# Patient Record
Sex: FEMALE | Race: BLACK OR AFRICAN AMERICAN | Employment: UNEMPLOYED | URBAN - METROPOLITAN AREA
[De-identification: names, ages, dates, MRNs, and addresses within clinical notes are randomized per-mention and may not be internally consistent; named-entity substitution may affect disease eponyms.]

---

## 2017-02-21 ENCOUNTER — HOSPITAL ENCOUNTER (INPATIENT)
Age: 62
LOS: 3 days | Discharge: HOME | DRG: 872 | End: 2017-02-24
Attending: EMERGENCY MEDICINE | Admitting: FAMILY MEDICINE
Payer: COMMERCIAL

## 2017-02-21 DIAGNOSIS — R65.10 SIRS (SYSTEMIC INFLAMMATORY RESPONSE SYNDROME) (HCC): ICD-10-CM

## 2017-02-21 DIAGNOSIS — N12 PYELONEPHRITIS: Primary | ICD-10-CM

## 2017-02-21 LAB
ALBUMIN SERPL BCP-MCNC: 3.8 G/DL (ref 3.4–5)
ALBUMIN/GLOB SERPL: 0.8 {RATIO} (ref 0.8–1.7)
ALP SERPL-CCNC: 47 U/L (ref 45–117)
ALT SERPL-CCNC: 23 U/L (ref 13–56)
ANION GAP BLD CALC-SCNC: 11 MMOL/L (ref 3–18)
APPEARANCE UR: ABNORMAL
AST SERPL W P-5'-P-CCNC: 15 U/L (ref 15–37)
BACTERIA URNS QL MICRO: ABNORMAL /HPF
BASOPHILS # BLD AUTO: 0 K/UL (ref 0–0.1)
BASOPHILS # BLD: 0 % (ref 0–2)
BILIRUB SERPL-MCNC: 0.5 MG/DL (ref 0.2–1)
BILIRUB UR QL: NEGATIVE
BUN SERPL-MCNC: 17 MG/DL (ref 7–18)
BUN/CREAT SERPL: 18 (ref 12–20)
CALCIUM SERPL-MCNC: 9.7 MG/DL (ref 8.5–10.1)
CHLORIDE SERPL-SCNC: 100 MMOL/L (ref 100–108)
CO2 SERPL-SCNC: 24 MMOL/L (ref 21–32)
COLOR UR: YELLOW
CREAT SERPL-MCNC: 0.93 MG/DL (ref 0.6–1.3)
DIFFERENTIAL METHOD BLD: ABNORMAL
EOSINOPHIL # BLD: 0 K/UL (ref 0–0.4)
EOSINOPHIL NFR BLD: 0 % (ref 0–5)
EPITH CASTS URNS QL MICRO: ABNORMAL /LPF (ref 0–5)
ERYTHROCYTE [DISTWIDTH] IN BLOOD BY AUTOMATED COUNT: 13.2 % (ref 11.6–14.5)
GLOBULIN SER CALC-MCNC: 4.5 G/DL (ref 2–4)
GLUCOSE SERPL-MCNC: 99 MG/DL (ref 74–99)
GLUCOSE UR STRIP.AUTO-MCNC: NEGATIVE MG/DL
HCT VFR BLD AUTO: 37.5 % (ref 35–45)
HGB BLD-MCNC: 13.2 G/DL (ref 12–16)
HGB UR QL STRIP: ABNORMAL
KETONES UR QL STRIP.AUTO: ABNORMAL MG/DL
LACTATE BLD-SCNC: 1 MMOL/L (ref 0.4–2)
LEUKOCYTE ESTERASE UR QL STRIP.AUTO: ABNORMAL
LYMPHOCYTES # BLD AUTO: 18 % (ref 21–52)
LYMPHOCYTES # BLD: 3.6 K/UL (ref 0.9–3.6)
MCH RBC QN AUTO: 30.5 PG (ref 24–34)
MCHC RBC AUTO-ENTMCNC: 35.2 G/DL (ref 31–37)
MCV RBC AUTO: 86.6 FL (ref 74–97)
MONOCYTES # BLD: 1.8 K/UL (ref 0.05–1.2)
MONOCYTES NFR BLD AUTO: 9 % (ref 3–10)
NEUTS SEG # BLD: 14.4 K/UL (ref 1.8–8)
NEUTS SEG NFR BLD AUTO: 73 % (ref 40–73)
NITRITE UR QL STRIP.AUTO: POSITIVE
PH UR STRIP: 5.5 [PH] (ref 5–8)
PLATELET # BLD AUTO: 330 K/UL (ref 135–420)
PMV BLD AUTO: 10.9 FL (ref 9.2–11.8)
POTASSIUM SERPL-SCNC: 3.1 MMOL/L (ref 3.5–5.5)
PROT SERPL-MCNC: 8.3 G/DL (ref 6.4–8.2)
PROT UR STRIP-MCNC: 100 MG/DL
RBC # BLD AUTO: 4.33 M/UL (ref 4.2–5.3)
RBC #/AREA URNS HPF: ABNORMAL /HPF (ref 0–5)
SODIUM SERPL-SCNC: 135 MMOL/L (ref 136–145)
SP GR UR REFRACTOMETRY: 1.01 (ref 1–1.03)
UROBILINOGEN UR QL STRIP.AUTO: 1 EU/DL (ref 0.2–1)
WBC # BLD AUTO: 19.9 K/UL (ref 4.6–13.2)
WBC URNS QL MICRO: ABNORMAL /HPF (ref 0–4)

## 2017-02-21 PROCEDURE — 80053 COMPREHEN METABOLIC PANEL: CPT | Performed by: EMERGENCY MEDICINE

## 2017-02-21 PROCEDURE — 96375 TX/PRO/DX INJ NEW DRUG ADDON: CPT

## 2017-02-21 PROCEDURE — 65270000029 HC RM PRIVATE

## 2017-02-21 PROCEDURE — 74011000258 HC RX REV CODE- 258: Performed by: NURSE PRACTITIONER

## 2017-02-21 PROCEDURE — 74011250637 HC RX REV CODE- 250/637: Performed by: NURSE PRACTITIONER

## 2017-02-21 PROCEDURE — 83605 ASSAY OF LACTIC ACID: CPT

## 2017-02-21 PROCEDURE — 99283 EMERGENCY DEPT VISIT LOW MDM: CPT

## 2017-02-21 PROCEDURE — 81001 URINALYSIS AUTO W/SCOPE: CPT | Performed by: EMERGENCY MEDICINE

## 2017-02-21 PROCEDURE — 85025 COMPLETE CBC W/AUTO DIFF WBC: CPT | Performed by: EMERGENCY MEDICINE

## 2017-02-21 PROCEDURE — 96361 HYDRATE IV INFUSION ADD-ON: CPT

## 2017-02-21 PROCEDURE — 74011250636 HC RX REV CODE- 250/636: Performed by: EMERGENCY MEDICINE

## 2017-02-21 PROCEDURE — 96365 THER/PROPH/DIAG IV INF INIT: CPT

## 2017-02-21 PROCEDURE — 74011250636 HC RX REV CODE- 250/636: Performed by: NURSE PRACTITIONER

## 2017-02-21 PROCEDURE — 87040 BLOOD CULTURE FOR BACTERIA: CPT | Performed by: NURSE PRACTITIONER

## 2017-02-21 RX ORDER — LEVOFLOXACIN 5 MG/ML
500 INJECTION, SOLUTION INTRAVENOUS
Status: COMPLETED | OUTPATIENT
Start: 2017-02-21 | End: 2017-02-21

## 2017-02-21 RX ORDER — METOCLOPRAMIDE HYDROCHLORIDE 5 MG/ML
10 INJECTION INTRAMUSCULAR; INTRAVENOUS EVERY 6 HOURS
Status: DISCONTINUED | OUTPATIENT
Start: 2017-02-21 | End: 2017-02-22

## 2017-02-21 RX ORDER — ACETAMINOPHEN 500 MG
500 TABLET ORAL
Status: COMPLETED | OUTPATIENT
Start: 2017-02-21 | End: 2017-02-21

## 2017-02-21 RX ORDER — ACETAMINOPHEN 325 MG/1
650 TABLET ORAL
Status: DISCONTINUED | OUTPATIENT
Start: 2017-02-21 | End: 2017-02-24 | Stop reason: HOSPADM

## 2017-02-21 RX ADMIN — LEVOFLOXACIN 500 MG: 5 INJECTION, SOLUTION INTRAVENOUS at 20:44

## 2017-02-21 RX ADMIN — ACETAMINOPHEN 500 MG: 500 TABLET, FILM COATED ORAL at 19:35

## 2017-02-21 RX ADMIN — SODIUM CHLORIDE 1800 ML: 900 INJECTION, SOLUTION INTRAVENOUS at 19:23

## 2017-02-21 RX ADMIN — METOCLOPRAMIDE 10 MG: 5 INJECTION, SOLUTION INTRAMUSCULAR; INTRAVENOUS at 18:07

## 2017-02-21 RX ADMIN — PIPERACILLIN AND TAZOBACTAM 3.38 G: 3; .375 INJECTION, POWDER, LYOPHILIZED, FOR SOLUTION INTRAVENOUS; PARENTERAL at 19:35

## 2017-02-22 ENCOUNTER — APPOINTMENT (OUTPATIENT)
Dept: ULTRASOUND IMAGING | Age: 62
DRG: 872 | End: 2017-02-22
Attending: FAMILY MEDICINE
Payer: COMMERCIAL

## 2017-02-22 PROBLEM — A41.9 SEPSIS (HCC): Status: ACTIVE | Noted: 2017-02-22

## 2017-02-22 PROCEDURE — 74011250637 HC RX REV CODE- 250/637: Performed by: FAMILY MEDICINE

## 2017-02-22 PROCEDURE — 87086 URINE CULTURE/COLONY COUNT: CPT | Performed by: FAMILY MEDICINE

## 2017-02-22 PROCEDURE — 74011250636 HC RX REV CODE- 250/636: Performed by: EMERGENCY MEDICINE

## 2017-02-22 PROCEDURE — 76770 US EXAM ABDO BACK WALL COMP: CPT

## 2017-02-22 PROCEDURE — 74011250636 HC RX REV CODE- 250/636: Performed by: FAMILY MEDICINE

## 2017-02-22 PROCEDURE — 65270000029 HC RM PRIVATE

## 2017-02-22 RX ORDER — HYDROCODONE BITARTRATE AND ACETAMINOPHEN 5; 325 MG/1; MG/1
1 TABLET ORAL
Status: DISCONTINUED | OUTPATIENT
Start: 2017-02-22 | End: 2017-02-24 | Stop reason: HOSPADM

## 2017-02-22 RX ORDER — SODIUM CHLORIDE 0.9 % (FLUSH) 0.9 %
5-10 SYRINGE (ML) INJECTION AS NEEDED
Status: DISCONTINUED | OUTPATIENT
Start: 2017-02-22 | End: 2017-02-24 | Stop reason: HOSPADM

## 2017-02-22 RX ORDER — LEVOFLOXACIN 5 MG/ML
500 INJECTION, SOLUTION INTRAVENOUS
Status: COMPLETED | OUTPATIENT
Start: 2017-02-22 | End: 2017-02-22

## 2017-02-22 RX ORDER — DULOXETIN HYDROCHLORIDE 20 MG/1
60 CAPSULE, DELAYED RELEASE ORAL DAILY
Status: DISCONTINUED | OUTPATIENT
Start: 2017-02-22 | End: 2017-02-24 | Stop reason: HOSPADM

## 2017-02-22 RX ORDER — ENOXAPARIN SODIUM 100 MG/ML
40 INJECTION SUBCUTANEOUS EVERY 24 HOURS
Status: DISCONTINUED | OUTPATIENT
Start: 2017-02-22 | End: 2017-02-24 | Stop reason: HOSPADM

## 2017-02-22 RX ORDER — OLANZAPINE 5 MG/1
10 TABLET, ORALLY DISINTEGRATING ORAL
Status: DISCONTINUED | OUTPATIENT
Start: 2017-02-22 | End: 2017-02-24 | Stop reason: HOSPADM

## 2017-02-22 RX ORDER — SODIUM CHLORIDE 0.9 % (FLUSH) 0.9 %
5-10 SYRINGE (ML) INJECTION EVERY 8 HOURS
Status: DISCONTINUED | OUTPATIENT
Start: 2017-02-22 | End: 2017-02-24 | Stop reason: HOSPADM

## 2017-02-22 RX ORDER — DOCUSATE SODIUM 100 MG/1
100 CAPSULE, LIQUID FILLED ORAL
Status: DISCONTINUED | OUTPATIENT
Start: 2017-02-22 | End: 2017-02-24 | Stop reason: HOSPADM

## 2017-02-22 RX ORDER — THERA TABS 400 MCG
1 TAB ORAL DAILY
Status: DISCONTINUED | OUTPATIENT
Start: 2017-02-22 | End: 2017-02-24 | Stop reason: HOSPADM

## 2017-02-22 RX ORDER — ACETAMINOPHEN 325 MG/1
650 TABLET ORAL
Status: DISCONTINUED | OUTPATIENT
Start: 2017-02-22 | End: 2017-02-22

## 2017-02-22 RX ADMIN — Medication 10 ML: at 13:20

## 2017-02-22 RX ADMIN — LEVOFLOXACIN 500 MG: 5 INJECTION, SOLUTION INTRAVENOUS at 09:03

## 2017-02-22 RX ADMIN — HYDROCODONE BITARTRATE AND ACETAMINOPHEN 1 TABLET: 5; 325 TABLET ORAL at 11:13

## 2017-02-22 RX ADMIN — HYDROCODONE BITARTRATE AND ACETAMINOPHEN 1 TABLET: 5; 325 TABLET ORAL at 22:59

## 2017-02-22 RX ADMIN — ACETAMINOPHEN 650 MG: 325 TABLET, FILM COATED ORAL at 00:10

## 2017-02-22 RX ADMIN — Medication 10 ML: at 23:01

## 2017-02-22 RX ADMIN — Medication 10 ML: at 09:04

## 2017-02-22 RX ADMIN — METOCLOPRAMIDE 10 MG: 5 INJECTION, SOLUTION INTRAMUSCULAR; INTRAVENOUS at 06:03

## 2017-02-22 RX ADMIN — THERA TABS 1 TABLET: TAB at 09:05

## 2017-02-22 RX ADMIN — DULOXETINE HYDROCHLORIDE 60 MG: 20 CAPSULE, DELAYED RELEASE ORAL at 09:05

## 2017-02-22 RX ADMIN — ACETAMINOPHEN 650 MG: 325 TABLET, FILM COATED ORAL at 06:03

## 2017-02-22 RX ADMIN — METOCLOPRAMIDE 10 MG: 5 INJECTION, SOLUTION INTRAMUSCULAR; INTRAVENOUS at 00:10

## 2017-02-22 RX ADMIN — ENOXAPARIN SODIUM 40 MG: 40 INJECTION SUBCUTANEOUS at 09:04

## 2017-02-22 NOTE — ROUTINE PROCESS
Bedside and Verbal shift change report given to Mattie Hernandez RN (oncoming nurse) by Keegan Granados RN (offgoing nurse). Report included the following information SBAR, Kardex, MAR and Recent Results.     SITUATION:    Code Status: Full Code  Reason for Admission: Pyelonephritis  SIRS (systemic inflammatory response syndrome) ([facility] [facility]   Sepsis ([facility] [facility]    [facility] day: 1   Problem List:       Hospital Problems  Never Reviewed          Codes Class Noted POA    Sepsis ([facility] [facility] ICD-10-CM: A41.9  ICD-9-CM: 038.9, 995.91  2/22/2017 Unknown        Pyelonephritis ICD-10-CM: N12  ICD-9-CM: 590.80  2/21/2017 Unknown        SIRS (systemic inflammatory response syndrome) ([facility] [facility] ICD-10-CM: R65.10  ICD-9-CM: 995.90  2/21/2017 Unknown              BACKGROUND:    Past Medical History:   Past Medical History:   Diagnosis Date    Depression     Gastrointestinal disorder Pancreatitis    Pancreatic disease     Pancreatitis     Psychiatric disorder          Patient taking anticoagulants no     ASSESSMENT:    Changes in Assessment Throughout Shift: none     Patient has Central Line: no Reasons if yes: n/a   Patient has Cisneros Cath: no Reasons if yes: n/a      Last Vitals:     Vitals:    02/22/17 0602 02/22/17 0815 02/22/17 1238 02/22/17 1600   BP: 147/77 133/78 116/77 138/86   Pulse:  76 81 82   Resp: 18 18 18 18   Temp: 99.1 °F (37.3 °C) 98.4 °F (36.9 °C) 97 °F (36.1 °C) 98.4 °F (36.9 °C)   SpO2: 99% 98% 100% 96%   Weight:       Height:            IV and DRAINS (will only show if present)   Peripheral IV 02/21/17 Left Antecubital-Site Assessment: Clean, dry, & intact  Peripheral IV 02/21/17 Right Antecubital-Site Assessment: Clean, dry, & intact     WOUND (if present)   Wound Type:  none   Dressing present Dressing Present : No   Wound Concerns/Notes:  none     PAIN    Pain Assessment    Pain Intensity 1: 0 (02/22/17 1600)    Pain Location 1: Generalized    Pain Intervention(s) 1: Medication (see MAR)    Patient Stated Pain Goal: 0  o Interventions for Pain:  Pain meds  o Intervention effective: yes  o Time of last intervention: 0600   o Reassessment Completed: yes      Last 3 Weights:  Last 3 Recorded Weights in this Encounter    02/21/17 1501   Weight: 51.3 kg (113 lb)     Weight change:      INTAKE/OUPUT    Current Shift:      Last three shifts: 02/20 1901 - 02/22 0700  In: 250 [P.O.:250]  Out: 300 [Urine:300]     LAB RESULTS     Recent Labs      02/21/17   1442   WBC  19.9*   HGB  13.2   HCT  37.5   PLT  330        Recent Labs      02/21/17   1442   NA  135*   K  3.1*   GLU  99   BUN  17   CREA  0.93   CA  9.7       RECOMMENDATIONS AND DISCHARGE PLANNING     1. Pending tests/procedures/ Plan of Care or Other Needs: follow up labs     2. Discharge plan for patient and Needs/Barriers: TBD    3. Estimated Discharge Date: 2/25/17 Posted on Whiteboard in [facility]: yes      4. The patient's care plan was reviewed with the oncoming nurse. \"HEALS\" SAFETY CHECK      Fall Risk    Total Score: 1    Safety Measures: Safety Measures: Bed/Chair-Wheels locked, Bed in low position, Call light within reach    A safety check occurred in the patient's room between off going nurse and oncoming nurse listed above. The safety check included the below items  Area Items   H  High Alert Medications - Verify all high alert medication drips (heparin, PCA, etc.)   E  Equipment - Suction is set up for ALL patients (with yanker)  - Red plugs utilized for all equipment (IV pumps, etc.)  - WOWs wiped down at end of shift.  - Room stocked with oxygen, suction, and other unit-specific supplies   A  Alarms - Bed alarm is set for fall risk patients  - Ensure chair alarm is in place and activated if patient is up in a chair   L  Lines - Check IV for any infiltration  - Cisneros bag is empty if patient has a Cisneros   - Tubing and IV bags are labeled   S  Safety   - Room is clean, patient is clean, and equipment is clean.   - Hallways are clear from equipment besides carts. - Fall bracelet on for fall risk patients  - Ensure room is clear and free of clutter  - Suction is set up for ALL patients (with steve)  - Hallways are clear from equipment besides carts.    - Isolation precautions followed, supplies available outside room, sign posted     Ahmet Saucedo RN

## 2017-02-22 NOTE — PROGRESS NOTES
NUTRITION    BPA/MST Referral       RECOMMENDATIONS / PLAN:     - Add nutrition supplement: Ensure Enlive TID  - Add food preferences  - Continue RD inpatient monitoring and evaluation. NUTRITION INTERVENTIONS & DIAGNOSIS:     [x] Meals/Snacks: regular diet  [x] Medical food supplementation: add  [x] Vitamin and mineral supplementation: multivitamin    Nutrition Diagnosis: Inadequate energy intake related to poor appetite as evidenced by poor meal intake PTA and since admission    ASSESSMENT:     Subjective/Objective:  Patient reported having poor appetite and po intake PTA. Had poor po intake today. Discussed food preferences. Discussed adding nutrition supplement; pt agreed with plan  Current Appetite:   [] Good     [] Fair     [x] Poor     [] Other:  Appetite/meal intake prior to admission:   [] Good     [] Fair     [x] Poor (for a while)    [] Other:    Diet: DIET REGULAR      Food Allergies:  None known   Feeding Limitations:  [] Swallowing difficulty    [] Chewing difficulty    [x] Other: wears dentures; pt stated tolerates regular consistency diet  Current Meal Intake: No data found. Average po intake adequate to meet patients estimated nutritional needs:   [] Yes     [x] No   [] Unable to determine at this time    BM:  Unknown   Skin Integrity:  No pressure ulcer or wound  Edema:  None   Pertinent Medications: Reviewed    Recent Labs      02/21/17   1442   NA  135*   K  3.1*   CL  100   CO2  24   GLU  99   BUN  17   CREA  0.93   CA  9.7   ALB  3.8   SGOT  15   ALT  23       Intake/Output Summary (Last 24 hours) at 02/22/17 1508  Last data filed at 02/22/17 0358   Gross per 24 hour   Intake              250 ml   Output              300 ml   Net              -50 ml       Anthropometrics:  Ht Readings from Last 1 Encounters:   02/21/17 5' 2\" (1.575 m)     Last 3 Recorded Weights in this Encounter    02/21/17 1501   Weight: 51.3 kg (113 lb)     Body mass index is 20.67 kg/(m^2).           Weight History: Patient reported having weighed 72 lb and gaining weight, as planned, and was recently 115 lbs. Reported noticing some weight loss; pt reported that her \"jeans fit a little big\". Weight Metrics 2/21/2017 7/15/2016 4/8/2016 11/24/2015 11/20/2015 1/17/2014 1/16/2014   Weight 113 lb 97 lb 101 lb 3 oz 93 lb 93 lb 3.2 oz - 115 lb   BMI 20.67 kg/m2 17.74 kg/m2 17.93 kg/m2 16.48 kg/m2 16.51 kg/m2 19.73 kg/m2 -   Some encounter information is confidential and restricted. Go to Review Flowsheets activity to see all data. Admitting Diagnosis: Pyelonephritis  SIRS (systemic inflammatory response syndrome) (HCC)  Sepsis ([facility] [address]  Past Medical History:   Diagnosis Date    Depression     Gastrointestinal disorder Pancreatitis    Pancreatic disease     Pancreatitis     Psychiatric disorder        Education Needs:        [x] None identified  [] Identified - Not appropriate at this time  []  Identified and addressed - refer to education log  Learning Limitations:   [x] None identified  [] Identified    Cultural, Adventist & ethnic food preferences:  [x] None identified    [] Identified and addressed     ESTIMATED NUTRITION NEEDS:     Calories: 4369-3925 kcal (MSJx1.2-1.3) based on  [x] Actual BW: 51 kg     [] IBW:   Protein: 41-51 gm (0.8-1 gm/kg) based on  [x] Actual BW:  51 kg    [] IBW:   Fluid: 1 mL/kcal     MONITORING & EVALUATION:     Nutrition Goal(s):   1. Po intake of meals will meet >75% of patient estimated nutritional needs within the next 5-7 days.   Outcome:  [] Met/Ongoing    []  Not Met    [x] New/Initial Goal     Monitoring:   [x] Diet tolerance   [x] Meal intake   [x] Supplement intake   [] GI symptoms/ability to tolerate po diet   [] Respiratory status   [] Plan of care      Previous Recommendations (for follow-up assessments only):     []   Implemented       []   Not Implemented (RD to address)     [] No Recommendation Made     Discharge Planning:   Regular diet   [x] Participated in care planning, discharge planning, & interdisciplinary rounds as appropriate      Shawna Gamboa, [address] [place] [address]   Pager: [phone]

## 2017-02-22 NOTE — ROUTINE PROCESS
Bedside and Verbal shift change report given to [address] (oncoming nurse) by Ольга Atkins RN (offgoing nurse). Report included the following information SBAR, Kardex, MAR and Recent Results.     SITUATION:    Code Status: No Order   Reason for Admission: Pyelonephritis   SIRS (systemic inflammatory response syndrome) ([facility] [address]     Hospital day: 1   Problem List:       Hospital Problems  Never Reviewed          Codes Class Noted POA    Pyelonephritis ICD-10-CM: N12  ICD-9-CM: 590.80  2/21/2017 Unknown        SIRS (systemic inflammatory response syndrome) ([facility]) ICD-10-CM: R65.10  ICD-9-CM: 995.90  2/21/2017 Unknown              BACKGROUND:    Past Medical History:   Past Medical History:   Diagnosis Date    Depression     Gastrointestinal disorder Pancreatitis    Pancreatic disease     Pancreatitis     Psychiatric disorder          Patient taking anticoagulants no     ASSESSMENT:    Changes in Assessment Throughout Shift: none     Patient has Central Line: no Reasons if yes: n/a   Patient has Cisneros Cath: no Reasons if yes: n/a      Last Vitals:     Vitals:    02/21/17 1501 02/21/17 1922 02/21/17 2128 02/22/17 0009   BP: (!) 158/91 (!) 142/93 169/82 150/82   Pulse: (!) 113 (!) 103 84    Resp: 16 18 18 18   Temp: 98.6 °F (37 °C) 100.3 °F (37.9 °C) 99 °F (37.2 °C) 99.4 °F (37.4 °C)   SpO2: 97% 100% 100% 99%   Weight: 51.3 kg (113 lb)      Height: 5' 2\" (1.575 m)           IV and DRAINS (will only show if present)   Peripheral IV 02/21/17 Left Antecubital-Site Assessment: Clean, dry, & intact  Peripheral IV 02/21/17 Right Antecubital-Site Assessment: Clean, dry, & intact     WOUND (if present)   Wound Type:  none   Dressing present Dressing Present : No   Wound Concerns/Notes:  none     PAIN    Pain Assessment    Pain Intensity 1: 0 (02/22/17 0358)    Pain Location 1: Head    Pain Intervention(s) 1: Medication (see MAR)    Patient Stated Pain Goal: 1  o Interventions for Pain:  Pain meds  o Intervention effective: yes  o Time of last intervention: 0600   o Reassessment Completed: yes      Last 3 Weights:  Last 3 Recorded Weights in this Encounter    02/21/17 1501   Weight: 51.3 kg (113 lb)     Weight change:      INTAKE/OUPUT    Current Shift: 02/21 1901 - 02/22 0700  In: 250 [P.O.:250]  Out: 300 [Urine:300]    Last three shifts:       LAB RESULTS     Recent Labs      02/21/17   1442   WBC  19.9*   HGB  13.2   HCT  37.5   PLT  330        Recent Labs      02/21/17   1442   NA  135*   K  3.1*   GLU  99   BUN  17   CREA  0.93   CA  9.7       RECOMMENDATIONS AND DISCHARGE PLANNING     1. Pending tests/procedures/ Plan of Care or Other Needs: follow up labs     2. Discharge plan for patient and Needs/Barriers: TBD    3. Estimated Discharge Date: 2/25/17 Posted on Whiteboard in [facility]: yes      4. The patient's care plan was reviewed with the oncoming nurse. \"HEALS\" SAFETY CHECK      Fall Risk    Total Score: 1    Safety Measures: Safety Measures: Bed/Chair-Wheels locked, Bed in low position, Call light within reach    A safety check occurred in the patient's room between off going nurse and oncoming nurse listed above. The safety check included the below items  Area Items   H  High Alert Medications - Verify all high alert medication drips (heparin, PCA, etc.)   E  Equipment - Suction is set up for ALL patients (with yanker)  - Red plugs utilized for all equipment (IV pumps, etc.)  - WOWs wiped down at end of shift.  - Room stocked with oxygen, suction, and other unit-specific supplies   A  Alarms - Bed alarm is set for fall risk patients  - Ensure chair alarm is in place and activated if patient is up in a chair   L  Lines - Check IV for any infiltration  - Cisneros bag is empty if patient has a Cisneros   - Tubing and IV bags are labeled   S  Safety   - Room is clean, patient is clean, and equipment is clean. - Hallways are clear from equipment besides carts.    - Fall bracelet on for fall risk patients  - Ensure room is clear and free of clutter  - Suction is set up for ALL patients (with huyker)  - Hallways are clear from equipment besides carts.    - Isolation precautions followed, supplies available outside room, sign posted     Nelson Tobias RN

## 2017-02-22 NOTE — ED PROVIDER NOTES
HPI Comments: Pt with a PMH of depression presents with the chief complaint of headache since yesterday and incidental blurred vision for several months that is unchanged. Pt was recently seen by her PCP 4 days ago with the complaint of dysuria and foot rash and placed on unknown antibiotic for \"blood infection\". Pt states that no urine was checked at that time. She states that she has been feeling progressively worse since then. She denies any upper respiratory symptoms, cough, chest or abdominal pain, nausea, vomiting or diarrhea. Pt also denies any weakness, numbness or tingling. Pt was unaware of her fever and denies chills. No other complaints at this time. Patient is a 57 y.o. female presenting with headaches, blurred vision, and vomiting. Headache   Associated symptoms include headaches. Pertinent negatives include no chest pain, no abdominal pain and no shortness of breath. Blurred Vision    Associated symptoms include blurred vision, nausea, vomiting and a fever. Pertinent negatives include no numbness, no photophobia, no eye redness, no weakness, no pain and no dizziness. Vomiting    Associated symptoms include a fever, headaches and headaches. Pertinent negatives include no chills, no abdominal pain, no diarrhea and no cough. Past Medical History:   Diagnosis Date    Depression     Gastrointestinal disorder Pancreatitis    Pancreatic disease     Pancreatitis     Psychiatric disorder        Past Surgical History:   Procedure Laterality Date    Hx gyn      Hx hysterectomy           Family History:   Problem Relation Age of Onset    Diabetes Maternal Aunt     Cancer Maternal Aunt     Alzheimer Maternal Aunt     Cancer Paternal Aunt     Diabetes Paternal Aunt        Social History     Social History    Marital status:      Spouse name: N/A    Number of children: N/A    Years of education: N/A     Occupational History    Not on file.      Social History Main Topics    Smoking status: Current Every Day Smoker     Packs/day: 1.00    Smokeless tobacco: Not on file    Alcohol use No      Comment: Per pt she quit in 2007    Drug use: No    Sexual activity: Not Currently     Other Topics Concern    Not on file     Social History Narrative         ALLERGIES: Review of patient's allergies indicates no known allergies. Review of Systems   Constitutional: Positive for activity change, appetite change and fever. Negative for chills and diaphoresis. HENT: Negative for congestion, ear pain, rhinorrhea, sinus pressure and sore throat. Eyes: Positive for blurred vision and visual disturbance. Negative for photophobia, pain and redness. Bilateral blurriness for several months, unchanged per pt   Respiratory: Negative. Negative for cough, chest tightness, shortness of breath, wheezing and stridor. Cardiovascular: Negative. Negative for chest pain and palpitations. Vomiting x 1 yesterday, no appetite today   Gastrointestinal: Positive for nausea and vomiting. Negative for abdominal pain and diarrhea. Genitourinary: Positive for dysuria and flank pain. Negative for difficulty urinating, hematuria, pelvic pain and vaginal discharge. Musculoskeletal: Positive for back pain. Negative for neck pain and neck stiffness. Bilateral flank pain   Skin: Negative. Neurological: Positive for headaches. Negative for dizziness, tremors, seizures, syncope, facial asymmetry, speech difficulty, weakness, light-headedness and numbness. Hematological: Does not bruise/bleed easily. All other systems reviewed and are negative. Vitals:    02/21/17 1501 02/21/17 1922   BP: (!) 158/91 (!) 142/93   Pulse: (!) 113 (!) 103   Resp: 16 18   Temp: 98.6 °F (37 °C) 100.3 °F (37.9 °C)   SpO2: 97% 100%   Weight: 51.3 kg (113 lb)    Height: 5' 2\" (1.575 m)             Physical Exam   Constitutional: She is oriented to person, place, and time.  She appears well-developed and well-nourished. No distress. HENT:   Head: Normocephalic and atraumatic. Mouth/Throat: Oropharynx is clear and moist.   Eyes: Conjunctivae and EOM are normal. Pupils are equal, round, and reactive to light. Right eye exhibits no discharge. Left eye exhibits no discharge. Neck: Normal range of motion. Neck supple. Cardiovascular: Regular rhythm and normal heart sounds. Exam reveals no gallop and no friction rub. No murmur heard. Tachycardia with fever   Pulmonary/Chest: Effort normal and breath sounds normal. No stridor. No respiratory distress. She has no wheezes. She has no rales. She exhibits no tenderness. Abdominal: Soft. Bowel sounds are normal. She exhibits no distension and no mass. There is no tenderness. There is no rebound and no guarding. Genitourinary:   Genitourinary Comments: +bilateral flank pain   Musculoskeletal: Normal range of motion. She exhibits no edema or tenderness. Neurological: She is alert and oriented to person, place, and time. Coordination normal.   Skin: Skin is warm and dry. She is not diaphoretic. Psychiatric: She has a normal mood and affect. Her behavior is normal. Thought content normal.   Nursing note and vitals reviewed. MDM  Number of Diagnoses or Management Options  Pyelonephritis:   SIRS (systemic inflammatory response syndrome) [facility]):   Diagnosis management comments: Discussed with Dr. Quan Nicholson immediately after seeing pt-LA is normal but will treat as SIRS due to labs and vital signs. Pt in NAD   Pt given IV Levaquin and Zosyn, 1800 cc NS bolus, discussed and admitted to Dr. David Mccoy who concurred with plan.         Amount and/or Complexity of Data Reviewed  Clinical lab tests: ordered and reviewed    Risk of Complications, Morbidity, and/or Mortality  Presenting problems: moderate  Diagnostic procedures: moderate  Management options: moderate    Patient Progress  Patient progress: stable    ED Course       Procedures                       Diagnosis: 1. Pyelonephritis    2. SIRS (systemic inflammatory response syndrome) ([facility])          Disposition: admit      Follow-up Information     None          Patient's Medications   Start Taking    No medications on file   Continue Taking    DOCUSATE SODIUM (COLACE) 100 MG CAPSULE    Take 100 mg by mouth two (2) times daily as needed for Constipation. DULOXETINE (CYMBALTA) 60 MG CAPSULE    Take 1 Cap by mouth daily. Indications: MAJOR DEPRESSIVE DISORDER    OLANZAPINE (ZYPREXA ZYDIS) 10 MG DISINTEGRATING TABLET    Take 1 Tab by mouth nightly. Indications: Psychosis    THERAPEUTIC MULTIVITAMIN (THERAGRAN) TABLET    Take 1 Tab by mouth daily.  Indications: VITAMIN DEFICIENCY PREVENTION   These Medications have changed    No medications on file   Stop Taking    No medications on file

## 2017-02-22 NOTE — INTERDISCIPLINARY ROUNDS
IDR/SLIDR Summary          Patient: Leopoldo Hernandez MRN: [id number]    Age: 57 y.o. YOB: 1955 Room/Bed: [facility]   Admit Diagnosis: Pyelonephritis  SIRS (systemic inflammatory response syndrome) (HCC)  Sepsis (HCC)  Principal Diagnosis: <principal problem not specified>   Goals: infection control  Readmission: NO  Quality Measure: SEPSIS  VTE Prophylaxis: Not needed  Influenza Vaccine screening completed? YES  Pneumococcal Vaccine screening completed? YES  Mobility needs: No   Nutrition plan:Yes  Consults:    Financial concerns:No  Escalated to CM? NO  RRAT Score: 12   Interventions:  Testing due for pt today?  NO  LOS: 1 days Expected length of stay 3 days  Discharge plan: home   PCP: José Miguel Carreon MD  Transportation needs: No    Days before discharge:two or more days before discharge   Discharge disposition: Home    Signed:     Janki Quintero RN  2/22/2017  10:21 PM

## 2017-02-22 NOTE — PROGRESS NOTES
Care Management Interventions  PCP Verified by CM: Yes (DR. Barbra Song)  Palliative Care Consult (Criteria: CHF and RRAT>21): No  Reason for No Palliative Care Consult: Other (see comment) (NO ORDER)  Mode of Transport at Discharge:  Other (see comment) (FRIENDS OR PT'S FAMILY WILL TRANSPORT )  Transition of Care Consult (CM Consult): Discharge Planning  Current Support Network: Own Home, Family Lives Nearby  Confirm Follow Up Transport: Self  Plan discussed with Pt/Family/Caregiver: Yes (PT [facility])  Discharge Location  Discharge Placement: Home with family assistance

## 2017-02-22 NOTE — PROGRESS NOTES
H&P dictated  1. UTI sepsis  2. Dehydratio  3. Mod protein selwyn malnutrition  4.  Depression    Plan  See orders

## 2017-02-22 NOTE — INTERDISCIPLINARY ROUNDS
IDR/SLIDR Summary          Patient: Eyal Vinson MRN: [id number]    Age: 57 y.o. YOB: 1955 Room/Bed: [facility]   Admit Diagnosis: Pyelonephritis  SIRS (systemic inflammatory response syndrome) (HCC)  Principal Diagnosis: <principal problem not specified>   Goals: infection control  Readmission: NO  Quality Measure: SEPSIS  VTE Prophylaxis: Not needed  Influenza Vaccine screening completed? YES  Pneumococcal Vaccine screening completed? YES  Mobility needs: No   Nutrition plan:Yes  Consults:    Financial concerns:No  Escalated to ? NO  RRAT Score: 12   Interventions:  Testing due for pt today?  NO  LOS: 0 days Expected length of stay 3 days  Discharge plan: home   PCP: Heather Rosa MD  Transportation needs: No    Days before discharge:two or more days before discharge   Discharge disposition: Home    Signed:     Mike Rain RN  2/21/2017  10:21 PM

## 2017-02-22 NOTE — H&P
[facility]  ROUTINE H AND PS    Name:  Rubén Dumont  MR#:  [id number]  :  1955  Account #:  [de-identified]  Date of Adm:  2017      CHIEF COMPLAINT: Not feeling well, dysuria and headaches. HISTORY OF PRESENT ILLNESS: This is a 62-year-old black female  with history of depression and COPD, who was seen by Dr. Dorothea Singleton 4  days prior to admission with dysuria and was given medication for  urinary tract infection. The patient is not feeling any better. She has  lost her appetite, not eating well. She presented to the ER with  tachycardia, low-grade fever and evidence of urinary tract infection for  which the patient was admitted for further treatment. PAST MEDICAL HISTORY: History of depression, COPD,  hysterectomy for fibroids, depression. FAMILY HISTORY: Father  of heart failure. Mother is alive and  well. SOCIAL HISTORY: She is a . 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 She cannot hold a job. She has  been hospitalized for depression. She had one daughter  who committed suicide. Her   of cancer. She does not  drink alcohol. REVIEW OF SYSTEMS  HEENT: Headaches. Vision is poor. No hearing problem. CARDIOVASCULAR/RESPIRATORY: No chest pain. No shortness of  breath. GASTROINTESTINAL: She had vomiting one time yesterday. GENITOURINARY: She has dysuria and flank pain. MUSCULOSKELETAL: No complaints. PHYSICAL EXAMINATION  GENERAL: The patient is awake and alert. VITAL SIGNS: On admission, her blood pressure is 158/91, pulse is  113, temperature is 100.3. She looks malnourished. HEENT: Head is normocephalic. Eyes, ocular movements intact. Ears,  no discharge. Nose, no septal deviation. Mouth, edentulous. NECK: Thyroid not enlarged. CHEST: Symmetrical. No breast masses. HEART: Regular. No murmur. LUNGS: Clear. ABDOMEN: Revealed midline scar below the umbilicus. EXTREMITIES: Revealed no edema.     LABORATORY DATA: Review of laboratory data revealed WBC too  numerous to count, large blood, positive nitrites, hemoglobin is 13 g,  white count 19,000. Chemistry: BUN 17, creatinine 0.93. IMPRESSION  1. Sepsis, urinary source, which includes tachycardia, leukocytosis,  and fever. 2. Chronic obstructive pulmonary disease. 3. Hypokalemia. 4. Moderate protein-calorie malnutrition. DISCUSSION: The patient will need IV fluids, IV antibiotics, and  potassium replacement.         MD REID Gottlieb / RENAN  D:  02/22/2017   11:10  T:  02/22/2017   11:36  Job #:  [id number]

## 2017-02-23 PROBLEM — S06.0XAA CONCUSSION: Chronic | Status: ACTIVE | Noted: 2017-02-23

## 2017-02-23 PROBLEM — S06.0XAA CONCUSSION: Status: RESOLVED | Noted: 2017-02-23 | Resolved: 2017-02-23

## 2017-02-23 PROBLEM — N39.0 UTI (URINARY TRACT INFECTION): Status: ACTIVE | Noted: 2017-02-23

## 2017-02-23 PROBLEM — S06.0XAA CONCUSSION: Status: ACTIVE | Noted: 2017-02-23

## 2017-02-23 LAB
BASOPHILS # BLD AUTO: 0 K/UL (ref 0–0.06)
BASOPHILS # BLD: 0 % (ref 0–2)
DIFFERENTIAL METHOD BLD: ABNORMAL
EOSINOPHIL # BLD: 0 K/UL (ref 0–0.4)
EOSINOPHIL NFR BLD: 0 % (ref 0–5)
ERYTHROCYTE [DISTWIDTH] IN BLOOD BY AUTOMATED COUNT: 13 % (ref 11.6–14.5)
HCT VFR BLD AUTO: 33.9 % (ref 35–45)
HGB BLD-MCNC: 11.8 G/DL (ref 12–16)
LYMPHOCYTES # BLD AUTO: 33 % (ref 21–52)
LYMPHOCYTES # BLD: 3.6 K/UL (ref 0.9–3.6)
MCH RBC QN AUTO: 30.2 PG (ref 24–34)
MCHC RBC AUTO-ENTMCNC: 34.8 G/DL (ref 31–37)
MCV RBC AUTO: 86.7 FL (ref 74–97)
MONOCYTES # BLD: 0.9 K/UL (ref 0.05–1.2)
MONOCYTES NFR BLD AUTO: 9 % (ref 3–10)
NEUTS SEG # BLD: 6.4 K/UL (ref 1.8–8)
NEUTS SEG NFR BLD AUTO: 58 % (ref 40–73)
PLATELET # BLD AUTO: 286 K/UL (ref 135–420)
PMV BLD AUTO: 10.3 FL (ref 9.2–11.8)
RBC # BLD AUTO: 3.91 M/UL (ref 4.2–5.3)
WBC # BLD AUTO: 11 K/UL (ref 4.6–13.2)

## 2017-02-23 PROCEDURE — 74011250637 HC RX REV CODE- 250/637: Performed by: FAMILY MEDICINE

## 2017-02-23 PROCEDURE — 65270000029 HC RM PRIVATE

## 2017-02-23 PROCEDURE — 74011250636 HC RX REV CODE- 250/636: Performed by: FAMILY MEDICINE

## 2017-02-23 PROCEDURE — 85025 COMPLETE CBC W/AUTO DIFF WBC: CPT | Performed by: FAMILY MEDICINE

## 2017-02-23 PROCEDURE — 36415 COLL VENOUS BLD VENIPUNCTURE: CPT | Performed by: FAMILY MEDICINE

## 2017-02-23 RX ADMIN — ENOXAPARIN SODIUM 40 MG: 40 INJECTION SUBCUTANEOUS at 08:00

## 2017-02-23 RX ADMIN — OLANZAPINE 10 MG: 5 TABLET, ORALLY DISINTEGRATING ORAL at 00:42

## 2017-02-23 RX ADMIN — THERA TABS 1 TABLET: TAB at 09:00

## 2017-02-23 RX ADMIN — Medication 10 ML: at 23:18

## 2017-02-23 RX ADMIN — OLANZAPINE 10 MG: 5 TABLET, ORALLY DISINTEGRATING ORAL at 23:18

## 2017-02-23 RX ADMIN — Medication 10 ML: at 14:00

## 2017-02-23 RX ADMIN — DULOXETINE HYDROCHLORIDE 60 MG: 20 CAPSULE, DELAYED RELEASE ORAL at 09:00

## 2017-02-23 RX ADMIN — Medication 10 ML: at 06:00

## 2017-02-23 NOTE — PROGRESS NOTES
visited with patient Samuel Alegre who is 57 y.o. of age and Cheondoism is Mandaen   When I entered the room the patient was crawled on the chair by the window and under her blanket. Patient appeared to be alert and aware of her surroundings. Patient shared limited information about both her medical history and spiritual beliefs; and her beliefs will not hinder her medical treatment. Patient talked about family. She has no children and her sadness started after her daughter committed suicide. She felt that she became sad and angry about life situations.  confirmed patient's Cheondoism affiliation, [employer].   I sensed patient to be sad about her prognosis because she mentioned that her medication is giving her headaches and her previous depressive medication was giving her hallucinations.  shared about the Behavioral Medicine floor and patient would like to know more about it. She has not seen her psychiatric to change her previous medicine for depression.  shared patient's concerns to the nurse, , and charge nurse. Patient appreciated my visit   Chaplains will continue to follow and will provide pastoral care as needed.     [address] [facility] Kulwinder Carranza  [phone]

## 2017-02-23 NOTE — PROGRESS NOTES
attended the interdisciplinary rounds for John Armenta, who is a 57 y.o.,female. Patients Primary Language is: [place]. According to the patients EMR Baptism Affiliation is: [facility].   Patient was sitting by the window when IDR team arrived.  shared that she contacted her nutritionist and left a note for her doctor. Patient appreciated 's care and asked to come back to check on her next time. Plan:  Chaplains will continue to follow and will provide pastoral care.     [address] [facility] Kulwinder Carranza  [phone]

## 2017-02-23 NOTE — ROUTINE PROCESS
Bedside and Verbal shift change report given to Suri Oviedo RN (oncoming nurse) by Maryan Kidd RN (offgoing nurse). Report included the following information SBAR, Kardex, MAR and Recent Results.     SITUATION:    Code Status: Full Code  Reason for Admission: Pyelonephritis  SIRS (systemic inflammatory response syndrome) ([facility]   Sepsis ([facility]    [facility] day: 2   Problem List:       Hospital Problems  Never Reviewed          Codes Class Noted POA    Sepsis ([facility] [facility] ICD-10-CM: A41.9  ICD-9-CM: 038.9, 995.91  2/22/2017 Unknown        Pyelonephritis ICD-10-CM: N12  ICD-9-CM: 590.80  2/21/2017 Unknown        SIRS (systemic inflammatory response syndrome) (HCC) ICD-10-CM: R65.10  ICD-9-CM: 995.90  2/21/2017 Unknown              BACKGROUND:    Past Medical History:   Past Medical History:   Diagnosis Date    Depression     Gastrointestinal disorder Pancreatitis    Pancreatic disease     Pancreatitis     Psychiatric disorder          Patient taking anticoagulants no     ASSESSMENT:    Changes in Assessment Throughout Shift: none     Patient has Central Line: no Reasons if yes: n/a   Patient has Cisneros Cath: no Reasons if yes: n/a      Last Vitals:     Vitals:    02/22/17 1951 02/23/17 0009 02/23/17 0418 02/23/17 0757   BP: 123/65 121/69 118/78 119/72   Pulse: 70 70 73 75   Resp: 18 18 18 18   Temp: 98.9 °F (37.2 °C) 98.3 °F (36.8 °C) 98.6 °F (37 °C) 98.6 °F (37 °C)   SpO2: 100% 98% 99% 99%   Weight:       Height:            IV and DRAINS (will only show if present)   Peripheral IV 02/21/17 Left Antecubital-Site Assessment: Clean, dry, & intact  Peripheral IV 02/21/17 Right Antecubital-Site Assessment: Clean, dry, & intact     WOUND (if present)   Wound Type:  none   Dressing present Dressing Present : No   Wound Concerns/Notes:  none     PAIN    Pain Assessment    Pain Intensity 1: 0 (02/23/17 0923)    Pain Location 1: Head    Pain Intervention(s) 1: Medication (see MAR)    Patient Stated Pain Goal: 0  o Interventions for Pain:  Pain meds  o Intervention effective: yes  o Time of last intervention: 2259  o Reassessment Completed: yes      Last 3 Weights:  Last 3 Recorded Weights in this Encounter    02/21/17 1501   Weight: 51.3 kg (113 lb)     Weight change:      INTAKE/OUPUT    Current Shift:      Last three shifts: 02/21 1901 - 02/23 0700  In: 250 [P.O.:250]  Out: 300 [Urine:300]     LAB RESULTS     Recent Labs      02/21/17   1442   WBC  19.9*   HGB  13.2   HCT  37.5   PLT  330        Recent Labs      02/21/17   1442   NA  135*   K  3.1*   GLU  99   BUN  17   CREA  0.93   CA  9.7       RECOMMENDATIONS AND DISCHARGE PLANNING     1. Pending tests/procedures/ Plan of Care or Other Needs: follow up labs     2. Discharge plan for patient and Needs/Barriers: TBD    3. Estimated Discharge Date: 2/25/17 Posted on Whiteboard in [facility]: yes      4. The patient's care plan was reviewed with the oncoming nurse. \"HEALS\" SAFETY CHECK      Fall Risk    Total Score: 1    Safety Measures: Safety Measures: Bed/Chair-Wheels locked, Bed in low position, Call light within reach    A safety check occurred in the patient's room between off going nurse and oncoming nurse listed above. The safety check included the below items  Area Items   H  High Alert Medications - Verify all high alert medication drips (heparin, PCA, etc.)   E  Equipment - Suction is set up for ALL patients (with yanker)  - Red plugs utilized for all equipment (IV pumps, etc.)  - WOWs wiped down at end of shift.  - Room stocked with oxygen, suction, and other unit-specific supplies   A  Alarms - Bed alarm is set for fall risk patients  - Ensure chair alarm is in place and activated if patient is up in a chair   L  Lines - Check IV for any infiltration  - Cisneros bag is empty if patient has a Cisneros   - Tubing and IV bags are labeled   S  Safety   - Room is clean, patient is clean, and equipment is clean.   - Hallways are clear from equipment besides carts. - Fall bracelet on for fall risk patients  - Ensure room is clear and free of clutter  - Suction is set up for ALL patients (with steve)  - Hallways are clear from equipment besides carts.    - Isolation precautions followed, supplies available outside room, sign posted     Dev Ruiz RN

## 2017-02-23 NOTE — PROGRESS NOTES
Justo Ojeda M.D. PROGRESS NOTE    Name: Jo Chandra MRN: [id number]   : 1955 Hospital: [facility]   Date: 2017  Admission Date: 2017     Subjective/Objective/Plans  1. UTI sepsis  2. Dehydratio  3. Mod protein selwyn malnutrition  4. Depression  5. Hypokalemia    No report of UC  HA, she feel depressed does not think her pills for depression is working  Still with dysuria    Plan  Psych consult  Levaquin for UTI  Cymbalta and Zyprexa  [facility]  Vital Signs:  Visit Vitals    /72 (BP 1 Location: Left arm, BP Patient Position: At rest)    Pulse 75    Temp 98.6 °F (37 °C)    Resp 18    Ht 5' 2\" (1.575 m)    Wt 51.3 kg (113 lb)    SpO2 99%    BMI 20.67 kg/m2       O2 Device: Room air       Temp (24hrs), Av.3 °F (36.8 °C), Min:97 °F (36.1 °C), Max:98.9 °F (37.2 °C)     9:08 AM  Intake/Output:   Last shift:         Last 3 shifts:  1901 -  0700  In: 250 [P.O.:250]  Out: 300 [Urine:300]  No intake or output data in the 24 hours ending 17 0908      Physical Exam:    General: in no apparent distress    HEENT: pupils equal, no ear discharge   Neck: No abnormally enlarged lymph nodes. , no JDV   Mouth: MMM no lesions    Chest: normal, no breast masses   Lungs: normal air entry   Heart: Regular rate and rhythm   Abdomen: abdomen is soft without significant tenderness, masses, organomegaly or guarding   Extremity: negative   Neuro: alert    Skin: Skin color, texture, turgor normal. No rashes or lesions    Data Review:    Labs: Results:       Chemistry Recent Labs      17   1442   GLU  99   NA  135*   K  3.1*   CL  100   CO2  24   BUN  17   CREA  0.93   CA  9.7   AGAP  11   BUCR  18   TBILI  0.5   AP  47   TP  8.3*   ALB  3.8   GLOB  4.5*   AGRAT  0.8      CBC w/Diff Recent Labs      17   1442   WBC  19.9*   RBC  4.33   HGB  13.2   HCT  37.5   PLT  330   GRANS  73   LYMPH  18*   EOS  0      Cardiac Enzymes No results for input(s): CPK, CKND1, MIKE in the last 72 hours.    No lab exists for component: CKRMB, TROIP   Coagulation No results for input(s): PTP, INR, APTT in the last 72 hours. No lab exists for component: INREXT    Lipid Panel Lab Results   Component Value Date/Time    Cholesterol, total 208 08/30/2016 06:50 AM    HDL Cholesterol 62 08/30/2016 06:50 AM    LDL, calculated 134.6 08/30/2016 06:50 AM    VLDL, calculated 11.4 08/30/2016 06:50 AM    Triglyceride 57 08/30/2016 06:50 AM    CHOL/HDL Ratio 3.4 08/30/2016 06:50 AM      BNP No results for input(s): BNPP in the last 72 hours. Liver Enzymes Recent Labs      02/21/17   1442   TP  8.3*   ALB  3.8   TBILI  0.5   AP  47   SGOT  15   ALT  23      Thyroid Studies Lab Results   Component Value Date/Time    TSH 0.47 04/08/2016 11:57 AM          Procedures/imaging: see electronic medical records for all procedures, Xrays and details which were not copied into this note but were reviewed. Allergies:  No Known Allergies    Home Medications:  Prior to Admission Medications   Prescriptions Last Dose Informant Patient Reported? Taking? DULoxetine (CYMBALTA) 60 mg capsule   No No   Sig: Take 1 Cap by mouth daily. Indications: MAJOR DEPRESSIVE DISORDER   OLANZapine (ZYPREXA ZYDIS) 10 mg disintegrating tablet   No No   Sig: Take 1 Tab by mouth nightly. Indications: Psychosis   docusate sodium (COLACE) 100 mg capsule   Yes No   Sig: Take 100 mg by mouth two (2) times daily as needed for Constipation. therapeutic multivitamin (THERAGRAN) tablet   No No   Sig: Take 1 Tab by mouth daily.  Indications: VITAMIN DEFICIENCY PREVENTION      Facility-Administered Medications: None       Current Medications:  Current Facility-Administered Medications   Medication Dose Route Frequency    docusate sodium (COLACE) capsule 100 mg  100 mg Oral BID PRN    DULoxetine (CYMBALTA) capsule 60 mg  60 mg Oral DAILY    OLANZapine (ZyPREXA zydis) disintegrating tablet 10 mg  10 mg Oral QHS    therapeutic multivitamin (THERAGRAN) tablet 1 Tab  1 Tab Oral DAILY    sodium chloride (NS) flush 5-10 mL  5-10 mL IntraVENous Q8H    sodium chloride (NS) flush 5-10 mL  5-10 mL IntraVENous PRN    HYDROcodone-acetaminophen (NORCO) 5-325 mg per tablet 1 Tab  1 Tab Oral Q4H PRN    enoxaparin (LOVENOX) injection 40 mg  40 mg SubCUTAneous Q24H    acetaminophen (TYLENOL) tablet 650 mg  650 mg Oral Q4H PRN       Chart and notes reviewed. Data reviewed. I have evaluated and examined the patient.         IMPRESSION:   Patient Active Problem List   Diagnosis Code    Depression F32.9    Itch of skin L29.9    Anxiety state F41.1    Weight loss, unintentional R63.4    Major depressive disorder, recurrent episode, severe, with psychosis ([facility] [facility] F33.3    Pyelonephritis N12    SIRS (systemic inflammatory response syndrome) (HCC) R65.10    Sepsis ([facility] [address] A41.9 ·         PLAN:/DISCUSSION:   · Psych consult  · [facility]  · DC in am        Alex Conley MD  2/23/2017, 9:08 AM

## 2017-02-24 VITALS
WEIGHT: 113 LBS | SYSTOLIC BLOOD PRESSURE: 130 MMHG | HEART RATE: 115 BPM | OXYGEN SATURATION: 100 % | RESPIRATION RATE: 18 BRPM | BODY MASS INDEX: 20.8 KG/M2 | HEIGHT: 62 IN | DIASTOLIC BLOOD PRESSURE: 86 MMHG | TEMPERATURE: 98.3 F

## 2017-02-24 LAB
BACTERIA SPEC CULT: NORMAL
SERVICE CMNT-IMP: NORMAL

## 2017-02-24 PROCEDURE — 74011250637 HC RX REV CODE- 250/637: Performed by: FAMILY MEDICINE

## 2017-02-24 PROCEDURE — 74011250636 HC RX REV CODE- 250/636: Performed by: FAMILY MEDICINE

## 2017-02-24 RX ORDER — LEVOFLOXACIN 500 MG/1
500 TABLET, FILM COATED ORAL DAILY
Qty: 5 TAB | Refills: 0 | Status: SHIPPED | OUTPATIENT
Start: 2017-02-24 | End: 2017-03-01

## 2017-02-24 RX ORDER — POTASSIUM CHLORIDE 20 MEQ/1
20 TABLET, EXTENDED RELEASE ORAL 2 TIMES DAILY
Qty: 10 TAB | Refills: 0 | Status: ON HOLD | OUTPATIENT
Start: 2017-02-24 | End: 2021-11-30 | Stop reason: SDUPTHER

## 2017-02-24 RX ORDER — POTASSIUM CHLORIDE 20 MEQ/1
20 TABLET, EXTENDED RELEASE ORAL 2 TIMES DAILY
Status: DISCONTINUED | OUTPATIENT
Start: 2017-02-24 | End: 2017-02-24 | Stop reason: HOSPADM

## 2017-02-24 RX ADMIN — Medication 10 ML: at 13:27

## 2017-02-24 RX ADMIN — ENOXAPARIN SODIUM 40 MG: 40 INJECTION SUBCUTANEOUS at 08:37

## 2017-02-24 RX ADMIN — THERA TABS 1 TABLET: TAB at 08:37

## 2017-02-24 RX ADMIN — DULOXETINE HYDROCHLORIDE 60 MG: 20 CAPSULE, DELAYED RELEASE ORAL at 08:37

## 2017-02-24 RX ADMIN — Medication 10 ML: at 06:16

## 2017-02-24 NOTE — PROGRESS NOTES
Pt's family at beside, prepared to transport this pt home when ready for discharge. Pt refused psychiatric consult, agreeing to be safe and is being escorted home with her niece Payal Mitchell who [facility], family provided number and intake hours for walk-in appointments.   Pt and Dr. Saurabh Howe M.D.

## 2017-02-24 NOTE — PROGRESS NOTES
Didi Garcia M.D. PROGRESS NOTE    Name: Chantal Massey MRN: [id number]   : 1955 Hospital: [address] [place] [address]    Date: 2017  Admission Date: 2017     Subjective/Objective/Plans  1. UTI sepsis  2. Dehydratio  3. Mod protein selwyn malnutrition  4. Depression  5. Hypokalemia    Does not want to wait for Psychiatrist, denies thoughts of hurying herself  She will see [facility] next week and Dr Candy Chacon next week    Vital Signs:  Visit Vitals    /75    Pulse 88    Temp 97.5 °F (36.4 °C)    Resp 18    Ht 5' 2\" (1.575 m)    Wt 51.3 kg (113 lb)    SpO2 100%    BMI 20.67 kg/m2       O2 Device: Room air       Temp (24hrs), Av.7 °F (37.1 °C), Min:97.5 °F (36.4 °C), Max:99.9 °F (37.7 °C)     12:47 PM  Intake/Output:   Last shift:         Last 3 shifts:    No intake or output data in the 24 hours ending 17 1247      Physical Exam:    General: in no apparent distress    HEENT: pupils equal, no ear discharge   Neck: No abnormally enlarged lymph nodes. , no JDV   Mouth: MMM no lesions    Chest: normal, no breast masses   Lungs: normal air entry   Heart: Regular rate and rhythm   Abdomen: abdomen is soft without significant tenderness, masses, organomegaly or guarding   Extremity: negative   Neuro: alert    Skin: Skin color, texture, turgor normal. No rashes or lesions    Data Review:    Labs: Results:       Chemistry Recent Labs      17   1442   GLU  99   NA  135*   K  3.1*   CL  100   CO2  24   BUN  17   CREA  0.93   CA  9.7   AGAP  11   BUCR  18   TBILI  0.5   AP  47   TP  8.3*   ALB  3.8   GLOB  4.5*   AGRAT  0.8      CBC w/Diff Recent Labs      17   1100  17   1442   WBC  11.0  19.9*   RBC  3.91*  4.33   HGB  11.8*  13.2   HCT  33.9*  37.5   PLT  286  330   GRANS  58  73   LYMPH  33  18*   EOS  0  0      Cardiac Enzymes No results for input(s): CPK, CKND1, MIKE in the last 72 hours.     No lab exists for component: CKRMB, TROIP   Coagulation No results for input(s): PTP, INR, APTT in the last 72 hours. No lab exists for component: INREXT    Lipid Panel Lab Results   Component Value Date/Time    Cholesterol, total 208 08/30/2016 06:50 AM    HDL Cholesterol 62 08/30/2016 06:50 AM    LDL, calculated 134.6 08/30/2016 06:50 AM    VLDL, calculated 11.4 08/30/2016 06:50 AM    Triglyceride 57 08/30/2016 06:50 AM    CHOL/HDL Ratio 3.4 08/30/2016 06:50 AM      BNP No results for input(s): BNPP in the last 72 hours. Liver Enzymes Recent Labs      02/21/17   1442   TP  8.3*   ALB  3.8   TBILI  0.5   AP  47   SGOT  15   ALT  23      Thyroid Studies Lab Results   Component Value Date/Time    TSH 0.47 04/08/2016 11:57 AM          Procedures/imaging: see electronic medical records for all procedures, Xrays and details which were not copied into this note but were reviewed. Allergies:  No Known Allergies    Home Medications:  Prior to Admission Medications   Prescriptions Last Dose Informant Patient Reported? Taking? DULoxetine (CYMBALTA) 60 mg capsule   No No   Sig: Take 1 Cap by mouth daily. Indications: MAJOR DEPRESSIVE DISORDER   OLANZapine (ZYPREXA ZYDIS) 10 mg disintegrating tablet   No No   Sig: Take 1 Tab by mouth nightly. Indications: Psychosis   docusate sodium (COLACE) 100 mg capsule   Yes No   Sig: Take 100 mg by mouth two (2) times daily as needed for Constipation. therapeutic multivitamin (THERAGRAN) tablet   No No   Sig: Take 1 Tab by mouth daily.  Indications: VITAMIN DEFICIENCY PREVENTION      Facility-Administered Medications: None       Current Medications:  Current Facility-Administered Medications   Medication Dose Route Frequency    docusate sodium (COLACE) capsule 100 mg  100 mg Oral BID PRN    DULoxetine (CYMBALTA) capsule 60 mg  60 mg Oral DAILY    OLANZapine (ZyPREXA zydis) disintegrating tablet 10 mg  10 mg Oral QHS    therapeutic multivitamin (THERAGRAN) tablet 1 Tab  1 Tab Oral DAILY    sodium chloride (NS) flush 5-10 mL  5-10 mL IntraVENous Q8H    sodium chloride (NS) flush 5-10 mL  5-10 mL IntraVENous PRN    HYDROcodone-acetaminophen (NORCO) 5-325 mg per tablet 1 Tab  1 Tab Oral Q4H PRN    enoxaparin (LOVENOX) injection 40 mg  40 mg SubCUTAneous Q24H    acetaminophen (TYLENOL) tablet 650 mg  650 mg Oral Q4H PRN       Chart and notes reviewed. Data reviewed. I have evaluated and examined the patient.         IMPRESSION:   Patient Active Problem List   Diagnosis Code    Depression F32.9    Itch of skin L29.9    Anxiety state F41.1    Weight loss, unintentional R63.4    Major depressive disorder, recurrent episode, severe, with psychosis ([facility] [address] F33.3    Pyelonephritis N12    SIRS (systemic inflammatory response syndrome) (HCC) R65.10    Sepsis ([facility] [address] A41.9    UTI (urinary tract infection) N39.0 ·         PLAN:/DISCUSSION:   · DC to home, friend will take her home        Francy Vee MD  2/24/2017, 12:47 PM

## 2017-02-24 NOTE — DISCHARGE INSTRUCTIONS
Patient armband removed and given to patient to take home. Patient was informed of the privacy risks if armband lost or stolen    MyChart Activation    Thank you for requesting access to [employer]. Please follow the instructions below to securely access and download your online medical record. [employer] allows you to send messages to your doctor, view your test results, renew your prescriptions, schedule appointments, and more. How Do I Sign Up? 1. In your internet browser, go to [email or web address]  2. Click on the First Time User? Click Here link in the Sign In box. You will be redirect to the New Member Sign Up page. 3. Enter your [employer] Access Code exactly as it appears below. You will not need to use this code after youve completed the sign-up process. If you do not sign up before the expiration date, you must request a new code. [employer] Access Code: [email or web address]  Expires: 2017  5:46 PM (This is the date your [employer] access code will )    4. Enter the last four digits of your Social Security Number (xxxx) and Date of Birth (mm/dd/yyyy) as indicated and click Submit. You will be taken to the next sign-up page. 5. Create a [employer] ID. This will be your [employer] login ID and cannot be changed, so think of one that is secure and easy to remember. 6. Create a [employer] password. You can change your password at any time. 7. Enter your Password Reset Question and Answer. This can be used at a later time if you forget your password. 8. Enter your e-mail address. You will receive e-mail notification when new information is available in [address]. 9. Click Sign Up. You can now view and download portions of your medical record. 10. Click the Download Summary menu link to download a portable copy of your medical information. Additional Information    If you have questions, please visit the Frequently Asked Questions section of the [employer] website at [email or web address] Remember, MyChart is NOT to be used for urgent needs. For medical emergencies, dial 911. DISCHARGE SUMMARY from Nurse    The following personal items are in your possession at time of discharge:    Dental Appliances: At home  Visual Aid: None     Home Medications: None  Jewelry: None  Clothing: Pants, Shirt, Jacket/Coat  Other Valuables: None             PATIENT INSTRUCTIONS:    After general anesthesia or intravenous sedation, for 24 hours or while taking prescription Narcotics:  · Limit your activities  · Do not drive and operate hazardous machinery  · Do not make important personal or business decisions  · Do  not drink alcoholic beverages  · If you have not urinated within 8 hours after discharge, please contact your surgeon on call. Report the following to your surgeon:  · Excessive pain, swelling, redness or odor of or around the surgical area  · Temperature over 100.5  · Nausea and vomiting lasting longer than 4 hours or if unable to take medications  · Any signs of decreased circulation or nerve impairment to extremity: change in color, persistent  numbness, tingling, coldness or increase pain  · Any questions        What to do at Home:  Recommended activity: Activity as tolerated    If you experience any of the following symptoms Nausea,vomiting, diarrhea, chest pain, shortness of breath  , please follow up with PCP. *  Please give a list of your current medications to your Primary Care Provider. *  Please update this list whenever your medications are discontinued, doses are      changed, or new medications (including over-the-counter products) are added. *  Please carry medication information at all times in case of emergency situations. These are general instructions for a healthy lifestyle:    No smoking/ No tobacco products/ Avoid exposure to second hand smoke    Surgeon General's Warning:  Quitting smoking now greatly reduces serious risk to your health.     Obesity, smoking, and sedentary lifestyle greatly increases your risk for illness    A healthy diet, regular physical exercise & weight monitoring are important for maintaining a healthy lifestyle    You may be retaining fluid if you have a history of heart failure or if you experience any of the following symptoms:  Weight gain of 3 pounds or more overnight or 5 pounds in a week, increased swelling in our hands or feet or shortness of breath while lying flat in bed. Please call your doctor as soon as you notice any of these symptoms; do not wait until your next office visit. Recognize signs and symptoms of STROKE:    F-face looks uneven    A-arms unable to move or move unevenly    S-speech slurred or non-existent    T-time-call 911 as soon as signs and symptoms begin-DO NOT go       Back to bed or wait to see if you get better-TIME IS BRAIN. Warning Signs of HEART ATTACK     Call 911 if you have these symptoms:   Chest discomfort. Most heart attacks involve discomfort in the center of the chest that lasts more than a few minutes, or that goes away and comes back. It can feel like uncomfortable pressure, squeezing, fullness, or pain.  Discomfort in other areas of the upper body. Symptoms can include pain or discomfort in one or both arms, the back, neck, jaw, or stomach.  Shortness of breath with or without chest discomfort.  Other signs may include breaking out in a cold sweat, nausea, or lightheadedness. Don't wait more than five minutes to call 911 - MINUTES MATTER! Fast action can save your life. Calling 911 is almost always the fastest way to get lifesaving treatment. Emergency Medical Services staff can begin treatment when they arrive -- up to an hour sooner than if someone gets to the hospital by car. The discharge information has been reviewed with the patient. The patient verbalized understanding.     Discharge medications reviewed with the patient and appropriate educational materials and side effects teaching were provided.

## 2017-02-24 NOTE — ROUTINE PROCESS
Bedside and Verbal shift change report given to JIN Mcclure (oncoming nurse) by Hermelinda Walls (offgoing nurse). Report included the following information SBAR, Kardex, MAR and Recent Results.     SITUATION:    Code Status: Full Code  Reason for Admission: Pyelonephritis  SIRS (systemic inflammatory response syndrome) ([facility]   Sepsis ([facility]    [facility] day: 3   Problem List:       Hospital Problems  Date Reviewed: 2/23/2017          Codes Class Noted POA    * (Principal)UTI (urinary tract infection) ICD-10-CM: N39.0  ICD-9-CM: 599.0  2/23/2017 Yes        Sepsis ([facility] ICD-10-CM: A41.9  ICD-9-CM: 038.9, 995.91  2/22/2017 Unknown        Pyelonephritis ICD-10-CM: N12  ICD-9-CM: 590.80  2/21/2017 Unknown        SIRS (systemic inflammatory response syndrome) ([facility] ICD-10-CM: R65.10  ICD-9-CM: 995.90  2/21/2017 Unknown        Anxiety state ICD-10-CM: F41.1  ICD-9-CM: 300.00  7/15/2016 Yes        Depression ICD-10-CM: F32.9  ICD-9-CM: 521  1/6/2016 Yes              BACKGROUND:    Past Medical History:   Past Medical History:   Diagnosis Date    Depression     Gastrointestinal disorder Pancreatitis    Pancreatic disease     Pancreatitis     Psychiatric disorder          Patient taking anticoagulants no     ASSESSMENT:    Changes in Assessment Throughout Shift: none     Patient has Central Line: no Reasons if yes: n/a   Patient has Cisneros Cath: no Reasons if yes: n/a      Last Vitals:     Vitals:    02/23/17 1733 02/23/17 1938 02/23/17 2341 02/24/17 0339   BP: 109/79 113/67 99/62 108/70   Pulse: 96 88 73 81   Resp: 18 17 16 16   Temp: 97.9 °F (36.6 °C) 99.9 °F (37.7 °C) 98.9 °F (37.2 °C) 99.5 °F (37.5 °C)   SpO2: 97% 97% 96% 95%   Weight:       Height:            IV and DRAINS (will only show if present)   Peripheral IV 02/21/17 Left Antecubital-Site Assessment: Clean, dry, & intact  Peripheral IV 02/21/17 Right Antecubital-Site Assessment: Clean, dry, & intact     WOUND (if present)   Wound Type: none   Dressing present Dressing Present : No   Wound Concerns/Notes:  none     PAIN    Pain Assessment    Pain Intensity 1: 0 (02/24/17 0400)    Pain Location 1: Head    Pain Intervention(s) 1: Medication (see MAR)    Patient Stated Pain Goal: 0  o Interventions for Pain:  Pain meds  o Intervention effective: yes  o Time of last intervention: 2259  o Reassessment Completed: yes      Last 3 Weights:  Last 3 Recorded Weights in this Encounter    02/21/17 1501   Weight: 51.3 kg (113 lb)     Weight change:      INTAKE/OUPUT    Current Shift:      Last three shifts:       LAB RESULTS     Recent Labs      02/23/17   1100  02/21/17   1442   WBC  11.0  19.9*   HGB  11.8*  13.2   HCT  33.9*  37.5   PLT  286  330        Recent Labs      02/21/17   1442   NA  135*   K  3.1*   GLU  99   BUN  17   CREA  0.93   CA  9.7       RECOMMENDATIONS AND DISCHARGE PLANNING     1. Pending tests/procedures/ Plan of Care or Other Needs: follow up labs     2. Discharge plan for patient and Needs/Barriers: home    3. Estimated Discharge Date: 2/25/17 Posted on i[employer] in [facility]: yes      4. The patient's care plan was reviewed with the oncoming nurse. \"HEALS\" SAFETY CHECK      Fall Risk    Total Score: 1    Safety Measures: Safety Measures: Bed/Chair alarm on, Bed/Chair-Wheels locked, Bed in low position, Call light within reach, Fall prevention (comment)    A safety check occurred in the patient's room between off going nurse and oncoming nurse listed above.     The safety check included the below items  Area Items   H  High Alert Medications - Verify all high alert medication drips (heparin, PCA, etc.)   E  Equipment - Suction is set up for ALL patients (with yanker)  - Red plugs utilized for all equipment (IV pumps, etc.)  - WOWs wiped down at end of shift.  - Room stocked with oxygen, suction, and other unit-specific supplies   A  Alarms - Bed alarm is set for fall risk patients  - Ensure chair alarm is in place and activated if patient is up in a chair   L  Lines - Check IV for any infiltration  - Cisneros bag is empty if patient has a Cisneros   - Tubing and IV bags are labeled   S  Safety   - Room is clean, patient is clean, and equipment is clean. - Hallways are clear from equipment besides carts. - Fall bracelet on for fall risk patients  - Ensure room is clear and free of clutter  - Suction is set up for ALL patients (with yanker)  - Hallways are clear from equipment besides carts.    - Isolation precautions followed, supplies available outside room, sign posted     Tana Alaniz

## 2017-02-24 NOTE — DISCHARGE SUMMARY
[facility]  TRANSFER SUMMARY    Name:  Rubén Dumont  MR#:  [id number]  :  1955  Account #:  [de-identified]  Date of Adm:  2017  Date of Transfer:  2017      FINAL DIAGNOSES  1. Sepsis, urinary source. 2. Dehydration. 3. Moderate protein-calorie malnutrition. 4. Depression. 5. Hypokalemia. DIET:  Regular. ACTIVITY:  As tolerated. MEDICATIONS ON DISCHARGE  1. Levaquin 500 mg daily x5Days. 2. Cymbalta 60 mg daily. 3. Zyprexa 10 mg at bedtime. 4. Theragran vitamin 1 tablet daily. 5. Tylenol 650 every 4 hours p.r.n. pain or fever. 6. Colace 100 mg b.i.d.  7. Norco 5/325 one every 4 hours p.r.n. pain. SUMMARY:  This is a 76-year-old black female with a history of  depression and COPD. She was seen by Jelly Valerio. Dorothea Singleton M.D.,  PCP, four days prior to admission. She was treated for a UTI. The  patient has dysuria. The patient has not been eating very well. She  has lost her appetite. On admission, she was dehydrated. She was  afebrile. She had a low-grade fever. PHYSICAL EXAMINATION  GENERAL:  On exam, she looks malnourished. VITAL SIGNS:  Blood pressure is 158/91. Pulse is 115. Temperature is  100.3. HEART AND LUNGS:  Unremarkable. ABDOMEN:  Soft. EXTREMITIES:  Revealed no edema. LABORATORY DATA:  Review of the laboratory data revealed the  urine showing WBCs too numerous to count and positive nitrite. White  count was 19,000. She was also hypokalemic. CLINICAL COURSE AND MANAGEMENT:  The patient was treated  with IV Levaquin. Potassium was replaced. A subsequent urine  culture showed no growth. Blood cultures were negative. The patient  is starting to feel well at this time, but tells me she feels depressed. She does not think the medication is working. We therefore asked  Psychiatry to see her. We will consider discharge once cleared from  Psychiatry.         Jacqui Hicks MD    JL / [address]  D:  2017   13:03  T:  2017 13:23  Job #:  [id number]

## 2017-02-24 NOTE — ROUTINE PROCESS
Pt left without getting her discharge instruction. MD provided pts prescriptions. Pt refused to wait 5 minutes to print d/c papers. Pt refused to take potassium pill.

## 2017-02-24 NOTE — INTERDISCIPLINARY ROUNDS
IDR/SLIDR Summary          Patient: Latrice Graves MRN: [id number]    Age: 57 y.o. YOB: 1955 Room/Bed: [facility]   Admit Diagnosis: Pyelonephritis  SIRS (systemic inflammatory response syndrome) ([facility])  [facility]  Principal Diagnosis: UTI (urinary tract infection)   Goals: infection control  Readmission: NO  Quality Measure: SEPSIS  VTE Prophylaxis: Not needed  Influenza Vaccine screening completed? YES  Pneumococcal Vaccine screening completed? YES  Mobility needs: No   Nutrition plan:Yes  Consults:    Financial concerns:No  Escalated to CM? NO  RRAT Score: 12   Interventions:  Testing due for pt today?  NO  LOS: 3 days Expected length of stay 3 days  Discharge plan: home   PCP: Coleen Braun MD  Transportation needs: No    Days before discharge:two or more days before discharge   Discharge disposition: Home    Signed:     Kamini Taveras RN  2/24/2017

## 2017-02-27 LAB
BACTERIA SPEC CULT: NORMAL
BACTERIA SPEC CULT: NORMAL
SERVICE CMNT-IMP: NORMAL
SERVICE CMNT-IMP: NORMAL

## 2017-05-02 ENCOUNTER — HOSPITAL ENCOUNTER (OUTPATIENT)
Dept: GENERAL RADIOLOGY | Age: 62
Discharge: HOME | End: 2017-05-02
Payer: COMMERCIAL

## 2017-05-02 ENCOUNTER — HOSPITAL ENCOUNTER (OUTPATIENT)
Dept: LAB | Age: 62
Discharge: HOME | End: 2017-05-02
Payer: COMMERCIAL

## 2017-05-02 DIAGNOSIS — R63.4 ABNORMAL WEIGHT LOSS: ICD-10-CM

## 2017-05-02 LAB
ALBUMIN SERPL BCP-MCNC: 3.4 G/DL (ref 3.4–5)
ALBUMIN/GLOB SERPL: 1.2 {RATIO} (ref 0.8–1.7)
ALP SERPL-CCNC: 43 U/L (ref 45–117)
ALT SERPL-CCNC: 14 U/L (ref 13–56)
ANION GAP BLD CALC-SCNC: 7 MMOL/L (ref 3–18)
AST SERPL W P-5'-P-CCNC: 10 U/L (ref 15–37)
BASOPHILS # BLD AUTO: 0 K/UL (ref 0–0.06)
BASOPHILS # BLD: 0 % (ref 0–2)
BILIRUB SERPL-MCNC: 0.3 MG/DL (ref 0.2–1)
BUN SERPL-MCNC: 7 MG/DL (ref 7–18)
BUN/CREAT SERPL: 9 (ref 12–20)
CALCIUM SERPL-MCNC: 9.2 MG/DL (ref 8.5–10.1)
CHLORIDE SERPL-SCNC: 106 MMOL/L (ref 100–108)
CHOLEST SERPL-MCNC: 157 MG/DL
CO2 SERPL-SCNC: 28 MMOL/L (ref 21–32)
CREAT SERPL-MCNC: 0.78 MG/DL (ref 0.6–1.3)
DIFFERENTIAL METHOD BLD: ABNORMAL
EOSINOPHIL # BLD: 0 K/UL (ref 0–0.4)
EOSINOPHIL NFR BLD: 0 % (ref 0–5)
ERYTHROCYTE [DISTWIDTH] IN BLOOD BY AUTOMATED COUNT: 13.3 % (ref 11.6–14.5)
GLOBULIN SER CALC-MCNC: 2.9 G/DL (ref 2–4)
GLUCOSE SERPL-MCNC: 63 MG/DL (ref 74–99)
HCT VFR BLD AUTO: 30.2 % (ref 35–45)
HDLC SERPL-MCNC: 58 MG/DL (ref 40–60)
HDLC SERPL: 2.7 {RATIO} (ref 0–5)
HGB BLD-MCNC: 10.3 G/DL (ref 12–16)
LDLC SERPL CALC-MCNC: 79.4 MG/DL (ref 0–100)
LIPID PROFILE,FLP: NORMAL
LYMPHOCYTES # BLD AUTO: 25 % (ref 21–52)
LYMPHOCYTES # BLD: 3.1 K/UL (ref 0.9–3.6)
MCH RBC QN AUTO: 30.4 PG (ref 24–34)
MCHC RBC AUTO-ENTMCNC: 34.1 G/DL (ref 31–37)
MCV RBC AUTO: 89.1 FL (ref 74–97)
MONOCYTES # BLD: 0.7 K/UL (ref 0.05–1.2)
MONOCYTES NFR BLD AUTO: 6 % (ref 3–10)
NEUTS SEG # BLD: 8.3 K/UL (ref 1.8–8)
NEUTS SEG NFR BLD AUTO: 69 % (ref 40–73)
PLATELET # BLD AUTO: 308 K/UL (ref 135–420)
PMV BLD AUTO: 10.7 FL (ref 9.2–11.8)
POTASSIUM SERPL-SCNC: 3.5 MMOL/L (ref 3.5–5.5)
PROT SERPL-MCNC: 6.3 G/DL (ref 6.4–8.2)
RBC # BLD AUTO: 3.39 M/UL (ref 4.2–5.3)
SODIUM SERPL-SCNC: 141 MMOL/L (ref 136–145)
T4 SERPL-MCNC: 6.5 UG/DL (ref 4.5–10.9)
TRIGL SERPL-MCNC: 98 MG/DL (ref ?–150)
TSH SERPL DL<=0.05 MIU/L-ACNC: 0.6 UIU/ML (ref 0.36–3.74)
VLDLC SERPL CALC-MCNC: 19.6 MG/DL
WBC # BLD AUTO: 12.2 K/UL (ref 4.6–13.2)

## 2017-05-02 PROCEDURE — 85025 COMPLETE CBC W/AUTO DIFF WBC: CPT | Performed by: INTERNAL MEDICINE

## 2017-05-02 PROCEDURE — 80061 LIPID PANEL: CPT | Performed by: INTERNAL MEDICINE

## 2017-05-02 PROCEDURE — 80053 COMPREHEN METABOLIC PANEL: CPT | Performed by: INTERNAL MEDICINE

## 2017-05-02 PROCEDURE — 71020 XR CHEST PA LAT: CPT

## 2017-05-02 PROCEDURE — 36415 COLL VENOUS BLD VENIPUNCTURE: CPT | Performed by: INTERNAL MEDICINE

## 2017-05-02 PROCEDURE — 84436 ASSAY OF TOTAL THYROXINE: CPT | Performed by: INTERNAL MEDICINE

## 2017-10-02 ENCOUNTER — HOSPITAL ENCOUNTER (EMERGENCY)
Age: 62
Discharge: HOME | End: 2017-10-02
Attending: EMERGENCY MEDICINE
Payer: COMMERCIAL

## 2017-10-02 VITALS
DIASTOLIC BLOOD PRESSURE: 91 MMHG | WEIGHT: 92 LBS | OXYGEN SATURATION: 98 % | HEART RATE: 99 BPM | TEMPERATURE: 98.1 F | SYSTOLIC BLOOD PRESSURE: 135 MMHG | BODY MASS INDEX: 16.83 KG/M2 | RESPIRATION RATE: 16 BRPM

## 2017-10-02 DIAGNOSIS — N30.01 ACUTE CYSTITIS WITH HEMATURIA: Primary | ICD-10-CM

## 2017-10-02 LAB
ANION GAP BLD CALC-SCNC: 17 MMOL/L (ref 10–20)
APPEARANCE UR: ABNORMAL
BACTERIA URNS QL MICRO: ABNORMAL /HPF
BILIRUB UR QL: NEGATIVE
BUN BLD-MCNC: 17 MG/DL (ref 7–18)
CA-I BLD-MCNC: 1.17 MMOL/L (ref 1.12–1.32)
CHLORIDE BLD-SCNC: 99 MMOL/L (ref 100–108)
CO2 BLD-SCNC: 24 MMOL/L (ref 19–24)
COLOR UR: YELLOW
CREAT UR-MCNC: 0.8 MG/DL (ref 0.6–1.3)
EPITH CASTS URNS QL MICRO: ABNORMAL /LPF (ref 0–5)
GLUCOSE BLD STRIP.AUTO-MCNC: 104 MG/DL (ref 74–106)
GLUCOSE UR STRIP.AUTO-MCNC: NEGATIVE MG/DL
HCT VFR BLD CALC: 39 % (ref 36–49)
HGB BLD-MCNC: 13.3 G/DL (ref 12–16)
HGB UR QL STRIP: ABNORMAL
KETONES UR QL STRIP.AUTO: 15 MG/DL
LEUKOCYTE ESTERASE UR QL STRIP.AUTO: ABNORMAL
NITRITE UR QL STRIP.AUTO: POSITIVE
PH UR STRIP: 5 [PH] (ref 5–8)
POTASSIUM BLD-SCNC: 3.8 MMOL/L (ref 3.5–5.5)
PROT UR STRIP-MCNC: 300 MG/DL
RBC #/AREA URNS HPF: ABNORMAL /HPF (ref 0–5)
SODIUM BLD-SCNC: 135 MMOL/L (ref 136–145)
SP GR UR REFRACTOMETRY: 1.02 (ref 1–1.03)
UROBILINOGEN UR QL STRIP.AUTO: 1 EU/DL (ref 0.2–1)
WBC URNS QL MICRO: ABNORMAL /HPF (ref 0–4)

## 2017-10-02 PROCEDURE — 87186 SC STD MICRODIL/AGAR DIL: CPT | Performed by: EMERGENCY MEDICINE

## 2017-10-02 PROCEDURE — 81001 URINALYSIS AUTO W/SCOPE: CPT | Performed by: EMERGENCY MEDICINE

## 2017-10-02 PROCEDURE — 87086 URINE CULTURE/COLONY COUNT: CPT | Performed by: EMERGENCY MEDICINE

## 2017-10-02 PROCEDURE — 87077 CULTURE AEROBIC IDENTIFY: CPT | Performed by: EMERGENCY MEDICINE

## 2017-10-02 PROCEDURE — 99283 EMERGENCY DEPT VISIT LOW MDM: CPT

## 2017-10-02 PROCEDURE — 80047 BASIC METABLC PNL IONIZED CA: CPT

## 2017-10-02 RX ORDER — CEPHALEXIN 500 MG/1
500 CAPSULE ORAL 3 TIMES DAILY
Qty: 21 CAP | Refills: 0 | Status: SHIPPED | OUTPATIENT
Start: 2017-10-02 | End: 2017-10-09

## 2017-10-02 RX ORDER — PHENAZOPYRIDINE HYDROCHLORIDE 200 MG/1
200 TABLET, FILM COATED ORAL 3 TIMES DAILY
Qty: 6 TAB | Refills: 0 | Status: SHIPPED | OUTPATIENT
Start: 2017-10-02 | End: 2017-10-04

## 2017-10-02 NOTE — ED NOTES
I have reviewed discharge instructions with the patient. The patient verbalized understanding. Discharge medications reviewed with patient and appropriate educational materials and side effects teaching were provided. Patient armband removed and given to patient to take home.   Patient armband removed and shredded

## 2017-10-02 NOTE — ED TRIAGE NOTES
urinary odor since Friday, also having hair fall our again. Caregiver states pt pulls it out. Pt admits to pulling hair out and using lots of things to soften skin.

## 2017-10-02 NOTE — DISCHARGE INSTRUCTIONS
Urinary Tract Infection in Women: Care Instructions  Your Care Instructions    A urinary tract infection, or UTI, is a general term for an infection anywhere between the kidneys and the urethra (where urine comes out). Most UTIs are bladder infections. They often cause pain or burning when you urinate. UTIs are caused by bacteria and can be cured with antibiotics. Be sure to complete your treatment so that the infection goes away. Follow-up care is a key part of your treatment and safety. Be sure to make and go to all appointments, and call your doctor if you are having problems. It's also a good idea to know your test results and keep a list of the medicines you take. How can you care for yourself at home? · Take your antibiotics as directed. Do not stop taking them just because you feel better. You need to take the full course of antibiotics. · Drink extra water and other fluids for the next day or two. This may help wash out the bacteria that are causing the infection. (If you have kidney, heart, or liver disease and have to limit fluids, talk with your doctor before you increase your fluid intake.)  · Avoid drinks that are carbonated or have caffeine. They can irritate the bladder. · Urinate often. Try to empty your bladder each time. · To relieve pain, take a hot bath or lay a heating pad set on low over your lower belly or genital area. Never go to sleep with a heating pad in place. To prevent UTIs  · Drink plenty of water each day. This helps you urinate often, which clears bacteria from your system. (If you have kidney, heart, or liver disease and have to limit fluids, talk with your doctor before you increase your fluid intake.)  · Urinate when you need to. · Urinate right after you have sex. · Change sanitary pads often. · Avoid douches, bubble baths, feminine hygiene sprays, and other feminine hygiene products that have deodorants.   · After going to the bathroom, wipe from front to back.  When should you call for help? Call your doctor now or seek immediate medical care if:  · Symptoms such as fever, chills, nausea, or vomiting get worse or appear for the first time. · You have new pain in your back just below your rib cage. This is called flank pain. · There is new blood or pus in your urine. · You have any problems with your antibiotic medicine. Watch closely for changes in your health, and be sure to contact your doctor if:  · You are not getting better after taking an antibiotic for 2 days. · Your symptoms go away but then come back. Where can you learn more? Go to http://shara-alcon.info/. Enter J446 in the search box to learn more about \"Urinary Tract Infection in Women: Care Instructions. \"  Current as of: November 28, 2016  Content Version: 11.3  © 7405-3105 [employer], [employer]. Care instructions adapted under license by [employer] (which disclaims liability or warranty for this information). If you have questions about a medical condition or this instruction, always ask your healthcare professional. [address] any warranty or liability for your use of this information.

## 2017-10-02 NOTE — ED PROVIDER NOTES
HPI Comments: 3:45 PM Yariel Perez is a 60 y.o. female with a h/o pancreatitis who presents to the ED c/o urinary odor that began 3 days ago. She states that odor is consistent with previous UTI. She notes twitching that began about 1 month ago. Her sister also states that she had been pulling her hair out and notes recent weight loss and lessened appetite. Patient had recently restarted tobacco use. PCP:  MD Joanna      The history is provided by the patient and a relative. No  was used. Past Medical History:   Diagnosis Date    Depression     Gastrointestinal disorder Pancreatitis    Pancreatic disease     Pancreatitis     Psychiatric disorder        Past Surgical History:   Procedure Laterality Date    HX GYN      HX HYSTERECTOMY           Family History:   Problem Relation Age of Onset    Diabetes Maternal Aunt     Cancer Maternal Aunt     Alzheimer Maternal Aunt     Cancer Paternal Aunt     Diabetes Paternal Aunt        Social History     Social History    Marital status:      Spouse name: N/A    Number of children: N/A    Years of education: N/A     Occupational History    Not on file. Social History Main Topics    Smoking status: Current Every Day Smoker     Packs/day: 1.00    Smokeless tobacco: Not on file    Alcohol use No      Comment: Per pt she quit in 2007    Drug use: No    Sexual activity: Not Currently     Other Topics Concern    Not on file     Social History Narrative         ALLERGIES: Review of patient's allergies indicates no known allergies. Review of Systems   Constitutional: Positive for appetite change and unexpected weight change. Negative for activity change, fatigue and fever. HENT: Negative for congestion and rhinorrhea. Eyes: Negative for visual disturbance. Respiratory: Negative for shortness of breath. Cardiovascular: Negative for chest pain and palpitations.    Gastrointestinal: Negative for abdominal pain, diarrhea, nausea and vomiting. Genitourinary: Positive for dysuria, frequency and urgency. Negative for hematuria. (+) urinary odor   Musculoskeletal: Negative for back pain. Skin: Negative for rash. Neurological: Negative for dizziness, weakness and light-headedness. (+) twitching       Vitals:    10/02/17 1452   BP: (!) 135/91   Pulse: 99   Resp: 16   Temp: 98.1 °F (36.7 °C)   SpO2: 98%   Weight: 41.7 kg (92 lb)            Physical Exam   Constitutional: She is oriented to person, place, and time. She appears well-developed and well-nourished. No distress. Supportive sister, a nurse, at bedside  Thin   episodic twitching that appears purposeful  Hair loss that appears to alopecia   HENT:   Head: Normocephalic and atraumatic. Right Ear: External ear normal.   Left Ear: External ear normal.   Nose: Nose normal.   Mouth/Throat: Oropharynx is clear and moist.   Moist mucus membranes   Eyes: Conjunctivae and EOM are normal. Pupils are equal, round, and reactive to light. No scleral icterus. Neck: Normal range of motion. Neck supple. No JVD present. No tracheal deviation present. No thyromegaly present. Cardiovascular: Normal rate, regular rhythm, normal heart sounds and intact distal pulses. Exam reveals no gallop and no friction rub. No murmur heard. Pulmonary/Chest: Effort normal and breath sounds normal. She exhibits no tenderness. Abdominal: Soft. Bowel sounds are normal. She exhibits no distension. There is no tenderness. There is no rebound and no guarding. Musculoskeletal: Normal range of motion. She exhibits no edema or tenderness. Lymphadenopathy:     She has no cervical adenopathy. Neurological: She is alert and oriented to person, place, and time. No cranial nerve deficit. Coordination normal.   No sensory loss, Gait normal, Motor 5/5   Skin: Skin is warm and dry. Psychiatric: She has a normal mood and affect.  Her behavior is normal. Judgment and thought content normal.   Nursing note and vitals reviewed. MDM  Number of Diagnoses or Management Options  Diagnosis management comments: 60 y.o. female with hx pancreatitis, anxiety and depression presents with dysuria, urinary urgency and frequency. Urinalysis appears to show UTI. Patient is chronically thin and episodically twitching. Will check creatinine, hemocrit and BUN and proceed with antibiotics if reassuring.           Amount and/or Complexity of Data Reviewed  Clinical lab tests: ordered and reviewed  Tests in the medicine section of CPT®: ordered and reviewed  Decide to obtain previous medical records or to obtain history from someone other than the patient: yes  Review and summarize past medical records: yes      ED Course       Procedures    Vitals:  Patient Vitals for the past 12 hrs:   Temp Pulse Resp BP SpO2   10/02/17 1452 98.1 °F (36.7 °C) 99 16 (!) 135/91 98 %       Medications ordered:   Medications - No data to display      Lab findings:  Recent Results (from the past 12 hour(s))   URINALYSIS W/ RFLX MICROSCOPIC    Collection Time: 10/02/17  2:55 PM   Result Value Ref Range    Color YELLOW      Appearance CLOUDY      Specific gravity 1.024 1.005 - 1.030      pH (UA) 5.0 5.0 - 8.0      Protein 300 (A) NEG mg/dL    Glucose NEGATIVE  NEG mg/dL    Ketone 15 (A) NEG mg/dL    Bilirubin NEGATIVE  NEG      Blood LARGE (A) NEG      Urobilinogen 1.0 0.2 - 1.0 EU/dL    Nitrites POSITIVE (A) NEG      Leukocyte Esterase MODERATE (A) NEG     URINE MICROSCOPIC ONLY    Collection Time: 10/02/17  2:55 PM   Result Value Ref Range    WBC 50 to 60 0 - 4 /hpf    RBC 10 to 14 0 - 5 /hpf    Epithelial cells 1+ 0 - 5 /lpf    Bacteria 4+ (A) NEG /hpf   POC CHEM8    Collection Time: 10/02/17  4:15 PM   Result Value Ref Range    CO2, POC 24 19 - 24 MMOL/L    Glucose,  74 - 106 MG/DL    BUN, POC 17 7 - 18 MG/DL    Creatinine, POC 0.8 0.6 - 1.3 MG/DL    GFRAA, POC >60 >60 ml/min/1.73m2    GFRNA, POC >60 >60 ml/min/1.73m2    Sodium,  (L) 136 - 145 MMOL/L    Potassium, POC 3.8 3.5 - 5.5 MMOL/L    Calcium, ionized (POC) 1.17 1.12 - 1.32 MMOL/L    Chloride, POC 99 (L) 100 - 108 MMOL/L    Anion gap, POC 17 10 - 20      Hematocrit, POC 39 36 - 49 %    Hemoglobin, POC 13.3 12 - 16 G/DL       Reevaluation of patient:   I have reassessed the patient. Blood tests are reassuring. Will treat for UTI and have her follow up with PCP. Disposition:  Diagnosis:   1. Acute cystitis with hematuria        Disposition: Discharge    Follow-up Information     Follow up With Details Comments 99 Rosemary Paredes MD In 2 days ED visit follow-up [address] [facility] [address]      [facility] EMERGENCY DEPT Go to As needed, If symptoms worsen [address] [facility] [address]  [phone]           Patient's Medications   Start Taking    CEPHALEXIN (KEFLEX) 500 MG CAPSULE    Take 1 Cap by mouth three (3) times daily for 7 days. PHENAZOPYRIDINE (PYRIDIUM) 200 MG TABLET    Take 1 Tab by mouth three (3) times daily for 2 days. Continue Taking    DOCUSATE SODIUM (COLACE) 100 MG CAPSULE    Take 100 mg by mouth two (2) times daily as needed for Constipation. DULOXETINE (CYMBALTA) 60 MG CAPSULE    Take 1 Cap by mouth daily. Indications: MAJOR DEPRESSIVE DISORDER    OLANZAPINE (ZYPREXA ZYDIS) 10 MG DISINTEGRATING TABLET    Take 1 Tab by mouth nightly. Indications: Psychosis    POTASSIUM CHLORIDE (K-DUR, KLOR-CON) 20 MEQ TABLET    Take 1 Tab by mouth two (2) times a day. THERAPEUTIC MULTIVITAMIN (THERAGRAN) TABLET    Take 1 Tab by mouth daily.  Indications: VITAMIN DEFICIENCY PREVENTION   These Medications have changed    No medications on file   Stop Taking    No medications on file         Scribe Attestation      John Erwin acting as a scribe for and in the presence of Roman Bagley DO      October 02, 2017 at 3:45 PM       Provider Attestation:      I personally performed the services described in the documentation, reviewed the documentation, as recorded by the scribe in my presence, and it accurately and completely records my words and actions.  October 02, 2017 at 3:45 PM - Johnnie Conley DO

## 2017-10-05 ENCOUNTER — APPOINTMENT (OUTPATIENT)
Dept: GENERAL RADIOLOGY | Age: 62
End: 2017-10-05
Attending: EMERGENCY MEDICINE
Payer: COMMERCIAL

## 2017-10-05 ENCOUNTER — HOSPITAL ENCOUNTER (EMERGENCY)
Age: 62
Discharge: HOME | End: 2017-10-05
Attending: EMERGENCY MEDICINE
Payer: COMMERCIAL

## 2017-10-05 VITALS
HEART RATE: 76 BPM | SYSTOLIC BLOOD PRESSURE: 112 MMHG | DIASTOLIC BLOOD PRESSURE: 64 MMHG | BODY MASS INDEX: 16.3 KG/M2 | TEMPERATURE: 98.1 F | RESPIRATION RATE: 17 BRPM | OXYGEN SATURATION: 98 % | HEIGHT: 63 IN | WEIGHT: 92 LBS

## 2017-10-05 DIAGNOSIS — R55 SYNCOPE AND COLLAPSE: Primary | ICD-10-CM

## 2017-10-05 DIAGNOSIS — N12 PYELONEPHRITIS: ICD-10-CM

## 2017-10-05 LAB
ALBUMIN SERPL-MCNC: 3.7 G/DL (ref 3.4–5)
ALBUMIN/GLOB SERPL: 0.9 {RATIO} (ref 0.8–1.7)
ALP SERPL-CCNC: 58 U/L (ref 45–117)
ALT SERPL-CCNC: 12 U/L (ref 13–56)
ANION GAP SERPL CALC-SCNC: 16 MMOL/L (ref 3–18)
APPEARANCE UR: ABNORMAL
AST SERPL-CCNC: 15 U/L (ref 15–37)
ATRIAL RATE: 76 BPM
BACTERIA SPEC CULT: ABNORMAL
BACTERIA SPEC CULT: ABNORMAL
BACTERIA URNS QL MICRO: ABNORMAL /HPF
BASOPHILS # BLD: 0 K/UL (ref 0–0.06)
BASOPHILS NFR BLD: 0 % (ref 0–3)
BILIRUB SERPL-MCNC: 0.8 MG/DL (ref 0.2–1)
BILIRUB UR QL: ABNORMAL
BUN SERPL-MCNC: 27 MG/DL (ref 7–18)
BUN/CREAT SERPL: 24 (ref 12–20)
CALCIUM SERPL-MCNC: 9.9 MG/DL (ref 8.5–10.1)
CALCULATED P AXIS, ECG09: 72 DEGREES
CALCULATED R AXIS, ECG10: 52 DEGREES
CALCULATED T AXIS, ECG11: 50 DEGREES
CHLORIDE SERPL-SCNC: 97 MMOL/L (ref 100–108)
CO2 SERPL-SCNC: 20 MMOL/L (ref 21–32)
COLOR UR: ABNORMAL
CREAT SERPL-MCNC: 1.12 MG/DL (ref 0.6–1.3)
DIAGNOSIS, 93000: NORMAL
DIFFERENTIAL METHOD BLD: ABNORMAL
EOSINOPHIL # BLD: 0 K/UL (ref 0–0.4)
EOSINOPHIL NFR BLD: 0 % (ref 0–5)
EPITH CASTS URNS QL MICRO: ABNORMAL /LPF (ref 0–5)
ERYTHROCYTE [DISTWIDTH] IN BLOOD BY AUTOMATED COUNT: 13.7 % (ref 11.6–14.5)
GLOBULIN SER CALC-MCNC: 4.2 G/DL (ref 2–4)
GLUCOSE SERPL-MCNC: 87 MG/DL (ref 74–99)
GLUCOSE UR STRIP.AUTO-MCNC: NEGATIVE MG/DL
HCT VFR BLD AUTO: 34.7 % (ref 35–45)
HGB BLD-MCNC: 11.7 G/DL (ref 12–16)
HGB UR QL STRIP: NEGATIVE
KETONES UR QL STRIP.AUTO: 40 MG/DL
LEUKOCYTE ESTERASE UR QL STRIP.AUTO: ABNORMAL
LYMPHOCYTES # BLD: 5.1 K/UL (ref 0.8–3.5)
LYMPHOCYTES NFR BLD: 34 % (ref 20–51)
MCH RBC QN AUTO: 29.3 PG (ref 24–34)
MCHC RBC AUTO-ENTMCNC: 33.7 G/DL (ref 31–37)
MCV RBC AUTO: 87 FL (ref 74–97)
MONOCYTES # BLD: 0.6 K/UL (ref 0–1)
MONOCYTES NFR BLD: 4 % (ref 2–9)
NEUTS SEG # BLD: 9.2 K/UL (ref 1.8–8)
NEUTS SEG NFR BLD: 62 % (ref 42–75)
NITRITE UR QL STRIP.AUTO: POSITIVE
P-R INTERVAL, ECG05: 128 MS
PH UR STRIP: 5 [PH] (ref 5–8)
PLATELET # BLD AUTO: 488 K/UL (ref 135–420)
PLATELET COMMENTS,PCOM: ABNORMAL
PMV BLD AUTO: 9.9 FL (ref 9.2–11.8)
POTASSIUM SERPL-SCNC: 3.6 MMOL/L (ref 3.5–5.5)
PROT SERPL-MCNC: 7.9 G/DL (ref 6.4–8.2)
PROT UR STRIP-MCNC: 100 MG/DL
Q-T INTERVAL, ECG07: 522 MS
QRS DURATION, ECG06: 132 MS
QTC CALCULATION (BEZET), ECG08: 587 MS
RBC # BLD AUTO: 3.99 M/UL (ref 4.2–5.3)
RBC MORPH BLD: ABNORMAL
SERVICE CMNT-IMP: ABNORMAL
SODIUM SERPL-SCNC: 133 MMOL/L (ref 136–145)
SP GR UR REFRACTOMETRY: 1.03 (ref 1–1.03)
UROBILINOGEN UR QL STRIP.AUTO: 1 EU/DL (ref 0.2–1)
VENTRICULAR RATE, ECG03: 76 BPM
WBC # BLD AUTO: 14.9 K/UL (ref 4.6–13.2)
WBC URNS QL MICRO: ABNORMAL /HPF (ref 0–4)

## 2017-10-05 PROCEDURE — 80053 COMPREHEN METABOLIC PANEL: CPT | Performed by: EMERGENCY MEDICINE

## 2017-10-05 PROCEDURE — 96361 HYDRATE IV INFUSION ADD-ON: CPT

## 2017-10-05 PROCEDURE — 74011250636 HC RX REV CODE- 250/636: Performed by: EMERGENCY MEDICINE

## 2017-10-05 PROCEDURE — 96360 HYDRATION IV INFUSION INIT: CPT

## 2017-10-05 PROCEDURE — 93005 ELECTROCARDIOGRAM TRACING: CPT

## 2017-10-05 PROCEDURE — 71010 XR CHEST SNGL V: CPT

## 2017-10-05 PROCEDURE — 99284 EMERGENCY DEPT VISIT MOD MDM: CPT

## 2017-10-05 PROCEDURE — 81001 URINALYSIS AUTO W/SCOPE: CPT | Performed by: EMERGENCY MEDICINE

## 2017-10-05 PROCEDURE — 85025 COMPLETE CBC W/AUTO DIFF WBC: CPT | Performed by: EMERGENCY MEDICINE

## 2017-10-05 PROCEDURE — 87086 URINE CULTURE/COLONY COUNT: CPT | Performed by: EMERGENCY MEDICINE

## 2017-10-05 RX ORDER — CEFPODOXIME PROXETIL 200 MG/1
200 TABLET, FILM COATED ORAL 2 TIMES DAILY
Qty: 20 TAB | Refills: 0 | Status: SHIPPED | OUTPATIENT
Start: 2017-10-05 | End: 2017-10-15

## 2017-10-05 RX ORDER — ONDANSETRON 4 MG/1
4 TABLET, ORALLY DISINTEGRATING ORAL
Qty: 12 TAB | Refills: 0 | Status: SHIPPED | OUTPATIENT
Start: 2017-10-05 | End: 2017-10-09

## 2017-10-05 RX ADMIN — SODIUM CHLORIDE 1000 ML: 900 INJECTION, SOLUTION INTRAVENOUS at 12:18

## 2017-10-05 RX ADMIN — SODIUM CHLORIDE 1000 ML: 900 INJECTION, SOLUTION INTRAVENOUS at 09:40

## 2017-10-05 NOTE — PROGRESS NOTES
Pt was placed on keflex at the time of the ED visit. Culture shows sensitivity to first generation cephalosporins.  Cecille Kemp PA-C 11:46 AM

## 2017-10-05 NOTE — ED TRIAGE NOTES
Pt arrived in room # 6 from triage. Pt alert & oriented times 3. Pt sister at bedside. Pt had a syncopal episode at home.

## 2017-10-05 NOTE — ED PROVIDER NOTES
HPI Comments: 9:28 AM  Keesha Strauss is a 60 y.o. female with a h/o UTI presents to ED c/o syncope onset this morning. Pt's sister, who brought pt here, is at bedside. Per sister, pt had a syncope this morning while walking on the way to the car. She did not have a head injury. Pt does not remember events after syncope, and says that she was light-headed prior to syncopal episode. Currently, pt notes of pressure on head and abd soreness. Pt's sister explains that pt had a UTI this week and has been taking abx. Pt's sister notes pt has been unable to urinate recently due to dehydration. Pt reports that she has had diarrhea. Pt has also been intermittently losing and gaining weight after taking medications for depression. PMHx include pancreatitis, depression and UTI. Pt denies SOB, CP, urinary sxs, and any other sxs or complaints. The history is provided by the patient and a relative. No  was used. Past Medical History:   Diagnosis Date    Depression     Gastrointestinal disorder Pancreatitis    Pancreatic disease     Pancreatitis     Psychiatric disorder        Past Surgical History:   Procedure Laterality Date    HX GYN      HX HYSTERECTOMY           Family History:   Problem Relation Age of Onset    Diabetes Maternal Aunt     Cancer Maternal Aunt     Alzheimer Maternal Aunt     Cancer Paternal Aunt     Diabetes Paternal Aunt        Social History     Social History    Marital status:      Spouse name: N/A    Number of children: N/A    Years of education: N/A     Occupational History    Not on file.      Social History Main Topics    Smoking status: Current Every Day Smoker     Packs/day: 1.00    Smokeless tobacco: Not on file    Alcohol use No      Comment: Per pt she quit in 2007    Drug use: No    Sexual activity: Not Currently     Other Topics Concern    Not on file     Social History Narrative         ALLERGIES: Review of patient's allergies indicates no known allergies. Review of Systems   Constitutional: Positive for unexpected weight change. Respiratory: Negative for shortness of breath. Cardiovascular: Negative for chest pain. Gastrointestinal: Positive for diarrhea. Negative for abdominal pain. Neurological: Positive for syncope and light-headedness. All other systems reviewed and are negative. Vitals:    10/05/17 0945 10/05/17 1000 10/05/17 1015 10/05/17 1030   BP: 112/67 112/67 103/60 108/58   Pulse: 80 81 72 65   Resp: 16 12 17 15   Temp:       SpO2: 99% 99% 99% 100%   Weight:       Height:                Physical Exam   Constitutional: She is oriented to person, place, and time. She appears well-developed. Overall dehydrated. HENT:   Head: Normocephalic and atraumatic. Mouth/Throat: Mucous membranes are dry. Eyes: Conjunctivae and EOM are normal. Scleral icterus is present. Neck: Normal range of motion. Cardiovascular: Normal heart sounds. Exam reveals no gallop and no friction rub. No murmur heard. Pulmonary/Chest: Effort normal and breath sounds normal. No stridor. Abdominal: Soft. There is no tenderness. Musculoskeletal: Normal range of motion. She exhibits no tenderness. Neurological: She is alert and oriented to person, place, and time. Skin: Skin is warm and dry. She is not diaphoretic. Skin tinting. Psychiatric: She has a normal mood and affect. Her behavior is normal.   Nursing note and vitals reviewed. MDM  Number of Diagnoses or Management Options  Syncope and collapse:   Diagnosis management comments: 60 y.o. female presents to ED with syncopal episode this morning. No head trauma involved. Was light-headed prior to LOC. Likely due to dehydration. Will check EKG for any arrhythmias. CBC for anemia. Electrolytes for kidney injury. Urine for any further UTI. Pt appears to be acute on chronic malnourished and dehydrated-- worsened by diarrhea after abx.  Will give fluids back and reassess. ED Course       Procedures    Vitals:  Patient Vitals for the past 12 hrs:   Temp Pulse Resp BP SpO2   10/05/17 1030 - 65 15 108/58 100 %   10/05/17 1015 - 72 17 103/60 99 %   10/05/17 1000 - 81 12 112/67 99 %   10/05/17 0945 - 80 16 112/67 99 %   10/05/17 0933 97.9 °F (36.6 °C) 82 17 138/81 99 %   10/05/17 0930 - 80 17 99/62 99 %       Medications Ordered:  Medications   sodium chloride 0.9 % bolus infusion 1,000 mL (1,000 mL IntraVENous New Bag 10/5/17 0940)       Lab Findings:  Recent Results (from the past 12 hour(s))   CBC WITH AUTOMATED DIFF    Collection Time: 10/05/17  9:31 AM   Result Value Ref Range    WBC 14.9 (H) 4.6 - 13.2 K/uL    RBC 3.99 (L) 4.20 - 5.30 M/uL    HGB 11.7 (L) 12.0 - 16.0 g/dL    HCT 34.7 (L) 35.0 - 45.0 %    MCV 87.0 74.0 - 97.0 FL    MCH 29.3 24.0 - 34.0 PG    MCHC 33.7 31.0 - 37.0 g/dL    RDW 13.7 11.6 - 14.5 %    PLATELET 367 (H) 659 - 420 K/uL    MPV 9.9 9.2 - 11.8 FL    NEUTROPHILS 62 42 - 75 %    LYMPHOCYTES 34 20 - 51 %    MONOCYTES 4 2 - 9 %    EOSINOPHILS 0 0 - 5 %    BASOPHILS 0 0 - 3 %    ABS. NEUTROPHILS 9.2 (H) 1.8 - 8.0 K/UL    ABS. LYMPHOCYTES 5.1 (H) 0.8 - 3.5 K/UL    ABS. MONOCYTES 0.6 0 - 1.0 K/UL    ABS. EOSINOPHILS 0.0 0.0 - 0.4 K/UL    ABS.  BASOPHILS 0.0 0.0 - 0.06 K/UL    DF MANUAL      PLATELET COMMENTS INCREASED PLATELETS      RBC COMMENTS NORMOCYTIC, NORMOCHROMIC     METABOLIC PANEL, COMPREHENSIVE    Collection Time: 10/05/17  9:31 AM   Result Value Ref Range    Sodium 133 (L) 136 - 145 mmol/L    Potassium 3.6 3.5 - 5.5 mmol/L    Chloride 97 (L) 100 - 108 mmol/L    CO2 20 (L) 21 - 32 mmol/L    Anion gap 16 3.0 - 18 mmol/L    Glucose 87 74 - 99 mg/dL    BUN 27 (H) 7.0 - 18 MG/DL    Creatinine 1.12 0.6 - 1.3 MG/DL    BUN/Creatinine ratio 24 (H) 12 - 20      GFR est AA 60 (L) >60 ml/min/1.73m2    GFR est non-AA 49 (L) >60 ml/min/1.73m2    Calcium 9.9 8.5 - 10.1 MG/DL    Bilirubin, total 0.8 0.2 - 1.0 MG/DL    ALT (SGPT) 12 (L) 13 - 56 U/L    AST (SGOT) 15 15 - 37 U/L    Alk. phosphatase 58 45 - 117 U/L    Protein, total 7.9 6.4 - 8.2 g/dL    Albumin 3.7 3.4 - 5.0 g/dL    Globulin 4.2 (H) 2.0 - 4.0 g/dL    A-G Ratio 0.9 0.8 - 1.7         EKG Interpretation by ED physician:  11:04 AM  Rate 76 bpm, right bundle compared to prior V3 has flipped t-waves now. X-ray, CT or radiology findings or impressions:  XR CHEST SNGL V   Final Result      11:17 AM  RADIOLOGY FINDINGS  CHEST SNGL V X-ray shows:  Impression:  1. No acute infiltrate or effusion. 2.  Hyperinflated lungs suggestive of COPD. Interpreted by Radiologist    Progress notes, consult notes, or additional procedure notes:      Reevaluation of the patient:  -Re-evaluted the patient - feeling much better, able to urine well after hydration.   -Results including UA, lytes, cbc, ekg were discussed and reviewed with pt who understood the implications. Otherwise reassuring results     -We discussed the diagnosis, treatment, and plan. Next steps in close outpt care include: switching to vantin for broader abx coverage and cont fluid/hydration as outpt with zofran as needed. Close PMD follow up. -They verbally convey understanding and agreement of the signs, symptoms, diagnosis, treatment and prognosis and additionally agree to follow up as discussed. All questions were answered, and we reviewed pertinent return precautions as seen in the discharge paperwork. Pt understood follow up instructions, and would return to the ED if any worsening or concerns. Graciela Castillo MD  2:00 PM    Diagnosis:   1. Syncope and collapse        Disposition: DC    Follow-up Information     None           Patient's Medications   Start Taking    No medications on file   Continue Taking    CEPHALEXIN (KEFLEX) 500 MG CAPSULE    Take 1 Cap by mouth three (3) times daily for 7 days. DOCUSATE SODIUM (COLACE) 100 MG CAPSULE    Take 100 mg by mouth two (2) times daily as needed for Constipation.     DULOXETINE (CYMBALTA) 60 MG CAPSULE Take 1 Cap by mouth daily. Indications: MAJOR DEPRESSIVE DISORDER    OLANZAPINE (ZYPREXA ZYDIS) 10 MG DISINTEGRATING TABLET    Take 1 Tab by mouth nightly. Indications: Psychosis    POTASSIUM CHLORIDE (K-DUR, KLOR-CON) 20 MEQ TABLET    Take 1 Tab by mouth two (2) times a day. THERAPEUTIC MULTIVITAMIN (THERAGRAN) TABLET    Take 1 Tab by mouth daily. Indications: VITAMIN DEFICIENCY PREVENTION   These Medications have changed    No medications on file   Stop Taking    No medications on file       Scribe Attestation      Mari Maloney acting as a scribe for and in the presence of Verona Sexton MD  October 05, 2017 at 9:30 AM       Provider Attestation:      I personally performed the services described in the documentation, reviewed the documentation, as recorded by the scribe in my presence, and it accurately and completely records my words and actions.  October 05, 2017 at 9:30 AM - Verona Sexton MD

## 2017-10-05 NOTE — ED TRIAGE NOTES
Pt arrived via family member, family states pt had syncopal episode upon getting in the car this morning, pt was seen here 4 days ago for a UTI, has finished full course of treatment, pt admits to not drinking water enough over the last week, pt has a hx of mental health problems for which she takes prozac, sister at bedside.

## 2017-10-05 NOTE — DISCHARGE INSTRUCTIONS
Kidney Infection: Care Instructions  Your Care Instructions  A kidney infection (pyelonephritis) is a type of urinary tract infection, or UTI. Most UTIs are bladder infections. Kidney infections tend to make people much sicker than bladder infections do. A kidney infection is also more serious because it can cause lasting damage if it is not treated quickly. Follow-up care is a key part of your treatment and safety. Be sure to make and go to all appointments, and call your doctor if you are having problems. It's also a good idea to know your test results and keep a list of the medicines you take. How can you care for yourself at home? · Take your antibiotics as directed. Do not stop taking them just because you feel better. You need to take the full course of antibiotics. · Drink plenty of water, enough so that your urine is light yellow or clear like water. This may help wash out bacteria that are causing the infection. If you have kidney, heart, or liver disease and have to limit fluids, talk with your doctor before you increase the amount of fluids you drink. · Urinate often. Try to empty your bladder each time. · To relieve pain, take a hot shower or lay a heating pad (set on low) over your lower belly. Never go to sleep with a heating pad in place. Put a thin cloth between the heating pad and your skin. To help prevent kidney infections  · Drink plenty of water each day. This helps you urinate often, which clears bacteria from your system. If you have kidney, heart, or liver disease and have to limit fluids, talk with your doctor before you increase the amount of fluids you drink. · Urinate when you have the urge. Do not hold your urine for a long time. Urinate before you go to sleep. · If you have symptoms of a bladder infection, such as burning when you urinate or having to urinate often, call your doctor so you can treat the problem before it gets worse.  If you do not treat a bladder infection quickly, it can spread to the kidney. · Men should keep the tip of the penis clean. If you are a woman, keep these ideas in mind:  · Urinate right after you have sex. · Change sanitary pads often. Avoid douches, feminine hygiene sprays, and other feminine hygiene products that have deodorants. · After going to the bathroom, wipe from front to back. When should you call for help? Call your doctor now or seek immediate medical care if:  · You have increasing pain in your back just below the rib cage. This is called flank pain. · You have a new or higher fever and chills. · You are vomiting or nauseated. Watch closely for changes in your health, and be sure to contact your doctor if:  · Symptoms, such as burning when you urinate, get worse or get better but then come back. · You are not getting better after 2 days. Where can you learn more? Go to http://shara-alcon.info/. Enter B616 in the search box to learn more about \"Kidney Infection: Care Instructions. \"  Current as of: November 28, 2016  Content Version: 11.3  © 6914-1703 [employer]. Care instructions adapted under license by [employer] (which disclaims liability or warranty for this information). If you have questions about a medical condition or this instruction, always ask your healthcare professional. [address] any warranty or liability for your use of this information.

## 2017-10-06 LAB
BACTERIA SPEC CULT: NORMAL
SERVICE CMNT-IMP: NORMAL

## 2021-11-20 ENCOUNTER — HOSPITAL ENCOUNTER (INPATIENT)
Age: 66
LOS: 2 days | Discharge: ANOTHER HOSPITAL | DRG: 885 | End: 2021-11-24
Attending: STUDENT IN AN ORGANIZED HEALTH CARE EDUCATION/TRAINING PROGRAM | Admitting: PSYCHIATRY & NEUROLOGY
Payer: MEDICARE

## 2021-11-20 ENCOUNTER — APPOINTMENT (OUTPATIENT)
Dept: GENERAL RADIOLOGY | Age: 66
DRG: 885 | End: 2021-11-20
Attending: PHYSICIAN ASSISTANT
Payer: MEDICARE

## 2021-11-20 ENCOUNTER — APPOINTMENT (OUTPATIENT)
Dept: CT IMAGING | Age: 66
DRG: 885 | End: 2021-11-20
Attending: PHYSICIAN ASSISTANT
Payer: MEDICARE

## 2021-11-20 DIAGNOSIS — R45.851 SUICIDAL IDEATION: Primary | ICD-10-CM

## 2021-11-20 DIAGNOSIS — D64.9 ANEMIA, UNSPECIFIED TYPE: ICD-10-CM

## 2021-11-20 DIAGNOSIS — E87.6 HYPOKALEMIA: ICD-10-CM

## 2021-11-20 LAB
ALBUMIN SERPL-MCNC: 3.1 G/DL (ref 3.4–5)
ALBUMIN/GLOB SERPL: 0.7 {RATIO} (ref 0.8–1.7)
ALP SERPL-CCNC: 73 U/L (ref 45–117)
ALT SERPL-CCNC: 17 U/L (ref 13–56)
AMPHET UR QL SCN: NEGATIVE
ANION GAP SERPL CALC-SCNC: 3 MMOL/L (ref 3–18)
ANION GAP SERPL CALC-SCNC: 6 MMOL/L (ref 3–18)
APPEARANCE UR: CLEAR
AST SERPL-CCNC: 15 U/L (ref 10–38)
BACTERIA URNS QL MICRO: ABNORMAL /HPF
BARBITURATES UR QL SCN: NEGATIVE
BASOPHILS # BLD: 0.2 K/UL (ref 0–0.1)
BASOPHILS NFR BLD: 1 % (ref 0–2)
BENZODIAZ UR QL: NEGATIVE
BILIRUB SERPL-MCNC: 0.3 MG/DL (ref 0.2–1)
BILIRUB UR QL: NEGATIVE
BUN SERPL-MCNC: 3 MG/DL (ref 7–18)
BUN SERPL-MCNC: 3 MG/DL (ref 7–18)
BUN/CREAT SERPL: 6 (ref 12–20)
BUN/CREAT SERPL: 6 (ref 12–20)
CALCIUM SERPL-MCNC: 8.6 MG/DL (ref 8.5–10.1)
CALCIUM SERPL-MCNC: 9.5 MG/DL (ref 8.5–10.1)
CANNABINOIDS UR QL SCN: POSITIVE
CHLORIDE SERPL-SCNC: 104 MMOL/L (ref 100–111)
CHLORIDE SERPL-SCNC: 99 MMOL/L (ref 100–111)
CO2 SERPL-SCNC: 30 MMOL/L (ref 21–32)
CO2 SERPL-SCNC: 34 MMOL/L (ref 21–32)
COCAINE UR QL SCN: NEGATIVE
COLOR UR: YELLOW
COVID-19 RAPID TEST, COVR: NOT DETECTED
CREAT SERPL-MCNC: 0.48 MG/DL (ref 0.6–1.3)
CREAT SERPL-MCNC: 0.5 MG/DL (ref 0.6–1.3)
DIFFERENTIAL METHOD BLD: ABNORMAL
EOSINOPHIL # BLD: 0 K/UL (ref 0–0.4)
EOSINOPHIL NFR BLD: 0 % (ref 0–5)
EPITH CASTS URNS QL MICRO: ABNORMAL /LPF (ref 0–5)
ERYTHROCYTE [DISTWIDTH] IN BLOOD BY AUTOMATED COUNT: 12.6 % (ref 11.6–14.5)
ETHANOL SERPL-MCNC: <3 MG/DL (ref 0–3)
GLOBULIN SER CALC-MCNC: 4.4 G/DL (ref 2–4)
GLUCOSE BLD STRIP.AUTO-MCNC: 92 MG/DL (ref 70–110)
GLUCOSE SERPL-MCNC: 102 MG/DL (ref 74–99)
GLUCOSE SERPL-MCNC: 86 MG/DL (ref 74–99)
GLUCOSE UR STRIP.AUTO-MCNC: NEGATIVE MG/DL
HCT VFR BLD AUTO: 28 % (ref 35–45)
HCT VFR BLD AUTO: 28.1 % (ref 35–45)
HDSCOM,HDSCOM: ABNORMAL
HEMOCCULT STL QL: NEGATIVE
HGB BLD-MCNC: 9.1 G/DL (ref 12–16)
HGB BLD-MCNC: 9.4 G/DL (ref 12–16)
HGB UR QL STRIP: ABNORMAL
IMM GRANULOCYTES # BLD AUTO: 0 K/UL
IMM GRANULOCYTES NFR BLD AUTO: 0 %
KETONES UR QL STRIP.AUTO: NEGATIVE MG/DL
LACTATE BLD-SCNC: 1.06 MMOL/L (ref 0.4–2)
LEUKOCYTE ESTERASE UR QL STRIP.AUTO: NEGATIVE
LYMPHOCYTES # BLD: 4.3 K/UL (ref 0.9–3.6)
LYMPHOCYTES NFR BLD: 21 % (ref 21–52)
MAGNESIUM SERPL-MCNC: 2.1 MG/DL (ref 1.6–2.6)
MCH RBC QN AUTO: 30.1 PG (ref 24–34)
MCHC RBC AUTO-ENTMCNC: 33.5 G/DL (ref 31–37)
MCV RBC AUTO: 90.1 FL (ref 78–100)
METHADONE UR QL: NEGATIVE
MONOCYTES # BLD: 1.8 K/UL (ref 0.05–1.2)
MONOCYTES NFR BLD: 9 % (ref 3–10)
NEUTS SEG # BLD: 14.2 K/UL (ref 1.8–8)
NEUTS SEG NFR BLD: 69 % (ref 40–73)
NITRITE UR QL STRIP.AUTO: NEGATIVE
NRBC # BLD: 0 K/UL (ref 0–0.01)
NRBC BLD-RTO: 0 PER 100 WBC
OPIATES UR QL: NEGATIVE
PCP UR QL: NEGATIVE
PH UR STRIP: 7.5 [PH] (ref 5–8)
PLATELET # BLD AUTO: 385 K/UL (ref 135–420)
PLATELET COMMENTS,PCOM: ABNORMAL
PMV BLD AUTO: 10.1 FL (ref 9.2–11.8)
POTASSIUM SERPL-SCNC: 2.6 MMOL/L (ref 3.5–5.5)
POTASSIUM SERPL-SCNC: 3.6 MMOL/L (ref 3.5–5.5)
PROT SERPL-MCNC: 7.5 G/DL (ref 6.4–8.2)
PROT UR STRIP-MCNC: NEGATIVE MG/DL
RBC # BLD AUTO: 3.12 M/UL (ref 4.2–5.3)
RBC #/AREA URNS HPF: ABNORMAL /HPF (ref 0–5)
RBC MORPH BLD: ABNORMAL
RBC MORPH BLD: ABNORMAL
SODIUM SERPL-SCNC: 136 MMOL/L (ref 136–145)
SODIUM SERPL-SCNC: 140 MMOL/L (ref 136–145)
SOURCE, COVRS: NORMAL
SP GR UR REFRACTOMETRY: 1.01 (ref 1–1.03)
UROBILINOGEN UR QL STRIP.AUTO: 1 EU/DL (ref 0.2–1)
WBC # BLD AUTO: 20.5 K/UL (ref 4.6–13.2)
WBC URNS QL MICRO: ABNORMAL /HPF (ref 0–5)

## 2021-11-20 PROCEDURE — 82272 OCCULT BLD FECES 1-3 TESTS: CPT

## 2021-11-20 PROCEDURE — 82962 GLUCOSE BLOOD TEST: CPT

## 2021-11-20 PROCEDURE — 93005 ELECTROCARDIOGRAM TRACING: CPT

## 2021-11-20 PROCEDURE — 96365 THER/PROPH/DIAG IV INF INIT: CPT

## 2021-11-20 PROCEDURE — 80307 DRUG TEST PRSMV CHEM ANLYZR: CPT

## 2021-11-20 PROCEDURE — 87040 BLOOD CULTURE FOR BACTERIA: CPT

## 2021-11-20 PROCEDURE — 74011250637 HC RX REV CODE- 250/637: Performed by: PHYSICIAN ASSISTANT

## 2021-11-20 PROCEDURE — 74011250636 HC RX REV CODE- 250/636: Performed by: PHYSICIAN ASSISTANT

## 2021-11-20 PROCEDURE — 85025 COMPLETE CBC W/AUTO DIFF WBC: CPT

## 2021-11-20 PROCEDURE — 96361 HYDRATE IV INFUSION ADD-ON: CPT

## 2021-11-20 PROCEDURE — 83605 ASSAY OF LACTIC ACID: CPT

## 2021-11-20 PROCEDURE — 99285 EMERGENCY DEPT VISIT HI MDM: CPT

## 2021-11-20 PROCEDURE — 74011000250 HC RX REV CODE- 250: Performed by: PHYSICIAN ASSISTANT

## 2021-11-20 PROCEDURE — 96367 TX/PROPH/DG ADDL SEQ IV INF: CPT

## 2021-11-20 PROCEDURE — 96376 TX/PRO/DX INJ SAME DRUG ADON: CPT

## 2021-11-20 PROCEDURE — 85014 HEMATOCRIT: CPT

## 2021-11-20 PROCEDURE — C9113 INJ PANTOPRAZOLE SODIUM, VIA: HCPCS | Performed by: PHYSICIAN ASSISTANT

## 2021-11-20 PROCEDURE — 80053 COMPREHEN METABOLIC PANEL: CPT

## 2021-11-20 PROCEDURE — 74011000636 HC RX REV CODE- 636: Performed by: STUDENT IN AN ORGANIZED HEALTH CARE EDUCATION/TRAINING PROGRAM

## 2021-11-20 PROCEDURE — 81001 URINALYSIS AUTO W/SCOPE: CPT

## 2021-11-20 PROCEDURE — 71046 X-RAY EXAM CHEST 2 VIEWS: CPT

## 2021-11-20 PROCEDURE — 96375 TX/PRO/DX INJ NEW DRUG ADDON: CPT

## 2021-11-20 PROCEDURE — 87635 SARS-COV-2 COVID-19 AMP PRB: CPT

## 2021-11-20 PROCEDURE — 83735 ASSAY OF MAGNESIUM: CPT

## 2021-11-20 PROCEDURE — 82077 ASSAY SPEC XCP UR&BREATH IA: CPT

## 2021-11-20 PROCEDURE — 74177 CT ABD & PELVIS W/CONTRAST: CPT

## 2021-11-20 RX ORDER — DOXYCYCLINE 100 MG/1
100 CAPSULE ORAL
Status: DISCONTINUED | OUTPATIENT
Start: 2021-11-20 | End: 2021-11-20

## 2021-11-20 RX ORDER — POTASSIUM CHLORIDE 7.45 MG/ML
10 INJECTION INTRAVENOUS
Status: DISPENSED | OUTPATIENT
Start: 2021-11-20 | End: 2021-11-20

## 2021-11-20 RX ORDER — DOXYCYCLINE 100 MG/1
100 CAPSULE ORAL ONCE
Status: DISCONTINUED | OUTPATIENT
Start: 2021-11-21 | End: 2021-11-21

## 2021-11-20 RX ORDER — POTASSIUM CHLORIDE 7.45 MG/ML
10 INJECTION INTRAVENOUS
Status: DISCONTINUED | OUTPATIENT
Start: 2021-11-20 | End: 2021-11-20

## 2021-11-20 RX ORDER — HYDROCODONE BITARTRATE AND ACETAMINOPHEN 5; 325 MG/1; MG/1
1 TABLET ORAL
Status: COMPLETED | OUTPATIENT
Start: 2021-11-20 | End: 2021-11-20

## 2021-11-20 RX ORDER — SODIUM CHLORIDE 0.9 % (FLUSH) 0.9 %
5-10 SYRINGE (ML) INJECTION AS NEEDED
Status: DISCONTINUED | OUTPATIENT
Start: 2021-11-20 | End: 2021-11-23

## 2021-11-20 RX ADMIN — HYDROCODONE BITARTRATE AND ACETAMINOPHEN 1 TABLET: 5; 325 TABLET ORAL at 22:39

## 2021-11-20 RX ADMIN — AZITHROMYCIN 500 MG: 500 INJECTION, POWDER, LYOPHILIZED, FOR SOLUTION INTRAVENOUS at 22:07

## 2021-11-20 RX ADMIN — SODIUM CHLORIDE 1000 ML: 900 INJECTION, SOLUTION INTRAVENOUS at 21:12

## 2021-11-20 RX ADMIN — POTASSIUM CHLORIDE 10 MEQ: 7.46 INJECTION, SOLUTION INTRAVENOUS at 16:32

## 2021-11-20 RX ADMIN — IOPAMIDOL 100 ML: 755 INJECTION, SOLUTION INTRAVENOUS at 18:15

## 2021-11-20 RX ADMIN — SODIUM CHLORIDE 347 ML: 900 INJECTION, SOLUTION INTRAVENOUS at 23:49

## 2021-11-20 RX ADMIN — POTASSIUM CHLORIDE 10 MEQ: 7.46 INJECTION, SOLUTION INTRAVENOUS at 19:49

## 2021-11-20 RX ADMIN — SODIUM CHLORIDE 40 MG: 9 INJECTION, SOLUTION INTRAMUSCULAR; INTRAVENOUS; SUBCUTANEOUS at 21:11

## 2021-11-20 RX ADMIN — CEFTRIAXONE SODIUM 2 G: 2 INJECTION, POWDER, FOR SOLUTION INTRAMUSCULAR; INTRAVENOUS at 21:57

## 2021-11-20 RX ADMIN — POTASSIUM BICARBONATE 40 MEQ: 391 TABLET, EFFERVESCENT ORAL at 16:29

## 2021-11-20 RX ADMIN — POTASSIUM CHLORIDE 10 MEQ: 7.46 INJECTION, SOLUTION INTRAVENOUS at 17:47

## 2021-11-20 NOTE — ED NOTES
Pt. Sister at bedside, Carla Brown [phone]    Sister would like a call if condition or placement changes.

## 2021-11-20 NOTE — ED TRIAGE NOTES
Pt comes in w/ sister stating she is depressed and wants to hurt herself. Pt has been depressed for the last month, per her sister she has gotten more depressed over the past week and a half. Pt has plan, states she would take a lot of pills and drink etoh.

## 2021-11-20 NOTE — ED NOTES
6:00 PM Assumed care of the pt at this time. Discussed with PARESH Brown concerning patient Joy Levy, standard discussion of reason for visit, HPI, ROS, PE, and current results available. Recommendation for obtaining pending CT imaging and repeat labs (after being medicated) to medically clear the pt followed by crisis evaluation/recommendation. Cecille Kemp PA-C     9:20 PM CT resulted,concerning for RLL pneumonia. Sepsis bundle ordered and IV abx ordered as well. Consulted with the hospitalist on call Dr. Rae Richard who recommends treating with PO meds as long as the pt isn't hypoxic and her vital shave remained stable in the ED. Pt was ambulated in the ED, O2 remains b/w % on room air. EKG shows no evidence of prolonged QT, IV meds ordered, will transition to PO meds after that. Pt's repeat BMP shows a normalized potassium. Will continue to monitor while in the ED awaiting placement. Cecille Kemp PA-C     2:00 AM Pt is turned over to Dr. Bebeto Meek, night ED attending who agrees to assume care of this pt at this time. Discussed this case with the doctor who agrees with the plan to continue to monitor the pt while in the ED Awaiting placement. Cecille Kemp PA-C     Disposition: TBD    Dictation disclaimer:  Please note that this dictation was completed with [employer], the computer voice recognition software. Quite often unanticipated grammatical, syntax, homophones, and other interpretive errors are inadvertently transcribed by the computer software. Please disregard these errors. Please excuse any errors that have escaped final proofreading.

## 2021-11-20 NOTE — ED NOTES
Patient changed into scrubs. Cords and monitor wires taken out of patient room. Belonging placed in [address]. Sister and sitter bedside.
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
## 2021-11-21 LAB
ATRIAL RATE: 65 BPM
CALCULATED P AXIS, ECG09: 25 DEGREES
CALCULATED R AXIS, ECG10: 18 DEGREES
CALCULATED T AXIS, ECG11: -32 DEGREES
DIAGNOSIS, 93000: NORMAL
P-R INTERVAL, ECG05: 114 MS
Q-T INTERVAL, ECG07: 462 MS
QRS DURATION, ECG06: 116 MS
QTC CALCULATION (BEZET), ECG08: 480 MS
VENTRICULAR RATE, ECG03: 65 BPM

## 2021-11-21 PROCEDURE — 74011250636 HC RX REV CODE- 250/636: Performed by: PHYSICIAN ASSISTANT

## 2021-11-21 PROCEDURE — 74011000250 HC RX REV CODE- 250: Performed by: PHYSICIAN ASSISTANT

## 2021-11-21 RX ORDER — AZITHROMYCIN 250 MG/1
250 TABLET, FILM COATED ORAL DAILY
Status: DISCONTINUED | OUTPATIENT
Start: 2021-11-22 | End: 2021-11-21

## 2021-11-21 RX ORDER — DOXYCYCLINE 100 MG/1
100 CAPSULE ORAL EVERY 12 HOURS
Status: DISCONTINUED | OUTPATIENT
Start: 2021-11-22 | End: 2021-11-23 | Stop reason: SDUPTHER

## 2021-11-21 RX ADMIN — AZITHROMYCIN 500 MG: 500 INJECTION, POWDER, LYOPHILIZED, FOR SOLUTION INTRAVENOUS at 21:58

## 2021-11-21 RX ADMIN — CEFTRIAXONE SODIUM 2 G: 2 INJECTION, POWDER, FOR SOLUTION INTRAMUSCULAR; INTRAVENOUS at 22:02

## 2021-11-21 NOTE — ED NOTES
Pt. Awake tearful. Pt. States she wants to \"be here on this earth a while longer\". Pt. Is talkative to staff and has pleasant demeanor.

## 2021-11-21 NOTE — ED NOTES
Patient is pending crisis reevaluation in the morning. She is suicidal. Has pneumonia but can be treated as an outpatient with p.o. antibiotics. Vital signs remained stable and no oxygen demand.

## 2021-11-21 NOTE — ED NOTES
Patient resting with eyes closed. Has bilateral hep locks, both are patent. Tolerated IV and IV push antibiotics well, no s/s adverse reactions.

## 2021-11-22 LAB — GLUCOSE BLD STRIP.AUTO-MCNC: 98 MG/DL (ref 70–110)

## 2021-11-22 PROCEDURE — 65220000003 HC RM SEMIPRIVATE PSYCH

## 2021-11-22 PROCEDURE — 74011250637 HC RX REV CODE- 250/637: Performed by: PHYSICIAN ASSISTANT

## 2021-11-22 PROCEDURE — 74011250637 HC RX REV CODE- 250/637: Performed by: PSYCHIATRY & NEUROLOGY

## 2021-11-22 PROCEDURE — 82962 GLUCOSE BLOOD TEST: CPT

## 2021-11-22 RX ORDER — TRAZODONE HYDROCHLORIDE 50 MG/1
50 TABLET ORAL
Status: DISCONTINUED | OUTPATIENT
Start: 2021-11-22 | End: 2021-11-24 | Stop reason: HOSPADM

## 2021-11-22 RX ORDER — ACETAMINOPHEN 325 MG/1
650 TABLET ORAL
Status: DISCONTINUED | OUTPATIENT
Start: 2021-11-22 | End: 2021-11-24 | Stop reason: HOSPADM

## 2021-11-22 RX ORDER — HYDROXYZINE PAMOATE 50 MG/1
50 CAPSULE ORAL
Status: DISCONTINUED | OUTPATIENT
Start: 2021-11-22 | End: 2021-11-24 | Stop reason: HOSPADM

## 2021-11-22 RX ORDER — DOXYCYCLINE 100 MG/1
100 CAPSULE ORAL EVERY 12 HOURS
Status: DISCONTINUED | OUTPATIENT
Start: 2021-11-22 | End: 2021-11-24 | Stop reason: HOSPADM

## 2021-11-22 RX ADMIN — DOXYCYCLINE HYCLATE 100 MG: 100 CAPSULE ORAL at 08:48

## 2021-11-22 RX ADMIN — DOXYCYCLINE HYCLATE 100 MG: 100 CAPSULE ORAL at 17:36

## 2021-11-22 RX ADMIN — DOXYCYCLINE HYCLATE 100 MG: 100 CAPSULE ORAL at 21:16

## 2021-11-22 RX ADMIN — DOXYCYCLINE HYCLATE 100 MG: 100 CAPSULE ORAL at 21:17

## 2021-11-22 NOTE — ED NOTES
Pt resting in bed, eating, and reports no pain at this time. Will continue to monitor. Sitter at bedside.   Suicide and Psychiatric Awareness/Precautions Observation Flowsheet utilized at pt bedside

## 2021-11-22 NOTE — H&P
Psychiatry History and Physical    Subjective:     Date of Evaluation:  11/22/2021    Reason for Referral:  Олег Schwarz was referred to the examiners from ED for SI. History of Presenting Problem: 77 yo AA female in NAD, underweight and well developed who presented to the ED for SI. She is currently being tx for Pneumonia and on doxycycline. She had IM Rocephin and IV Azithromycin in the ED. She has no SOB, CP, dizziness, fevers, nausea or vomiting and is overall feeling well. She denies SI, HI, anxiety, AH, or VH. She endorses depression. Patient Active Problem List    Diagnosis Date Noted    UTI (urinary tract infection) 02/23/2017    Sepsis ([facility] [address] 02/22/2017    Pyelonephritis 02/21/2017    SIRS (systemic inflammatory response syndrome) ([facility] [address] 02/21/2017    Major depressive disorder, recurrent episode, severe, with psychosis ([facility] [address] 08/28/2016    Itch of skin 07/15/2016    Anxiety state 07/15/2016    Weight loss, unintentional 07/15/2016    Depression 01/06/2016     Past Medical History:   Diagnosis Date    Depression     Gastrointestinal disorder Pancreatitis    Pancreatic disease     Pancreatitis     Psychiatric disorder      Past Surgical History:   Procedure Laterality Date    HX GYN      HX HYSTERECTOMY         Family History   Problem Relation Age of Onset    Diabetes Maternal Aunt     Cancer Maternal Aunt     Alzheimer's Disease Maternal Aunt     Cancer Paternal Aunt     Diabetes Paternal Aunt       Social History     Tobacco Use    Smoking status: Current Every Day Smoker     Packs/day: 1.00    Smokeless tobacco: Not on file   Substance Use Topics    Alcohol use: No     Comment: Per pt she quit in 2007     Prior to Admission medications    Medication Sig Start Date End Date Taking? Authorizing Provider   potassium chloride (K-DUR, KLOR-CON) 20 mEq tablet Take 1 Tab by mouth two (2) times a day.  2/24/17   Debra Rob MD   DULoxetine (CYMBALTA) 60 mg capsule Take 1 Cap by mouth daily. Indications: MAJOR DEPRESSIVE DISORDER 9/6/16   Kendal Ash MD   OLANZapine (ZYPREXA ZYDIS) 10 mg disintegrating tablet Take 1 Tab by mouth nightly. Indications: Psychosis 9/6/16   Kendal Ash MD   therapeutic multivitamin [facility]) tablet Take 1 Tab by mouth daily. Indications: VITAMIN DEFICIENCY PREVENTION 9/6/16   Kendal Ash MD   docusate sodium (COLACE) 100 mg capsule Take 100 mg by mouth two (2) times daily as needed for Constipation.     Provider, Historical     No Known Allergies     Review of Systems - History obtained from chart review and the patient  General ROS: negative  Psychological ROS: positive for - depression  Ophthalmic ROS: negative  ENT ROS: negative  Allergy and Immunology ROS: negative  Hematological and Lymphatic ROS: negative  Endocrine ROS: negative  Respiratory ROS: no cough, shortness of breath, or wheezing  Cardiovascular ROS: no chest pain or dyspnea on exertion  Gastrointestinal ROS: no abdominal pain, change in bowel habits, or black or bloody stools  Genito-Urinary ROS: no dysuria, trouble voiding, or hematuria  Musculoskeletal ROS: negative  Neurological ROS: no TIA or stroke symptoms  Dermatological ROS: negative      Objective:     Patient Vitals for the past 8 hrs:   BP Temp Pulse Resp SpO2   11/22/21 1414 126/77 98 °F (36.7 °C) 71 16 98 %   11/22/21 1352 124/67 99 °F (37.2 °C) 65 18 98 %   11/22/21 1341     97 %       Mental Status exam: WNL except for    Sensorium  Alert and Oriented x 2   Orientation person and place   Relations cooperative   Eye Contact poor   Appearance:  disheveled   Motor Behavior:  within normal limits   Speech:  normal pitch, normal volume and non-pressured   Vocabulary average   Thought Process: within normal limits   Thought Content free of delusions and free of hallucinations   Suicidal ideations no plan  and no intention   Homicidal ideations no plan  and no intention   Mood:  depressed   Affect: mood-congruent   Memory recent  adequate   Memory remote:  adequate   Concentration:  adequate   Abstraction:  concrete   Insight:  good   Reliability good   Judgment:  fair         Physical Exam:   Visit Vitals  /77 (BP 1 Location: Right upper arm, BP Patient Position: At rest)   Pulse 71   Temp 98 °F (36.7 °C)   Resp 16   Ht 5' 2\" (1.575 m)   Wt 44.9 kg (99 lb)   SpO2 98%   BMI 18.11 kg/m²     General:  Alert, cooperative, no distress, appears stated age. Head:  Normocephalic, without obvious abnormality, atraumatic. Eyes:  Conjunctivae/corneas clear. PERRL, EOMs intact. Fundi benign. Ears:  Normal TMs and external ear canals both ears. Nose: Nares normal. Septum midline. Mucosa normal. No drainage or sinus tenderness. Throat: Lips, mucosa, and tongue normal. Poor dentition   Neck: Supple, symmetrical, trachea midline, no adenopathy, thyroid: no enlargement/tenderness/nodules, no carotid bruit and no JVD. Back:   Symmetric, no curvature. ROM normal. No CVA tenderness. Lungs:   Clear to auscultation bilaterally. Chest wall:  No tenderness or deformity. Heart:  Regular rate and rhythm, S1, S2 normal, no murmur, click, rub or gallop. Abdomen:   Soft, non-tender. Bowel sounds normal. No masses,  No organomegaly. Extremities: Extremities normal, atraumatic, no cyanosis or edema. Pulses: 2+ and symmetric all extremities. Skin: Skin color, texture, turgor normal. No rashes or lesions. Lymph nodes: Cervical, supraclavicular, and axillary nodes normal.   Neurologic: CNII-XII intact. Normal strength, sensation and reflexes throughout. Impression:      Active Problems:    Depression (1/6/2016)          Plan:     Recommendations for Treatment/Conditions:  Psychiatric treatment recommended while in hospital  Admit to behavioral health for Depression. Continue Doxycycline for Pneumonia. Referral To:    Inpatient psychiatric care      [place], [place]   11/22/2021 5:43 PM

## 2021-11-22 NOTE — PROGRESS NOTES
Problem: Suicide  Goal: *STG: Remains safe in hospital  Description: Patient will be assess daily for her safety. Outcome: Progressing Towards Goal  Goal: *STG/LTG: Complies with medication therapy  Description: Patient will take prescribed medications daily.   Outcome: Progressing Towards Goal

## 2021-11-22 NOTE — BSMART NOTE
Crisis Evaluation:  Patient reassessed for possible admission. Patient continues to endorse SI with a plan. Patient reports feelings of helplessness and hopelessness. Patient reports the need for inpatient hospitalization. Patient will be presented for admission to [facility].   Dr. Vaughn Means informed.  Damion Ricketts, JUAN-E

## 2021-11-22 NOTE — PROGRESS NOTES
Patient is a 78year old female who presented to the emergency room with c/o of feeling  suicidal.  Patient verbalized that she \"has a plan to take pills. \"  Patient has been depressed for some time. She denies substance abuse but did state she use to be an alcoholic, states she has not drank for 14 years. Patient lives in her own home and will return will discharged. Patient admitted to unit , oriented to unit and guide lines. RN's will initate ,develop,implent, review or revise treatment plan.
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
## 2021-11-22 NOTE — PROGRESS NOTES
completed the initial Spiritual Assessment of the patient in bed FT3 of the emergency room, and offered Pastoral Care support to the patient who has now been here 2 days. . Patient does not have an advance directive on file here yet. Patient does not have any Mandaen/cultural needs that will affect patients preferences in health care. Chaplains will continue to follow and will provide pastoral care on an as needed/requested basis.     [facility] Care Department  [phone]

## 2021-11-22 NOTE — ED NOTES
Patient turned over to me Dr. Janis Chery she is a 14-year-old female evaluated by crisis for going to admit in the morning for suicidal ideation.   Dr. Majo De Anda pending observation

## 2021-11-23 PROBLEM — E43 SEVERE PROTEIN-CALORIE MALNUTRITION (HCC): Status: ACTIVE | Noted: 2021-11-23

## 2021-11-23 PROCEDURE — 99222 1ST HOSP IP/OBS MODERATE 55: CPT | Performed by: PSYCHIATRY & NEUROLOGY

## 2021-11-23 PROCEDURE — 74011000250 HC RX REV CODE- 250: Performed by: PSYCHIATRY & NEUROLOGY

## 2021-11-23 PROCEDURE — 74011250636 HC RX REV CODE- 250/636: Performed by: PSYCHIATRY & NEUROLOGY

## 2021-11-23 PROCEDURE — 74011250637 HC RX REV CODE- 250/637: Performed by: PSYCHIATRY & NEUROLOGY

## 2021-11-23 PROCEDURE — 65220000003 HC RM SEMIPRIVATE PSYCH

## 2021-11-23 RX ORDER — THERA TABS 400 MCG
1 TAB ORAL DAILY
Status: DISCONTINUED | OUTPATIENT
Start: 2021-11-23 | End: 2021-11-24 | Stop reason: HOSPADM

## 2021-11-23 RX ORDER — IPRATROPIUM BROMIDE AND ALBUTEROL SULFATE 2.5; .5 MG/3ML; MG/3ML
3 SOLUTION RESPIRATORY (INHALATION)
Status: DISCONTINUED | OUTPATIENT
Start: 2021-11-23 | End: 2021-11-24 | Stop reason: HOSPADM

## 2021-11-23 RX ORDER — ONDANSETRON 2 MG/ML
4 INJECTION INTRAMUSCULAR; INTRAVENOUS
Status: DISCONTINUED | OUTPATIENT
Start: 2021-11-23 | End: 2021-11-24 | Stop reason: HOSPADM

## 2021-11-23 RX ADMIN — IPRATROPIUM BROMIDE AND ALBUTEROL SULFATE 3 ML: .5; 3 SOLUTION RESPIRATORY (INHALATION) at 00:46

## 2021-11-23 RX ADMIN — DOXYCYCLINE HYCLATE 100 MG: 100 CAPSULE ORAL at 21:05

## 2021-11-23 RX ADMIN — ACETAMINOPHEN 650 MG: 325 TABLET ORAL at 17:18

## 2021-11-23 RX ADMIN — ONDANSETRON 4 MG: 2 INJECTION INTRAMUSCULAR; INTRAVENOUS at 00:43
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
## 2021-11-23 NOTE — BH NOTES
Pt was given Zofran 4mg. IM in the right gluteal region for nausea and vomiting. She was given an albuterol treatment for respiratory congestion. ( albuterol duo-neb 2.5 -0.5 MG /3 ML.

## 2021-11-23 NOTE — BH NOTES
During initial rounds patient was observed sitting in the day area. Shortly she complained of nausea and began to vomit in the trash can. She complained of respiratory tightness. She does have some expiratory  wheezing in the left upper chest . BP [address] pulse-87 R-18.

## 2021-11-23 NOTE — PROGRESS NOTES
Problem: Suicide  Goal: *STG: Remains safe in hospital  Description: Patient will be assess daily for her safety. Outcome: Progressing Towards Goal  Goal: *STG/LTG: Complies with medication therapy  Description: Patient will take prescribed medications daily. Outcome: Progressing Towards Goal     Problem: Falls - Risk of  Goal: *Absence of Falls  Description: Document Nolan Wade Fall Risk and appropriate interventions in the flowsheet daily.   Outcome: Progressing Towards Goal  Note: Fall Risk Interventions:                      History of Falls Interventions: Door open when patient unattended         Problem: Patient Education: Go to Patient Education Activity  Goal: Patient/Family Education  Outcome: Progressing Towards Goal
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
## 2021-11-23 NOTE — GROUP NOTE
[facility] GROUP DOCUMENTATION INDIVIDUAL                                                                          Group Therapy Note    Date: 11/22/2021    Group Start Time: 2000  Group End Time: 2100  Group Topic: Medication    [address] ADULT CHEM DEP    Thao Still RN    IP [facility] GROUP DOCUMENTATION GROUP    Group Therapy Note    Attendees: 13           Attendance: Attended    Patient's Goal:  Understand medication being provided and available. Additional Notes:  Pt isolated to room. Multiple attempts made to encourage pt to come to group. All attempts unsuccessful. Will continue to monitor and encourage group participation as needed.       Cuate Arciniega RN

## 2021-11-23 NOTE — BSMART NOTE
SOCIAL WORK GROUP THERAPY PROGRESS NOTE    Group Time:  10:15am    Group Topic:  Coping Skills    Group Participation:     Pt reportedly did not attend group due to medical issues & was deemed inappropriate for session

## 2021-11-23 NOTE — BH NOTES
MICHELLE Note: Rounds were done at the beginning of the shift by the shift that was getting ready to hand the shift over.

## 2021-11-23 NOTE — CONSULTS
Comprehensive Nutrition Assessment    Type and Reason for Visit: Initial, Positive nutrition screen, Consult    Nutrition Recommendations/Plan:  - Continue oral diet and add Ensure Enlive, TID to optimize nutritional intake. - Add daily multivitamin.  - Monitor and encourage po intake as tolerated. Nutrition Assessment:  Admitted with depression & reported decreased appetite with episode of nausea and occasional emesis. Noted pt received Zofran overnight. States she ate a few bites of breakfast meal this morning, agreeable to supplements & NFPE completed. Malnutrition Assessment:  Malnutrition Status:  Severe malnutrition    Context:  Social/environmental circumstances     Findings of the 6 clinical characteristics of malnutrition:   Energy Intake:  Mild decrease in energy intake (specify)  Weight Loss:  Unable to assess     Body Fat Loss:  7 - Severe body fat loss, Orbital, Fat overlying ribs   Muscle Mass Loss:  7 - Severe muscle mass loss, Clavicles (pectoralis &deltoids), Temples (temporalis)  Fluid Accumulation:  Unable to assess,     Strength:  Not performed     Nutrition History and Allergies: PMHx- depression & pancreatitis. Presented to ED with increased feelings of hopelessness and agitation over the past few weeks. Reports decreased appetite over the past few months with usual intake of 1 small meal at dinner time and Ensure supplements TID. Reports wt loss over many years with highest wt of 110#. Wt hx per chart 102# (9/4/16), 92# (10/5/17) & 99# (11/20/21). NKFA    Estimated Daily Nutrient Needs:  Energy (kcal): 6353-6297; Weight Used for Energy Requirements: Current (50 kg)  Protein (g): 40-60; Weight Used for Protein Requirements: Current (0.8-1.2)  Fluid (ml/day): 8410-1464; Method Used for Fluid Requirements: 1 ml/kcal     Nutrition Related Findings:  Edentulous but denies need for altered diet consistency.       Wounds:    None       Current Nutrition Therapies:  ADULT DIET Regular    Anthropometric Measures:  · Height:  5' 2\" (157.5 cm)  · Current Body Wt:  44.9 kg (98 lb 15.8 oz)   · Admission Body Wt:  98 lb 15.8 oz    · Usual Body Wt:  41.7 kg (92 lb) (10/5/17 per chart)     · Ideal Body Wt:  110 lbs:  90 %   · BMI Category:  Underweight (BMI less than 22) age over 72       Nutrition Diagnosis:   · Severe malnutrition, In context of social or environmental circumstances related to psychological cause or life stress (predicted inadequate energy intake) as evidenced by severe loss of subcutaneous fat, severe muscle loss    Nutrition Interventions:   Food and/or Nutrient Delivery: Continue current diet, Start oral nutrition supplement, Vitamin supplement  Nutrition Education and Counseling: No recommendations at this time, Education not indicated  Coordination of Nutrition Care: Continue to monitor while inpatient (verbal orders from MD for nutrition consult)    Goals:  PO nutrition intake will meet >75% of patient estimated nutritional needs within the next 7 days.        Nutrition Monitoring and Evaluation:   Behavioral-Environmental Outcomes: None identified  Food/Nutrient Intake Outcomes: Diet advancement/tolerance, Food and nutrient intake, Supplement intake, Vitamin/mineral intake  Physical Signs/Symptoms Outcomes: Biochemical data, Chewing or swallowing, Meal time behavior, Nutrition focused physical findings, Nausea/vomiting, Weight    Discharge Planning:    Continue current diet, Continue oral nutrition supplement     Electronically signed by Adama Smith RD on 11/23/2021 at 10:55 AM    Contact: [phone]

## 2021-11-23 NOTE — BH NOTES
Pt appeared to have slept for 3.50 hours thus far. See previous RN notes. Pt wakes up intermittently and vomits mucous/dry heaves. Vital signs were assessed. RN administered meds. She is quite weak and has required staff to assist her to the restroom to prevent her from falling. This writer has observed loose (light brown)  stool in her toilet; no blood. Pt has slept in the suzanne chair, in the day area , for comfort and close observation. Will continue to monitor for safety.

## 2021-11-24 ENCOUNTER — APPOINTMENT (OUTPATIENT)
Dept: GENERAL RADIOLOGY | Age: 66
DRG: 286 | End: 2021-11-24
Attending: EMERGENCY MEDICINE
Payer: MEDICARE

## 2021-11-24 ENCOUNTER — APPOINTMENT (OUTPATIENT)
Dept: CT IMAGING | Age: 66
DRG: 286 | End: 2021-11-24
Attending: EMERGENCY MEDICINE
Payer: MEDICARE

## 2021-11-24 ENCOUNTER — HOSPITAL ENCOUNTER (INPATIENT)
Age: 66
LOS: 5 days | Discharge: HOME | DRG: 286 | End: 2021-11-30
Attending: EMERGENCY MEDICINE | Admitting: STUDENT IN AN ORGANIZED HEALTH CARE EDUCATION/TRAINING PROGRAM
Payer: MEDICARE

## 2021-11-24 ENCOUNTER — APPOINTMENT (OUTPATIENT)
Dept: ULTRASOUND IMAGING | Age: 66
DRG: 885 | End: 2021-11-24
Attending: PSYCHIATRY & NEUROLOGY
Payer: MEDICARE

## 2021-11-24 VITALS
WEIGHT: 99 LBS | HEIGHT: 62 IN | HEART RATE: 97 BPM | DIASTOLIC BLOOD PRESSURE: 64 MMHG | OXYGEN SATURATION: 97 % | TEMPERATURE: 98 F | BODY MASS INDEX: 18.22 KG/M2 | RESPIRATION RATE: 16 BRPM | SYSTOLIC BLOOD PRESSURE: 97 MMHG

## 2021-11-24 DIAGNOSIS — F33.3 MAJOR DEPRESSIVE DISORDER, RECURRENT EPISODE, SEVERE, WITH PSYCHOSIS (HCC): ICD-10-CM

## 2021-11-24 DIAGNOSIS — R65.20 SEVERE SEPSIS (HCC): ICD-10-CM

## 2021-11-24 DIAGNOSIS — J18.9 COMMUNITY ACQUIRED PNEUMONIA OF RIGHT LOWER LOBE OF LUNG: ICD-10-CM

## 2021-11-24 DIAGNOSIS — I21.4 NSTEMI (NON-ST ELEVATED MYOCARDIAL INFARCTION) (HCC): Primary | ICD-10-CM

## 2021-11-24 DIAGNOSIS — I25.10 CORONARY ARTERY DISEASE, UNSPECIFIED VESSEL OR LESION TYPE, UNSPECIFIED WHETHER ANGINA PRESENT, UNSPECIFIED WHETHER NATIVE OR TRANSPLANTED HEART: ICD-10-CM

## 2021-11-24 DIAGNOSIS — A41.9 SEVERE SEPSIS (HCC): ICD-10-CM

## 2021-11-24 DIAGNOSIS — R65.10 SIRS (SYSTEMIC INFLAMMATORY RESPONSE SYNDROME) (HCC): ICD-10-CM

## 2021-11-24 DIAGNOSIS — R94.31 QT PROLONGATION: ICD-10-CM

## 2021-11-24 LAB
ALBUMIN SERPL-MCNC: 3 G/DL (ref 3.4–5)
ALBUMIN/GLOB SERPL: 0.8 {RATIO} (ref 0.8–1.7)
ALP SERPL-CCNC: 60 U/L (ref 45–117)
ALT SERPL-CCNC: 12 U/L (ref 13–56)
ANION GAP SERPL CALC-SCNC: 8 MMOL/L (ref 3–18)
APTT PPP: 32.1 SEC (ref 23–36.4)
AST SERPL-CCNC: 50 U/L (ref 10–38)
BASOPHILS # BLD: 0.1 K/UL (ref 0–0.1)
BASOPHILS NFR BLD: 0 % (ref 0–2)
BILIRUB SERPL-MCNC: 0.6 MG/DL (ref 0.2–1)
BNP SERPL-MCNC: ABNORMAL PG/ML (ref 0–900)
BUN SERPL-MCNC: 30 MG/DL (ref 7–18)
BUN/CREAT SERPL: 37 (ref 12–20)
CA-I BLD-SCNC: 1.2 MMOL/L (ref 1.12–1.32)
CALCIUM SERPL-MCNC: 10.7 MG/DL (ref 8.5–10.1)
CHLORIDE SERPL-SCNC: 98 MMOL/L (ref 100–111)
CO2 SERPL-SCNC: 29 MMOL/L (ref 21–32)
CREAT SERPL-MCNC: 0.82 MG/DL (ref 0.6–1.3)
DIFFERENTIAL METHOD BLD: ABNORMAL
EOSINOPHIL # BLD: 0 K/UL (ref 0–0.4)
EOSINOPHIL NFR BLD: 0 % (ref 0–5)
ERYTHROCYTE [DISTWIDTH] IN BLOOD BY AUTOMATED COUNT: 13.2 % (ref 11.6–14.5)
GLOBULIN SER CALC-MCNC: 4 G/DL (ref 2–4)
GLUCOSE BLD STRIP.AUTO-MCNC: 160 MG/DL (ref 70–110)
GLUCOSE SERPL-MCNC: 148 MG/DL (ref 74–99)
HCT VFR BLD AUTO: 34.5 % (ref 35–45)
HGB BLD-MCNC: 11 G/DL (ref 12–16)
IMM GRANULOCYTES # BLD AUTO: 0.3 K/UL (ref 0–0.04)
IMM GRANULOCYTES NFR BLD AUTO: 1 % (ref 0–0.5)
LIPASE SERPL-CCNC: 58 U/L (ref 73–393)
LYMPHOCYTES # BLD: 4.5 K/UL (ref 0.9–3.6)
LYMPHOCYTES NFR BLD: 17 % (ref 21–52)
MAGNESIUM SERPL-MCNC: 2 MG/DL (ref 1.6–2.6)
MCH RBC QN AUTO: 29.6 PG (ref 24–34)
MCHC RBC AUTO-ENTMCNC: 31.9 G/DL (ref 31–37)
MCV RBC AUTO: 93 FL (ref 78–100)
MONOCYTES # BLD: 1.7 K/UL (ref 0.05–1.2)
MONOCYTES NFR BLD: 6 % (ref 3–10)
NEUTS SEG # BLD: 19.8 K/UL (ref 1.8–8)
NEUTS SEG NFR BLD: 75 % (ref 40–73)
NRBC # BLD: 0.03 K/UL (ref 0–0.01)
NRBC BLD-RTO: 0.1 PER 100 WBC
PLATELET # BLD AUTO: 581 K/UL (ref 135–420)
PMV BLD AUTO: 9.8 FL (ref 9.2–11.8)
POTASSIUM SERPL-SCNC: 4.8 MMOL/L (ref 3.5–5.5)
PROT SERPL-MCNC: 7 G/DL (ref 6.4–8.2)
RBC # BLD AUTO: 3.71 M/UL (ref 4.2–5.3)
SERVICE CMNT-IMP: NORMAL
SODIUM SERPL-SCNC: 135 MMOL/L (ref 136–145)
TROPONIN I SERPL-MCNC: 1.13 NG/ML (ref 0–0.04)
WBC # BLD AUTO: 26.3 K/UL (ref 4.6–13.2)

## 2021-11-24 PROCEDURE — 83690 ASSAY OF LIPASE: CPT

## 2021-11-24 PROCEDURE — 71045 X-RAY EXAM CHEST 1 VIEW: CPT

## 2021-11-24 PROCEDURE — 85730 THROMBOPLASTIN TIME PARTIAL: CPT

## 2021-11-24 PROCEDURE — 96365 THER/PROPH/DIAG IV INF INIT: CPT

## 2021-11-24 PROCEDURE — 74011250636 HC RX REV CODE- 250/636: Performed by: EMERGENCY MEDICINE

## 2021-11-24 PROCEDURE — 87040 BLOOD CULTURE FOR BACTERIA: CPT

## 2021-11-24 PROCEDURE — 80053 COMPREHEN METABOLIC PANEL: CPT

## 2021-11-24 PROCEDURE — 71275 CT ANGIOGRAPHY CHEST: CPT

## 2021-11-24 PROCEDURE — 83735 ASSAY OF MAGNESIUM: CPT

## 2021-11-24 PROCEDURE — 93005 ELECTROCARDIOGRAM TRACING: CPT

## 2021-11-24 PROCEDURE — 85025 COMPLETE CBC W/AUTO DIFF WBC: CPT

## 2021-11-24 PROCEDURE — 82330 ASSAY OF CALCIUM: CPT

## 2021-11-24 PROCEDURE — 96375 TX/PRO/DX INJ NEW DRUG ADDON: CPT

## 2021-11-24 PROCEDURE — 74011250637 HC RX REV CODE- 250/637: Performed by: PSYCHIATRY & NEUROLOGY

## 2021-11-24 PROCEDURE — 74011000258 HC RX REV CODE- 258: Performed by: EMERGENCY MEDICINE

## 2021-11-24 PROCEDURE — 82962 GLUCOSE BLOOD TEST: CPT

## 2021-11-24 PROCEDURE — 99238 HOSP IP/OBS DSCHRG MGMT 30/<: CPT | Performed by: PSYCHIATRY & NEUROLOGY

## 2021-11-24 PROCEDURE — 84484 ASSAY OF TROPONIN QUANT: CPT

## 2021-11-24 PROCEDURE — 76536 US EXAM OF HEAD AND NECK: CPT

## 2021-11-24 PROCEDURE — 74011000636 HC RX REV CODE- 636: Performed by: EMERGENCY MEDICINE

## 2021-11-24 PROCEDURE — 83880 ASSAY OF NATRIURETIC PEPTIDE: CPT

## 2021-11-24 PROCEDURE — 99285 EMERGENCY DEPT VISIT HI MDM: CPT

## 2021-11-24 PROCEDURE — 70450 CT HEAD/BRAIN W/O DYE: CPT

## 2021-11-24 RX ORDER — HEPARIN SODIUM 1000 [USP'U]/ML
60 INJECTION, SOLUTION INTRAVENOUS; SUBCUTANEOUS ONCE
Status: COMPLETED | OUTPATIENT
Start: 2021-11-24 | End: 2021-11-25

## 2021-11-24 RX ORDER — ONDANSETRON 2 MG/ML
4 INJECTION INTRAMUSCULAR; INTRAVENOUS ONCE
Status: COMPLETED | OUTPATIENT
Start: 2021-11-24 | End: 2021-11-24

## 2021-11-24 RX ORDER — ARIPIPRAZOLE 5 MG/1
5 TABLET ORAL DAILY
Status: DISCONTINUED | OUTPATIENT
Start: 2021-11-24 | End: 2021-11-24 | Stop reason: HOSPADM

## 2021-11-24 RX ORDER — MIRTAZAPINE 15 MG/1
7.5 TABLET, FILM COATED ORAL
Status: DISCONTINUED | OUTPATIENT
Start: 2021-11-24 | End: 2021-11-24 | Stop reason: HOSPADM

## 2021-11-24 RX ORDER — HEPARIN SODIUM 10000 [USP'U]/100ML
12-25 INJECTION, SOLUTION INTRAVENOUS
Status: DISCONTINUED | OUTPATIENT
Start: 2021-11-24 | End: 2021-11-26

## 2021-11-24 RX ORDER — SODIUM CHLORIDE 0.9 % (FLUSH) 0.9 %
5-10 SYRINGE (ML) INJECTION AS NEEDED
Status: DISCONTINUED | OUTPATIENT
Start: 2021-11-24 | End: 2021-11-30 | Stop reason: HOSPADM

## 2021-11-24 RX ADMIN — MIRTAZAPINE 7.5 MG: 15 TABLET, FILM COATED ORAL at 20:09

## 2021-11-24 RX ADMIN — TRAZODONE HYDROCHLORIDE 50 MG: 50 TABLET ORAL at 20:10

## 2021-11-24 RX ADMIN — PIPERACILLIN AND TAZOBACTAM 4.5 G: 4; .5 INJECTION, POWDER, FOR SOLUTION INTRAVENOUS at 22:57

## 2021-11-24 RX ADMIN — ARIPIPRAZOLE 5 MG: 5 TABLET ORAL at 11:57

## 2021-11-24 RX ADMIN — IOPAMIDOL 100 ML: 755 INJECTION, SOLUTION INTRAVENOUS at 23:47

## 2021-11-24 RX ADMIN — SODIUM CHLORIDE, SODIUM LACTATE, POTASSIUM CHLORIDE, AND CALCIUM CHLORIDE 1000 ML: 600; 310; 30; 20 INJECTION, SOLUTION INTRAVENOUS at 22:18

## 2021-11-24 RX ADMIN — DOXYCYCLINE HYCLATE 100 MG: 100 CAPSULE ORAL at 09:00

## 2021-11-24 RX ADMIN — ONDANSETRON 4 MG: 2 INJECTION INTRAMUSCULAR; INTRAVENOUS at 22:57

## 2021-11-24 RX ADMIN — SODIUM CHLORIDE, SODIUM LACTATE, POTASSIUM CHLORIDE, AND CALCIUM CHLORIDE 347 ML: 600; 310; 30; 20 INJECTION, SOLUTION INTRAVENOUS at 23:06

## 2021-11-24 RX ADMIN — THERA TABS 1 TABLET: TAB at 09:00

## 2021-11-24 RX ADMIN — DOXYCYCLINE HYCLATE 100 MG: 100 CAPSULE ORAL at 20:09

## 2021-11-24 NOTE — Clinical Note
Bilateral groin and right radial prepped with ChloraPrep and draped. Wet prep elapsed drying time: 3 mins.  Physician wants to proceed with femoral approach due to small radial artery, low SPO2 pleth

## 2021-11-24 NOTE — Clinical Note
Status[de-identified] INPATIENT [101]   Type of Bed: Telemetry [19]   Cardiac Monitoring Required?: Yes   Inpatient Hospitalization Certified Necessary for the Following Reasons: 3.  Patient receiving treatment that can only be provided in an inpatient setting (further clarification in H&P documentation)   Admitting Diagnosis: NSTEMI (non-ST elevated myocardial infarction) [facility]) [[id number]]   Admitting Diagnosis: Severe sepsis [facility] [[id number]]   Admitting Physician: Joseluis Ospina   Attending Physician: Joseluis Ospina   Estimated Length of Stay: 2 Midnights   Discharge Plan[de-identified] Home with Office Follow-up

## 2021-11-24 NOTE — BH NOTES
Pt continues to refuse meals stating, \"I don't feel much like eating. \" Pt did consume ensure supplement, and was encouraged by staff to push fluids. Pt was given several cups of water as well as a gatorade to drink. Will continue to monitor.

## 2021-11-24 NOTE — BSMART NOTE
AXIS I:  Major depressive disorder with psychotic symptoms. Cannabis use disorder, moderate. Nicotine use disorder, moderate. AXIS II:  Noncontributory. AXIS III:  History of nausea and vomiting without specific findings on the CT scans performed. Pneumonia, right lower lobe by CT of the chest finding. Pancreatitis by history. History of weight loss. Hypokalemia. Status post hysterectomy. Pt.s' case was dicussed . SW Contact:  Covering SW attempted to meet with pt . Pt was off the unit for an ultrasound. Covering SW will attempt to meet with pt at a later time.          Pranav Martinez MA, LMHP-R

## 2021-11-24 NOTE — PROGRESS NOTES
Problem: Suicide  Goal: *STG: Remains safe in hospital  Description: Patient will be assess daily for her safety. Outcome: Progressing Towards Goal  Goal: *STG: Attends activities and groups  Description: Patient will be encouraged to attend groups daily. Outcome: Not Progressing Towards Goal  Goal: *STG/LTG: Complies with medication therapy  Description: Patient will take prescribed medications daily. Outcome: Progressing Towards Goal     Problem: Falls - Risk of  Goal: *Absence of Falls  Description: Document Danney Mess Fall Risk and appropriate interventions in the flowsheet daily. Outcome: Progressing Towards Goal  Note: Fall Risk Interventions:  History of Falls Interventions: Door open when patient unattended    Problem: Depressed Mood (Adult/Pediatric)  Goal: *STG: Demonstrates reduction in symptoms and increase in insight into coping skills/future focused  Outcome: Not Progressing Towards Goal       Pt appeared to have a flat affect. Pt reported feeling okay today. Pt reported that she is \"currently tired of being tired. \" pt was medication compliant. Pt denies SI currently. Pt does not attend groups as she stated, \"I rather lay in the bed. \" pt will continue to be monitored.

## 2021-11-24 NOTE — PROGRESS NOTES
[address] [address]  Inpatient Progress Note     Date of Service: 21  Hospital Day: 2     Subjective/Interval History   21    Treatment Team Notes:  Notes reviewed and/or discussed and report that Mamdaou Hoang is a patient with a history of depression with psychotic symptoms, recently admitted to the facility. Attention is invited to the dictated admission note which is self-explanatory. Patient interview: Mamadou Hoang was interviewed by this writer today. During session today, the patient describes the continues \"whispering from her  daughter,\" which appears to be the only auditory hallucinations that she is currently suffering with. As a stated since admission note, this auditory hallucinations are present only when the patient describes her depression as more intense. During session today she disclosed the fact that she has not been taking any medications for at least 6 to 8 weeks, and so it is not clear as to when did she see Dr. Radha Young with the [facility] in [place] last.  So we are going to start recommendation of a low-dose of mirtazapine at nighttime, hoping to help with care insomnia and also eating difficulties, but in addition a low-dose of an atypical.  We are hopeful that prescribing an atypical antipsychotic is going to help not only with the patient's psychotic symptoms, but also with providing antidepressant augmentation. Please see orders. Objective     Visit Vitals  BP 97/64 (BP 1 Location: Left upper arm, BP Patient Position: Sitting)   Pulse 97   Temp 98 °F (36.7 °C)   Resp 16   Ht 5' 2\" (1.575 m)   Wt 44.9 kg (99 lb)   SpO2 97%   BMI 18.11 kg/m²     Vital signs are low however she denies any hypotension related symptoms    No results found for this or any previous visit (from the past 24 hour(s)). Mental Status Examination     Appearance/Hygiene 78 y.o.  BLACK/ female  Hygiene: Limited   Behavior/Social Relatedness  withdrawn Musculoskeletal Gait/Station: appropriate  Tone (flaccid, cogwheeling, spastic): not assessed  Psychomotor (hyperkinetic, hypokinetic): calm   Involuntary movements (tics, dyskinesias, akathisa, stereotypies): none   Speech   Rate, rhythm, volume, fluency and articulation are appropriate   Mood   depressed   Affect    appropriate to situation   Thought Process Linear and goal directed   Thought Content and Perceptual Disturbances  the patient described the presence of auditory hallucinations as above stated. Self harming thoughts are less intense, and she has been able to maintain self-control of her behaviors. She is able to CFS while in the hospital, however she remains helpless and hopeless. Sensorium and Cognition  Grossly intact   Insight  Limited but improving   Judgment  improving        Assessment/Plan      Psychiatric Diagnoses:   Patient Active Problem List   Diagnosis Code    Depression F32. A    Itch of skin L29.9    Anxiety state F41.1    Weight loss, unintentional R63.4    Major depressive disorder, recurrent episode, severe, with psychosis ([address] F33.3    Pyelonephritis N12    SIRS (systemic inflammatory response syndrome) (HCC) R65.10    Sepsis ([address] A41.9    UTI (urinary tract infection) N39.0    Severe protein-calorie malnutrition ([address] E43       Medical Diagnoses: Current diagnoses include major depressive disorder recurrent with psychotic symptoms, right lower lobe pneumonia, weight loss, severe malnutrition, history of GI symptoms possibly associated to antibiotic treatment. Hypodensities in the thyroid by CT of the chest findings    Psychosocial and contextual factors: Same    Level of impairment/disability: Severe    1. We will begin treatment with aripiprazole 5 mg every morning, and Remeron 7.5 mg every night. Doses will be adjusted depending upon progress and tolerance. Several lab studies were requested including iron profile, A1c, lipid panel, and if not done a TSH.   2. Reviewed instructions, risks, benefits and side effects of medications  3. Disposition/Discharge Date: TBD.     Jasmin Phillips MD, [address] [place] [address]

## 2021-11-24 NOTE — Clinical Note
Contrast Dose Calculator:   Patient's age: 78.   Patient's sex: Female. Patient weight (kg) = 48. Creatinine level (mg/dL) = 0.61. Creatinine clearance (mL/min): 68.   Max Contrast dose per Creatinine Cl calculator = 153 mL.

## 2021-11-24 NOTE — GROUP NOTE
[facility] GROUP DOCUMENTATION INDIVIDUAL                                                                          Group Therapy Note    Date: 11/23/2021    Group Start Time: 2000  Group End Time: 2030  Group Topic: Medication    [address] ADULT CHEM DEP    Thao Still RN    IP [address] [facility] GROUP DOCUMENTATION GROUP    Group Therapy Note    Attendees: 8           Attendance: Attended    Patient's Goal:  Understanding medication being provided as ordered and as needed. Interventions/techniques: Reinforced    Follows Directions: Followed directions    Interactions: Interacted appropriately    Mental Status: Flat    Behavior/appearance: Cooperative    Goals Achieved: Able to receive feedback      Additional Notes:  Pt has been isolated to bed with general malaise. Pt appears weak on feet, but denied needing assistance back to bed. Remains compliant with medication. Will continue to monitor and support as needed.      Cuate Arciniega RN

## 2021-11-24 NOTE — Clinical Note
TRANSFER - OUT REPORT:     Verbal report given to: Renee Hutton RN. Report consisted of patient's Situation, Background, Assessment and   Recommendations(SBAR). Opportunity for questions and clarification was provided. Patient transported with a Registered Nurse. Patient transported to: holding area.

## 2021-11-24 NOTE — BH NOTES
[facility] Pt reports vomiting three times thus far this morning, as well as having one episode of \"black dribble like diarrhea. \" Pt reports chest tightness in the middle of her chest. Pt states, \"It hurts like a 9/10 like someone is taking a nail to my chest. It has been going on for several days now since I have pneumonia. That, on top of, I was having black stools before I came in here. \" Lung sounds clear and equal in all fields, except the Right lower lobe. Lung sounds diminished in right lower lobe. Pt refused breakfast stating, \"The smell of food makes me sick. \" Vital signs are as follows: /90 HR 85 RR 18 O2 98 Temp 97.6. MD notified. Will continue to monitor.

## 2021-11-24 NOTE — Clinical Note
Sheath #1: Sheath: left in place. Site secured by Tegaderm. Upon evaluation of the common femoral artery stick using fluoroscopy, the access site puncture was within the safe zone.

## 2021-11-24 NOTE — CONSULTS
Nutrition Assessment     Type and Reason for Visit: Reassess, Positive nutrition screen, Consult    Nutrition Recommendations/Plan:   - Continue oral diet as tolerated with Ensure Enlive, TID.  - Continue daily multivitamin and anti emetic medication as needed. Nutrition Assessment:  Did not eat any meals yesterday but did consume her Ensure at dinner last night & this morning at breakfast. States she ate ½ a fruit cup today for breakfast. C/o nausea with episodes of emesis (mucuos like appearance per pt) and early satiety. Malnutrition Assessment:  Malnutrition Status: Severe malnutrition     Estimated Daily Nutrient Needs:  Energy (kcal):  7987-5718  Protein (g):  40-60       Fluid (ml/day):  7827-1828    Nutrition Related Findings:  Edentulous but denies need for altered diet consistency. Remeron to start tonight at bedtime. Current Nutrition Therapies:  ADULT ORAL NUTRITION SUPPLEMENT Breakfast, Lunch, Dinner; Standard High Calorie/High Protein  ADULT DIET Regular    Anthropometric Measures:  · Height:  5' 2\" (157.5 cm)  · Current Body Wt:  44.9 kg (98 lb 15.8 oz)  · BMI: 18.1    Nutrition Diagnosis:   · Severe malnutrition, In context of social or environmental circumstances related to psychological cause or life stress (predicted inadequate energy intake) as evidenced by severe loss of subcutaneous fat, severe muscle loss    Nutrition Intervention:  Food and/or Nutrient Delivery: Continue current diet, Continue oral nutrition supplement, Vitamin supplement  Nutrition Education and Counseling: No recommendations at this time, Education not indicated  Coordination of Nutrition Care: Continue to monitor while inpatient    Goals:  PO nutrition intake will meet >75% of patient estimated nutritional needs within the next 7 days.        Nutrition Monitoring and Evaluation:   Behavioral-Environmental Outcomes: None identified  Food/Nutrient Intake Outcomes: Diet advancement/tolerance, Food and nutrient intake, Supplement intake, Vitamin/mineral intake  Physical Signs/Symptoms Outcomes: Biochemical data, Chewing or swallowing, Meal time behavior, Nutrition focused physical findings, Nausea/vomiting, Weight    Discharge Planning:    Continue current diet, Continue oral nutrition supplement     Electronically signed by Taylor Shelton RD on 11/24/2021 at 11:01 AM    Contact Number: [phone]

## 2021-11-25 ENCOUNTER — APPOINTMENT (OUTPATIENT)
Dept: NON INVASIVE DIAGNOSTICS | Age: 66
DRG: 286 | End: 2021-11-25
Attending: STUDENT IN AN ORGANIZED HEALTH CARE EDUCATION/TRAINING PROGRAM
Payer: MEDICARE

## 2021-11-25 ENCOUNTER — APPOINTMENT (OUTPATIENT)
Dept: CT IMAGING | Age: 66
DRG: 286 | End: 2021-11-25
Attending: STUDENT IN AN ORGANIZED HEALTH CARE EDUCATION/TRAINING PROGRAM
Payer: MEDICARE

## 2021-11-25 PROBLEM — I21.4 NSTEMI (NON-ST ELEVATED MYOCARDIAL INFARCTION) (HCC): Status: ACTIVE | Noted: 2021-11-25

## 2021-11-25 PROBLEM — R65.20 SEVERE SEPSIS (HCC): Status: ACTIVE | Noted: 2021-11-25

## 2021-11-25 PROBLEM — A41.9 SEVERE SEPSIS (HCC): Status: ACTIVE | Noted: 2021-11-25

## 2021-11-25 PROBLEM — R94.31 QT PROLONGATION: Status: ACTIVE | Noted: 2021-11-25

## 2021-11-25 LAB
ALBUMIN SERPL-MCNC: 2.9 G/DL (ref 3.4–5)
ALBUMIN/GLOB SERPL: 0.7 {RATIO} (ref 0.8–1.7)
ALP SERPL-CCNC: 54 U/L (ref 45–117)
ALT SERPL-CCNC: 14 U/L (ref 13–56)
ANION GAP SERPL CALC-SCNC: 5 MMOL/L (ref 3–18)
ANION GAP SERPL CALC-SCNC: 6 MMOL/L (ref 3–18)
APTT PPP: 126.8 SEC (ref 23–36.4)
APTT PPP: 39.3 SEC (ref 23–36.4)
APTT PPP: 44.7 SEC (ref 23–36.4)
AST SERPL-CCNC: 40 U/L (ref 10–38)
ATRIAL RATE: 81 BPM
AV R PG: 81.51 MMHG
BASOPHILS # BLD: 0 K/UL (ref 0–0.1)
BASOPHILS NFR BLD: 0 % (ref 0–2)
BILIRUB DIRECT SERPL-MCNC: <0.1 MG/DL (ref 0–0.2)
BILIRUB SERPL-MCNC: 0.6 MG/DL (ref 0.2–1)
BUN SERPL-MCNC: 21 MG/DL (ref 7–18)
BUN SERPL-MCNC: 24 MG/DL (ref 7–18)
BUN/CREAT SERPL: 30 (ref 12–20)
BUN/CREAT SERPL: 38 (ref 12–20)
CALCIUM SERPL-MCNC: 10 MG/DL (ref 8.5–10.1)
CALCIUM SERPL-MCNC: 10 MG/DL (ref 8.5–10.1)
CALCULATED P AXIS, ECG09: 77 DEGREES
CALCULATED R AXIS, ECG10: -81 DEGREES
CALCULATED T AXIS, ECG11: -97 DEGREES
CHLORIDE SERPL-SCNC: 96 MMOL/L (ref 100–111)
CHLORIDE SERPL-SCNC: 97 MMOL/L (ref 100–111)
CHOLEST SERPL-MCNC: 195 MG/DL
CO2 SERPL-SCNC: 32 MMOL/L (ref 21–32)
CO2 SERPL-SCNC: 33 MMOL/L (ref 21–32)
CREAT SERPL-MCNC: 0.64 MG/DL (ref 0.6–1.3)
CREAT SERPL-MCNC: 0.69 MG/DL (ref 0.6–1.3)
DIAGNOSIS, 93000: NORMAL
DIFFERENTIAL METHOD BLD: ABNORMAL
ECHO AO ROOT DIAM: 2.97 CM
ECHO AR MAX VEL PISA: 451.42 CM/S
ECHO AV REGURGITANT PHT: 439.87 MS
ECHO LA AREA 4C: 17.86 CM2
ECHO LA VOL 2C: 60.25 ML (ref 22–52)
ECHO LA VOL 4C: 49.47 ML (ref 22–52)
ECHO LA VOL BP: 59.62 ML (ref 22–52)
ECHO LA VOL/BSA BIPLANE: 39.75 ML/M2 (ref 16–28)
ECHO LA VOLUME INDEX A2C: 40.17 ML/M2 (ref 16–28)
ECHO LA VOLUME INDEX A4C: 32.98 ML/M2 (ref 16–28)
ECHO LV E' LATERAL VELOCITY: 7.81 CM/S
ECHO LV E' SEPTAL VELOCITY: 5.2 CM/S
ECHO LV INTERNAL DIMENSION DIASTOLIC: 4.05 CM (ref 3.9–5.3)
ECHO LV INTERNAL DIMENSION SYSTOLIC: 3.5 CM
ECHO LV IVSD: 1.47 CM (ref 0.6–0.9)
ECHO LV MASS 2D: 252 G (ref 67–162)
ECHO LV MASS INDEX 2D: 168 G/M2 (ref 43–95)
ECHO LV POSTERIOR WALL DIASTOLIC: 1.65 CM (ref 0.6–0.9)
ECHO LVOT DIAM: 2.02 CM
ECHO MV A VELOCITY: 93.97 CM/S
ECHO MV E DECELERATION TIME (DT): 142.47 MS
ECHO MV E VELOCITY: 58.01 CM/S
ECHO MV E/A RATIO: 0.62
ECHO MV E/E' LATERAL: 7.43
ECHO MV E/E' RATIO (AVERAGED): 9.29
ECHO MV E/E' SEPTAL: 11.16
ECHO PVEIN A DURATION: 157.33 MS
ECHO PVEIN A VELOCITY: 46.03 CM/S
ECHO RV TAPSE: 2.38 CM (ref 1.5–2)
ECHO TV REGURGITANT MAX VELOCITY: 249.25 CM/S
ECHO TV REGURGITANT PEAK GRADIENT: 24.85 MMHG
EOSINOPHIL # BLD: 0 K/UL (ref 0–0.4)
EOSINOPHIL NFR BLD: 0 % (ref 0–5)
ERYTHROCYTE [DISTWIDTH] IN BLOOD BY AUTOMATED COUNT: 13.2 % (ref 11.6–14.5)
GLOBULIN SER CALC-MCNC: 4.4 G/DL (ref 2–4)
GLUCOSE SERPL-MCNC: 110 MG/DL (ref 74–99)
GLUCOSE SERPL-MCNC: 122 MG/DL (ref 74–99)
HBA1C MFR BLD: 5.2 % (ref 4.2–5.6)
HCT VFR BLD AUTO: 34.4 % (ref 35–45)
HDLC SERPL-MCNC: 48 MG/DL (ref 40–60)
HDLC SERPL: 4.1 {RATIO} (ref 0–5)
HGB BLD-MCNC: 11.2 G/DL (ref 12–16)
IMM GRANULOCYTES # BLD AUTO: 0 K/UL
IMM GRANULOCYTES NFR BLD AUTO: 0 %
IRON SATN MFR SERPL: 29 % (ref 20–50)
IRON SERPL-MCNC: 89 UG/DL (ref 50–175)
LACTATE BLD-SCNC: 2.53 MMOL/L (ref 0.4–2)
LACTATE SERPL-SCNC: 1.8 MMOL/L (ref 0.4–2)
LDLC SERPL CALC-MCNC: 126.4 MG/DL (ref 0–100)
LIPID PROFILE,FLP: ABNORMAL
LYMPHOCYTES # BLD: 5.6 K/UL (ref 0.9–3.6)
LYMPHOCYTES NFR BLD: 20 % (ref 21–52)
MAGNESIUM SERPL-MCNC: 2.4 MG/DL (ref 1.6–2.6)
MAGNESIUM SERPL-MCNC: 7.7 MG/DL (ref 1.6–2.6)
MCH RBC QN AUTO: 29.9 PG (ref 24–34)
MCHC RBC AUTO-ENTMCNC: 32.6 G/DL (ref 31–37)
MCV RBC AUTO: 91.7 FL (ref 78–100)
MONOCYTES # BLD: 0.8 K/UL (ref 0.05–1.2)
MONOCYTES NFR BLD: 3 % (ref 3–10)
NEUTS SEG # BLD: 21.4 K/UL (ref 1.8–8)
NEUTS SEG NFR BLD: 77 % (ref 40–73)
NRBC # BLD: 0 K/UL (ref 0–0.01)
NRBC BLD-RTO: 0 PER 100 WBC
P-R INTERVAL, ECG05: 126 MS
PLATELET # BLD AUTO: 631 K/UL (ref 135–420)
PLATELET COMMENTS,PCOM: ABNORMAL
PMV BLD AUTO: 9.4 FL (ref 9.2–11.8)
POTASSIUM SERPL-SCNC: 3.3 MMOL/L (ref 3.5–5.5)
POTASSIUM SERPL-SCNC: 4.1 MMOL/L (ref 3.5–5.5)
PROT SERPL-MCNC: 7.3 G/DL (ref 6.4–8.2)
Q-T INTERVAL, ECG07: 526 MS
QRS DURATION, ECG06: 126 MS
QTC CALCULATION (BEZET), ECG08: 611 MS
RBC # BLD AUTO: 3.75 M/UL (ref 4.2–5.3)
RBC MORPH BLD: ABNORMAL
SODIUM SERPL-SCNC: 134 MMOL/L (ref 136–145)
SODIUM SERPL-SCNC: 135 MMOL/L (ref 136–145)
TIBC SERPL-MCNC: 308 UG/DL (ref 250–450)
TRIGL SERPL-MCNC: 103 MG/DL (ref ?–150)
TROPONIN I SERPL-MCNC: 0.96 NG/ML (ref 0–0.04)
TSH SERPL DL<=0.05 MIU/L-ACNC: 1.56 UIU/ML (ref 0.36–3.74)
VENTRICULAR RATE, ECG03: 81 BPM
VLDLC SERPL CALC-MCNC: 20.6 MG/DL
WBC # BLD AUTO: 27.8 K/UL (ref 4.6–13.2)

## 2021-11-25 PROCEDURE — 74011250636 HC RX REV CODE- 250/636: Performed by: STUDENT IN AN ORGANIZED HEALTH CARE EDUCATION/TRAINING PROGRAM

## 2021-11-25 PROCEDURE — 93306 TTE W/DOPPLER COMPLETE: CPT

## 2021-11-25 PROCEDURE — 85025 COMPLETE CBC W/AUTO DIFF WBC: CPT

## 2021-11-25 PROCEDURE — 99232 SBSQ HOSP IP/OBS MODERATE 35: CPT | Performed by: INTERNAL MEDICINE

## 2021-11-25 PROCEDURE — 74011000258 HC RX REV CODE- 258: Performed by: INTERNAL MEDICINE

## 2021-11-25 PROCEDURE — 85730 THROMBOPLASTIN TIME PARTIAL: CPT

## 2021-11-25 PROCEDURE — 84443 ASSAY THYROID STIM HORMONE: CPT

## 2021-11-25 PROCEDURE — 96365 THER/PROPH/DIAG IV INF INIT: CPT

## 2021-11-25 PROCEDURE — 80076 HEPATIC FUNCTION PANEL: CPT

## 2021-11-25 PROCEDURE — 99223 1ST HOSP IP/OBS HIGH 75: CPT | Performed by: STUDENT IN AN ORGANIZED HEALTH CARE EDUCATION/TRAINING PROGRAM

## 2021-11-25 PROCEDURE — 36415 COLL VENOUS BLD VENIPUNCTURE: CPT

## 2021-11-25 PROCEDURE — 80061 LIPID PANEL: CPT

## 2021-11-25 PROCEDURE — 74011250637 HC RX REV CODE- 250/637: Performed by: INTERNAL MEDICINE

## 2021-11-25 PROCEDURE — 74176 CT ABD & PELVIS W/O CONTRAST: CPT

## 2021-11-25 PROCEDURE — 65660000000 HC RM CCU STEPDOWN

## 2021-11-25 PROCEDURE — 83605 ASSAY OF LACTIC ACID: CPT

## 2021-11-25 PROCEDURE — 80048 BASIC METABOLIC PNL TOTAL CA: CPT

## 2021-11-25 PROCEDURE — 74011250637 HC RX REV CODE- 250/637: Performed by: STUDENT IN AN ORGANIZED HEALTH CARE EDUCATION/TRAINING PROGRAM

## 2021-11-25 PROCEDURE — 83735 ASSAY OF MAGNESIUM: CPT

## 2021-11-25 PROCEDURE — 74011000258 HC RX REV CODE- 258: Performed by: EMERGENCY MEDICINE

## 2021-11-25 PROCEDURE — 2709999900 HC NON-CHARGEABLE SUPPLY

## 2021-11-25 PROCEDURE — 83036 HEMOGLOBIN GLYCOSYLATED A1C: CPT

## 2021-11-25 PROCEDURE — 94640 AIRWAY INHALATION TREATMENT: CPT

## 2021-11-25 PROCEDURE — 74011250636 HC RX REV CODE- 250/636: Performed by: EMERGENCY MEDICINE

## 2021-11-25 PROCEDURE — 74011250636 HC RX REV CODE- 250/636: Performed by: INTERNAL MEDICINE

## 2021-11-25 PROCEDURE — 74011000636 HC RX REV CODE- 636: Performed by: STUDENT IN AN ORGANIZED HEALTH CARE EDUCATION/TRAINING PROGRAM

## 2021-11-25 PROCEDURE — 83540 ASSAY OF IRON: CPT

## 2021-11-25 PROCEDURE — 74011000250 HC RX REV CODE- 250: Performed by: INTERNAL MEDICINE

## 2021-11-25 PROCEDURE — 84484 ASSAY OF TROPONIN QUANT: CPT

## 2021-11-25 PROCEDURE — 96375 TX/PRO/DX INJ NEW DRUG ADDON: CPT

## 2021-11-25 RX ORDER — MIRTAZAPINE 15 MG/1
15 TABLET, ORALLY DISINTEGRATING ORAL
Status: DISCONTINUED | OUTPATIENT
Start: 2021-11-25 | End: 2021-11-30 | Stop reason: HOSPADM

## 2021-11-25 RX ORDER — DEXTROSE, SODIUM CHLORIDE, AND POTASSIUM CHLORIDE 5; .9; .15 G/100ML; G/100ML; G/100ML
50 INJECTION INTRAVENOUS CONTINUOUS
Status: DISCONTINUED | OUTPATIENT
Start: 2021-11-25 | End: 2021-11-30 | Stop reason: HOSPADM

## 2021-11-25 RX ORDER — SODIUM CHLORIDE 0.9 % (FLUSH) 0.9 %
5-40 SYRINGE (ML) INJECTION AS NEEDED
Status: DISCONTINUED | OUTPATIENT
Start: 2021-11-25 | End: 2021-11-30 | Stop reason: HOSPADM

## 2021-11-25 RX ORDER — ONDANSETRON 4 MG/1
4 TABLET, ORALLY DISINTEGRATING ORAL
Status: DISCONTINUED | OUTPATIENT
Start: 2021-11-25 | End: 2021-11-30 | Stop reason: HOSPADM

## 2021-11-25 RX ORDER — IPRATROPIUM BROMIDE AND ALBUTEROL SULFATE 2.5; .5 MG/3ML; MG/3ML
3 SOLUTION RESPIRATORY (INHALATION)
Status: DISCONTINUED | OUTPATIENT
Start: 2021-11-25 | End: 2021-11-30 | Stop reason: HOSPADM

## 2021-11-25 RX ORDER — HEPARIN SODIUM 1000 [USP'U]/ML
3000 INJECTION, SOLUTION INTRAVENOUS; SUBCUTANEOUS ONCE
Status: COMPLETED | OUTPATIENT
Start: 2021-11-25 | End: 2021-11-25

## 2021-11-25 RX ORDER — POLYETHYLENE GLYCOL 3350 17 G/17G
17 POWDER, FOR SOLUTION ORAL DAILY PRN
Status: DISCONTINUED | OUTPATIENT
Start: 2021-11-25 | End: 2021-11-30 | Stop reason: HOSPADM

## 2021-11-25 RX ORDER — ONDANSETRON 2 MG/ML
4 INJECTION INTRAMUSCULAR; INTRAVENOUS
Status: DISCONTINUED | OUTPATIENT
Start: 2021-11-25 | End: 2021-11-30 | Stop reason: HOSPADM

## 2021-11-25 RX ORDER — AMOXICILLIN 250 MG
1 CAPSULE ORAL DAILY
Status: DISCONTINUED | OUTPATIENT
Start: 2021-11-26 | End: 2021-11-30 | Stop reason: HOSPADM

## 2021-11-25 RX ORDER — ATORVASTATIN CALCIUM 20 MG/1
20 TABLET, FILM COATED ORAL
Status: DISCONTINUED | OUTPATIENT
Start: 2021-11-25 | End: 2021-11-30 | Stop reason: HOSPADM

## 2021-11-25 RX ORDER — MAGNESIUM SULFATE HEPTAHYDRATE 40 MG/ML
2 INJECTION, SOLUTION INTRAVENOUS ONCE
Status: COMPLETED | OUTPATIENT
Start: 2021-11-25 | End: 2021-11-25

## 2021-11-25 RX ORDER — ASPIRIN 81 MG/1
81 TABLET ORAL DAILY
Status: DISCONTINUED | OUTPATIENT
Start: 2021-11-26 | End: 2021-11-30 | Stop reason: HOSPADM

## 2021-11-25 RX ORDER — SODIUM CHLORIDE 0.9 % (FLUSH) 0.9 %
5-40 SYRINGE (ML) INJECTION EVERY 8 HOURS
Status: DISCONTINUED | OUTPATIENT
Start: 2021-11-25 | End: 2021-11-30 | Stop reason: HOSPADM

## 2021-11-25 RX ORDER — FAMOTIDINE 20 MG/1
20 TABLET, FILM COATED ORAL 2 TIMES DAILY
Status: DISCONTINUED | OUTPATIENT
Start: 2021-11-25 | End: 2021-11-30 | Stop reason: HOSPADM

## 2021-11-25 RX ORDER — ACETAMINOPHEN 325 MG/1
650 TABLET ORAL
Status: DISCONTINUED | OUTPATIENT
Start: 2021-11-25 | End: 2021-11-30 | Stop reason: HOSPADM

## 2021-11-25 RX ORDER — ACETAMINOPHEN 650 MG/1
650 SUPPOSITORY RECTAL
Status: DISCONTINUED | OUTPATIENT
Start: 2021-11-25 | End: 2021-11-30 | Stop reason: HOSPADM

## 2021-11-25 RX ORDER — POTASSIUM CHLORIDE 20 MEQ/1
40 TABLET, EXTENDED RELEASE ORAL
Status: COMPLETED | OUTPATIENT
Start: 2021-11-25 | End: 2021-11-25

## 2021-11-25 RX ADMIN — POLYETHYLENE GLYCOL 3350 17 G: 17 POWDER, FOR SOLUTION ORAL at 17:48

## 2021-11-25 RX ADMIN — VANCOMYCIN HYDROCHLORIDE 500 MG: 500 INJECTION, POWDER, LYOPHILIZED, FOR SOLUTION INTRAVENOUS at 23:33

## 2021-11-25 RX ADMIN — PIPERACILLIN AND TAZOBACTAM 3.38 G: 3; .375 INJECTION, POWDER, LYOPHILIZED, FOR SOLUTION INTRAVENOUS at 04:36

## 2021-11-25 RX ADMIN — ATORVASTATIN CALCIUM 20 MG: 20 TABLET, FILM COATED ORAL at 23:36

## 2021-11-25 RX ADMIN — IPRATROPIUM BROMIDE AND ALBUTEROL SULFATE 3 ML: .5; 2.5 SOLUTION RESPIRATORY (INHALATION) at 13:27

## 2021-11-25 RX ADMIN — HEPARIN SODIUM AND DEXTROSE 12 UNITS/KG/HR: 10000; 5 INJECTION INTRAVENOUS at 00:22

## 2021-11-25 RX ADMIN — MAGNESIUM SULFATE HEPTAHYDRATE 2 G: 2 INJECTION, SOLUTION INTRAVENOUS at 02:55

## 2021-11-25 RX ADMIN — Medication 10 ML: at 06:00

## 2021-11-25 RX ADMIN — Medication 10 ML: at 23:34

## 2021-11-25 RX ADMIN — IPRATROPIUM BROMIDE AND ALBUTEROL SULFATE 3 ML: .5; 2.5 SOLUTION RESPIRATORY (INHALATION) at 20:59

## 2021-11-25 RX ADMIN — PIPERACILLIN AND TAZOBACTAM 3.38 G: 3; .375 INJECTION, POWDER, LYOPHILIZED, FOR SOLUTION INTRAVENOUS at 17:02

## 2021-11-25 RX ADMIN — VANCOMYCIN HYDROCHLORIDE 500 MG: 500 INJECTION, POWDER, LYOPHILIZED, FOR SOLUTION INTRAVENOUS at 11:32

## 2021-11-25 RX ADMIN — PIPERACILLIN AND TAZOBACTAM 3.38 G: 3; .375 INJECTION, POWDER, LYOPHILIZED, FOR SOLUTION INTRAVENOUS at 11:31

## 2021-11-25 RX ADMIN — HEPARIN SODIUM 3000 UNITS: 1000 INJECTION, SOLUTION INTRAVENOUS; SUBCUTANEOUS at 06:40

## 2021-11-25 RX ADMIN — POTASSIUM CHLORIDE, DEXTROSE MONOHYDRATE AND SODIUM CHLORIDE 50 ML/HR: 150; 5; 900 INJECTION, SOLUTION INTRAVENOUS at 17:00

## 2021-11-25 RX ADMIN — MIRTAZAPINE 15 MG: 15 TABLET, ORALLY DISINTEGRATING ORAL at 23:36

## 2021-11-25 RX ADMIN — HEPARIN SODIUM 2370 UNITS: 1000 INJECTION INTRAVENOUS; SUBCUTANEOUS at 00:17

## 2021-11-25 RX ADMIN — VANCOMYCIN HYDROCHLORIDE 750 MG: 750 INJECTION, POWDER, LYOPHILIZED, FOR SOLUTION INTRAVENOUS at 00:15

## 2021-11-25 RX ADMIN — POTASSIUM CHLORIDE 40 MEQ: 1500 TABLET, EXTENDED RELEASE ORAL at 17:48

## 2021-11-25 RX ADMIN — Medication 10 ML: at 02:55

## 2021-11-25 RX ADMIN — DIATRIZOATE MEGLUMINE AND DIATRIZOATE SODIUM 30 ML: 600; 100 SOLUTION ORAL; RECTAL at 02:54

## 2021-11-25 RX ADMIN — FAMOTIDINE 20 MG: 20 TABLET ORAL at 17:02

## 2021-11-25 RX ADMIN — HEPARIN SODIUM AND DEXTROSE 14 UNITS/KG/HR: 10000; 5 INJECTION INTRAVENOUS at 06:16

## 2021-11-25 RX ADMIN — Medication 10 ML: at 17:03

## 2021-11-25 NOTE — ROUTINE PROCESS
0615 Pt transferred from ED to [address] [place] [address]. Asked pt if she had any thoughts of suicidal ideation. Pt states \" I feel at peace right now.  I feel calm\"

## 2021-11-25 NOTE — ED TRIAGE NOTES
Patient presents to the ED as a rapid response from [address]. Patient was in bed when she threw up. She got up to go to the bathroom when she had a syncopal episode. Patient states she hit her head on the wall as she fell but no LOC. Patient is not sure if she is on blood thinners or not. Patient sleeping in mothers arms at this time   Mom with call bah within reach     Bill Chamorro RN  08/06/18 1316

## 2021-11-25 NOTE — PROGRESS NOTES
Problem: Patient Education: Go to Patient Education Activity  Goal: Patient/Family Education  Outcome: Progressing Towards Goal     Problem: Unstable angina/NSTEMI: Day of Admission/Day 1  Goal: Medications  Outcome: Progressing Towards Goal     Problem: Unstable angina/NSTEMI: Day 2  Goal: Medications  Outcome: Progressing Towards Goal

## 2021-11-25 NOTE — ED NOTES
Patient's lactic acid results are not transferring into EMR from ePOC machine. I printed the results from the machine to be scanned into patient's chart.      Lactic Acid: 2.74 at 2151 (11/24/21)

## 2021-11-25 NOTE — ED NOTES
TRANSFER - OUT REPORT:    Verbal report given to receiving nurse on Armen Christianson  being transferred to [address] [place] [address] for routine progression of care       Report consisted of patients Situation, Background, Assessment and   Recommendations(SBAR). Information from the following report(s) SBAR and ED Summary was reviewed with the receiving nurse. Lines:   Peripheral IV 11/24/21 Left Antecubital (Active)   Site Assessment Clean, dry, & intact 11/24/21 2132   Phlebitis Assessment 0 11/24/21 2132   Infiltration Assessment 0 11/24/21 2132   Dressing Status Clean, dry, & intact 11/24/21 2132   Dressing Type Transparent 11/24/21 2132   Hub Color/Line Status Pink; Flushed; Patent 11/24/21 2132       Peripheral IV 11/24/21 Right Forearm (Active)   Site Assessment Clean, dry, & intact 11/24/21 2207   Phlebitis Assessment 0 11/24/21 2207   Infiltration Assessment 0 11/24/21 2207   Dressing Status Clean, dry, & intact 11/24/21 2207   Dressing Type Transparent 11/24/21 2207   Hub Color/Line Status Blue; Flushed; Patent 11/24/21 2207   Action Taken Blood drawn 11/24/21 2207        Opportunity for questions and clarification was provided.       Patient transported with:   [employer]

## 2021-11-25 NOTE — PROGRESS NOTES
Rapid Response Note  [facility]    Patient: Marlene Pond 78 y.o. female  [id number]  1955      Admit Date: 11/24/2021   Admission Diagnosis: None on file, psychosis. RAPID RESPONSE     Rapid response called for fall. On arrival, patient was found to by lying on her right side. She was alert and oriented. Patient described having gone to the restroom, after which she stood up and hit her head against the wall having felt faint quickly. She fell down and doesn't specifically recall losing consciousness or hitting her head. Patient denies any acute musculoskeletal complaints though she does endorse shortness of breath and chronic back pain. Of note, patient was recently diagnosed with PNA and has been taking doxycycline. Before tonight's event, she had emesis of watery, light brown fluid. Given PNA diagnosis with previous leukocytosis and hypotension, patient was transferred to the ED for a sepsis work up. Medications Reviewed    OBJECTIVE     Vital signs on arrival:   BP 75/51, RR 94, SpO2 97%  BP 83/55, HR 89, SpO2 96%    PHYSICAL:  General:  Alert and responsive. Lying on floor. CV:  RRR, no murmurs, rubs, or gallops. RESP:  Unlabored breathing. Mild right lung field rhonchi. ABD:  Soft, nontender. Normoactive bowel sounds. Neuro:  Alert and oriented x3. ASSESSMENT, PLAN & DISPOSITION   Marlene Pond is a 78y.o. year old female admitted for depression with psychosis. Rapid response called for fall. Patient condition currently: stable. Medications Administered: None. Labs ordered/Pending: . Recommend sepsis work up in the ED, consider head CT. Disposition: ED    Attending, Dr. Girish Carrera, notified of rapid response. In agreement with plan. Primary team resuming care.      Senior resident, Dr. Jagruti De La Cruz, present during RRT and evaluation    Main Vang, [address] [place] [address] PGY-1  9:21 PM 11/24/21

## 2021-11-25 NOTE — BH NOTES
MHT alerted by this patient's roommate that this patient had fallen & needed assistance. MHT reports patient had been in bed and appeared to be sleeping during 2045 rounds. At approximately 2050 patient found lying in prone position on floor of patient room. When this nurse arrived to room MHT and another RN were in room talking to patient who seemed minimally responsive. Patient lying on right side when this nurse arrived into room. Upon visual assessment with vomit noted in basin next to patient's bed, this nurse immediately notified supervisor of patient decline in condition. Rapid response team called and arrived promptly to assess patient. Patient transferred to Emergency Dept. Please see rapid response flow chart for timing.

## 2021-11-25 NOTE — ED PROVIDER NOTES
EMERGENCY DEPARTMENT HISTORY AND PHYSICAL EXAM    9:17 PM  Date: 11/24/2021  Patient Name: Dejuan Mccray    History of Presenting Illness     Chief Complaint   Patient presents with    Dizziness        History Provided By: Patient and Rapid response team    HPI: Dejuan Mccray is a 78 y.o. female with history of depression and recently diagnosed pneumonia about 4 days ago. Patient was admitted to the behavioral health unit. She had been taking doxycycline as prescribed for her pneumonia. When she got up to go to the bathroom she felt lightheaded, had a syncopal event and fell down and hit her head. That was followed by an episode of vomiting. Patient is reporting generalized fatigue and weakness. States that she has not had a bowel movement in a few days. Denies abdominal pain but complaining of nausea. Passing flatus normally. Is also complaining of chest pain radiating to her back which was similar to the pain she had upon presentation. Denies shortness of breath. Location:  Severity:  Timing/course:    Onset/Duration:     PCP: Oskar Norton MD    Past History     Past Medical History:  Past Medical History:   Diagnosis Date    Depression     Gastrointestinal disorder Pancreatitis    Pancreatic disease     Pancreatitis     Psychiatric disorder        Past Surgical History:  Past Surgical History:   Procedure Laterality Date    HX GYN      HX HYSTERECTOMY         Family History:  Family History   Problem Relation Age of Onset    Diabetes Maternal Aunt     Cancer Maternal Aunt     Alzheimer's Disease Maternal Aunt     Cancer Paternal Aunt     Diabetes Paternal Aunt        Social History:  Social History     Tobacco Use    Smoking status: Current Every Day Smoker     Packs/day: 1.00    Smokeless tobacco: Not on file   Substance Use Topics    Alcohol use: No     Comment: Per pt she quit in 2007    Drug use: No       Allergies:  No Known Allergies    Review of Systems   Review of Systems Constitutional: Positive for appetite change and fatigue. Cardiovascular: Positive for chest pain. Gastrointestinal: Positive for constipation, nausea and vomiting. Musculoskeletal: Positive for back pain and myalgias. All other systems reviewed and are negative. Physical Exam     Patient Vitals for the past 12 hrs:   Temp Pulse Resp BP SpO2   11/25/21 0007 -- 85 20 122/74 98 %   11/24/21 2352 -- 86 18 118/70 100 %   11/24/21 2337 -- -- -- 119/68 --   11/24/21 2307 -- 89 17 120/75 97 %   11/24/21 2300 -- 83 18 118/75 99 %   11/24/21 2243 -- 91 19 125/76 99 %   11/24/21 2228 -- 83 17 125/72 98 %   11/24/21 2213 -- 91 23 125/77 99 %   11/24/21 2158 -- -- -- 126/74 --   11/24/21 2143 -- 84 17 127/75 --   11/24/21 2134 -- 80 16 (!) 116/56 100 %   11/24/21 2133 -- 85 13 117/74 --   11/24/21 2122 97.4 °F (36.3 °C) 92 -- (!) 83/55 96 %   11/24/21 2118 -- 80 19 (!) 116/56 --       Physical Exam  Vitals and nursing note reviewed. Constitutional:       Appearance: She is underweight. She is ill-appearing. HENT:      Head: Normocephalic and atraumatic. Mouth/Throat:      Mouth: Mucous membranes are dry. Cardiovascular:      Rate and Rhythm: Normal rate. Pulses: Normal pulses. Pulmonary:      Effort: Pulmonary effort is normal. No respiratory distress. Abdominal:      General: There is no distension. Palpations: Abdomen is soft. Tenderness: There is no abdominal tenderness. Musculoskeletal:         General: No deformity. Cervical back: Neck supple. Skin:     General: Skin is warm and dry. Neurological:      General: No focal deficit present. Mental Status: She is alert and oriented to person, place, and time.    Psychiatric:         Mood and Affect: Mood normal.         Behavior: Behavior normal.         Diagnostic Study Results     Labs -  Recent Results (from the past 12 hour(s))   GLUCOSE, POC    Collection Time: 11/24/21  9:00 PM   Result Value Ref Range Glucose (POC) 160 (H) 70 - 110 mg/dL   EKG, 12 LEAD, INITIAL    Collection Time: 11/24/21  9:19 PM   Result Value Ref Range    Ventricular Rate 90 BPM    Atrial Rate 90 BPM    P-R Interval 128 ms    QRS Duration 114 ms    Q-T Interval 446 ms    QTC Calculation (Bezet) 545 ms    Calculated P Axis 71 degrees    Calculated R Axis -86 degrees    Calculated T Axis -90 degrees    Diagnosis       Normal sinus rhythm  Possible Left atrial enlargement  Left axis deviation  Incomplete right bundle branch block  Inferior infarct , age undetermined  Anterior infarct , age undetermined  T wave abnormality, consider lateral ischemia  Prolonged QT  Abnormal ECG  When compared with ECG of 20-NOV-2021 21:34,  Significant changes have occurred     IONIZED CALCIUM    Collection Time: 11/24/21  9:51 PM   Result Value Ref Range    Calcium, ionized 1.20 1. 12 - 1.32 mmol/L    Critical value read back DR. Reagan Carmona    METABOLIC PANEL, COMPREHENSIVE    Collection Time: 11/24/21  9:52 PM   Result Value Ref Range    Sodium 135 (L) 136 - 145 mmol/L    Potassium 4.8 3.5 - 5.5 mmol/L    Chloride 98 (L) 100 - 111 mmol/L    CO2 29 21 - 32 mmol/L    Anion gap 8 3.0 - 18 mmol/L    Glucose 148 (H) 74 - 99 mg/dL    BUN 30 (H) 7.0 - 18 MG/DL    Creatinine 0.82 0.6 - 1.3 MG/DL    BUN/Creatinine ratio 37 (H) 12 - 20      GFR est AA >60 >60 ml/min/1.73m2    GFR est non-AA >60 >60 ml/min/1.73m2    Calcium 10.7 (H) 8.5 - 10.1 MG/DL    Bilirubin, total 0.6 0.2 - 1.0 MG/DL    ALT (SGPT) 12 (L) 13 - 56 U/L    AST (SGOT) 50 (H) 10 - 38 U/L    Alk.  phosphatase 60 45 - 117 U/L    Protein, total 7.0 6.4 - 8.2 g/dL    Albumin 3.0 (L) 3.4 - 5.0 g/dL    Globulin 4.0 2.0 - 4.0 g/dL    A-G Ratio 0.8 0.8 - 1.7     CBC WITH AUTOMATED DIFF    Collection Time: 11/24/21  9:52 PM   Result Value Ref Range    WBC 26.3 (H) 4.6 - 13.2 K/uL    RBC 3.71 (L) 4.20 - 5.30 M/uL    HGB 11.0 (L) 12.0 - 16.0 g/dL    HCT 34.5 (L) 35.0 - 45.0 %    MCV 93.0 78.0 - 100.0 FL    MCH 29.6 24.0 - 34.0 PG    MCHC 31.9 31.0 - 37.0 g/dL    RDW 13.2 11.6 - 14.5 %    PLATELET 573 (H) 905 - 420 K/uL    MPV 9.8 9.2 - 11.8 FL    NRBC 0.1 (H) 0  WBC    ABSOLUTE NRBC 0.03 (H) 0.00 - 0.01 K/uL    NEUTROPHILS 75 (H) 40 - 73 %    LYMPHOCYTES 17 (L) 21 - 52 %    MONOCYTES 6 3 - 10 %    EOSINOPHILS 0 0 - 5 %    BASOPHILS 0 0 - 2 %    IMMATURE GRANULOCYTES 1 (H) 0.0 - 0.5 %    ABS. NEUTROPHILS 19.8 (H) 1.8 - 8.0 K/UL    ABS. LYMPHOCYTES 4.5 (H) 0.9 - 3.6 K/UL    ABS. MONOCYTES 1.7 (H) 0.05 - 1.2 K/UL    ABS. EOSINOPHILS 0.0 0.0 - 0.4 K/UL    ABS. BASOPHILS 0.1 0.0 - 0.1 K/UL    ABS. IMM. GRANS. 0.3 (H) 0.00 - 0.04 K/UL    DF AUTOMATED     NT-PRO BNP    Collection Time: 11/24/21  9:52 PM   Result Value Ref Range    NT pro-BNP 13,769 (H) 0 - 900 PG/ML   TROPONIN I    Collection Time: 11/24/21  9:52 PM   Result Value Ref Range    Troponin-I, QT 1.13 (H) 0.0 - 0.045 NG/ML   LIPASE    Collection Time: 11/24/21  9:52 PM   Result Value Ref Range    Lipase 58 (L) 73 - 393 U/L   MAGNESIUM    Collection Time: 11/24/21  9:52 PM   Result Value Ref Range    Magnesium 2.0 1.6 - 2.6 mg/dL   PTT    Collection Time: 11/24/21 11:02 PM   Result Value Ref Range    aPTT 32.1 23.0 - 36.4 SEC   POC LACTIC ACID    Collection Time: 11/25/21 12:50 AM   Result Value Ref Range    Lactic Acid (POC) 2.53 (HH) 0.40 - 2.00 mmol/L       Radiologic Studies -   CT HEAD WO CONT    Result Date: 11/24/2021  No acute infarct, hemorrhage or mass effect identified. CTA CHEST W OR W WO CONT    Result Date: 11/25/2021  No evidence of pulmonary embolus or right heart strain. Residual right lung base streaky opacities notably improved compared to 11/20/2021. US THYROID/PARATHYROID/SOFT TISS    Result Date: 11/24/2021  Multinodular thyroid gland. Largest nodules in the right thyroid lobe measure up to 3.6 cm and in the left thyroid lobe measures 3.2 cm. Tissue sampling recommended.     XR CHEST PORT    Result Date: 11/24/2021  Right basilar streaky infiltrate appears slightly improved compared to 11/20/2021. Medical Decision Making     ED Course: Progress Notes, Reevaluation, and Consults:    9:17 PM Initial assessment performed. The patients presenting problems have been discussed, and they/their family are in agreement with the care plan formulated and outlined with them. I have encouraged them to ask questions as they arise throughout their visit. Provider Notes (Medical Decision Making): 50-year-old female history of depression and recently diagnosed with pneumonia. Patient was brought to the ER after syncopal event in the behavioral health unit. She is ill-appearing on exam and appears dehydrated. Hypotensive but not tachycardic or febrile. She is 700% on room air and not in any respiratory distress. Sepsis bundle was initiated with IV fluid and broad-spectrum antibiotic and suspicion for septic shock from pneumonia. Could also be related to vomiting and decreased p.o. intake, dehydration. Abdomen is soft and nontender. She had a CT scan 4 days ago of her chest and abdomen and there were both unremarkable for acute abnormalities. I do not think she needs repeat imaging however will order head CT given the syncope followed by vomiting. Given the elevation of proBNP and troponin without EKG changes PE was possible diagnosed especially with the syncope. CTA was negative for PE. Patient abdominal exam is pretty benign so I do not think she needs repeat abdominal CT. Her lactic acid only went from 2.7-2.3, unclear why she is not clearing her lactic. I think it might be more related to poor cardiac function rather than septic shock. We will hold off on additional fluids and recheck her lactic acid. She is currently on a heparin drip. Chest pain-free. Case was discussed with the hospitalist Dr. Wild Townsend and the patient had been accepted.     Procedures:     Critical Care Time: Upon my evaluation, this patient had a high probability of imminent or life-threatening deterioration due to severe sepsis, NSTEMI, which required my direct attention, intervention, and personal management. I have personally provided 45 minutes of critical care time exclusive of time spent on separately billable procedures. Time includes review of laboratory data, radiology results, discussion with consultants, and monitoring for potential decompensation. Interventions were performed as documented above. Ghislaine Jones MD    1:08 AM        Vital Signs-Reviewed the patient's vital signs. Reviewed pt's pulse ox reading. EKG: Interpreted by the EP. Time Interpreted:    Rate:    Rhythm:    Interpretation:   Comparison:     Records Reviewed: Nursing Notes, Old Medical Records, Previous electrocardiograms, Previous Radiology Studies and Previous Laboratory Studies (Time of Review: 9:17 PM)  -I am the first provider for this patient.  -I reviewed the vital signs, available nursing notes, past medical history, past surgical history, family history and social history.     Current Facility-Administered Medications   Medication Dose Route Frequency Provider Last Rate Last Admin    sodium chloride (NS) flush 5-40 mL  5-40 mL IntraVENous Q8H Rachael Thurston MD        sodium chloride (NS) flush 5-40 mL  5-40 mL IntraVENous PRN Rachael Thurston MD        acetaminophen (TYLENOL) tablet 650 mg  650 mg Oral Q6H PRN Rachael Thurston MD        Or    acetaminophen (TYLENOL) suppository 650 mg  650 mg Rectal Q6H PRN Rachael Thurston MD        polyethylene glycol (MIRALAX) packet 17 g  17 g Oral DAILY PRN Rachael Thurston MD        ondansetron (ZOFRAN ODT) tablet 4 mg  4 mg Oral Q8H PRN Rachael Thurston MD        Or    ondansetron (ZOFRAN) injection 4 mg  4 mg IntraVENous Q6H PRN Rachael Thurston MD        sodium chloride (NS) flush 5-10 mL  5-10 mL IntraVENous PRN Ghislaine Jones MD        piperacillin-tazobactam (ZOSYN) 3.375 g in 0.9% sodium chloride (MBP/ADV) 100 mL MBP  3.375 g IntraVENous Q6H Ghislaine Jones MD        vancomycin (VANCOCIN) 750 mg in 0.9% sodium chloride 250 mL (VIAL-MATE)  750 mg IntraVENous Q12H Ghislaine Jones  mL/hr at 11/25/21 0015 750 mg at 11/25/21 0015    heparin 25,000 units in D5W 250 ml infusion  12-25 Units/kg/hr IntraVENous TITRATE Ghislaine Jones MD 4.7 mL/hr at 11/25/21 0022 12 Units/kg/hr at 11/25/21 0022     Current Outpatient Medications   Medication Sig Dispense Refill    potassium chloride (K-DUR, KLOR-CON) 20 mEq tablet Take 1 Tab by mouth two (2) times a day. 10 Tab 0    DULoxetine (CYMBALTA) 60 mg capsule Take 1 Cap by mouth daily. Indications: MAJOR DEPRESSIVE DISORDER 30 Cap 0    OLANZapine (ZYPREXA ZYDIS) 10 mg disintegrating tablet Take 1 Tab by mouth nightly. Indications: Psychosis 30 Tab 0    therapeutic multivitamin (THERAGRAN) tablet Take 1 Tab by mouth daily. Indications: VITAMIN DEFICIENCY PREVENTION 30 Tab 0    docusate sodium (COLACE) 100 mg capsule Take 100 mg by mouth two (2) times daily as needed for Constipation. Clinical Impression     Clinical Impression: No diagnosis found. Disposition: Admit        This note was dictated utilizing voice recognition software which may lead to typographical errors. I apologize in advance if the situation occurs. If questions arise please do not hesitate to contact me or call our department.     Froilan Franco MD  9:17 PM

## 2021-11-25 NOTE — PROGRESS NOTES
[address] [facility] [address]  Inpatient Progress Note     Date of Service: 11/25/21  Hospital Day: 0     Subjective/Interval History   11/25/21      Notes reviewed and/or discussed and report that Wilmer Christian is a patient who has been admitted to [facility] under the care of the undersigned, and who suffered with a syncopal episode last evening prompting her transfer to the emergency room, and from there to be admitted to the telemetry services here at  [facility]. In addition to her having a QT and QTc elongation on her electrocardiogram which by the way is new when compared to previous one done several days prior, the patient had elevated cardiac enzymes with the possibility of a non-STEMI being raised. Patient interview: Wilmer Christian was interviewed by this writer today. The patient herself described feelings stable, currently denying suicidal thoughts, and described herself as being very comfortable in the [address] The case was discussed with Dr. Tk Davalos who was informed about the reasons for which the patient had been admitted psychiatrically. We also discussed with him the medical problems that she has been diagnosed with when initially  being medically cleared at [facility] days earlier. While in the psychiatric unit, the patient only received 1 dose of aripiprazole 5 mg a day since we were able to determined that previously prescribed duloxetine 60 mg daily, and also olanzapine 10 mg at bedtime, has not been taken by the patient for several months. Due to history of depression with psychotic symptoms, a combination of aripiprazole and mirtazapine has been ordered however the patient had only received 1 dose of aripiprazole as a stated.   It is doubtful that 1 dose of aripiprazole increase the QT and QTc segments as noted in the patient's electrocardiogram.  However for obvious reasons treatment with antidepressants and antipsychotics have been held. Objective     Visit Vitals  /68 (BP 1 Location: Left upper arm, BP Patient Position: At rest)   Pulse 80   Temp 98.1 °F (36.7 °C)   Resp 18   Ht 5' 3\" (1.6 m)   Wt 49.9 kg (110 lb)   SpO2 98%   Breastfeeding No   BMI 19.49 kg/m²     Vitals appear to be stable    Recent Results (from the past 24 hour(s))   GLUCOSE, POC    Collection Time: 11/24/21  9:00 PM   Result Value Ref Range    Glucose (POC) 160 (H) 70 - 110 mg/dL   EKG, 12 LEAD, INITIAL    Collection Time: 11/24/21  9:19 PM   Result Value Ref Range    Ventricular Rate 81 BPM    Atrial Rate 81 BPM    P-R Interval 126 ms    QRS Duration 126 ms    Q-T Interval 526 ms    QTC Calculation (Bezet) 611 ms    Calculated P Axis 77 degrees    Calculated R Axis -81 degrees    Calculated T Axis -97 degrees    Diagnosis       Normal sinus rhythm  Possible Left atrial enlargement  Right bundle branch block  Left anterior fascicular block  Bifascicular block  Inferior infarct (cited on or before 24-NOV-2021)  T wave abnormality, consider lateral ischemia  Abnormal ECG  When compared with ECG of 24-NOV-2021 21:19,  QT has lengthened     IONIZED CALCIUM    Collection Time: 11/24/21  9:51 PM   Result Value Ref Range    Calcium, ionized 1.20 1. 12 - 1.32 mmol/L    Critical value read back DR. Kathleen Mehta    CULTURE, BLOOD    Collection Time: 11/24/21  9:52 PM    Specimen: Blood   Result Value Ref Range    Special Requests: NO SPECIAL REQUESTS      Culture result: NO GROWTH AFTER 8 HOURS     METABOLIC PANEL, COMPREHENSIVE    Collection Time: 11/24/21  9:52 PM   Result Value Ref Range    Sodium 135 (L) 136 - 145 mmol/L    Potassium 4.8 3.5 - 5.5 mmol/L    Chloride 98 (L) 100 - 111 mmol/L    CO2 29 21 - 32 mmol/L    Anion gap 8 3.0 - 18 mmol/L    Glucose 148 (H) 74 - 99 mg/dL    BUN 30 (H) 7.0 - 18 MG/DL    Creatinine 0.82 0.6 - 1.3 MG/DL    BUN/Creatinine ratio 37 (H) 12 - 20      GFR est AA >60 >60 ml/min/1.73m2    GFR est non-AA >60 >60 ml/min/1.73m2    Calcium 10.7 (H) 8.5 - 10.1 MG/DL    Bilirubin, total 0.6 0.2 - 1.0 MG/DL    ALT (SGPT) 12 (L) 13 - 56 U/L    AST (SGOT) 50 (H) 10 - 38 U/L    Alk. phosphatase 60 45 - 117 U/L    Protein, total 7.0 6.4 - 8.2 g/dL    Albumin 3.0 (L) 3.4 - 5.0 g/dL    Globulin 4.0 2.0 - 4.0 g/dL    A-G Ratio 0.8 0.8 - 1.7     CBC WITH AUTOMATED DIFF    Collection Time: 11/24/21  9:52 PM   Result Value Ref Range    WBC 26.3 (H) 4.6 - 13.2 K/uL    RBC 3.71 (L) 4.20 - 5.30 M/uL    HGB 11.0 (L) 12.0 - 16.0 g/dL    HCT 34.5 (L) 35.0 - 45.0 %    MCV 93.0 78.0 - 100.0 FL    MCH 29.6 24.0 - 34.0 PG    MCHC 31.9 31.0 - 37.0 g/dL    RDW 13.2 11.6 - 14.5 %    PLATELET 200 (H) 557 - 420 K/uL    MPV 9.8 9.2 - 11.8 FL    NRBC 0.1 (H) 0  WBC    ABSOLUTE NRBC 0.03 (H) 0.00 - 0.01 K/uL    NEUTROPHILS 75 (H) 40 - 73 %    LYMPHOCYTES 17 (L) 21 - 52 %    MONOCYTES 6 3 - 10 %    EOSINOPHILS 0 0 - 5 %    BASOPHILS 0 0 - 2 %    IMMATURE GRANULOCYTES 1 (H) 0.0 - 0.5 %    ABS. NEUTROPHILS 19.8 (H) 1.8 - 8.0 K/UL    ABS. LYMPHOCYTES 4.5 (H) 0.9 - 3.6 K/UL    ABS. MONOCYTES 1.7 (H) 0.05 - 1.2 K/UL    ABS. EOSINOPHILS 0.0 0.0 - 0.4 K/UL    ABS. BASOPHILS 0.1 0.0 - 0.1 K/UL    ABS. IMM.  GRANS. 0.3 (H) 0.00 - 0.04 K/UL    DF AUTOMATED     NT-PRO BNP    Collection Time: 11/24/21  9:52 PM   Result Value Ref Range    NT pro-BNP 13,769 (H) 0 - 900 PG/ML   TROPONIN I    Collection Time: 11/24/21  9:52 PM   Result Value Ref Range    Troponin-I, QT 1.13 (H) 0.0 - 0.045 NG/ML   LIPASE    Collection Time: 11/24/21  9:52 PM   Result Value Ref Range    Lipase 58 (L) 73 - 393 U/L   MAGNESIUM    Collection Time: 11/24/21  9:52 PM   Result Value Ref Range    Magnesium 2.0 1.6 - 2.6 mg/dL   CULTURE, BLOOD    Collection Time: 11/24/21 11:02 PM    Specimen: Blood   Result Value Ref Range    Special Requests: NO SPECIAL REQUESTS      Culture result: NO GROWTH AFTER 6 HOURS     PTT    Collection Time: 11/24/21 11:02 PM   Result Value Ref Range    aPTT 32.1 23.0 - 36.4 SEC   POC LACTIC ACID    Collection Time: 11/25/21 12:50 AM   Result Value Ref Range    Lactic Acid (POC) 2.53 (HH) 0.40 - 2.00 mmol/L   HEMOGLOBIN A1C W/O EAG    Collection Time: 11/25/21  4:42 AM   Result Value Ref Range    Hemoglobin A1c 5.2 4.2 - 5.6 %   METABOLIC PANEL, BASIC    Collection Time: 11/25/21  4:42 AM   Result Value Ref Range    Sodium 135 (L) 136 - 145 mmol/L    Potassium 4.1 3.5 - 5.5 mmol/L    Chloride 97 (L) 100 - 111 mmol/L    CO2 33 (H) 21 - 32 mmol/L    Anion gap 5 3.0 - 18 mmol/L    Glucose 122 (H) 74 - 99 mg/dL    BUN 24 (H) 7.0 - 18 MG/DL    Creatinine 0.64 0.6 - 1.3 MG/DL    BUN/Creatinine ratio 38 (H) 12 - 20      GFR est AA >60 >60 ml/min/1.73m2    GFR est non-AA >60 >60 ml/min/1.73m2    Calcium 10.0 8.5 - 10.1 MG/DL   PTT    Collection Time: 11/25/21  4:42 AM   Result Value Ref Range    aPTT 44.7 (H) 23.0 - 36.4 SEC   CBC WITH AUTOMATED DIFF    Collection Time: 11/25/21  4:42 AM   Result Value Ref Range    WBC 27.8 (H) 4.6 - 13.2 K/uL    RBC 3.75 (L) 4.20 - 5.30 M/uL    HGB 11.2 (L) 12.0 - 16.0 g/dL    HCT 34.4 (L) 35.0 - 45.0 %    MCV 91.7 78.0 - 100.0 FL    MCH 29.9 24.0 - 34.0 PG    MCHC 32.6 31.0 - 37.0 g/dL    RDW 13.2 11.6 - 14.5 %    PLATELET 705 (H) 318 - 420 K/uL    MPV 9.4 9.2 - 11.8 FL    NRBC 0.0 0  WBC    ABSOLUTE NRBC 0.00 0.00 - 0.01 K/uL    NEUTROPHILS 77 (H) 40 - 73 %    LYMPHOCYTES 20 (L) 21 - 52 %    MONOCYTES 3 3 - 10 %    EOSINOPHILS 0 0 - 5 %    BASOPHILS 0 0 - 2 %    IMMATURE GRANULOCYTES 0 %    ABS. NEUTROPHILS 21.4 (H) 1.8 - 8.0 K/UL    ABS. LYMPHOCYTES 5.6 (H) 0.9 - 3.6 K/UL    ABS. MONOCYTES 0.8 0.05 - 1.2 K/UL    ABS. EOSINOPHILS 0.0 0.0 - 0.4 K/UL    ABS. BASOPHILS 0.0 0.0 - 0.1 K/UL    ABS. IMM.  GRANS. 0.0 K/UL    DF MANUAL      PLATELET COMMENTS Increased Platelets      RBC COMMENTS ANISOCYTOSIS  1+       HEPATIC FUNCTION PANEL    Collection Time: 11/25/21  4:42 AM   Result Value Ref Range    Protein, total 7.3 6.4 - 8.2 g/dL    Albumin 2.9 (L) 3.4 - 5.0 g/dL    Globulin 4.4 (H) 2.0 - 4.0 g/dL    A-G Ratio 0.7 (L) 0.8 - 1.7      Bilirubin, total 0.6 0.2 - 1.0 MG/DL    Bilirubin, direct <0.1 0.0 - 0.2 MG/DL    Alk.  phosphatase 54 45 - 117 U/L    AST (SGOT) 40 (H) 10 - 38 U/L    ALT (SGPT) 14 13 - 56 U/L   MAGNESIUM    Collection Time: 11/25/21  4:42 AM   Result Value Ref Range    Magnesium 7.7 (HH) 1.6 - 2.6 mg/dL   IRON PROFILE    Collection Time: 11/25/21  4:42 AM   Result Value Ref Range    Iron 89 50 - 175 ug/dL    TIBC 308 250 - 450 ug/dL    Iron % saturation 29 20 - 50 %   LIPID PANEL    Collection Time: 11/25/21  4:42 AM   Result Value Ref Range    LIPID PROFILE          Cholesterol, total 195 <200 MG/DL    Triglyceride 103 <150 MG/DL    HDL Cholesterol 48 40 - 60 MG/DL    LDL, calculated 126.4 (H) 0 - 100 MG/DL    VLDL, calculated 20.6 MG/DL    CHOL/HDL Ratio 4.1 0 - 5.0     TSH 3RD GENERATION    Collection Time: 11/25/21  4:42 AM   Result Value Ref Range    TSH 1.56 0.36 - 3.74 uIU/mL   LACTIC ACID    Collection Time: 11/25/21  6:25 AM   Result Value Ref Range    Lactic acid 1.8 0.4 - 2.0 MMOL/L   TROPONIN I    Collection Time: 11/25/21  6:25 AM   Result Value Ref Range    Troponin-I, QT 0.96 (H) 0.0 - 0.045 NG/ML   ECHO ADULT COMPLETE    Collection Time: 11/25/21 10:00 AM   Result Value Ref Range    IVSd 1.47 (A) 0.6 - 0.9 cm    LVIDd 4.05 3.9 - 5.3 cm    LVIDs 3.50 cm    LVOT d 2.02 cm    LVPWd 1.65 (A) 0.6 - 0.9 cm    LA Volume 59.62 22 - 52 mL    LA Area 4C 17.86 cm2    LA Vol 2C 60.25 (A) 22 - 52 mL    LA Vol 4C 49.47 22 - 52 mL    AV R PG 81.51 mmHg    Aortic Regurgitant Pressure Half-time 439.87 ms    AR Max Bryant 451.42 cm/s    MV A Bryant 93.97 cm/s    Mitral Valve E Wave Deceleration Time 142.47 ms    MV E Bryant 58.01 cm/s    E/E' lateral 7.43     E/E' septal 11.16     LV E' Lateral Velocity 7.81 cm/s    LV E' Septal Velocity 5.20 cm/s    Tapse 2.38 (A) 1.5 - 2.0 cm    Triscuspid Valve Regurgitation Peak Gradient 24.85 mmHg    TR Max Velocity 249.25 cm/s    Ao Root D 2.97 cm    Pulmonary Vein \"A\" Wave Velocity 46.03 cm/s    P Vein A Dur 157.33 ms    MV E/A 0.62     LV Mass .0 67 - 162 g    LV Mass AL Index 168.0 43 - 95 g/m2    E/E' ratio (averaged) 9.29     LA Vol Index 39.75 16 - 28 ml/m2    LA Vol Index 40.17 16 - 28 ml/m2    LA Vol Index 32.98 16 - 28 ml/m2   METABOLIC PANEL, BASIC    Collection Time: 11/25/21 10:39 AM   Result Value Ref Range    Sodium 134 (L) 136 - 145 mmol/L    Potassium 3.3 (L) 3.5 - 5.5 mmol/L    Chloride 96 (L) 100 - 111 mmol/L    CO2 32 21 - 32 mmol/L    Anion gap 6 3.0 - 18 mmol/L    Glucose 110 (H) 74 - 99 mg/dL    BUN 21 (H) 7.0 - 18 MG/DL    Creatinine 0.69 0.6 - 1.3 MG/DL    BUN/Creatinine ratio 30 (H) 12 - 20      GFR est AA >60 >60 ml/min/1.73m2    GFR est non-AA >60 >60 ml/min/1.73m2    Calcium 10.0 8.5 - 10.1 MG/DL   MAGNESIUM    Collection Time: 11/25/21 10:39 AM   Result Value Ref Range    Magnesium 2.4 1.6 - 2.6 mg/dL     Above results noted, treatment per primary care team.    Mental Status Examination     Appearance/Hygiene 78 y.o.  BLACK/ female  Hygiene: Fair   Behavior/Social Relatedness Appropriate   Musculoskeletal Gait/Station: appropriate  Tone (flaccid, cogwheeling, spastic): not assessed  Psychomotor (hyperkinetic, hypokinetic): calm   Involuntary movements (tics, dyskinesias, akathisa, stereotypies): none   Speech   Rate, rhythm, volume, fluency and articulation are appropriate   Mood   described her depression improving   Affect    appropriate to situation   Thought Process Linear and goal directed   Thought Content and Perceptual Disturbances Denies self-injurious behavior (SIB), suicidal ideation (SI), aggressive behavior or homicidal ideation (HI)  Denies auditory and visual hallucinations   Sensorium and Cognition  Grossly intact   Insight  improving   Judgment  improving        Assessment/Plan      Psychiatric Diagnoses:   Patient Active Problem List   Diagnosis Code  Itch of skin L29.9    Anxiety state F41.1    Weight loss, unintentional R63.4    Major depressive disorder, recurrent episode, severe, with psychosis ([facility] F33.3    Pyelonephritis N12    SIRS (systemic inflammatory response syndrome) ([facility]) R65.10    Sepsis ([facility]) A41.9    UTI (urinary tract infection) N39.0    Severe protein-calorie malnutrition ([facility] E43    NSTEMI (non-ST elevated myocardial infarction) ([facility]) I21.4    Severe sepsis ([facility]) A41.9, R65.20    QT prolongation R94.31       Medical Diagnoses: Per attending physician    Psychosocial and contextual factors: Same    Level of impairment/disability: Severe by history    1. The help provided by the rapid response team, and the hospitalist group is greatly appreciated. We will continue to follow patient psychiatrically as necessary. For now treatment with antipsychotics and antidepressants have been discontinued due to the presence of QT and QTc elongation. We will have to eventually determine if she requires to be represcribed with them or not. 2.  Reviewed instructions, risks, benefits and side effects of medications  3.   Disposition/Discharge Date: self-care/home, TBD    Yuri Brown MD, [facility]  Psychiatry

## 2021-11-25 NOTE — H&P
History & Physical    Patient: Олег Schwarz MRN: [id number]  CSN: [id number]    YOB: 1955  Age: 78 y.o. Sex: female      DOA: 11/24/2021    Chief Complaint:   Chief Complaint   Patient presents with    Dizziness          HPI:     Олег Schwarz is a 78 y.o. female w/ PMH depression/anxiety due to childhood trauma, GERD who presented to the ED initially 4 days ago with complaints of increased dyspnea on exertion and cough. She had a CT scan done that had a mild right lower lobe consolidation and white blood cell elevation to 20,000. She was started on doxycycline and she was sent to behavioral health unit as she was also expressing suicidal ideation. She was kept on doxycycline. Today, patient had a syncopal episode which she describes as her whole body going limp and her losing consciousness. She denies any kind of palpitations or chest pain. She denies any fevers or chills. No dysuria. On further questioning, she states that she has had a poor appetite for the past week or so. Has not been drinking much water has not been eating much either. She is also had significant constipation and reports not having a bowel movement except for once where she passed a small amount of stool for the past week. Additionally, she also stated that she has not passed gas for the past 24 to 36 hours. Otherwise, she continues to have cough with whitish sputum production.     Past Medical History:   Diagnosis Date    Depression     Gastrointestinal disorder Pancreatitis    Pancreatic disease     Pancreatitis     Psychiatric disorder        Past Surgical History:   Procedure Laterality Date    HX GYN      HX HYSTERECTOMY         Family History   Problem Relation Age of Onset    Diabetes Maternal Aunt     Cancer Maternal Aunt     Alzheimer's Disease Maternal Aunt     Cancer Paternal Aunt     Diabetes Paternal Aunt        Social History     Socioeconomic History    Marital status:  Tobacco Use    Smoking status: Current Every Day Smoker     Packs/day: 1.00   Substance and Sexual Activity    Alcohol use: No     Comment: Per pt she quit in 2007    Drug use: No    Sexual activity: Not Currently       Prior to Admission medications    Medication Sig Start Date End Date Taking? Authorizing Provider   potassium chloride (K-DUR, KLOR-CON) 20 mEq tablet Take 1 Tab by mouth two (2) times a day. 2/24/17   Ann Arambula MD   DULoxetine (CYMBALTA) 60 mg capsule Take 1 Cap by mouth daily. Indications: MAJOR DEPRESSIVE DISORDER 9/6/16   Rosalee Galaviz MD   OLANZapine (ZYPREXA ZYDIS) 10 mg disintegrating tablet Take 1 Tab by mouth nightly. Indications: Psychosis 9/6/16   Rosalee Galaviz MD   therapeutic multivitamin [facility]) tablet Take 1 Tab by mouth daily. Indications: VITAMIN DEFICIENCY PREVENTION 9/6/16   Rosalee Galaviz MD   docusate sodium (COLACE) 100 mg capsule Take 100 mg by mouth two (2) times daily as needed for Constipation. Provider, Historical       No Known Allergies      Review of Systems  GENERAL: No fevers, chills  HEENT: No change in vision, no earache, no tinnitus, no sore throat or sinus congestion. NECK: No pain or stiffness. PULMONARY: +HUA and cough  Cardiovascular: no pnd or orthopnea, no CP  GASTROINTESTINAL: See HPI  GENITOURINARY: No urinary frequency, no urgency, no hesitancy or dysuria. MUSCULOSKELETAL: No joint or muscle pain, no back pain, no recent trauma. DERMATOLOGIC: No rash, no itching, no lesions. ENDOCRINE: No polyuria, no polydipsia, no heat or cold intolerance. No recent change in weight. HEMATOLOGICAL: No anemia or easy bruising or bleeding. NEUROLOGIC: No headache, no seizures, no numbness, no tingling or weakness.        Physical Exam:     Physical Exam:  Visit Vitals  /78   Pulse 84   Temp 97.4 °F (36.3 °C)   Resp 20   Ht 5' 2\" (1.575 m)   Wt 39.5 kg (87 lb)   SpO2 99%   BMI 15.91 kg/m²      O2 Device: None (Room air)    Temp (24hrs), Av.7 °F (36.5 °C), Min:97.4 °F (36.3 °C), Max:98 °F (36.7 °C)     190 -  0700  In: 1012 [I.V.:1697]  Out: -    No intake/output data recorded. General: AOX3, NAD, thin. HEENT: NCAT, no scleral icterus, PERRL  Neck: No LAD, no JVD  Lungs: CTAB, no wheezing, rales, or crackles. In no respiratory distress. CV: RRR, S1/S2 normal. No MRG. Abdomen: Soft. +diffuse tenderness but more so in RUQ and LLQ. Extremities: No cyanosis or edema. Skin: No rashes or lesions  Neuro: Aox3, no focal motor or sensory deficits    Labs Reviewed: All lab results for the last 24 hours reviewed. CT and EKG    Procedures/imaging: see electronic medical records for all procedures/Xrays and details which were not copied into this note but were reviewed prior to creation of Plan      Assessment/Plan     Active Problems:    NSTEMI (non-ST elevated myocardial infarction) ([facility] (2021)      Severe sepsis ([facility] [address] [facility] (2021)       #Syncope, suspect arrhythmia or cardiac event. Also underlying hypovolemia from dehydration.  -Patient was on doxycycline for antibiotics for her mild pneumonia. Also she is on antidepressants. All of these prolong her QTC. Her QTC was prolonged on recent EKGs as well greater than 500. Concerned that this was the reason that she had her syncope. She also has underlying dehydration as evidenced by her new thrombocytosis and increase in her hemoglobin from just a few days ago suggestive of hypovolemia. -EKG with new Q waves in lateral leads. Also has poor R wave progression which was there previously as well. Right bundle branch block that was also there previously. -Troponin elevated to 1.1 and BNP elevation to 13,000  -Bedside ultrasound with dilated LV, poor contraction, wall motion abnormalities in anterior and lateral segments. Unable to visualize RV completely. Plan:  -Continue patient on heparin drip. A.m. cardiology consult for further evaluation. Trend troponin.  -Avoid further IV fluids although patient is intravascularly depleted, concerned that there is pulmonary hypertension going on which could cause her to decompensate quickly with minimal amounts of IV fluids. Okay to have p.o. fluid intake.  -Hold patient's antidepressants. Switching antibiotics. Avoid QTC prolonging agents. -Admit to telemetry    #Abdominal pain  -Patient is reports that she has not passed gas for the past 24 hours is concerning along with her history of significant constipation.  -Obtain CT abdomen with p.o. contrast    #Right lower lobe pneumonia  -Unlikely that this is the cause of her problems. Is relatively mild on imaging. We will continue her on antibiotics until she is more stable. Blood cultures from before negative. #NSTEMI  -Management as above. Cardiology consult in the a.m. Continue heparin drip. #Leukocytosis, likely worsened because of dehydration rather than worsening infection.  -Follow-up CT abdomen pelvis. Antibiotics as above. #JOSE, likely prerenal.  At risk for cardiorenal given high BNP.  -Patient has creatinine that jumped up from 0.4-0.8, qualifying as JOSE. -Follow-up a.m. labs. #Depression  -Meds on hold currently due to QT prolongation. DVT/GI Prophylaxis: heparin drip    Discussed with patient at bedside about hospital admission and my plan care, they understood and agree with my plan care. Shayna Cortes MD  11/25/2021 2:12 AM    Disclaimer: Sections of this note are dictated using utilizing voice recognition software. Minor typographical errors may be present. If questions arise, please do not hesitate to contact me or call our department.

## 2021-11-25 NOTE — PROGRESS NOTES
Hospitalist Progress Note    Subjective:   Daily Progress Note: 11/25/2021     Patient states she is feeling fine. She might have lost 15 to 20 pounds in last 1 month because of her appetite has been running low. Denies any chest pain or shortness of breath or cough. No nausea or vomiting. Feeling depressed. No headaches or dizziness currently. No more syncope.     Current Facility-Administered Medications   Medication Dose Route Frequency    sodium chloride (NS) flush 5-40 mL  5-40 mL IntraVENous Q8H    sodium chloride (NS) flush 5-40 mL  5-40 mL IntraVENous PRN    acetaminophen (TYLENOL) tablet 650 mg  650 mg Oral Q6H PRN    Or    acetaminophen (TYLENOL) suppository 650 mg  650 mg Rectal Q6H PRN    polyethylene glycol (MIRALAX) packet 17 g  17 g Oral DAILY PRN    ondansetron (ZOFRAN ODT) tablet 4 mg  4 mg Oral Q8H PRN    Or    ondansetron (ZOFRAN) injection 4 mg  4 mg IntraVENous Q6H PRN    vancomycin (VANCOCIN) 500 mg in 0.9% sodium chloride (MBP/ADV) 100 mL MBP  500 mg IntraVENous Q12H    potassium chloride (K-DUR, KLOR-CON M20) SR tablet 40 mEq  40 mEq Oral NOW    albuterol-ipratropium (DUO-NEB) 2.5 MG-0.5 MG/3 ML  3 mL Nebulization Q6H RT    [START ON 11/26/2021] multivitamin, tx-iron-ca-min (THERA-M w/ IRON) tablet 1 Tablet  1 Tablet Oral DAILY    atorvastatin (LIPITOR) tablet 20 mg  20 mg Oral QHS    famotidine (PEPCID) tablet 20 mg  20 mg Oral BID    mirtazapine (REMERON SOL-TAB) disintegrating tablet 15 mg  15 mg Oral QHS    [START ON 11/26/2021] aspirin delayed-release tablet 81 mg  81 mg Oral DAILY    dextrose 5% - 0.9% NaCl with KCl 20 mEq/L infusion  50 mL/hr IntraVENous CONTINUOUS    [START ON 11/26/2021] senna-docusate (PERICOLACE) 8.6-50 mg per tablet 1 Tablet  1 Tablet Oral DAILY    sodium chloride (NS) flush 5-10 mL  5-10 mL IntraVENous PRN    piperacillin-tazobactam (ZOSYN) 3.375 g in 0.9% sodium chloride (MBP/ADV) 100 mL MBP  3.375 g IntraVENous Q6H    heparin 25,000 units in D5W 250 ml infusion  12-25 Units/kg/hr IntraVENous TITRATE        Review of Systems  Pertinent items are noted in HPI. Objective:     Visit Vitals  BP 95/66   Pulse 83   Temp 97.9 °F (36.6 °C)   Resp 18   Ht 5' 3\" (1.6 m)   Wt 49.9 kg (110 lb)   SpO2 97%   Breastfeeding No   BMI 19.49 kg/m²      O2 Device: None (Room air)    Temp (24hrs), Av.9 °F (36.6 °C), Min:97.4 °F (36.3 °C), Max:98.2 °F (36.8 °C)      No intake/output data recorded.  1901 -  0700  In: 2160 [I.V.:1747]  Out: -       General appearance - alert, cachectic, bilateral temporal wasting present, and in no distress  Eyes - sclera anicteric, no pallor  Nose - no obvious nasal discharge.    Neck - supple, no JVD, trachea is midline  Chest -diminished air entry noted in bases, no wheezes  Heart - S1 and S2 normal  Abdomen - soft, nontender, nondistended, Bowel sounds present  Neurological - alert, oriented, normal speech, no focal findings noted, no tremors  Musculoskeletal - no joint tenderness or erythema of knees bilaterally  Extremities - no pedal edema noted      Data Review    Recent Results (from the past 24 hour(s))   GLUCOSE, POC    Collection Time: 21  9:00 PM   Result Value Ref Range    Glucose (POC) 160 (H) 70 - 110 mg/dL   EKG, 12 LEAD, INITIAL    Collection Time: 21  9:19 PM   Result Value Ref Range    Ventricular Rate 81 BPM    Atrial Rate 81 BPM    P-R Interval 126 ms    QRS Duration 126 ms    Q-T Interval 526 ms    QTC Calculation (Bezet) 611 ms    Calculated P Axis 77 degrees    Calculated R Axis -81 degrees    Calculated T Axis -97 degrees    Diagnosis       Normal sinus rhythm  Possible Left atrial enlargement  Right bundle branch block  Left anterior fascicular block  Bifascicular block  Inferior infarct (cited on or before 2021)  T wave abnormality, consider lateral ischemia  Abnormal ECG  When compared with ECG of 2021 21:19,  QT has lengthened     IONIZED CALCIUM    Collection Time: 11/24/21  9:51 PM   Result Value Ref Range    Calcium, ionized 1.20 1. 12 - 1.32 mmol/L    Critical value read back DR. Narda Dobbins    CULTURE, BLOOD    Collection Time: 11/24/21  9:52 PM    Specimen: Blood   Result Value Ref Range    Special Requests: NO SPECIAL REQUESTS      Culture result: NO GROWTH AFTER 8 HOURS     METABOLIC PANEL, COMPREHENSIVE    Collection Time: 11/24/21  9:52 PM   Result Value Ref Range    Sodium 135 (L) 136 - 145 mmol/L    Potassium 4.8 3.5 - 5.5 mmol/L    Chloride 98 (L) 100 - 111 mmol/L    CO2 29 21 - 32 mmol/L    Anion gap 8 3.0 - 18 mmol/L    Glucose 148 (H) 74 - 99 mg/dL    BUN 30 (H) 7.0 - 18 MG/DL    Creatinine 0.82 0.6 - 1.3 MG/DL    BUN/Creatinine ratio 37 (H) 12 - 20      GFR est AA >60 >60 ml/min/1.73m2    GFR est non-AA >60 >60 ml/min/1.73m2    Calcium 10.7 (H) 8.5 - 10.1 MG/DL    Bilirubin, total 0.6 0.2 - 1.0 MG/DL    ALT (SGPT) 12 (L) 13 - 56 U/L    AST (SGOT) 50 (H) 10 - 38 U/L    Alk. phosphatase 60 45 - 117 U/L    Protein, total 7.0 6.4 - 8.2 g/dL    Albumin 3.0 (L) 3.4 - 5.0 g/dL    Globulin 4.0 2.0 - 4.0 g/dL    A-G Ratio 0.8 0.8 - 1.7     CBC WITH AUTOMATED DIFF    Collection Time: 11/24/21  9:52 PM   Result Value Ref Range    WBC 26.3 (H) 4.6 - 13.2 K/uL    RBC 3.71 (L) 4.20 - 5.30 M/uL    HGB 11.0 (L) 12.0 - 16.0 g/dL    HCT 34.5 (L) 35.0 - 45.0 %    MCV 93.0 78.0 - 100.0 FL    MCH 29.6 24.0 - 34.0 PG    MCHC 31.9 31.0 - 37.0 g/dL    RDW 13.2 11.6 - 14.5 %    PLATELET 749 (H) 770 - 420 K/uL    MPV 9.8 9.2 - 11.8 FL    NRBC 0.1 (H) 0  WBC    ABSOLUTE NRBC 0.03 (H) 0.00 - 0.01 K/uL    NEUTROPHILS 75 (H) 40 - 73 %    LYMPHOCYTES 17 (L) 21 - 52 %    MONOCYTES 6 3 - 10 %    EOSINOPHILS 0 0 - 5 %    BASOPHILS 0 0 - 2 %    IMMATURE GRANULOCYTES 1 (H) 0.0 - 0.5 %    ABS. NEUTROPHILS 19.8 (H) 1.8 - 8.0 K/UL    ABS. LYMPHOCYTES 4.5 (H) 0.9 - 3.6 K/UL    ABS. MONOCYTES 1.7 (H) 0.05 - 1.2 K/UL    ABS. EOSINOPHILS 0.0 0.0 - 0.4 K/UL    ABS.  BASOPHILS 0.1 0.0 - 0.1 K/UL ABS. IMM.  GRANS. 0.3 (H) 0.00 - 0.04 K/UL    DF AUTOMATED     NT-PRO BNP    Collection Time: 11/24/21  9:52 PM   Result Value Ref Range    NT pro-BNP 13,769 (H) 0 - 900 PG/ML   TROPONIN I    Collection Time: 11/24/21  9:52 PM   Result Value Ref Range    Troponin-I, QT 1.13 (H) 0.0 - 0.045 NG/ML   LIPASE    Collection Time: 11/24/21  9:52 PM   Result Value Ref Range    Lipase 58 (L) 73 - 393 U/L   MAGNESIUM    Collection Time: 11/24/21  9:52 PM   Result Value Ref Range    Magnesium 2.0 1.6 - 2.6 mg/dL   CULTURE, BLOOD    Collection Time: 11/24/21 11:02 PM    Specimen: Blood   Result Value Ref Range    Special Requests: NO SPECIAL REQUESTS      Culture result: NO GROWTH AFTER 6 HOURS     PTT    Collection Time: 11/24/21 11:02 PM   Result Value Ref Range    aPTT 32.1 23.0 - 36.4 SEC   POC LACTIC ACID    Collection Time: 11/25/21 12:50 AM   Result Value Ref Range    Lactic Acid (POC) 2.53 (HH) 0.40 - 2.00 mmol/L   HEMOGLOBIN A1C W/O EAG    Collection Time: 11/25/21  4:42 AM   Result Value Ref Range    Hemoglobin A1c 5.2 4.2 - 5.6 %   METABOLIC PANEL, BASIC    Collection Time: 11/25/21  4:42 AM   Result Value Ref Range    Sodium 135 (L) 136 - 145 mmol/L    Potassium 4.1 3.5 - 5.5 mmol/L    Chloride 97 (L) 100 - 111 mmol/L    CO2 33 (H) 21 - 32 mmol/L    Anion gap 5 3.0 - 18 mmol/L    Glucose 122 (H) 74 - 99 mg/dL    BUN 24 (H) 7.0 - 18 MG/DL    Creatinine 0.64 0.6 - 1.3 MG/DL    BUN/Creatinine ratio 38 (H) 12 - 20      GFR est AA >60 >60 ml/min/1.73m2    GFR est non-AA >60 >60 ml/min/1.73m2    Calcium 10.0 8.5 - 10.1 MG/DL   PTT    Collection Time: 11/25/21  4:42 AM   Result Value Ref Range    aPTT 44.7 (H) 23.0 - 36.4 SEC   CBC WITH AUTOMATED DIFF    Collection Time: 11/25/21  4:42 AM   Result Value Ref Range    WBC 27.8 (H) 4.6 - 13.2 K/uL    RBC 3.75 (L) 4.20 - 5.30 M/uL    HGB 11.2 (L) 12.0 - 16.0 g/dL    HCT 34.4 (L) 35.0 - 45.0 %    MCV 91.7 78.0 - 100.0 FL    MCH 29.9 24.0 - 34.0 PG    MCHC 32.6 31.0 - 37.0 g/dL RDW 13.2 11.6 - 14.5 %    PLATELET 446 (H) 471 - 420 K/uL    MPV 9.4 9.2 - 11.8 FL    NRBC 0.0 0  WBC    ABSOLUTE NRBC 0.00 0.00 - 0.01 K/uL    NEUTROPHILS 77 (H) 40 - 73 %    LYMPHOCYTES 20 (L) 21 - 52 %    MONOCYTES 3 3 - 10 %    EOSINOPHILS 0 0 - 5 %    BASOPHILS 0 0 - 2 %    IMMATURE GRANULOCYTES 0 %    ABS. NEUTROPHILS 21.4 (H) 1.8 - 8.0 K/UL    ABS. LYMPHOCYTES 5.6 (H) 0.9 - 3.6 K/UL    ABS. MONOCYTES 0.8 0.05 - 1.2 K/UL    ABS. EOSINOPHILS 0.0 0.0 - 0.4 K/UL    ABS. BASOPHILS 0.0 0.0 - 0.1 K/UL    ABS. IMM. GRANS. 0.0 K/UL    DF MANUAL      PLATELET COMMENTS Increased Platelets      RBC COMMENTS ANISOCYTOSIS  1+       HEPATIC FUNCTION PANEL    Collection Time: 11/25/21  4:42 AM   Result Value Ref Range    Protein, total 7.3 6.4 - 8.2 g/dL    Albumin 2.9 (L) 3.4 - 5.0 g/dL    Globulin 4.4 (H) 2.0 - 4.0 g/dL    A-G Ratio 0.7 (L) 0.8 - 1.7      Bilirubin, total 0.6 0.2 - 1.0 MG/DL    Bilirubin, direct <0.1 0.0 - 0.2 MG/DL    Alk.  phosphatase 54 45 - 117 U/L    AST (SGOT) 40 (H) 10 - 38 U/L    ALT (SGPT) 14 13 - 56 U/L   MAGNESIUM    Collection Time: 11/25/21  4:42 AM   Result Value Ref Range    Magnesium 7.7 (HH) 1.6 - 2.6 mg/dL   IRON PROFILE    Collection Time: 11/25/21  4:42 AM   Result Value Ref Range    Iron 89 50 - 175 ug/dL    TIBC 308 250 - 450 ug/dL    Iron % saturation 29 20 - 50 %   LIPID PANEL    Collection Time: 11/25/21  4:42 AM   Result Value Ref Range    LIPID PROFILE          Cholesterol, total 195 <200 MG/DL    Triglyceride 103 <150 MG/DL    HDL Cholesterol 48 40 - 60 MG/DL    LDL, calculated 126.4 (H) 0 - 100 MG/DL    VLDL, calculated 20.6 MG/DL    CHOL/HDL Ratio 4.1 0 - 5.0     TSH 3RD GENERATION    Collection Time: 11/25/21  4:42 AM   Result Value Ref Range    TSH 1.56 0.36 - 3.74 uIU/mL   LACTIC ACID    Collection Time: 11/25/21  6:25 AM   Result Value Ref Range    Lactic acid 1.8 0.4 - 2.0 MMOL/L   TROPONIN I    Collection Time: 11/25/21  6:25 AM   Result Value Ref Range Troponin-I, QT 0.96 (H) 0.0 - 0.045 NG/ML   ECHO ADULT COMPLETE    Collection Time: 11/25/21 10:00 AM   Result Value Ref Range    IVSd 1.47 (A) 0.6 - 0.9 cm    LVIDd 4.05 3.9 - 5.3 cm    LVIDs 3.50 cm    LVOT d 2.02 cm    LVPWd 1.65 (A) 0.6 - 0.9 cm    LA Volume 59.62 22 - 52 mL    LA Area 4C 17.86 cm2    LA Vol 2C 60.25 (A) 22 - 52 mL    LA Vol 4C 49.47 22 - 52 mL    AV R PG 81.51 mmHg    Aortic Regurgitant Pressure Half-time 439.87 ms    AR Max Bryant 451.42 cm/s    MV A Bryant 93.97 cm/s    Mitral Valve E Wave Deceleration Time 142.47 ms    MV E Bryant 58.01 cm/s    E/E' lateral 7.43     E/E' septal 11.16     LV E' Lateral Velocity 7.81 cm/s    LV E' Septal Velocity 5.20 cm/s    Tapse 2.38 (A) 1.5 - 2.0 cm    Triscuspid Valve Regurgitation Peak Gradient 24.85 mmHg    TR Max Velocity 249.25 cm/s    Ao Root D 2.97 cm    Pulmonary Vein \"A\" Wave Velocity 46.03 cm/s    P Vein A Dur 157.33 ms    MV E/A 0.62     LV Mass .0 67 - 162 g    LV Mass AL Index 168.0 43 - 95 g/m2    E/E' ratio (averaged) 9.29     LA Vol Index 39.75 16 - 28 ml/m2    LA Vol Index 40.17 16 - 28 ml/m2    LA Vol Index 32.98 16 - 28 ml/m2   METABOLIC PANEL, BASIC    Collection Time: 11/25/21 10:39 AM   Result Value Ref Range    Sodium 134 (L) 136 - 145 mmol/L    Potassium 3.3 (L) 3.5 - 5.5 mmol/L    Chloride 96 (L) 100 - 111 mmol/L    CO2 32 21 - 32 mmol/L    Anion gap 6 3.0 - 18 mmol/L    Glucose 110 (H) 74 - 99 mg/dL    BUN 21 (H) 7.0 - 18 MG/DL    Creatinine 0.69 0.6 - 1.3 MG/DL    BUN/Creatinine ratio 30 (H) 12 - 20      GFR est AA >60 >60 ml/min/1.73m2    GFR est non-AA >60 >60 ml/min/1.73m2    Calcium 10.0 8.5 - 10.1 MG/DL   MAGNESIUM    Collection Time: 11/25/21 10:39 AM   Result Value Ref Range    Magnesium 2.4 1.6 - 2.6 mg/dL         Assessment/Plan:     Active Problems:    NSTEMI (non-ST elevated [facility]) [facility] (11/25/2021)      Severe sepsis ([facility] (11/25/2021)      QT prolongation (11/25/2021)      ASSESSMENT:    1.   Syncope, no recurrence  2. Prolonged QTC  3. Elevated troponin due to demand ischemia  4. Leukocytosis  5. Aspiration pneumonia  6. Appetite and weight loss  7. Anxiety and depression  8. Hypokalemia  9. Nonspecific abdominal pain/discomfort, currently no symptoms    PLAN:    Called lab for stat BMP and magnesium  Repeat lab reviewed and will replace potassium and also start patient on IV fluid  We will repeat EKG  Continue heparin drip  We will add aspirin and statin for #3  Pending echocardiogram  Continue monitoring WBC  Continue antibiotic  PT, speech therapist to see this patient  We will add oral amiodarone and multivitamin  Dietitian to follow this patient  CT abdomen pelvis report reviewed    Total time to take care of this patient was 30 minutes and more than 50% of time was spent counseling and coordinating care. Disclaimer: Sections of this note are dictated using utilizing voice recognition software, which may have resulted in some phonetic based errors in grammar and contents. Even though attempts were made to correct all the mistakes, some may have been missed, and remained in the body of the document. If questions arise, please contact our department.

## 2021-11-25 NOTE — ACP (ADVANCE CARE PLANNING)
Advance Care Planning   Advance Care Planning Inpatient Note  [address] [place] [address]   Spiritual Care Department    Today's Date: 11/25/2021  Unit: [facility] 2S TELEMETRY    Received request from patient. Upon review of chart and communication with care team, patient's decision making abilities are not in question. Patient was/were present in the room during visit. Goals of ACP Conversation:  Discuss Advance Care planning documents  Facilitate a discussion related to patient's goals of care as they align with the patient's values and beliefs    Health Care Decision Makers:      Primary Decision Maker: Jonas Islas - [facility] - [phone]      Summary:  Completed New Documents    Advance Care Planning Documents (Patient Wishes) on file:  Healthcare Power of /Advance Directive appointment of Health care agent  Living Will/ Advance Directive  Anatomical Gift/Organ donation     Assessment:  Patient is very clear that she wants her sister be her primary decision maker because she can trust her to follow her Advance Medical Directive instructions.     Interventions:  Discussed and provided education on state decision maker hierarchy  Assisted in the completion of documents according to patient's wishes at this time    Care Preferences Communicated:  No    Outcomes/Plan:  New Advance Directive completed    Derrick Clements [facility] on 11/25/2021 at 1:07 PM
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
## 2021-11-26 ENCOUNTER — APPOINTMENT (OUTPATIENT)
Dept: GENERAL RADIOLOGY | Age: 66
DRG: 286 | End: 2021-11-26
Attending: INTERNAL MEDICINE
Payer: MEDICARE

## 2021-11-26 LAB
ANION GAP SERPL CALC-SCNC: 6 MMOL/L (ref 3–18)
APTT PPP: 34.3 SEC (ref 23–36.4)
BACTERIA SPEC CULT: NORMAL
BACTERIA SPEC CULT: NORMAL
BASOPHILS # BLD: 0.1 K/UL (ref 0–0.1)
BASOPHILS # BLD: 0.1 K/UL (ref 0–0.1)
BASOPHILS NFR BLD: 0 % (ref 0–2)
BASOPHILS NFR BLD: 1 % (ref 0–2)
BUN SERPL-MCNC: 12 MG/DL (ref 7–18)
BUN/CREAT SERPL: 20 (ref 12–20)
CALCIUM SERPL-MCNC: 9.3 MG/DL (ref 8.5–10.1)
CHLORIDE SERPL-SCNC: 101 MMOL/L (ref 100–111)
CO2 SERPL-SCNC: 29 MMOL/L (ref 21–32)
CREAT SERPL-MCNC: 0.6 MG/DL (ref 0.6–1.3)
DIFFERENTIAL METHOD BLD: ABNORMAL
DIFFERENTIAL METHOD BLD: ABNORMAL
EOSINOPHIL # BLD: 0 K/UL (ref 0–0.4)
EOSINOPHIL # BLD: 0 K/UL (ref 0–0.4)
EOSINOPHIL NFR BLD: 0 % (ref 0–5)
EOSINOPHIL NFR BLD: 0 % (ref 0–5)
ERYTHROCYTE [DISTWIDTH] IN BLOOD BY AUTOMATED COUNT: 13.4 % (ref 11.6–14.5)
ERYTHROCYTE [DISTWIDTH] IN BLOOD BY AUTOMATED COUNT: 13.6 % (ref 11.6–14.5)
GLUCOSE SERPL-MCNC: 113 MG/DL (ref 74–99)
HCT VFR BLD AUTO: 29.5 % (ref 35–45)
HCT VFR BLD AUTO: 31 % (ref 35–45)
HGB BLD-MCNC: 10 G/DL (ref 12–16)
HGB BLD-MCNC: 9.5 G/DL (ref 12–16)
IMM GRANULOCYTES # BLD AUTO: 0.1 K/UL (ref 0–0.04)
IMM GRANULOCYTES # BLD AUTO: 0.1 K/UL (ref 0–0.04)
IMM GRANULOCYTES NFR BLD AUTO: 1 % (ref 0–0.5)
IMM GRANULOCYTES NFR BLD AUTO: 1 % (ref 0–0.5)
LYMPHOCYTES # BLD: 3.8 K/UL (ref 0.9–3.6)
LYMPHOCYTES # BLD: 4 K/UL (ref 0.9–3.6)
LYMPHOCYTES NFR BLD: 22 % (ref 21–52)
LYMPHOCYTES NFR BLD: 23 % (ref 21–52)
MAGNESIUM SERPL-MCNC: 2 MG/DL (ref 1.6–2.6)
MCH RBC QN AUTO: 30.1 PG (ref 24–34)
MCH RBC QN AUTO: 30.4 PG (ref 24–34)
MCHC RBC AUTO-ENTMCNC: 32.2 G/DL (ref 31–37)
MCHC RBC AUTO-ENTMCNC: 32.3 G/DL (ref 31–37)
MCV RBC AUTO: 93.4 FL (ref 78–100)
MCV RBC AUTO: 94.2 FL (ref 78–100)
MONOCYTES # BLD: 1.4 K/UL (ref 0.05–1.2)
MONOCYTES # BLD: 1.5 K/UL (ref 0.05–1.2)
MONOCYTES NFR BLD: 8 % (ref 3–10)
MONOCYTES NFR BLD: 9 % (ref 3–10)
NEUTS SEG # BLD: 11.6 K/UL (ref 1.8–8)
NEUTS SEG # BLD: 11.7 K/UL (ref 1.8–8)
NEUTS SEG NFR BLD: 67 % (ref 40–73)
NEUTS SEG NFR BLD: 68 % (ref 40–73)
NRBC # BLD: 0 K/UL (ref 0–0.01)
NRBC # BLD: 0.02 K/UL (ref 0–0.01)
NRBC BLD-RTO: 0 PER 100 WBC
NRBC BLD-RTO: 0.1 PER 100 WBC
PLATELET # BLD AUTO: 522 K/UL (ref 135–420)
PLATELET # BLD AUTO: 526 K/UL (ref 135–420)
PMV BLD AUTO: 9.4 FL (ref 9.2–11.8)
PMV BLD AUTO: 9.6 FL (ref 9.2–11.8)
POTASSIUM SERPL-SCNC: 3.5 MMOL/L (ref 3.5–5.5)
RBC # BLD AUTO: 3.13 M/UL (ref 4.2–5.3)
RBC # BLD AUTO: 3.32 M/UL (ref 4.2–5.3)
SERVICE CMNT-IMP: NORMAL
SERVICE CMNT-IMP: NORMAL
SODIUM SERPL-SCNC: 136 MMOL/L (ref 136–145)
VANCOMYCIN SERPL-MCNC: 9.3 UG/ML (ref 5–40)
WBC # BLD AUTO: 17.2 K/UL (ref 4.6–13.2)
WBC # BLD AUTO: 17.2 K/UL (ref 4.6–13.2)

## 2021-11-26 PROCEDURE — 93005 ELECTROCARDIOGRAM TRACING: CPT

## 2021-11-26 PROCEDURE — 80048 BASIC METABOLIC PNL TOTAL CA: CPT

## 2021-11-26 PROCEDURE — 74230 X-RAY XM SWLNG FUNCJ C+: CPT

## 2021-11-26 PROCEDURE — 85730 THROMBOPLASTIN TIME PARTIAL: CPT

## 2021-11-26 PROCEDURE — 92526 ORAL FUNCTION THERAPY: CPT

## 2021-11-26 PROCEDURE — 83735 ASSAY OF MAGNESIUM: CPT

## 2021-11-26 PROCEDURE — 92611 MOTION FLUOROSCOPY/SWALLOW: CPT

## 2021-11-26 PROCEDURE — 99232 SBSQ HOSP IP/OBS MODERATE 35: CPT | Performed by: INTERNAL MEDICINE

## 2021-11-26 PROCEDURE — 72070 X-RAY EXAM THORAC SPINE 2VWS: CPT

## 2021-11-26 PROCEDURE — 36415 COLL VENOUS BLD VENIPUNCTURE: CPT

## 2021-11-26 PROCEDURE — 65660000000 HC RM CCU STEPDOWN

## 2021-11-26 PROCEDURE — 74011250636 HC RX REV CODE- 250/636: Performed by: INTERNAL MEDICINE

## 2021-11-26 PROCEDURE — 99232 SBSQ HOSP IP/OBS MODERATE 35: CPT | Performed by: STUDENT IN AN ORGANIZED HEALTH CARE EDUCATION/TRAINING PROGRAM

## 2021-11-26 PROCEDURE — 74011250637 HC RX REV CODE- 250/637: Performed by: INTERNAL MEDICINE

## 2021-11-26 PROCEDURE — 97165 OT EVAL LOW COMPLEX 30 MIN: CPT

## 2021-11-26 PROCEDURE — 74011000258 HC RX REV CODE- 258: Performed by: INTERNAL MEDICINE

## 2021-11-26 PROCEDURE — 85025 COMPLETE CBC W/AUTO DIFF WBC: CPT

## 2021-11-26 PROCEDURE — 72100 X-RAY EXAM L-S SPINE 2/3 VWS: CPT

## 2021-11-26 PROCEDURE — 80202 ASSAY OF VANCOMYCIN: CPT

## 2021-11-26 PROCEDURE — 2709999900 HC NON-CHARGEABLE SUPPLY

## 2021-11-26 PROCEDURE — 94640 AIRWAY INHALATION TREATMENT: CPT

## 2021-11-26 PROCEDURE — 74011250637 HC RX REV CODE- 250/637: Performed by: STUDENT IN AN ORGANIZED HEALTH CARE EDUCATION/TRAINING PROGRAM

## 2021-11-26 PROCEDURE — 97535 SELF CARE MNGMENT TRAINING: CPT

## 2021-11-26 PROCEDURE — 74011000250 HC RX REV CODE- 250: Performed by: INTERNAL MEDICINE

## 2021-11-26 RX ORDER — LISINOPRIL 5 MG/1
5 TABLET ORAL DAILY
Status: DISCONTINUED | OUTPATIENT
Start: 2021-11-27 | End: 2021-11-28

## 2021-11-26 RX ORDER — HEPARIN SODIUM 1000 [USP'U]/ML
3000 INJECTION, SOLUTION INTRAVENOUS; SUBCUTANEOUS ONCE
Status: DISPENSED | OUTPATIENT
Start: 2021-11-26 | End: 2021-11-26

## 2021-11-26 RX ORDER — LIDOCAINE 4 G/100G
1 PATCH TOPICAL EVERY 24 HOURS
Status: DISCONTINUED | OUTPATIENT
Start: 2021-11-26 | End: 2021-11-30 | Stop reason: HOSPADM

## 2021-11-26 RX ORDER — ENOXAPARIN SODIUM 100 MG/ML
40 INJECTION SUBCUTANEOUS EVERY 24 HOURS
Status: DISCONTINUED | OUTPATIENT
Start: 2021-11-26 | End: 2021-11-30 | Stop reason: HOSPADM

## 2021-11-26 RX ORDER — METOPROLOL SUCCINATE 25 MG/1
25 TABLET, EXTENDED RELEASE ORAL DAILY
Status: DISCONTINUED | OUTPATIENT
Start: 2021-11-27 | End: 2021-11-30

## 2021-11-26 RX ADMIN — PIPERACILLIN AND TAZOBACTAM 3.38 G: 3; .375 INJECTION, POWDER, LYOPHILIZED, FOR SOLUTION INTRAVENOUS at 12:36

## 2021-11-26 RX ADMIN — Medication 10 ML: at 14:41

## 2021-11-26 RX ADMIN — POLYETHYLENE GLYCOL 3350 17 G: 17 POWDER, FOR SOLUTION ORAL at 14:31

## 2021-11-26 RX ADMIN — VANCOMYCIN HYDROCHLORIDE 750 MG: 750 INJECTION, POWDER, LYOPHILIZED, FOR SOLUTION INTRAVENOUS at 13:06

## 2021-11-26 RX ADMIN — Medication 10 ML: at 23:46

## 2021-11-26 RX ADMIN — BARIUM SULFATE 30 ML: 400 PASTE ORAL at 13:35

## 2021-11-26 RX ADMIN — MIRTAZAPINE 15 MG: 15 TABLET, ORALLY DISINTEGRATING ORAL at 23:45

## 2021-11-26 RX ADMIN — BARIUM SULFATE 700 MG: 700 TABLET ORAL at 13:34

## 2021-11-26 RX ADMIN — IPRATROPIUM BROMIDE AND ALBUTEROL SULFATE 3 ML: .5; 2.5 SOLUTION RESPIRATORY (INHALATION) at 19:10

## 2021-11-26 RX ADMIN — SENNOSIDES AND DOCUSATE SODIUM 1 TABLET: 50; 8.6 TABLET ORAL at 10:59

## 2021-11-26 RX ADMIN — BARIUM SULFATE 60 G: 960 POWDER, FOR SUSPENSION ORAL at 13:35

## 2021-11-26 RX ADMIN — FAMOTIDINE 20 MG: 20 TABLET ORAL at 18:30

## 2021-11-26 RX ADMIN — FAMOTIDINE 20 MG: 20 TABLET ORAL at 10:59

## 2021-11-26 RX ADMIN — Medication 10 ML: at 07:10

## 2021-11-26 RX ADMIN — ASPIRIN 81 MG: 81 TABLET, COATED ORAL at 10:59

## 2021-11-26 RX ADMIN — PIPERACILLIN AND TAZOBACTAM 3.38 G: 3; .375 INJECTION, POWDER, LYOPHILIZED, FOR SOLUTION INTRAVENOUS at 18:31

## 2021-11-26 RX ADMIN — MULTIPLE VITAMINS W/ MINERALS TAB 1 TABLET: TAB at 11:00

## 2021-11-26 RX ADMIN — PIPERACILLIN AND TAZOBACTAM 3.38 G: 3; .375 INJECTION, POWDER, LYOPHILIZED, FOR SOLUTION INTRAVENOUS at 01:18

## 2021-11-26 RX ADMIN — PIPERACILLIN AND TAZOBACTAM 3.38 G: 3; .375 INJECTION, POWDER, LYOPHILIZED, FOR SOLUTION INTRAVENOUS at 23:46

## 2021-11-26 RX ADMIN — ATORVASTATIN CALCIUM 20 MG: 20 TABLET, FILM COATED ORAL at 23:45

## 2021-11-26 RX ADMIN — POTASSIUM CHLORIDE, DEXTROSE MONOHYDRATE AND SODIUM CHLORIDE 50 ML/HR: 150; 5; 900 INJECTION, SOLUTION INTRAVENOUS at 17:05

## 2021-11-26 RX ADMIN — IPRATROPIUM BROMIDE AND ALBUTEROL SULFATE 3 ML: .5; 2.5 SOLUTION RESPIRATORY (INHALATION) at 07:11

## 2021-11-26 RX ADMIN — PIPERACILLIN AND TAZOBACTAM 3.38 G: 3; .375 INJECTION, POWDER, LYOPHILIZED, FOR SOLUTION INTRAVENOUS at 07:08

## 2021-11-26 RX ADMIN — ENOXAPARIN SODIUM 40 MG: 100 INJECTION SUBCUTANEOUS at 23:45

## 2021-11-26 NOTE — PROGRESS NOTES
Received report on pt.from off going RN. Resting quietly in bed on rounds. Denies c/o pain or SOB at this time. No acute distress noted. heparin drip infusing per guardrails. Call bell at side. Bed alarm on. will cont to monitor for any changes in status. 2000 Bedside and Verbal shift change report given to Jessie ABDI (oncoming nurse) by Celi Dye RN (offgoing nurse). Report given with Kathy LOUIS and MAR.

## 2021-11-26 NOTE — CONSULTS
Comprehensive Nutrition Assessment    Type and Reason for Visit: Initial, Positive nutrition screen, Consult    Nutrition Recommendations/Plan:   - Add supplement: Ensure Enlive TID  - Add prune juice once daily for promotion of BM; continue bowel regimen.  - Continue all other nutrition interventions; encourage/ monitor po intake of meals and supplements. Nutrition Assessment:  Pt reported poor appetite/ meal intake PTA and since admission. Food preferences discussed. Agreeable to nutrition supplement. C/o constipation; last BM was on 11/21; agreeable to prune juice and started on bowel regimen. NFPE conducted. Pt reported poor appetite/ meal intake PTA and since admission. Food preferences discussed. Agreeable to nutrition supplement. C/o constipation; last BM was on 11/21; agreeable to prune juice and started on bowel regimen. NFPE conducted. Pt edentulous but denied need for altered diet consistency    Malnutrition Assessment:  Malnutrition Status:  Severe malnutrition    Context:  Chronic illness     Findings of the 6 clinical characteristics of malnutrition:   Energy Intake:  Mild decrease in energy intake (specify) (poor po intake of meals PTA)  Weight Loss:  No significant weight loss     Body Fat Loss:  7 - Severe body fat loss, Triceps, Orbital   Muscle Mass Loss:  7 - Severe muscle mass loss, Calf (gastrocnemius), Temples (temporalis), Clavicles (pectoralis &deltoids)      Nutrition History and Allergies: Past medical hx:  Depression, GI disorder, pancreatitic disease, pancreatitis, GERD. Poor appetite/ po intake, eats small meals, for a while; usually eats some rice, vegetables, and chicken for a meal.   UBW is 105-107 lb, but recently weighing 80-90 lb range. Wt checked on 11/26/21; pt weighed 100 lb, bed scale. No known food allergies     Estimated Daily Nutrient Needs:  Energy (kcal): 1692-6261; Weight Used for Energy Requirements: Current (45.4 kg)  Protein (g): 45-54;  Weight Used for Protein Requirements: Current (x1-1.2)  Fluid (ml/day): 9511-3720; Method Used for Fluid Requirements: 1 ml/kcal      Nutrition Related Findings:  BM 11/21. No edema. IVF, D5 NS with KCl 20 mEq/L at 50 mL/hr (60 gm dextrose, 204 kcal, 24 mEq KCl per day). Pertinent meds: remeron, MVI with iron, bowel regimen. Wounds:    None       Current Nutrition Therapies:  ADULT DIET Regular; Low Fat/Low Chol/High Fiber/2 gm Na    Anthropometric Measures:  · Height:  5' 3\" (160 cm)  · Current Body Wt:  45.4 kg (100 lb 1.4 oz)   · Admission Body Wt:  87 lb 1.3 oz    · Usual Body Wt:   (105-107 lb)     · Ideal Body Wt:  115 lbs:  87 %   · Adjusted Body Weight:   ; Weight Adjustment for: No adjustment   · BMI Category:  Underweight (BMI less than 22) age over 72       Nutrition Diagnosis:   · Severe malnutrition, In context of chronic illness related to inadequate protein-energy intake as evidenced by severe loss of subcutaneous fat, severe muscle loss      Nutrition Interventions:   Food and/or Nutrient Delivery: Continue current diet, Vitamin supplement, Mineral supplement, IV fluid delivery, Start oral nutrition supplement  Nutrition Education and Counseling: No recommendations at this time, Education not indicated  Coordination of Nutrition Care: Continue to monitor while inpatient    Goals:  PO nutrition intake will meet >75% of patient estimated nutritional needs within the next 7 days.        Nutrition Monitoring and Evaluation:   Behavioral-Environmental Outcomes: None identified  Food/Nutrient Intake Outcomes: Food and nutrient intake, Vitamin/mineral intake, Supplement intake, IVF intake  Physical Signs/Symptoms Outcomes: Biochemical data, Meal time behavior, Nutrition focused physical findings, Weight, Chewing or swallowing    Discharge Planning:    Continue oral nutrition supplement, Continue current diet     Electronically signed by Marcin Benoit RD on 11/26/2021 at 1:26 PM    Contact: [phone]

## 2021-11-26 NOTE — PROGRESS NOTES
Problem: Dysphagia (Adult)  Goal: *Acute Goals and Plan of Care (Insert Text)  Description:     Patient will:  1. Tolerate PO trials with 0 s/s overt distress in 4/5 trials - met  2. Complete an objective swallow study (i.e., MBSS) to assess swallow integrity, r/o aspiration, and determine of safest LRD, min A as indicated/ordered by MD  - met    Rec:     Regular solid with thin liquids  Aspiration precautions  HOB >45 during po intake, remain >30 for 30-45 minutes after po   Small bites/sips; alternate liquid/solid with slow feeding rate   Oral care TID  Meds per pt preference    Outcome: Resolved/Met     SPEECH PATHOLOGY MODIFIED BARIUM SWALLOW STUDY/TREATMENT AND DISCHARGE    Patient: Tony Haynes (63 y.o. female)  Date: 11/26/2021  Primary Diagnosis: NSTEMI (non-ST elevated myocardial infarction) ([facility] [I21.4]  Severe sepsis ([facility] [A41.9, R65.20]        Precautions: aspiration Fall    ASSESSMENT :  Based on the objective data described below, the patient presents with oropharyngeal swallow fxn essentially WFL. Pt tolerated reg solid, puree, thin liquids +/- straw, and 13 mm Ba pill with thin liquid wash without aspiration/penetration events. Bolus manipulation, tongue base retraction, laryngeal elevation/excursion, and overall pharyngeal motility/sensation were WFL. Positive oropharyngeal clearance across all trials. Recommend regular solid diet with thin liquids, aspiration precautions, oral care TID, and meds as tolerated. TREATMENT :  Treatment provided post diagnostic testing including oropharyngeal anatomy/physiology, MBS results, diet recommendations and compensatory strategies/positioning. Pt able to verbalize understanding. No further skilled SLP services are indicated at this time. Please re-consult if needed.       PLAN :  Recommendations and Planned Interventions: See above  Frequency/Duration: x MBS/tx only  Discharge Recommendations: None     SUBJECTIVE:   Patient stated I think my swallowing problem is more mental than anything else. OBJECTIVE:     Past Medical History:   Diagnosis Date    Depression     Gastrointestinal disorder Pancreatitis    Pancreatic disease     Pancreatitis     Psychiatric disorder      Past Surgical History:   Procedure Laterality Date    HX GYN      HX HYSTERECTOMY       Prior Level of Function/Home Situation: see below  Home Situation  Home Environment: Trailer/mobile home  # Steps to Enter: 3  One/Two Story Residence: One story  Living Alone: Yes (has a renter that will be moving out)  Support Systems: Other Family Member(s)  Patient Expects to be Discharged to[de-identified] Trailer/mobile home  Current DME Used/Available at Home: None  Tub or Shower Type: Shower  Diet prior to admission: regular solid with thin  Current Diet:  regular solid with thin  Radiologist:    Film Views: Lateral; Fluoro  Patient Position: 90 in fluoro chair    Trial 1:   Consistency Presented: Thin liquid; Solid; Puree; Pill/Tablet   How Presented: Self-fed/presented; Cup/sip; Spoon; Straw; Successive swallows       Bolus Acceptance: No impairment   Bolus Formation/Control: No impairment:     Propulsion: No impairment   Oral Residue: None   Initiation of Swallow: No impairment   Timing: No impairment   Penetration: None   Aspiration/Timing: No evidence of aspiration   Pharyngeal Clearance: No residue       Effective Modifications: None   Cues for Modifications: None       Decreased Tongue Base Retraction?: No  Laryngeal Elevation: WFL (within functional limits)  Aspiration/Penetration Score: 1 (No penetration or aspiration-Contrast does not enter the airway)  Pharyngeal Symmetry: Not assessed  Pharyngeal-Esophageal Segment: No impairment  Pharyngeal Dysfunction: None    Oral Phase Severity: No impairment  Pharyngeal Phase Severity: N/A    8-point Penetration-Aspiration Scale: Score 1    PAIN:  Pt reports 0/10 pain or discomfort prior to MBS. Pt reports 0/10 pain or discomfort post MBS. COMMUNICATION/EDUCATION:   [x]  Patient educated regarding MBS results and diet recommendations. [x]  Patient/family have participated as able in goal setting and plan of care. []  Patient/family agree to work toward stated goals and plan of care. []  Patient understands intent and goals of therapy, but is neutral about his/her participation. []  Patient is unable to participate in goal setting and plan of care.     Thank you for this referral.    Nadia Tello M.S. CCC-SLP/L  Speech-Language Pathologist

## 2021-11-26 NOTE — PROGRESS NOTES
MBS completed with recs of reg solid diet and thin liquids, meds as tolerated. Full report to follow.     Thank you for this referral.    Charles Horne M.S. CCC-SLP/L  Speech-Language Pathologist

## 2021-11-26 NOTE — PROGRESS NOTES
Speech Pathology:     Given aspiration PNA diagnosis: will order MBS for this date.      Thank you for this referral.    Susana Quarles M.S. CCC-SLP/L  Speech-Language Pathologist

## 2021-11-26 NOTE — PROGRESS NOTES
Problem: Self Care Deficits Care Plan (Adult)  Goal: *Acute Goals and Plan of Care (Insert Text)  Outcome: Resolved/Met   OCCUPATIONAL THERAPY EVALUATION/DISCHARGE    Patient: Salvador Silvestre (52 y.o. female)  Date: 11/26/2021  Primary Diagnosis: NSTEMI (non-ST elevated myocardial infarction) ([facility] [I21.4]  Severe sepsis ([facility] [A41.9, R65.20]        Precautions:   Fall  PLOF: Pt reports [place] in ADL and IADL    ASSESSMENT AND RECOMMENDATIONS:  Based on the objective data described below, the patient presents with intact BUE  strength and AROM and modified independence in bathing/dressing routine at sink level. Pt demonstrated bed mobility with MI and performed bathroom mobility with SUP for management of IV pole. Pt performed ADL routine at sink level with G balance and MI. Pt demonstrates good safety awareness. Pt returned to bed level with MI and sit>supine MI. Pt educated to ask for assistance with IV pole during functional mobility in room for safety. Pt stated understanding. Pt with all needs in reach at end of session. Skilled occupational therapy is not indicated at this time. Discharge Recommendations: home with family support  Further Equipment Recommendations for Discharge: N/A      SUBJECTIVE:   Patient stated I walk from [facility] to here if I miss the bus.     OBJECTIVE DATA SUMMARY:     Past Medical History:   Diagnosis Date    Depression     Gastrointestinal disorder Pancreatitis    Pancreatic disease     Pancreatitis     Psychiatric disorder      Past Surgical History:   Procedure Laterality Date    HX GYN      HX HYSTERECTOMY       Barriers to Learning/Limitations: None  Compensate with: visual, verbal, tactile, kinesthetic cues/model    Home Situation:   Home Situation  Home Environment: Trailer/mobile home  # Steps to Enter: 3  One/Two Story Residence: One story  Living Alone: Yes (has a renter that will be moving out)  Support Systems: Other Family Member(s)  Patient Expects to be Discharged to[de-identified] Trailer/mobile home  Current DME Used/Available at Home: None  Tub or Shower Type: Shower  [x]     Right hand dominant   []     Left hand dominant    Cognitive/Behavioral Status:  Neurologic State: Alert  Orientation Level: Oriented X4  Cognition: Follows commands  Safety/Judgement: Fall prevention    Skin: intact BUE  Edema: none noted BUE    Vision/Perceptual:       Appears intact, wears glasses     Coordination: BUE  Fine Motor Skills-Upper: Left Intact; Right Intact    Gross Motor Skills-Upper: Left Intact; Right Intact  Balance:  Sitting: Intact  Standing: Intact  Strength: BUE  Strength: Within functional limits   Tone & Sensation: BUE  Normal and intact    Range of Motion: BUE  AROM: Within functional limits   Functional Mobility and Transfers for ADLs:  Bed Mobility:    Supine to Sit: Modified independent  Sit to Supine: Modified independent  Transfers:  Sit to Stand: Supervision (due to IV pole)  Stand to Sit: Supervision (due to IV pole)       Bathroom Mobility: Supervision/set up  ADL Assessment:  Feeding: Modified independent  Bathing: Modified independent  Upper Body Dressing: Modified independent  Lower Body Dressing: Modified independent  Toileting: Modified independent   ADL Intervention:   Cognitive Retraining  Safety/Judgement: Fall prevention    Pain:  Pain level pre-treatment: 0/10   Pain level post-treatment: 0/10   Pain Intervention(s): Medication (see MAR); Rest,  Repositioning   Response to intervention: Nurse notified, See doc flow    Activity Tolerance:   good  Please refer to the flowsheet for vital signs taken during this treatment.   After treatment:   []  Patient left in no apparent distress sitting up in chair  [x]  Patient left in no apparent distress in bed  [x]  Call bell left within reach  []  Nursing notified  []  Caregiver present  []  Bed alarm activated    COMMUNICATION/EDUCATION:   [x]      Role of Occupational Therapy in the acute care setting  [x] Home safety education was provided and the patient/caregiver indicated understanding. [x]      Patient/family have participated as able and agree with findings and recommendations. []      Patient is unable to participate in plan of care at this time. Thank you for this referral.  Samaria Mckeon OT  Time Calculation: 24 mins      Eval Complexity: History: MEDIUM Complexity : Expanded review of history including physical, cognitive and psychosocial  history ; Examination: LOW Complexity : 1-3 performance deficits relating to physical, cognitive , or psychosocial skils that result in activity limitations and / or participation restrictions ;    Decision Making:LOW Complexity : No comorbidities that affect functional and no verbal or physical assistance needed to complete eval tasks

## 2021-11-26 NOTE — CONSULTS
Cardiology ProgressNote    Consultation request by Lyle Jackson MD for advice/opinion related to evaluating syncope, qtc prolongation    Date of  Admission: 11/24/2021  9:13 PM   Primary Care Physician: Ayaka Oliveros MD       Assessment:     Hospital Problems  Date Reviewed: 2/23/2017          Codes Class Noted POA    NSTEMI (non-ST elevated myocardial infarction) [facility]) ICD-10-CM: I21.4  ICD-9-CM: 410.70  11/25/2021 Yes        Severe sepsis ([facility] ICD-10-CM: A41.9, R65.20  ICD-9-CM: 038.9, 995.92  11/25/2021 Yes        QT prolongation ICD-10-CM: R94.31  ICD-9-CM: 794.31  11/25/2021 Yes             Primary cardiologist: none     Plan:     1. ECG reviewed with prolonged QTC, of since yesterday. Magnesium levels also normalized. For now recommend avoiding QTC prolonging medications. No significant arrhythmias noted on telemetry so far. 2.  Echocardiogram reviewed. LVEF noted to be about 30 to 35% without significant valvular disease. Unclear underlying etiology of new cardiomyopathy. For now recommend starting lisinopril 5 mg daily along with Toprol-XL 25 mg daily. Given no other known cardiac risk factors in the setting of her major depressive disorder, recommend noninvasive ischemic work-up to begin with given new cardiomyopathy. Plan for a nuclear stress on Monday. Please keep n.p.o. after midnight Sunday night. We will plan to follow-up after stress test on Monday. 3. For now, continue daily ECGs. Given her history of syncope without any prodromal symptoms without clear etiology would be reasonable to consider a loop recorder prior to discharge given these episodes are every 5 to 6 months to make sure no tachyarrhythmias in the setting of new CM which is causing her episodes. Overnight Events:     Overall feels better without significant complaints.      Past Medical History:     Past Medical History:   Diagnosis Date    Depression     Gastrointestinal disorder Pancreatitis    Pancreatic disease     Pancreatitis     Psychiatric disorder          Social History:     Social History     Socioeconomic History    Marital status:    Tobacco Use    Smoking status: Current Every Day Smoker     Packs/day: 1.00   Substance and Sexual Activity    Alcohol use: No     Comment: Per pt she quit in 2007    Drug use: No    Sexual activity: Not Currently        Family History:     Family History   Problem Relation Age of Onset    Diabetes Maternal Aunt     Cancer Maternal Aunt     Alzheimer's Disease Maternal Aunt     Cancer Paternal Aunt     Diabetes Paternal Aunt         Medications:   No Known Allergies     Current Facility-Administered Medications   Medication Dose Route Frequency    vancomycin (VANCOCIN) 750 mg in 0.9% sodium chloride 250 mL (VIAL-MATE)  750 mg IntraVENous Q12H    [START ON 11/27/2021] Vancomycin Lab Information  1 Each Other ONCE    heparin (porcine) 1,000 unit/mL injection 3,000 Units  3,000 Units IntraVENous ONCE    lidocaine 4 % patch 1 Patch  1 Patch TransDERmal Q24H    enoxaparin (LOVENOX) injection 40 mg  40 mg SubCUTAneous Q24H    sodium chloride (NS) flush 5-40 mL  5-40 mL IntraVENous Q8H    sodium chloride (NS) flush 5-40 mL  5-40 mL IntraVENous PRN    acetaminophen (TYLENOL) tablet 650 mg  650 mg Oral Q6H PRN    Or    acetaminophen (TYLENOL) suppository 650 mg  650 mg Rectal Q6H PRN    polyethylene glycol (MIRALAX) packet 17 g  17 g Oral DAILY PRN    ondansetron (ZOFRAN ODT) tablet 4 mg  4 mg Oral Q8H PRN    Or    ondansetron (ZOFRAN) injection 4 mg  4 mg IntraVENous Q6H PRN    albuterol-ipratropium (DUO-NEB) 2.5 MG-0.5 MG/3 ML  3 mL Nebulization Q6H RT    multivitamin, tx-iron-ca-min (THERA-M w/ IRON) tablet 1 Tablet  1 Tablet Oral DAILY    atorvastatin (LIPITOR) tablet 20 mg  20 mg Oral QHS    famotidine (PEPCID) tablet 20 mg  20 mg Oral BID    mirtazapine (REMERON SOL-TAB) disintegrating tablet 15 mg  15 mg Oral QHS    aspirin delayed-release tablet 81 mg  81 mg Oral DAILY    dextrose 5% - 0.9% NaCl with KCl 20 mEq/L infusion  50 mL/hr IntraVENous CONTINUOUS    senna-docusate (PERICOLACE) 8.6-50 mg per tablet 1 Tablet  1 Tablet Oral DAILY    piperacillin-tazobactam (ZOSYN) 3.375 g in 0.9% sodium chloride (MBP/ADV) 100 mL MBP  3.375 g IntraVENous Q6H    sodium chloride (NS) flush 5-10 mL  5-10 mL IntraVENous PRN         Physical Exam:     Visit Vitals  /81 (BP 1 Location: Right upper arm)   Pulse 82   Temp 97.4 °F (36.3 °C)   Resp 16   Ht 5' 3\" (1.6 m)   Wt 45.4 kg (100 lb)   SpO2 100%   Breastfeeding No   BMI 17.71 kg/m²       TELE: normal sinus rhythm significantly prolonged QTC    BP Readings from Last 3 Encounters:   11/26/21 128/81   11/24/21 97/64   10/05/17 112/64     Pulse Readings from Last 3 Encounters:   11/26/21 82   11/24/21 97   10/05/17 76     Wt Readings from Last 3 Encounters:   11/26/21 45.4 kg (100 lb)   11/20/21 44.9 kg (99 lb)   10/05/17 41.7 kg (92 lb)       General:  alert, cooperative, no distress, appears stated age, cachectic  Neck:  no carotid bruit, no JVD  Lungs:  clear to auscultation bilaterally  Heart:  regular rate and rhythm, S1, S2 normal, no murmur, click, rub or gallop  Abdomen:  abdomen is soft without significant tenderness, masses, organomegaly or guarding  Extremities:  extremities normal, atraumatic, no cyanosis or edema  Skin: Warm and dry.  no hyperpigmentation, vitiligo, or suspicious lesions  Neuro: alert, oriented x3, affect appropriate, no focal neurological deficits, moves all extremities well, no involuntary movements, reflexes at knee and ankle intact  Psych: non focal     Data Review:     Recent Labs     11/26/21  0659 11/25/21  0442 11/24/21  2152   WBC 17.2* 27.8* 26.3*   HGB 10.0* 11.2* 11.0*   HCT 31.0* 34.4* 34.5*   * 631* 581*     Recent Labs     11/26/21  0659 11/25/21  1039 11/25/21  0442 11/24/21  2152 11/24/21 2152    134* 135*   < > 135*   K 3.5 3.3* 4.1   < > 4.8   CL 101 96* 97*   < > 98*   CO2 29 32 33*   < > 29   * 110* 122*   < > 148*   BUN 12 21* 24*   < > 30*   CREA 0.60 0.69 0.64   < > 0.82   CA 9.3 10.0 10.0   < > 10.7*   MG 2.0 2.4 7.7*   < > 2.0   ALB  --   --  2.9*  --  3.0*   ALT  --   --  14  --  12*    < > = values in this interval not displayed.        Results for orders placed or performed during the hospital encounter of 11/24/21   EKG, 12 LEAD, INITIAL   Result Value Ref Range    Ventricular Rate 81 BPM    Atrial Rate 81 BPM    P-R Interval 126 ms    QRS Duration 126 ms    Q-T Interval 526 ms    QTC Calculation (Bezet) 611 ms    Calculated P Axis 77 degrees    Calculated R Axis -81 degrees    Calculated T Axis -97 degrees    Diagnosis       Normal sinus rhythm  Right bundle branch block  Left anterior fascicular block  Inferior infarct (cited on or before 24-NOV-2021)  T wave abnormality, consider lateral ischemia  Abnormal ECG  When compared with ECG of 24-NOV-2021 21:19,  QT has lengthened  Confirmed by Yesi Garcia M.D., [address] [place] [address] ([id number]) on 11/25/2021 8:35:25 PM         All Cardiac Markers in the last 24 hours:    No results found for: CPK, CK, CKMMB, CKMB, RCK3, CKMBT, CKNDX, CKND1, MIKE, TROPT, TROIQ, STEPHAN, TROPT, TNIPOC, BNP, BNPP    Last Lipid:    Lab Results   Component Value Date/Time    Cholesterol, total 195 11/25/2021 04:42 AM    HDL Cholesterol 48 11/25/2021 04:42 AM    LDL, calculated 126.4 (H) 11/25/2021 04:42 AM    Triglyceride 103 11/25/2021 04:42 AM    CHOL/HDL Ratio 4.1 11/25/2021 04:42 AM       Cardiographics:     EKG Results     Procedure 720 Value Units Date/Time    EKG, 12 LEAD, SUBSEQUENT [[id number]] Collected: 11/26/21 0838    Order Status: Completed Updated: 11/26/21 0839     Ventricular Rate 83 BPM      Atrial Rate 83 BPM      P-R Interval 138 ms      QRS Duration 126 ms      Q-T Interval 424 ms      QTC Calculation (Bezet) 498 ms      Calculated P Axis 48 degrees      Calculated R Axis -46 degrees      Calculated T Axis -90 degrees Diagnosis --     Normal sinus rhythm  Left axis deviation  Right bundle branch block  T wave abnormality, consider lateral ischemia  Abnormal ECG  When compared with ECG of 24-NOV-2021 21:20,  QT has shortened      EKG, 12 LEAD, INITIAL [[id number]] Collected: 11/24/21 2119    Order Status: Completed Updated: 11/25/21 2035     Ventricular Rate 81 BPM      Atrial Rate 81 BPM      P-R Interval 126 ms      QRS Duration 126 ms      Q-T Interval 526 ms      QTC Calculation (Bezet) 611 ms      Calculated P Axis 77 degrees      Calculated R Axis -81 degrees      Calculated T Axis -97 degrees      Diagnosis --     Normal sinus rhythm  Right bundle branch block  Left anterior fascicular block  Inferior infarct (cited on or before 24-NOV-2021)  T wave abnormality, consider lateral ischemia  Abnormal ECG  When compared with ECG of 24-NOV-2021 21:19,  QT has lengthened  Confirmed by Blas Kimbrough M.D., [address] [place] [address] ([id number]) on 11/25/2021 8:35:25 PM            XR Results (most recent):  Results from [place] encounter on 11/24/21    XR SPINE LUMB 2 OR 3 V    Narrative  XR SPINE LUMB 2 OR 3 V: 11/26/2021 4:08 PM    CLINICAL INFORMATION  back pain. COMPARISON  CT abdomen/pelvis 25 November 2021    TECHNIQUE  3 views of the lumbar spine    FINDINGS:  No fracture or destructive osseous lesion. Slight levoconvex curvature of the  lumbar spine. Straightening of the expected lumbar lordosis. The joint spaces  are maintained. Enteric contrast outlines portions of the colon. Impression  1. No acute osseous findings.         Signed By: Dale Mejia MD     November 26, 2021

## 2021-11-26 NOTE — PROGRESS NOTES
Hospitalist Progress Note    Subjective:   Daily Progress Note: 11/26/2021     Patient feels fine other than having midline thoracic and lumbar spine pain. No abdominal pain. Denies any headaches or dizziness. No chest pain or shortness of breath or cough. No nausea or vomiting. She told me her sister Ms. Juan Antonio David is her medical power of  which I confirm with her sister over the phone. Patient also signed DNR.     Current Facility-Administered Medications   Medication Dose Route Frequency    vancomycin (VANCOCIN) 750 mg in 0.9% sodium chloride 250 mL (VIAL-MATE)  750 mg IntraVENous Q12H    [START ON 11/27/2021] Vancomycin Lab Information  1 Each Other ONCE    heparin (porcine) 1,000 unit/mL injection 3,000 Units  3,000 Units IntraVENous ONCE    sodium chloride (NS) flush 5-40 mL  5-40 mL IntraVENous Q8H    sodium chloride (NS) flush 5-40 mL  5-40 mL IntraVENous PRN    acetaminophen (TYLENOL) tablet 650 mg  650 mg Oral Q6H PRN    Or    acetaminophen (TYLENOL) suppository 650 mg  650 mg Rectal Q6H PRN    polyethylene glycol (MIRALAX) packet 17 g  17 g Oral DAILY PRN    ondansetron (ZOFRAN ODT) tablet 4 mg  4 mg Oral Q8H PRN    Or    ondansetron (ZOFRAN) injection 4 mg  4 mg IntraVENous Q6H PRN    albuterol-ipratropium (DUO-NEB) 2.5 MG-0.5 MG/3 ML  3 mL Nebulization Q6H RT    multivitamin, tx-iron-ca-min (THERA-M w/ IRON) tablet 1 Tablet  1 Tablet Oral DAILY    atorvastatin (LIPITOR) tablet 20 mg  20 mg Oral QHS    famotidine (PEPCID) tablet 20 mg  20 mg Oral BID    mirtazapine (REMERON SOL-TAB) disintegrating tablet 15 mg  15 mg Oral QHS    aspirin delayed-release tablet 81 mg  81 mg Oral DAILY    dextrose 5% - 0.9% NaCl with KCl 20 mEq/L infusion  50 mL/hr IntraVENous CONTINUOUS    senna-docusate (PERICOLACE) 8.6-50 mg per tablet 1 Tablet  1 Tablet Oral DAILY    piperacillin-tazobactam (ZOSYN) 3.375 g in 0.9% sodium chloride (MBP/ADV) 100 mL MBP  3.375 g IntraVENous Q6H    sodium chloride (NS) flush 5-10 mL  5-10 mL IntraVENous PRN    heparin 25,000 units in D5W 250 ml infusion  12-25 Units/kg/hr IntraVENous TITRATE        Review of Systems  Pertinent items are noted in HPI. Objective:     Visit Vitals  /81 (BP 1 Location: Right upper arm)   Pulse 82   Temp 97.4 °F (36.3 °C)   Resp 16   Ht 5' 3\" (1.6 m)   Wt 49.9 kg (110 lb)   SpO2 100%   Breastfeeding No   BMI 19.49 kg/m²      O2 Device: None (Room air)    Temp (24hrs), Av.2 °F (36.8 °C), Min:97.4 °F (36.3 °C), Max:98.6 °F (37 °C)      No intake/output data recorded.    190 -  0700  In:  [I.V.:1747]  Out: -       General appearance - alert, cachectic, bilateral temporal wasting present, and in no distress  Chest -diminished air entry noted in bases, no wheezes  Heart - S1 and S2 normal  Abdomen - soft, nontender, nondistended, Bowel sounds present  Neurological - alert, oriented, normal speech, no focal findings noted, no tremors  Musculoskeletal - no joint tenderness or erythema of knees bilaterally, point tenderness around T12 vertebra  Extremities - no pedal edema noted      Data Review    Recent Results (from the past 24 hour(s))   PTT    Collection Time: 21  6:38 PM   Result Value Ref Range    aPTT 126.8 (H) 23.0 - 85.6 SEC   METABOLIC PANEL, BASIC    Collection Time: 21  6:59 AM   Result Value Ref Range    Sodium 136 136 - 145 mmol/L    Potassium 3.5 3.5 - 5.5 mmol/L    Chloride 101 100 - 111 mmol/L    CO2 29 21 - 32 mmol/L    Anion gap 6 3.0 - 18 mmol/L    Glucose 113 (H) 74 - 99 mg/dL    BUN 12 7.0 - 18 MG/DL    Creatinine 0.60 0.6 - 1.3 MG/DL    BUN/Creatinine ratio 20 12 - 20      GFR est AA >60 >60 ml/min/1.73m2    GFR est non-AA >60 >60 ml/min/1.73m2    Calcium 9.3 8.5 - 10.1 MG/DL   CBC WITH AUTOMATED DIFF    Collection Time: 21  6:59 AM   Result Value Ref Range    WBC 17.2 (H) 4.6 - 13.2 K/uL    RBC 3.32 (L) 4.20 - 5.30 M/uL    HGB 10.0 (L) 12.0 - 16.0 g/dL    HCT 31.0 (L) 35.0 - 45.0 %    MCV 93.4 78.0 - 100.0 FL    MCH 30.1 24.0 - 34.0 PG    MCHC 32.3 31.0 - 37.0 g/dL    RDW 13.4 11.6 - 14.5 %    PLATELET 998 (H) 528 - 420 K/uL    MPV 9.4 9.2 - 11.8 FL    NRBC 0.0 0  WBC    ABSOLUTE NRBC 0.00 0.00 - 0.01 K/uL    NEUTROPHILS 67 40 - 73 %    LYMPHOCYTES 23 21 - 52 %    MONOCYTES 8 3 - 10 %    EOSINOPHILS 0 0 - 5 %    BASOPHILS 1 0 - 2 %    IMMATURE GRANULOCYTES 1 (H) 0.0 - 0.5 %    ABS. NEUTROPHILS 11.6 (H) 1.8 - 8.0 K/UL    ABS. LYMPHOCYTES 4.0 (H) 0.9 - 3.6 K/UL    ABS. MONOCYTES 1.4 (H) 0.05 - 1.2 K/UL    ABS. EOSINOPHILS 0.0 0.0 - 0.4 K/UL    ABS. BASOPHILS 0.1 0.0 - 0.1 K/UL    ABS. IMM. GRANS. 0.1 (H) 0.00 - 0.04 K/UL    DF AUTOMATED     VANCOMYCIN, RANDOM    Collection Time: 11/26/21  6:59 AM   Result Value Ref Range    Vancomycin, random 9.3 5.0 - 40.0 UG/ML   MAGNESIUM    Collection Time: 11/26/21  6:59 AM   Result Value Ref Range    Magnesium 2.0 1.6 - 2.6 mg/dL   PTT    Collection Time: 11/26/21  6:59 AM   Result Value Ref Range    aPTT 34.3 23.0 - 36.4 SEC   EKG, 12 LEAD, SUBSEQUENT    Collection Time: 11/26/21  8:38 AM   Result Value Ref Range    Ventricular Rate 83 BPM    Atrial Rate 83 BPM    P-R Interval 138 ms    QRS Duration 126 ms    Q-T Interval 424 ms    QTC Calculation (Bezet) 498 ms    Calculated P Axis 48 degrees    Calculated R Axis -46 degrees    Calculated T Axis -90 degrees    Diagnosis       Normal sinus rhythm  Left axis deviation  Right bundle branch block  T wave abnormality, consider lateral ischemia  Abnormal ECG  When compared with ECG of 24-NOV-2021 21:20,  QT has shortened           Assessment/Plan:     Active Problems:    NSTEMI (non-ST elevated myocardial infarction) ([facility] [address] (11/25/2021)      Severe sepsis (HCC) (11/25/2021)      QT prolongation (11/25/2021)      ASSESSMENT:    1. Syncope, no recurrence  2. Prolonged QTC, better  3. Elevated troponin due to demand ischemia  4. Leukocytosis, improving  5. Aspiration pneumonia  6.   Appetite and weight loss  7. Anxiety and depression  8. Hypokalemia  9. Nonspecific abdominal pain/discomfort, currently no symptoms  10. Nonspecific thoracic and lumbar spine pain    PLAN:    Discontinue heparin and monitor  Resume aspirin and statin for #3  Echocardiogram report is reviewed  Continue monitoring WBC and start de-escalating antibiotic tomorrow if blood culture remains negative  PT, OT and speech therapist to follow patient  Resume Remeron and multivitamin  Dietitian to follow this patient  X-ray L-spine and T-spine will be ordered  Lidocaine patch for her back pain    Total time to take care of this patient was 30 minutes and more than 50% of time was spent counseling and coordinating care. Disclaimer: Sections of this note are dictated using utilizing voice recognition software, which may have resulted in some phonetic based errors in grammar and contents. Even though attempts were made to correct all the mistakes, some may have been missed, and remained in the body of the document. If questions arise, please contact our department.

## 2021-11-26 NOTE — CONSULTS
Cardiology Consult Note    Consultation request by Rikki Blanton MD for advice/opinion related to evaluating syncope, qtc prolongation    Date of  Admission: 11/24/2021  9:13 PM   Primary Care Physician: Irais Herndon MD       Assessment:     Hospital Problems  Date Reviewed: 2/23/2017          Codes Class Noted POA    NSTEMI (non-ST elevated myocardial infarction) [facility]) ICD-10-CM: I21.4  ICD-9-CM: 410.70  11/25/2021 Yes        Severe sepsis ([address] ICD-10-CM: A41.9, R65.20  ICD-9-CM: 038.9, 995.92  11/25/2021 Yes        QT prolongation ICD-10-CM: R94.31  ICD-9-CM: 794.31  11/25/2021 Yes             Primary cardiologist none     Plan:     1. ECG reviewed with prolonged QTC. Magnesium level noted elevated at 7.7. At such high levels, ECG changes can be similar to those found in hyperkalemia and certainly could be contributing to her prolonged QTC. Of note upon review of her older EKGs back in 2017, her ECGs also showed similarly prolonged QTC. No clear etiology of her hyperglycemia identified. Her initial magnesium was 2.0 and 2 g of magnesium were given IV overnight. Patient denied any recent medication overdose or significant use of laxatives etc. For now recommend avoiding QTC prolonging medications. No significant arrhythmias noted on telemetry so far. 2. Check full echocardiogram to assess LV function and rule out any valvular abnormalities as a cause for her syncope. 3. Depending on findings above and resolution of electrolyte abnormalities, may need a cardiac monitor or likely a loop recorder prior to discharge given infrequent syncopal episodes (every 5-6 months), to rule out any evidence of tacky or bradycardia arrhythmias as etiology of syncope. History of Present Illness:      This is a 78 y.o. female with past medical history of major depressive disorder, without any other known cardiac risk factors who presented this admission as a transfer from [facility] where she was initially admitted after a suicidal ideation being frustrated with her home situation currently. While she was admitted in the evening she suffered a syncopal episode which prompted a transfer to the emergency department and admission to the telemetry floor. We have been consulted for syncope in the setting of prolonged QTC noted on EKG. Patient tells me in regards to her syncope all she remembers is walking towards the door went without any prodromal symptoms she passed out for a few seconds down to the floor. Someone was present in her room called for help and then the transfer was triggered. She denied any episode of chest discomfort or shortness of breath or palpitations prior to syncopal episode or anytime recently. At times she says that she feels tired and gets a little short of breath after walking a long distance. She denies any other heart failure symptoms like lower extremity edema or orthopnea or PND. She mentions that she had the similar syncopal episodes in the past however they happen about every 5 to 6 months without clear trigger. However even with these episodes there is no clear prodrome like lightheadedness, palpitations, clamminess etc.    A comprehensive review of systems was negative except for that written in the HPI.      Past Medical History:     Past Medical History:   Diagnosis Date    Depression     Gastrointestinal disorder Pancreatitis    Pancreatic disease     Pancreatitis     Psychiatric disorder          Social History:     Social History     Socioeconomic History    Marital status:    Tobacco Use    Smoking status: Current Every Day Smoker     Packs/day: 1.00   Substance and Sexual Activity    Alcohol use: No     Comment: Per pt she quit in 2007    Drug use: No    Sexual activity: Not Currently        Family History:     Family History   Problem Relation Age of Onset    Diabetes Maternal Aunt     Cancer Maternal Aunt     Alzheimer's Disease Maternal Aunt     Cancer Paternal Aunt     Diabetes Paternal Aunt         Medications:   No Known Allergies     Current Facility-Administered Medications   Medication Dose Route Frequency    sodium chloride (NS) flush 5-40 mL  5-40 mL IntraVENous Q8H    sodium chloride (NS) flush 5-40 mL  5-40 mL IntraVENous PRN    acetaminophen (TYLENOL) tablet 650 mg  650 mg Oral Q6H PRN    Or    acetaminophen (TYLENOL) suppository 650 mg  650 mg Rectal Q6H PRN    polyethylene glycol (MIRALAX) packet 17 g  17 g Oral DAILY PRN    ondansetron (ZOFRAN ODT) tablet 4 mg  4 mg Oral Q8H PRN    Or    ondansetron (ZOFRAN) injection 4 mg  4 mg IntraVENous Q6H PRN    vancomycin (VANCOCIN) 500 mg in 0.9% sodium chloride (MBP/ADV) 100 mL MBP  500 mg IntraVENous Q12H    albuterol-ipratropium (DUO-NEB) 2.5 MG-0.5 MG/3 ML  3 mL Nebulization Q6H RT    [START ON 11/26/2021] multivitamin, tx-iron-ca-min (THERA-M w/ IRON) tablet 1 Tablet  1 Tablet Oral DAILY    atorvastatin (LIPITOR) tablet 20 mg  20 mg Oral QHS    famotidine (PEPCID) tablet 20 mg  20 mg Oral BID    mirtazapine (REMERON SOL-TAB) disintegrating tablet 15 mg  15 mg Oral QHS    [START ON 11/26/2021] aspirin delayed-release tablet 81 mg  81 mg Oral DAILY    dextrose 5% - 0.9% NaCl with KCl 20 mEq/L infusion  50 mL/hr IntraVENous CONTINUOUS    [START ON 11/26/2021] senna-docusate (PERICOLACE) 8.6-50 mg per tablet 1 Tablet  1 Tablet Oral DAILY    piperacillin-tazobactam (ZOSYN) 3.375 g in 0.9% sodium chloride (MBP/ADV) 100 mL MBP  3.375 g IntraVENous Q6H    sodium chloride (NS) flush 5-10 mL  5-10 mL IntraVENous PRN    heparin 25,000 units in D5W 250 ml infusion  12-25 Units/kg/hr IntraVENous TITRATE         Physical Exam:     Visit Vitals  /68 (BP 1 Location: Right upper arm, BP Patient Position: At rest)   Pulse 94   Temp 98.4 °F (36.9 °C)   Resp 18   Ht 5' 3\" (1.6 m)   Wt 49.9 kg (110 lb)   SpO2 100%   Breastfeeding No   BMI 19.49 kg/m²       TELE: normal sinus rhythm significantly prolonged QTC    BP Readings from Last 3 Encounters:   11/25/21 100/68   11/24/21 97/64   10/05/17 112/64     Pulse Readings from Last 3 Encounters:   11/25/21 94   11/24/21 97   10/05/17 76     Wt Readings from Last 3 Encounters:   11/25/21 49.9 kg (110 lb)   11/20/21 44.9 kg (99 lb)   10/05/17 41.7 kg (92 lb)       General:  alert, cooperative, no distress, appears stated age, cachectic  Neck:  no carotid bruit, no JVD  Lungs:  clear to auscultation bilaterally  Heart:  regular rate and rhythm, S1, S2 normal, no murmur, click, rub or gallop  Abdomen:  abdomen is soft without significant tenderness, masses, organomegaly or guarding  Extremities:  extremities normal, atraumatic, no cyanosis or edema  Skin: Warm and dry.  no hyperpigmentation, vitiligo, or suspicious lesions  Neuro: alert, oriented x3, affect appropriate, no focal neurological deficits, moves all extremities well, no involuntary movements, reflexes at knee and ankle intact  Psych: non focal     Data Review:     Recent Labs     11/25/21  0442 11/24/21  2152   WBC 27.8* 26.3*   HGB 11.2* 11.0*   HCT 34.4* 34.5*   * 581*     Recent Labs     11/25/21  1039 11/25/21  0442 11/24/21  2152   * 135* 135*   K 3.3* 4.1 4.8   CL 96* 97* 98*   CO2 32 33* 29   * 122* 148*   BUN 21* 24* 30*   CREA 0.69 0.64 0.82   CA 10.0 10.0 10.7*   MG 2.4 7.7* 2.0   ALB  --  2.9* 3.0*   ALT  --  14 12*       Results for orders placed or performed during the hospital encounter of 11/24/21   EKG, 12 LEAD, INITIAL   Result Value Ref Range    Ventricular Rate 81 BPM    Atrial Rate 81 BPM    P-R Interval 126 ms    QRS Duration 126 ms    Q-T Interval 526 ms    QTC Calculation (Bezet) 611 ms    Calculated P Axis 77 degrees    Calculated R Axis -81 degrees    Calculated T Axis -97 degrees    Diagnosis       Normal sinus rhythm  Right bundle branch block  Left anterior fascicular block  Inferior infarct (cited on or before 24-NOV-2021)  T wave abnormality, consider lateral ischemia  Abnormal ECG  When compared with ECG of 24-NOV-2021 21:19,  QT has lengthened  Confirmed by Paty Orellana M.D., Sukhjinder Ulloa ([id number]) on 11/25/2021 8:35:25 PM         All Cardiac Markers in the last 24 hours:    Lab Results   Component Value Date/Time    TROIQ 0.96 (H) 11/25/2021 06:25 AM       Last Lipid:    Lab Results   Component Value Date/Time    Cholesterol, total 195 11/25/2021 04:42 AM    HDL Cholesterol 48 11/25/2021 04:42 AM    LDL, calculated 126.4 (H) 11/25/2021 04:42 AM    Triglyceride 103 11/25/2021 04:42 AM    CHOL/HDL Ratio 4.1 11/25/2021 04:42 AM       Cardiographics:     EKG Results     Procedure 720 Value Units Date/Time    EKG, 12 LEAD, INITIAL [[id number]] Collected: 11/24/21 2119    Order Status: Completed Updated: 11/25/21 2035     Ventricular Rate 81 BPM      Atrial Rate 81 BPM      P-R Interval 126 ms      QRS Duration 126 ms      Q-T Interval 526 ms      QTC Calculation (Bezet) 611 ms      Calculated P Axis 77 degrees      Calculated R Axis -81 degrees      Calculated T Axis -97 degrees      Diagnosis --     Normal sinus rhythm  Right bundle branch block  Left anterior fascicular block  Inferior infarct (cited on or before 24-NOV-2021)  T wave abnormality, consider lateral ischemia  Abnormal ECG  When compared with ECG of 24-NOV-2021 21:19,  QT has lengthened  Confirmed by Paty Orellana M.D., Sukhjinder Ulloa ([id number]) on 11/25/2021 8:35:25 PM      EKG, 12 LEAD, SUBSEQUENT [[id number]]     Order Status: Sent           XR Results (most recent):  Results from Hospital Encounter encounter on 11/24/21    XR CHEST PORT    Narrative  EXAMINATION: Chest single view    INDICATION: Systemic inflammatory response    COMPARISON: 11/20/2021    FINDINGS: Single frontal view the chest obtained. Borderline prominent cardiac  silhouette. Aortic arch calcifications. Pulmonary vasculature unremarkable. Right lung base streaky opacity noted. No evidence of pneumothorax.  No obvious  acute osseous findings. Impression  Right basilar streaky infiltrate appears slightly improved compared to  11/20/2021.         Signed By: Rochelle Perez MD     November 25, 2021

## 2021-11-26 NOTE — PROGRESS NOTES
PT orders received and chart reviewed. Encountered patient ambulating in room. Proceeds to don personal pants and adjust gown with appropriate balance and safety awareness. Denies mobility concerns. Reports amb to/from bathroom without difficulties. Returned to seated in bed. No formal PT needs. Discontinuing PT orders.

## 2021-11-27 ENCOUNTER — APPOINTMENT (OUTPATIENT)
Dept: MRI IMAGING | Age: 66
DRG: 286 | End: 2021-11-27
Attending: INTERNAL MEDICINE
Payer: MEDICARE

## 2021-11-27 LAB
ANION GAP SERPL CALC-SCNC: 8 MMOL/L (ref 3–18)
ATRIAL RATE: 83 BPM
BUN SERPL-MCNC: 8 MG/DL (ref 7–18)
BUN/CREAT SERPL: 15 (ref 12–20)
CALCIUM SERPL-MCNC: 8.8 MG/DL (ref 8.5–10.1)
CALCULATED P AXIS, ECG09: 48 DEGREES
CALCULATED R AXIS, ECG10: -46 DEGREES
CALCULATED T AXIS, ECG11: -90 DEGREES
CHLORIDE SERPL-SCNC: 103 MMOL/L (ref 100–111)
CK MB CFR SERPL CALC: 5.3 % (ref 0–4)
CK MB SERPL-MCNC: 2.1 NG/ML (ref 5–25)
CK SERPL-CCNC: 40 U/L (ref 26–192)
CO2 SERPL-SCNC: 26 MMOL/L (ref 21–32)
CREAT SERPL-MCNC: 0.54 MG/DL (ref 0.6–1.3)
DATE LAST DOSE: NORMAL
DIAGNOSIS, 93000: NORMAL
GLUCOSE SERPL-MCNC: 95 MG/DL (ref 74–99)
P-R INTERVAL, ECG05: 138 MS
POTASSIUM SERPL-SCNC: 2.6 MMOL/L (ref 3.5–5.5)
Q-T INTERVAL, ECG07: 424 MS
QRS DURATION, ECG06: 126 MS
QTC CALCULATION (BEZET), ECG08: 498 MS
REPORTED DOSE,DOSE: NORMAL UNITS
REPORTED DOSE/TIME,TMG: 1200
SODIUM SERPL-SCNC: 137 MMOL/L (ref 136–145)
TROPONIN I SERPL-MCNC: 0.21 NG/ML (ref 0–0.04)
VANCOMYCIN TROUGH SERPL-MCNC: 19.5 UG/ML (ref 10–20)
VENTRICULAR RATE, ECG03: 83 BPM

## 2021-11-27 PROCEDURE — 74011250636 HC RX REV CODE- 250/636: Performed by: STUDENT IN AN ORGANIZED HEALTH CARE EDUCATION/TRAINING PROGRAM

## 2021-11-27 PROCEDURE — 2709999900 HC NON-CHARGEABLE SUPPLY

## 2021-11-27 PROCEDURE — A9576 INJ PROHANCE MULTIPACK: HCPCS | Performed by: INTERNAL MEDICINE

## 2021-11-27 PROCEDURE — 99231 SBSQ HOSP IP/OBS SF/LOW 25: CPT | Performed by: PSYCHIATRY & NEUROLOGY

## 2021-11-27 PROCEDURE — 74011250637 HC RX REV CODE- 250/637: Performed by: INTERNAL MEDICINE

## 2021-11-27 PROCEDURE — 94640 AIRWAY INHALATION TREATMENT: CPT

## 2021-11-27 PROCEDURE — 74011250637 HC RX REV CODE- 250/637: Performed by: STUDENT IN AN ORGANIZED HEALTH CARE EDUCATION/TRAINING PROGRAM

## 2021-11-27 PROCEDURE — 99232 SBSQ HOSP IP/OBS MODERATE 35: CPT | Performed by: INTERNAL MEDICINE

## 2021-11-27 PROCEDURE — 74011250636 HC RX REV CODE- 250/636: Performed by: INTERNAL MEDICINE

## 2021-11-27 PROCEDURE — 72158 MRI LUMBAR SPINE W/O & W/DYE: CPT

## 2021-11-27 PROCEDURE — 74011000258 HC RX REV CODE- 258: Performed by: INTERNAL MEDICINE

## 2021-11-27 PROCEDURE — 80202 ASSAY OF VANCOMYCIN: CPT

## 2021-11-27 PROCEDURE — 72157 MRI CHEST SPINE W/O & W/DYE: CPT

## 2021-11-27 PROCEDURE — 36415 COLL VENOUS BLD VENIPUNCTURE: CPT

## 2021-11-27 PROCEDURE — 94761 N-INVAS EAR/PLS OXIMETRY MLT: CPT

## 2021-11-27 PROCEDURE — 82553 CREATINE MB FRACTION: CPT

## 2021-11-27 PROCEDURE — 65660000000 HC RM CCU STEPDOWN

## 2021-11-27 PROCEDURE — 80048 BASIC METABOLIC PNL TOTAL CA: CPT

## 2021-11-27 PROCEDURE — 74011000250 HC RX REV CODE- 250: Performed by: INTERNAL MEDICINE

## 2021-11-27 RX ORDER — POTASSIUM CHLORIDE 20 MEQ/1
40 TABLET, EXTENDED RELEASE ORAL 2 TIMES DAILY
Status: DISCONTINUED | OUTPATIENT
Start: 2021-11-27 | End: 2021-11-30

## 2021-11-27 RX ORDER — POTASSIUM CHLORIDE 7.45 MG/ML
10 INJECTION INTRAVENOUS
Status: COMPLETED | OUTPATIENT
Start: 2021-11-27 | End: 2021-11-27

## 2021-11-27 RX ORDER — POTASSIUM CHLORIDE 7.45 MG/ML
10 INJECTION INTRAVENOUS
Status: DISCONTINUED | OUTPATIENT
Start: 2021-11-27 | End: 2021-11-27

## 2021-11-27 RX ORDER — TRAMADOL HYDROCHLORIDE 50 MG/1
50 TABLET ORAL
Status: DISCONTINUED | OUTPATIENT
Start: 2021-11-27 | End: 2021-11-30 | Stop reason: HOSPADM

## 2021-11-27 RX ADMIN — ATORVASTATIN CALCIUM 20 MG: 20 TABLET, FILM COATED ORAL at 21:54

## 2021-11-27 RX ADMIN — LISINOPRIL 5 MG: 5 TABLET ORAL at 10:12

## 2021-11-27 RX ADMIN — POTASSIUM CHLORIDE 40 MEQ: 20 TABLET, EXTENDED RELEASE ORAL at 19:13

## 2021-11-27 RX ADMIN — ENOXAPARIN SODIUM 40 MG: 100 INJECTION SUBCUTANEOUS at 21:55

## 2021-11-27 RX ADMIN — FAMOTIDINE 20 MG: 20 TABLET ORAL at 19:14

## 2021-11-27 RX ADMIN — VANCOMYCIN HYDROCHLORIDE 750 MG: 750 INJECTION, POWDER, LYOPHILIZED, FOR SOLUTION INTRAVENOUS at 18:59

## 2021-11-27 RX ADMIN — GADOTERIDOL 8 ML: 279.3 INJECTION, SOLUTION INTRAVENOUS at 17:11

## 2021-11-27 RX ADMIN — SENNOSIDES AND DOCUSATE SODIUM 1 TABLET: 50; 8.6 TABLET ORAL at 10:12

## 2021-11-27 RX ADMIN — IPRATROPIUM BROMIDE AND ALBUTEROL SULFATE 3 ML: .5; 2.5 SOLUTION RESPIRATORY (INHALATION) at 15:44

## 2021-11-27 RX ADMIN — MIRTAZAPINE 15 MG: 15 TABLET, ORALLY DISINTEGRATING ORAL at 21:54

## 2021-11-27 RX ADMIN — POTASSIUM CHLORIDE 40 MEQ: 20 TABLET, EXTENDED RELEASE ORAL at 10:12

## 2021-11-27 RX ADMIN — FAMOTIDINE 20 MG: 20 TABLET ORAL at 10:12

## 2021-11-27 RX ADMIN — Medication 10 ML: at 14:00

## 2021-11-27 RX ADMIN — POTASSIUM CHLORIDE 10 MEQ: 7.45 INJECTION INTRAVENOUS at 10:18

## 2021-11-27 RX ADMIN — IPRATROPIUM BROMIDE AND ALBUTEROL SULFATE 3 ML: .5; 2.5 SOLUTION RESPIRATORY (INHALATION) at 21:54

## 2021-11-27 RX ADMIN — IPRATROPIUM BROMIDE AND ALBUTEROL SULFATE 3 ML: .5; 2.5 SOLUTION RESPIRATORY (INHALATION) at 07:32

## 2021-11-27 RX ADMIN — METOPROLOL SUCCINATE 25 MG: 25 TABLET, EXTENDED RELEASE ORAL at 10:12

## 2021-11-27 RX ADMIN — IPRATROPIUM BROMIDE AND ALBUTEROL SULFATE 3 ML: .5; 2.5 SOLUTION RESPIRATORY (INHALATION) at 03:40

## 2021-11-27 RX ADMIN — POTASSIUM CHLORIDE 10 MEQ: 7.45 INJECTION INTRAVENOUS at 09:00

## 2021-11-27 RX ADMIN — PIPERACILLIN AND TAZOBACTAM 3.38 G: 3; .375 INJECTION, POWDER, LYOPHILIZED, FOR SOLUTION INTRAVENOUS at 19:08

## 2021-11-27 RX ADMIN — POTASSIUM CHLORIDE 10 MEQ: 7.46 INJECTION, SOLUTION INTRAVENOUS at 20:28

## 2021-11-27 RX ADMIN — ASPIRIN 81 MG: 81 TABLET, COATED ORAL at 10:12

## 2021-11-27 RX ADMIN — POTASSIUM CHLORIDE 10 MEQ: 7.45 INJECTION INTRAVENOUS at 08:00

## 2021-11-27 RX ADMIN — Medication 10 ML: at 21:56

## 2021-11-27 RX ADMIN — PIPERACILLIN AND TAZOBACTAM 3.38 G: 3; .375 INJECTION, POWDER, LYOPHILIZED, FOR SOLUTION INTRAVENOUS at 06:37

## 2021-11-27 RX ADMIN — MULTIPLE VITAMINS W/ MINERALS TAB 1 TABLET: TAB at 10:12

## 2021-11-27 RX ADMIN — VANCOMYCIN HYDROCHLORIDE 750 MG: 750 INJECTION, POWDER, LYOPHILIZED, FOR SOLUTION INTRAVENOUS at 01:08

## 2021-11-27 RX ADMIN — PIPERACILLIN AND TAZOBACTAM 3.38 G: 3; .375 INJECTION, POWDER, LYOPHILIZED, FOR SOLUTION INTRAVENOUS at 12:00

## 2021-11-27 NOTE — PROGRESS NOTES
[address]  Inpatient Progress Note     Date of Service: 11/27/21  Hospital Day: 2     Subjective/Interval History   11/27/21     Notes reviewed and/or discussed and report that Jeny Elkins is a patient with a history of depression with psychotic symptoms, transferred to [facility], after suffering with a syncopal episode while at [facility] behavioral medicine services. Attention is invited to the discharge summary note from her inpatient admission to [facility] behavioral medicine services which is self-explanatory. Patient interview: Jeny Elkins was interviewed by this writer today. The patient described her depression is doing better. She was more concerned about having some back pain, however she is currently denying any hallucinations, and described her depression this is stable. The patient does have a history of recurrent depressive disorder, however due to difficulties with the described QTC elongation, and suggestion by cardiology not to prescribe medications that could increase the QT and QTc intervals, will maintain the patient off antidepressants and antipsychotics. It appears that her depression is reactive to third to having to take care of her sisters children, her by description of the patient, suffer with severe intellectual disability. She is planning not to have to take care of the children anymore, this is obviously improving her overall depression.       Objective     Visit Vitals  BP (!) 144/93 (BP 1 Location: Left upper arm, BP Patient Position: At rest)   Pulse 85   Temp 98.7 °F (37.1 °C)   Resp 16   Ht 5' 3\" (1.6 m)   Wt 45.4 kg (100 lb)   SpO2 98%   Breastfeeding No   BMI 17.71 kg/m²     Above vitals noted, management per primary care team    Recent Results (from the past 24 hour(s))   CBC WITH AUTOMATED DIFF    Collection Time: 11/26/21  4:36 PM   Result Value Ref Range    WBC 17.2 (H) 4.6 - 13.2 K/uL    RBC 3.13 (L) 4.20 - 5.30 M/uL    HGB 9.5 (L) 12.0 - 16.0 g/dL    HCT 29.5 (L) 35.0 - 45.0 %    MCV 94.2 78.0 - 100.0 FL    MCH 30.4 24.0 - 34.0 PG    MCHC 32.2 31.0 - 37.0 g/dL    RDW 13.6 11.6 - 14.5 %    PLATELET 506 (H) 916 - 420 K/uL    MPV 9.6 9.2 - 11.8 FL    NRBC 0.1 (H) 0  WBC    ABSOLUTE NRBC 0.02 (H) 0.00 - 0.01 K/uL    NEUTROPHILS 68 40 - 73 %    LYMPHOCYTES 22 21 - 52 %    MONOCYTES 9 3 - 10 %    EOSINOPHILS 0 0 - 5 %    BASOPHILS 0 0 - 2 %    IMMATURE GRANULOCYTES 1 (H) 0.0 - 0.5 %    ABS. NEUTROPHILS 11.7 (H) 1.8 - 8.0 K/UL    ABS. LYMPHOCYTES 3.8 (H) 0.9 - 3.6 K/UL    ABS. MONOCYTES 1.5 (H) 0.05 - 1.2 K/UL    ABS. EOSINOPHILS 0.0 0.0 - 0.4 K/UL    ABS. BASOPHILS 0.1 0.0 - 0.1 K/UL    ABS. IMM. GRANS. 0.1 (H) 0.00 - 0.04 K/UL    DF AUTOMATED     METABOLIC PANEL, BASIC    Collection Time: 11/27/21  3:27 AM   Result Value Ref Range    Sodium 137 136 - 145 mmol/L    Potassium 2.6 (LL) 3.5 - 5.5 mmol/L    Chloride 103 100 - 111 mmol/L    CO2 26 21 - 32 mmol/L    Anion gap 8 3.0 - 18 mmol/L    Glucose 95 74 - 99 mg/dL    BUN 8 7.0 - 18 MG/DL    Creatinine 0.54 (L) 0.6 - 1.3 MG/DL    BUN/Creatinine ratio 15 12 - 20      GFR est AA >60 >60 ml/min/1.73m2    GFR est non-AA >60 >60 ml/min/1.73m2    Calcium 8.8 8.5 - 10.1 MG/DL   CARDIAC PANEL,(CK, CKMB & TROPONIN)    Collection Time: 11/27/21  3:27 AM   Result Value Ref Range    CK - MB 2.1 <3.6 ng/ml    CK-MB Index 5.3 (H) 0.0 - 4.0 %    CK 40 26 - 192 U/L    Troponin-I, QT 0.21 (H) 0.0 - 0.045 NG/ML     Results noted, management per primary care team     Mental Status Examination     Appearance/Hygiene 78 y.o.  BLACK/ female  Hygiene: Fair   Behavior/Social Relatedness Appropriate   Musculoskeletal Gait/Station: appropriate  Tone (flaccid, cogwheeling, spastic): not assessed  Psychomotor (hyperkinetic, hypokinetic): calm   Involuntary movements (tics, dyskinesias, akathisa, stereotypies): none   Speech   Rate, rhythm, volume, fluency and articulation are appropriate   Mood denies depression   Affect    appropriate to situation   Thought Process Linear and goal directed   Thought Content and Perceptual Disturbances Denies self-injurious behavior (SIB), suicidal ideation (SI), aggressive behavior or homicidal ideation (HI)  Denies auditory and visual hallucinations, ideas of reference or influence. Sensorium and Cognition  Grossly intact   Insight  fair   Judgment  fair        Assessment/Plan      Psychiatric Diagnoses:   Patient Active Problem List   Diagnosis Code    Itch of skin L29.9    Anxiety state F41.1    Weight loss, unintentional R63.4    Major depressive disorder, recurrent episode, severe, with psychosis ([facility] [address] F33.3    Pyelonephritis N12    SIRS (systemic inflammatory response syndrome) ([facility]) R65.10    Sepsis ([facility]) A41.9    UTI (urinary tract infection) N39.0    Severe protein-calorie malnutrition ([facility] E43    NSTEMI (non-ST elevated myocardial infarction) ([facility]) I21.4    Severe sepsis ([facility]) A41.9, R65.20    QT prolongation R94.31       Medical Diagnoses: Per primary care team    Psychosocial and contextual factors: Same    Level of impairment/disability: Severe by history    1. We will continue to follow patient peripherally. Currently she is off treatment with antidepressants and antipsychotics, because showing evidence of improvement regarding her mood disorder and psychotic symptoms. It appeared that her main stressor was related to taking care of her intellectually disabled nephew's. The patient is planning not to return back to taking care of them and so for now we will maintain of antidepressants and antipsychotics due to concerns about the QT and QTc intervals elongation. 2.  Reviewed instructions, risks, benefits and side effects of medications  3.   Disposition/Discharge Date: self-care/home, to be determined by primary care team    Merna Worthington MD, [address] [place] [address]  Psychiatry

## 2021-11-27 NOTE — PROGRESS NOTES
Hospitalist Progress Note    Subjective:   Daily Progress Note: 11/27/2021   Patient has complaints of back pain in the thoracic level, she states she has had it for several weeks, worse over the last 1 week. She states she was seen by [facility] care but was not given any treatment for it. She is in no great distress but winces in pain with palpation of her thoracic vertebrae. She reports some chest pain especially with coughing. Poor appetite.     Current Facility-Administered Medications   Medication Dose Route Frequency    potassium chloride (K-DUR, KLOR-CON M20) SR tablet 40 mEq  40 mEq Oral BID    Vancomycin Lab Information  1 Each Other ONCE    traMADoL (ULTRAM) tablet 50 mg  50 mg Oral Q6H PRN    vancomycin (VANCOCIN) 750 mg in 0.9% sodium chloride 250 mL (VIAL-MATE)  750 mg IntraVENous Q12H    lidocaine 4 % patch 1 Patch  1 Patch TransDERmal Q24H    enoxaparin (LOVENOX) injection 40 mg  40 mg SubCUTAneous Q24H    metoprolol succinate (TOPROL-XL) XL tablet 25 mg  25 mg Oral DAILY    lisinopriL (PRINIVIL, ZESTRIL) tablet 5 mg  5 mg Oral DAILY    sodium chloride (NS) flush 5-40 mL  5-40 mL IntraVENous Q8H    sodium chloride (NS) flush 5-40 mL  5-40 mL IntraVENous PRN    acetaminophen (TYLENOL) tablet 650 mg  650 mg Oral Q6H PRN    Or    acetaminophen (TYLENOL) suppository 650 mg  650 mg Rectal Q6H PRN    polyethylene glycol (MIRALAX) packet 17 g  17 g Oral DAILY PRN    ondansetron (ZOFRAN ODT) tablet 4 mg  4 mg Oral Q8H PRN    Or    ondansetron (ZOFRAN) injection 4 mg  4 mg IntraVENous Q6H PRN    albuterol-ipratropium (DUO-NEB) 2.5 MG-0.5 MG/3 ML  3 mL Nebulization Q6H RT    multivitamin, tx-iron-ca-min (THERA-M w/ IRON) tablet 1 Tablet  1 Tablet Oral DAILY    atorvastatin (LIPITOR) tablet 20 mg  20 mg Oral QHS    famotidine (PEPCID) tablet 20 mg  20 mg Oral BID    mirtazapine (REMERON SOL-TAB) disintegrating tablet 15 mg  15 mg Oral QHS    aspirin delayed-release tablet 81 mg  81 mg Oral DAILY    dextrose 5% - 0.9% NaCl with KCl 20 mEq/L infusion  50 mL/hr IntraVENous CONTINUOUS    senna-docusate (PERICOLACE) 8.6-50 mg per tablet 1 Tablet  1 Tablet Oral DAILY    piperacillin-tazobactam (ZOSYN) 3.375 g in 0.9% sodium chloride (MBP/ADV) 100 mL MBP  3.375 g IntraVENous Q6H    sodium chloride (NS) flush 5-10 mL  5-10 mL IntraVENous PRN        Review of Systems  Pertinent items are noted in HPI. Objective:     Visit Vitals  BP (!) 144/93 (BP 1 Location: Left upper arm, BP Patient Position: At rest)   Pulse 85   Temp 98.7 °F (37.1 °C)   Resp 16   Ht 5' 3\" (1.6 m)   Wt 45.4 kg (100 lb)   SpO2 98%   Breastfeeding No   BMI 17.71 kg/m²      O2 Device: None (Room air)    Temp (24hrs), Av.3 °F (36.8 °C), Min:97.9 °F (36.6 °C), Max:98.7 °F (37.1 °C)      No intake/output data recorded.    1901 -  0700  In: 450 [I.V.:450]  Out: -       General appearance - alert, cachectic, bilateral temporal wasting present, and in no distress  Chest -ctab  Heart - S1 and S2 normal  Abdomen - soft, nontender, nondistended, Bowel sounds present  Neurological - alert, oriented, normal speech, no focal findings noted, no tremors  Musculoskeletal - point tenderness of lower thoracic vertebrae    Extremities - no pedal edema noted      Data Review    Recent Results (from the past 24 hour(s))   CBC WITH AUTOMATED DIFF    Collection Time: 21  4:36 PM   Result Value Ref Range    WBC 17.2 (H) 4.6 - 13.2 K/uL    RBC 3.13 (L) 4.20 - 5.30 M/uL    HGB 9.5 (L) 12.0 - 16.0 g/dL    HCT 29.5 (L) 35.0 - 45.0 %    MCV 94.2 78.0 - 100.0 FL    MCH 30.4 24.0 - 34.0 PG    MCHC 32.2 31.0 - 37.0 g/dL    RDW 13.6 11.6 - 14.5 %    PLATELET 650 (H) 418 - 420 K/uL    MPV 9.6 9.2 - 11.8 FL    NRBC 0.1 (H) 0  WBC    ABSOLUTE NRBC 0.02 (H) 0.00 - 0.01 K/uL    NEUTROPHILS 68 40 - 73 %    LYMPHOCYTES 22 21 - 52 %    MONOCYTES 9 3 - 10 %    EOSINOPHILS 0 0 - 5 %    BASOPHILS 0 0 - 2 %    IMMATURE GRANULOCYTES 1 (H) 0.0 - 0.5 % ABS. NEUTROPHILS 11.7 (H) 1.8 - 8.0 K/UL    ABS. LYMPHOCYTES 3.8 (H) 0.9 - 3.6 K/UL    ABS. MONOCYTES 1.5 (H) 0.05 - 1.2 K/UL    ABS. EOSINOPHILS 0.0 0.0 - 0.4 K/UL    ABS. BASOPHILS 0.1 0.0 - 0.1 K/UL    ABS. IMM. GRANS. 0.1 (H) 0.00 - 0.04 K/UL    DF AUTOMATED     METABOLIC PANEL, BASIC    Collection Time: 11/27/21  3:27 AM   Result Value Ref Range    Sodium 137 136 - 145 mmol/L    Potassium 2.6 (LL) 3.5 - 5.5 mmol/L    Chloride 103 100 - 111 mmol/L    CO2 26 21 - 32 mmol/L    Anion gap 8 3.0 - 18 mmol/L    Glucose 95 74 - 99 mg/dL    BUN 8 7.0 - 18 MG/DL    Creatinine 0.54 (L) 0.6 - 1.3 MG/DL    BUN/Creatinine ratio 15 12 - 20      GFR est AA >60 >60 ml/min/1.73m2    GFR est non-AA >60 >60 ml/min/1.73m2    Calcium 8.8 8.5 - 10.1 MG/DL   CARDIAC PANEL,(CK, CKMB & TROPONIN)    Collection Time: 11/27/21  3:27 AM   Result Value Ref Range    CK - MB 2.1 <3.6 ng/ml    CK-MB Index 5.3 (H) 0.0 - 4.0 %    CK 40 26 - 192 U/L    Troponin-I, QT 0.21 (H) 0.0 - 0.045 NG/ML         Assessment/Plan:     Active Problems:    NSTEMI (non-ST elevated myocardial infarction) ([facility] (11/25/2021)      Severe sepsis ([facility] (11/25/2021)      QT prolongation (11/25/2021)    41-year-old female admitted on 11/25 after presenting with chief complaint of dizziness/syncopal event    -Syncope: happened while at [address] [place] [address] unit. Cause remains unclear. Work-up thus far significant for new diagnosis of heart failure with reduced ejection fraction on echo done on 11/25. Cardiology plans for noninvasive work-up for ischemia early next week noted. No clear complaints of chest pain. Also noted to have prolonged QTc, improved off her psych meds she takes for recurrent depressive d/o. Indeterminate level of troponin elevation.    -Hypokalemia, normal magnesium on 11/26, on supplementation currently. Cause unclear, not on diuretics.    -Leukocytosis: On broad-spectrum antibiotics. Cultures remain negative.   WBC count remains around 17 although much lower than upon admission. Source of infection unclear, suspicion that this might be aspiration pneumonia given right sided pulmonary streaky opacities on CT and chest x-ray imagings. However due to persistent complaints of back pain and point tenderness in the thoracic lumbar region plan is to pursue work-up for epidural abscess versus vertebral bone osteomyelitis. X-ray of the thoracic and lumbar spine are unrevealing. Plan to proceed with MRI studies.    -History of depression with psychotic symptoms: [facility] her [facility] here for syncope. Off psych meds due to QTc prolognation. Psych input--> plans to monitor pt off psych meds noted. Appreciate assistance. PLAN:  -Stat MRI of the thoracic and lumbar spine, check ESR and CRP  -Cardiac ischemia work-up per cardiology, plans for leg stress testing for Monday noted. Patient to be n.p.o. after midnight Sunday night. Continue aspirin, statin and beta-blocker. m report is reviewed  -Monitor off psych meds  -Avoid QTc prolonging meds, replace K aggressively, monitor mag  -Continue monitoring WBC and continue current regimen of antibiotics for now.  -May need to consult ID if leukocytosis continues and once I obtain MRI studies. -PT, OT and speech therapist to follow patient  -Resume Remeron and multivitamin  -Dietitian to follow this patient  -Add Ultram  for her back pain      Disclaimer: Sections of this note are dictated using utilizing voice recognition software, which may have resulted in some phonetic based errors in grammar and contents. Even though attempts were made to correct all the mistakes, some may have been missed, and remained in the body of the document. If questions arise, please contact our department.

## 2021-11-28 LAB
ANION GAP SERPL CALC-SCNC: 8 MMOL/L (ref 3–18)
BASOPHILS # BLD: 0.1 K/UL (ref 0–0.1)
BASOPHILS NFR BLD: 1 % (ref 0–2)
BUN SERPL-MCNC: 6 MG/DL (ref 7–18)
BUN/CREAT SERPL: 10 (ref 12–20)
CALCIUM SERPL-MCNC: 9.1 MG/DL (ref 8.5–10.1)
CHLORIDE SERPL-SCNC: 103 MMOL/L (ref 100–111)
CO2 SERPL-SCNC: 24 MMOL/L (ref 21–32)
CREAT SERPL-MCNC: 0.61 MG/DL (ref 0.6–1.3)
CRP SERPL-MCNC: 0.3 MG/DL (ref 0–0.3)
DIFFERENTIAL METHOD BLD: ABNORMAL
EOSINOPHIL # BLD: 0 K/UL (ref 0–0.4)
EOSINOPHIL NFR BLD: 0 % (ref 0–5)
ERYTHROCYTE [DISTWIDTH] IN BLOOD BY AUTOMATED COUNT: 13.9 % (ref 11.6–14.5)
ERYTHROCYTE [SEDIMENTATION RATE] IN BLOOD: 28 MM/HR (ref 0–30)
GLUCOSE SERPL-MCNC: 109 MG/DL (ref 74–99)
HCT VFR BLD AUTO: 29.4 % (ref 35–45)
HGB BLD-MCNC: 9.5 G/DL (ref 12–16)
IMM GRANULOCYTES # BLD AUTO: 0.1 K/UL (ref 0–0.04)
IMM GRANULOCYTES NFR BLD AUTO: 1 % (ref 0–0.5)
LYMPHOCYTES # BLD: 3.4 K/UL (ref 0.9–3.6)
LYMPHOCYTES NFR BLD: 27 % (ref 21–52)
MAGNESIUM SERPL-MCNC: 1.5 MG/DL (ref 1.6–2.6)
MCH RBC QN AUTO: 30.7 PG (ref 24–34)
MCHC RBC AUTO-ENTMCNC: 32.3 G/DL (ref 31–37)
MCV RBC AUTO: 95.1 FL (ref 78–100)
MONOCYTES # BLD: 1 K/UL (ref 0.05–1.2)
MONOCYTES NFR BLD: 8 % (ref 3–10)
NEUTS SEG # BLD: 8.1 K/UL (ref 1.8–8)
NEUTS SEG NFR BLD: 64 % (ref 40–73)
NRBC # BLD: 0 K/UL (ref 0–0.01)
NRBC BLD-RTO: 0 PER 100 WBC
PLATELET # BLD AUTO: 524 K/UL (ref 135–420)
PMV BLD AUTO: 10.1 FL (ref 9.2–11.8)
POTASSIUM SERPL-SCNC: 4.2 MMOL/L (ref 3.5–5.5)
RBC # BLD AUTO: 3.09 M/UL (ref 4.2–5.3)
SODIUM SERPL-SCNC: 135 MMOL/L (ref 136–145)
WBC # BLD AUTO: 12.6 K/UL (ref 4.6–13.2)

## 2021-11-28 PROCEDURE — 74011250637 HC RX REV CODE- 250/637: Performed by: STUDENT IN AN ORGANIZED HEALTH CARE EDUCATION/TRAINING PROGRAM

## 2021-11-28 PROCEDURE — 74011000258 HC RX REV CODE- 258: Performed by: INTERNAL MEDICINE

## 2021-11-28 PROCEDURE — 74011250636 HC RX REV CODE- 250/636: Performed by: INTERNAL MEDICINE

## 2021-11-28 PROCEDURE — 74011250637 HC RX REV CODE- 250/637: Performed by: INTERNAL MEDICINE

## 2021-11-28 PROCEDURE — 80048 BASIC METABOLIC PNL TOTAL CA: CPT

## 2021-11-28 PROCEDURE — 85652 RBC SED RATE AUTOMATED: CPT

## 2021-11-28 PROCEDURE — 74011000250 HC RX REV CODE- 250: Performed by: INTERNAL MEDICINE

## 2021-11-28 PROCEDURE — 2709999900 HC NON-CHARGEABLE SUPPLY

## 2021-11-28 PROCEDURE — 94640 AIRWAY INHALATION TREATMENT: CPT

## 2021-11-28 PROCEDURE — 65660000000 HC RM CCU STEPDOWN

## 2021-11-28 PROCEDURE — 99232 SBSQ HOSP IP/OBS MODERATE 35: CPT | Performed by: INTERNAL MEDICINE

## 2021-11-28 PROCEDURE — 36415 COLL VENOUS BLD VENIPUNCTURE: CPT

## 2021-11-28 PROCEDURE — 86140 C-REACTIVE PROTEIN: CPT

## 2021-11-28 PROCEDURE — 85025 COMPLETE CBC W/AUTO DIFF WBC: CPT

## 2021-11-28 PROCEDURE — 83735 ASSAY OF MAGNESIUM: CPT

## 2021-11-28 RX ORDER — MAGNESIUM SULFATE HEPTAHYDRATE 40 MG/ML
2 INJECTION, SOLUTION INTRAVENOUS ONCE
Status: COMPLETED | OUTPATIENT
Start: 2021-11-28 | End: 2021-11-28

## 2021-11-28 RX ORDER — LISINOPRIL 10 MG/1
10 TABLET ORAL DAILY
Status: DISCONTINUED | OUTPATIENT
Start: 2021-11-29 | End: 2021-11-30 | Stop reason: HOSPADM

## 2021-11-28 RX ADMIN — ATORVASTATIN CALCIUM 20 MG: 20 TABLET, FILM COATED ORAL at 22:00

## 2021-11-28 RX ADMIN — MIRTAZAPINE 15 MG: 15 TABLET, ORALLY DISINTEGRATING ORAL at 22:00

## 2021-11-28 RX ADMIN — LISINOPRIL 5 MG: 5 TABLET ORAL at 09:00

## 2021-11-28 RX ADMIN — Medication 10 ML: at 05:21

## 2021-11-28 RX ADMIN — PIPERACILLIN AND TAZOBACTAM 3.38 G: 3; .375 INJECTION, POWDER, LYOPHILIZED, FOR SOLUTION INTRAVENOUS at 17:26

## 2021-11-28 RX ADMIN — SENNOSIDES AND DOCUSATE SODIUM 1 TABLET: 50; 8.6 TABLET ORAL at 10:02

## 2021-11-28 RX ADMIN — IPRATROPIUM BROMIDE AND ALBUTEROL SULFATE 3 ML: .5; 2.5 SOLUTION RESPIRATORY (INHALATION) at 14:54

## 2021-11-28 RX ADMIN — PIPERACILLIN AND TAZOBACTAM 3.38 G: 3; .375 INJECTION, POWDER, LYOPHILIZED, FOR SOLUTION INTRAVENOUS at 05:20

## 2021-11-28 RX ADMIN — PIPERACILLIN AND TAZOBACTAM 3.38 G: 3; .375 INJECTION, POWDER, LYOPHILIZED, FOR SOLUTION INTRAVENOUS at 23:53

## 2021-11-28 RX ADMIN — POTASSIUM CHLORIDE, DEXTROSE MONOHYDRATE AND SODIUM CHLORIDE 50 ML/HR: 150; 5; 900 INJECTION, SOLUTION INTRAVENOUS at 04:48

## 2021-11-28 RX ADMIN — POTASSIUM CHLORIDE 40 MEQ: 20 TABLET, EXTENDED RELEASE ORAL at 17:26

## 2021-11-28 RX ADMIN — MAGNESIUM SULFATE HEPTAHYDRATE 2 G: 2 INJECTION, SOLUTION INTRAVENOUS at 12:57

## 2021-11-28 RX ADMIN — ENOXAPARIN SODIUM 40 MG: 100 INJECTION SUBCUTANEOUS at 22:00

## 2021-11-28 RX ADMIN — PIPERACILLIN AND TAZOBACTAM 3.38 G: 3; .375 INJECTION, POWDER, LYOPHILIZED, FOR SOLUTION INTRAVENOUS at 00:33

## 2021-11-28 RX ADMIN — Medication 10 ML: at 13:07

## 2021-11-28 RX ADMIN — VANCOMYCIN HYDROCHLORIDE 750 MG: 750 INJECTION, POWDER, LYOPHILIZED, FOR SOLUTION INTRAVENOUS at 05:53

## 2021-11-28 RX ADMIN — MULTIPLE VITAMINS W/ MINERALS TAB 1 TABLET: TAB at 10:01

## 2021-11-28 RX ADMIN — IPRATROPIUM BROMIDE AND ALBUTEROL SULFATE 3 ML: .5; 2.5 SOLUTION RESPIRATORY (INHALATION) at 08:21

## 2021-11-28 RX ADMIN — METOPROLOL SUCCINATE 25 MG: 25 TABLET, EXTENDED RELEASE ORAL at 10:01

## 2021-11-28 RX ADMIN — IPRATROPIUM BROMIDE AND ALBUTEROL SULFATE 3 ML: .5; 2.5 SOLUTION RESPIRATORY (INHALATION) at 20:43

## 2021-11-28 RX ADMIN — FAMOTIDINE 20 MG: 20 TABLET ORAL at 10:01

## 2021-11-28 RX ADMIN — FAMOTIDINE 20 MG: 20 TABLET ORAL at 17:26

## 2021-11-28 RX ADMIN — POTASSIUM CHLORIDE 40 MEQ: 20 TABLET, EXTENDED RELEASE ORAL at 10:03

## 2021-11-28 RX ADMIN — Medication 10 ML: at 22:00

## 2021-11-28 RX ADMIN — ASPIRIN 81 MG: 81 TABLET, COATED ORAL at 10:02

## 2021-11-28 RX ADMIN — PIPERACILLIN AND TAZOBACTAM 3.38 G: 3; .375 INJECTION, POWDER, LYOPHILIZED, FOR SOLUTION INTRAVENOUS at 12:57

## 2021-11-28 NOTE — PROGRESS NOTES
Hospitalist Progress Note    Subjective:   Daily Progress Note: 11/28/2021   Pt states her back pain is better. C/o lump in left proximal, inner thigh.     Current Facility-Administered Medications   Medication Dose Route Frequency    [START ON 11/29/2021] lisinopriL (PRINIVIL, ZESTRIL) tablet 10 mg  10 mg Oral DAILY    potassium chloride (K-DUR, KLOR-CON M20) SR tablet 40 mEq  40 mEq Oral BID    traMADoL (ULTRAM) tablet 50 mg  50 mg Oral Q6H PRN    lidocaine 4 % patch 1 Patch  1 Patch TransDERmal Q24H    enoxaparin (LOVENOX) injection 40 mg  40 mg SubCUTAneous Q24H    metoprolol succinate (TOPROL-XL) XL tablet 25 mg  25 mg Oral DAILY    sodium chloride (NS) flush 5-40 mL  5-40 mL IntraVENous Q8H    sodium chloride (NS) flush 5-40 mL  5-40 mL IntraVENous PRN    acetaminophen (TYLENOL) tablet 650 mg  650 mg Oral Q6H PRN    Or    acetaminophen (TYLENOL) suppository 650 mg  650 mg Rectal Q6H PRN    polyethylene glycol (MIRALAX) packet 17 g  17 g Oral DAILY PRN    ondansetron (ZOFRAN ODT) tablet 4 mg  4 mg Oral Q8H PRN    Or    ondansetron (ZOFRAN) injection 4 mg  4 mg IntraVENous Q6H PRN    albuterol-ipratropium (DUO-NEB) 2.5 MG-0.5 MG/3 ML  3 mL Nebulization Q6H RT    multivitamin, tx-iron-ca-min (THERA-M w/ IRON) tablet 1 Tablet  1 Tablet Oral DAILY    atorvastatin (LIPITOR) tablet 20 mg  20 mg Oral QHS    famotidine (PEPCID) tablet 20 mg  20 mg Oral BID    mirtazapine (REMERON SOL-TAB) disintegrating tablet 15 mg  15 mg Oral QHS    aspirin delayed-release tablet 81 mg  81 mg Oral DAILY    dextrose 5% - 0.9% NaCl with KCl 20 mEq/L infusion  50 mL/hr IntraVENous CONTINUOUS    senna-docusate (PERICOLACE) 8.6-50 mg per tablet 1 Tablet  1 Tablet Oral DAILY    piperacillin-tazobactam (ZOSYN) 3.375 g in 0.9% sodium chloride (MBP/ADV) 100 mL MBP  3.375 g IntraVENous Q6H    sodium chloride (NS) flush 5-10 mL  5-10 mL IntraVENous PRN        Review of Systems  Pertinent items are noted in HPI.    Objective:     Visit Vitals  /73   Pulse 79   Temp 98.4 °F (36.9 °C)   Resp 20   Ht 5' 3\" (1.6 m)   Wt 47.7 kg (105 lb 3.2 oz)   SpO2 94%   Breastfeeding No   BMI 18.64 kg/m²      O2 Device: None (Room air)    Temp (24hrs), Av.3 °F (36.8 °C), Min:97.9 °F (36.6 °C), Max:98.6 °F (37 °C)      No intake/output data recorded.  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 Data Review    Recent Results (from the past 24 hour(s))   VANCOMYCIN, TROUGH    Collection Time: 21 10:41 PM   Result Value Ref Range    Vancomycin,trough 19.5 10.0 - 20.0 ug/mL    Reported dose date 2021      Reported dose time: 1200      Reported dose: 750MG UNITS   CBC WITH AUTOMATED DIFF    Collection Time: 21  4:58 AM   Result Value Ref Range    WBC 12.6 4.6 - 13.2 K/uL    RBC 3.09 (L) 4.20 - 5.30 M/uL    HGB 9.5 (L) 12.0 - 16.0 g/dL    HCT 29.4 (L) 35.0 - 45.0 %    MCV 95.1 78.0 - 100.0 FL    MCH 30.7 24.0 - 34.0 PG    MCHC 32.3 31.0 - 37.0 g/dL    RDW 13.9 11.6 - 14.5 %    PLATELET 209 (H) 823 - 420 K/uL    MPV 10.1 9.2 - 11.8 FL    NRBC 0.0 0  WBC    ABSOLUTE NRBC 0.00 0.00 - 0.01 K/uL    NEUTROPHILS 64 40 - 73 %    LYMPHOCYTES 27 21 - 52 %    MONOCYTES 8 3 - 10 %    EOSINOPHILS 0 0 - 5 %    BASOPHILS 1 0 - 2 %    IMMATURE GRANULOCYTES 1 (H) 0.0 - 0.5 %    ABS. NEUTROPHILS 8.1 (H) 1.8 - 8.0 K/UL    ABS. LYMPHOCYTES 3.4 0.9 - 3.6 K/UL    ABS. MONOCYTES 1.0 0.05 - 1.2 K/UL    ABS.  EOSINOPHILS 0.0 0.0 - 0.4 K/UL    ABS. BASOPHILS 0.1 0.0 - 0.1 K/UL    ABS. IMM. GRANS. 0.1 (H) 0.00 - 0.04 K/UL    DF AUTOMATED     MAGNESIUM    Collection Time: 11/28/21  4:58 AM   Result Value Ref Range    Magnesium 1.5 (L) 1.6 - 2.6 mg/dL   C REACTIVE PROTEIN, QT    Collection Time: 11/28/21  4:58 AM   Result Value Ref Range    C-Reactive protein 0.3 0 - 0.3 mg/dL   SED RATE (ESR)    Collection Time: 11/28/21  4:58 AM   Result Value Ref Range    Sed rate, automated 28 0 - 30 mm/hr   METABOLIC PANEL, BASIC    Collection Time: 11/28/21 10:55 AM   Result Value Ref Range    Sodium 135 (L) 136 - 145 mmol/L    Potassium 4.2 3.5 - 5.5 mmol/L    Chloride 103 100 - 111 mmol/L    CO2 24 21 - 32 mmol/L    Anion gap 8 3.0 - 18 mmol/L    Glucose 109 (H) 74 - 99 mg/dL    BUN 6 (L) 7.0 - 18 MG/DL    Creatinine 0.61 0.6 - 1.3 MG/DL    BUN/Creatinine ratio 10 (L) 12 - 20      GFR est AA >60 >60 ml/min/1.73m2    GFR est non-AA >60 >60 ml/min/1.73m2    Calcium 9.1 8.5 - 10.1 MG/DL         Assessment/Plan:     Active Problems:    NSTEMI (non-ST elevated myocardial infarction) ([facility] (11/25/2021)      Severe sepsis ([facility] (11/25/2021)      QT prolongation (11/25/2021)    54-year-old female admitted on 11/25 after presenting with chief complaint of dizziness/syncopal event    -Syncope: happened while at [address] [place] [address] unit. Cause remains unclear. Work-up thus far significant for new diagnosis of heart failure with reduced ejection fraction on echo done on 11/25. Cardiology plans for noninvasive work-up for ischemia early next week noted. No clear complaints of chest pain. Also noted to have prolonged QTc, improved off her psych meds she takes for recurrent depressive d/o. Indeterminate level of troponin elevation.    -Hypokalemia, low mag   Cause unclear, not on diuretics. K improved, today after replacement.     -Leukocytosis: On broad-spectrum antibiotics. Cultures remain negative. WBC count normal today.   Source of infection unclear, suspicion that this might be aspiration pneumonia given right sided pulmonary streaky opacities on CT and chest x-ray imagings, on broad spectrum abx. MRI thoracic and lumbar spine negative for infectious etiology.     -Back pain: MRI thoracic and lumbar spine show evidence of DJD. -Multinodular thyroid gland. Largest nodules in the right thyroid lobe measure up to 3.6 cm and in the left  thyroid lobe measures 3.2 cm. Tissue sampling recommended.    -History of depression with psychotic symptoms: [facility] here for [facility]. Off psych meds due to QTc prolognation. Psych input--> plans to monitor pt off psych meds noted. Appreciate assistance. -MBS completed with recs of reg solid diet and thin liquids, meds as tolerated. PLAN:    -Cardiac ischemia work-up per cardiology, plans for stress testing for Monday noted. Patient to be n.p.o. after midnight Sunday night. Continue aspirin, statin and beta-blocker. m report is reviewed  -Consult IR for thyroid nodule bx if pt stays beyond tomorrow will likely need to be done as outpt as pt has stress test scheduled for tomorrow. No need to keep pt in hospital for biopsy.  -Monitor off psych meds  -Avoid QTc prolonging meds, replace mag, monitor and replace K  -Dc vanc, stop zosyn after tomorrow's dose   -May need to consult ID if leukocytosis continues   -Resume Remeron and multivitamin  -Dietitian to follow this patient  -Cont Ultram  for her back pain    Dispo: possibly dc tomorrow after stress test depending on testing result, can get thyroid nodule bx as outpt if not done tomorrow. Disclaimer: Sections of this note are dictated using utilizing voice recognition software, which may have resulted in some phonetic based errors in grammar and contents. Even though attempts were made to correct all the mistakes, some may have been missed, and remained in the body of the document.  If questions arise, please contact our department.

## 2021-11-28 NOTE — ROUTINE PROCESS
Bedside shift change report given to Mari Lee RN (oncoming nurse) by Santino Brown RN (offgoing nurse). Report included the following information SBAR, Kardex, Intake/Output and MAR.

## 2021-11-28 NOTE — PROGRESS NOTES
Problem: Patient Education: Go to Patient Education Activity  Goal: Patient/Family Education  Outcome: Progressing Towards Goal     Problem: Unstable angina/NSTEMI: Day of Admission/Day 1  Goal: Off Pathway (Use only if patient is Off Pathway)  Outcome: Progressing Towards Goal  Goal: Activity/Safety  Outcome: Progressing Towards Goal  Goal: Consults, if ordered  Outcome: Progressing Towards Goal  Goal: Diagnostic Test/Procedures  Outcome: Progressing Towards Goal  Goal: Nutrition/Diet  Outcome: Progressing Towards Goal  Goal: Discharge Planning  Outcome: Progressing Towards Goal  Goal: Medications  Outcome: Progressing Towards Goal  Goal: Respiratory  Outcome: Progressing Towards Goal  Goal: Treatments/Interventions/Procedures  Outcome: Progressing Towards Goal  Goal: Psychosocial  Outcome: Progressing Towards Goal  Goal: *Hemodynamically stable  Outcome: Progressing Towards Goal  Goal: *Optimal pain control at patient's stated goal  Outcome: Progressing Towards Goal  Goal: *Lungs clear or at baseline  Outcome: Progressing Towards Goal     Problem: Unstable angina/NSTEMI: Day 2  Goal: Off Pathway (Use only if patient is Off Pathway)  Outcome: Progressing Towards Goal  Goal: Activity/Safety  Outcome: Progressing Towards Goal  Goal: Consults, if ordered  Outcome: Progressing Towards Goal  Goal: Diagnostic Test/Procedures  Outcome: Progressing Towards Goal  Goal: Nutrition/Diet  Outcome: Progressing Towards Goal  Goal: Discharge Planning  Outcome: Progressing Towards Goal  Goal: Medications  Outcome: Progressing Towards Goal  Goal: Respiratory  Outcome: Progressing Towards Goal  Goal: Treatments/Interventions/Procedures  Outcome: Progressing Towards Goal  Goal: Psychosocial  Outcome: Progressing Towards Goal  Goal: *Hemodynamically stable  Outcome: Progressing Towards Goal  Goal: *Optimal pain control at patient's stated goal  Outcome: Progressing Towards Goal  Goal: *Lungs clear or at baseline  Outcome: Progressing Towards Goal     Problem: Unstable angina/NSTEMI: Day 2  Goal: Off Pathway (Use only if patient is Off Pathway)  Outcome: Progressing Towards Goal  Goal: Activity/Safety  Outcome: Progressing Towards Goal  Goal: Consults, if ordered  Outcome: Progressing Towards Goal  Goal: Diagnostic Test/Procedures  Outcome: Progressing Towards Goal  Goal: Nutrition/Diet  Outcome: Progressing Towards Goal  Goal: Discharge Planning  Outcome: Progressing Towards Goal  Goal: Medications  Outcome: Progressing Towards Goal  Goal: Respiratory  Outcome: Progressing Towards Goal  Goal: Treatments/Interventions/Procedures  Outcome: Progressing Towards Goal  Goal: Psychosocial  Outcome: Progressing Towards Goal  Goal: *Hemodynamically stable  Outcome: Progressing Towards Goal  Goal: *Optimal pain control at patient's stated goal  Outcome: Progressing Towards Goal  Goal: *Lungs clear or at baseline  Outcome: Progressing Towards Goal     Problem: Pressure Injury - Risk of  Goal: *Prevention of pressure injury  Description: Document Reid Scale and appropriate interventions in the flowsheet. Outcome: Progressing Towards Goal  Note: Pressure Injury Interventions:             Activity Interventions: Assess need for specialty bed    Mobility Interventions: HOB 30 degrees or less, Pressure redistribution bed/mattress (bed type)    Nutrition Interventions: Document food/fluid/supplement intake                     Problem: Patient Education: Go to Patient Education Activity  Goal: Patient/Family Education  Outcome: Progressing Towards Goal

## 2021-11-28 NOTE — PROGRESS NOTES
Reason for Admission:  NSTEMI (non-ST elevated myocardial infarction) ([facility] [I21.4]  Severe sepsis ([facility] [A41.9, R65.20]                 RUR Score:    12            Plan for utilizing home health:    tbd                      Likelihood of Readmission:   LOW                         Transition of Care Plan:              Initial assessment completed with patient. Cognitive status of patient: oriented to time, place, person and situation. Face sheet information confirmed:  yes. The patient designates sister to participate in her discharge plan and to receive any needed information. This patient lives in a mobile home with . Patient is able to navigate steps as needed. Prior to hospitalization, patient was considered to be independent with ADLs/IADLS : yes Patient has a current ACP document on file: yes      Healthcare Decision Maker:   Primary Decision Maker: Albert Martinez - [phone]    Click here to complete [address] including selection of the Healthcare Decision Maker Relationship (ie \"Primary\")    The friend will be available to transport patient home upon discharge. The patient already has none reported, and  medical equipment available in the home. Patient is not currently active with home health. Patient has not stayed in a skilled nursing facility or rehab. Was  stay within last 60 days : no. This patient is on dialysis :no         Currently, the discharge plan is Home. The patient states that she can obtain her medications from the pharmacy, and take her medications as directed. Patient's current insurance is [employer]       Care Management Interventions  PCP Verified by CM:  Yes  Mode of Transport at Discharge: Self  Transition of Care Consult (CM Consult): Discharge Planning  Support Systems: Other Family Member(s)  Confirm Follow Up Transport: Self  The Plan for Transition of Care is Related to the Following Treatment Goals : home  Discharge Location  Discharge Placement: Home

## 2021-11-29 ENCOUNTER — APPOINTMENT (OUTPATIENT)
Dept: NUCLEAR MEDICINE | Age: 66
DRG: 286 | End: 2021-11-29
Attending: STUDENT IN AN ORGANIZED HEALTH CARE EDUCATION/TRAINING PROGRAM
Payer: MEDICARE

## 2021-11-29 ENCOUNTER — APPOINTMENT (OUTPATIENT)
Dept: NON INVASIVE DIAGNOSTICS | Age: 66
DRG: 286 | End: 2021-11-29
Attending: STUDENT IN AN ORGANIZED HEALTH CARE EDUCATION/TRAINING PROGRAM
Payer: MEDICARE

## 2021-11-29 LAB
ATRIAL RATE: 69 BPM
CALCULATED P AXIS, ECG09: 17 DEGREES
CALCULATED R AXIS, ECG10: 27 DEGREES
CALCULATED T AXIS, ECG11: -83 DEGREES
DIAGNOSIS, 93000: NORMAL
MAGNESIUM SERPL-MCNC: 2.2 MG/DL (ref 1.6–2.6)
P-R INTERVAL, ECG05: 124 MS
Q-T INTERVAL, ECG07: 458 MS
QRS DURATION, ECG06: 124 MS
QTC CALCULATION (BEZET), ECG08: 490 MS
STRESS BASELINE DIAS BP: 71 MMHG
STRESS BASELINE HR: 69 BPM
STRESS BASELINE SYS BP: 109 MMHG
STRESS ESTIMATED WORKLOAD: 1 METS
STRESS EXERCISE DUR MIN: NORMAL
STRESS PEAK DIAS BP: 93 MMHG
STRESS PEAK SYS BP: 132 MMHG
STRESS PERCENT HR ACHIEVED: 63 %
STRESS POST PEAK HR: 97 BPM
STRESS RATE PRESSURE PRODUCT: NORMAL BPM*MMHG
STRESS ST DEPRESSION: 0 MM
STRESS ST ELEVATION: 0 MM
STRESS TARGET HR: 153 BPM
VENTRICULAR RATE, ECG03: 69 BPM

## 2021-11-29 PROCEDURE — 74011000258 HC RX REV CODE- 258: Performed by: INTERNAL MEDICINE

## 2021-11-29 PROCEDURE — 4A023N7 MEASUREMENT OF CARDIAC SAMPLING AND PRESSURE, LEFT HEART, PERCUTANEOUS APPROACH: ICD-10-PCS | Performed by: INTERNAL MEDICINE

## 2021-11-29 PROCEDURE — 2709999900 HC NON-CHARGEABLE SUPPLY

## 2021-11-29 PROCEDURE — 74011000250 HC RX REV CODE- 250: Performed by: INTERNAL MEDICINE

## 2021-11-29 PROCEDURE — 99232 SBSQ HOSP IP/OBS MODERATE 35: CPT | Performed by: INTERNAL MEDICINE

## 2021-11-29 PROCEDURE — 99152 MOD SED SAME PHYS/QHP 5/>YRS: CPT | Performed by: INTERNAL MEDICINE

## 2021-11-29 PROCEDURE — 74011250636 HC RX REV CODE- 250/636: Performed by: INTERNAL MEDICINE

## 2021-11-29 PROCEDURE — 74011000636 HC RX REV CODE- 636: Performed by: INTERNAL MEDICINE

## 2021-11-29 PROCEDURE — 36415 COLL VENOUS BLD VENIPUNCTURE: CPT

## 2021-11-29 PROCEDURE — 65660000000 HC RM CCU STEPDOWN

## 2021-11-29 PROCEDURE — 93018 CV STRESS TEST I&R ONLY: CPT | Performed by: INTERNAL MEDICINE

## 2021-11-29 PROCEDURE — 77030016699 HC CATH ANGI DX INFN1 CARD -A: Performed by: INTERNAL MEDICINE

## 2021-11-29 PROCEDURE — 93458 L HRT ARTERY/VENTRICLE ANGIO: CPT | Performed by: INTERNAL MEDICINE

## 2021-11-29 PROCEDURE — B2111ZZ FLUOROSCOPY OF MULTIPLE CORONARY ARTERIES USING LOW OSMOLAR CONTRAST: ICD-10-PCS | Performed by: INTERNAL MEDICINE

## 2021-11-29 PROCEDURE — 93016 CV STRESS TEST SUPVJ ONLY: CPT | Performed by: INTERNAL MEDICINE

## 2021-11-29 PROCEDURE — 78452 HT MUSCLE IMAGE SPECT MULT: CPT

## 2021-11-29 PROCEDURE — 93005 ELECTROCARDIOGRAM TRACING: CPT

## 2021-11-29 PROCEDURE — 74011250637 HC RX REV CODE- 250/637: Performed by: STUDENT IN AN ORGANIZED HEALTH CARE EDUCATION/TRAINING PROGRAM

## 2021-11-29 PROCEDURE — 77030013797 HC KT TRNSDUC PRSSR EDWD -A: Performed by: INTERNAL MEDICINE

## 2021-11-29 PROCEDURE — 78452 HT MUSCLE IMAGE SPECT MULT: CPT | Performed by: INTERNAL MEDICINE

## 2021-11-29 PROCEDURE — C1894 INTRO/SHEATH, NON-LASER: HCPCS | Performed by: INTERNAL MEDICINE

## 2021-11-29 PROCEDURE — 93017 CV STRESS TEST TRACING ONLY: CPT

## 2021-11-29 PROCEDURE — B2151ZZ FLUOROSCOPY OF LEFT HEART USING LOW OSMOLAR CONTRAST: ICD-10-PCS | Performed by: INTERNAL MEDICINE

## 2021-11-29 PROCEDURE — 74011250637 HC RX REV CODE- 250/637: Performed by: INTERNAL MEDICINE

## 2021-11-29 PROCEDURE — 83735 ASSAY OF MAGNESIUM: CPT

## 2021-11-29 RX ORDER — HEPARIN SODIUM 200 [USP'U]/100ML
INJECTION, SOLUTION INTRAVENOUS
Status: COMPLETED | OUTPATIENT
Start: 2021-11-29 | End: 2021-11-29

## 2021-11-29 RX ORDER — FENTANYL CITRATE 50 UG/ML
INJECTION, SOLUTION INTRAMUSCULAR; INTRAVENOUS AS NEEDED
Status: DISCONTINUED | OUTPATIENT
Start: 2021-11-29 | End: 2021-11-29 | Stop reason: HOSPADM

## 2021-11-29 RX ORDER — SODIUM CHLORIDE 9 MG/ML
250 INJECTION, SOLUTION INTRAVENOUS ONCE
Status: COMPLETED | OUTPATIENT
Start: 2021-11-29 | End: 2021-11-29

## 2021-11-29 RX ORDER — SODIUM CHLORIDE 0.9 % (FLUSH) 0.9 %
5-40 SYRINGE (ML) INJECTION EVERY 8 HOURS
Status: DISCONTINUED | OUTPATIENT
Start: 2021-11-29 | End: 2021-11-30 | Stop reason: HOSPADM

## 2021-11-29 RX ORDER — MIDAZOLAM HYDROCHLORIDE 1 MG/ML
INJECTION, SOLUTION INTRAMUSCULAR; INTRAVENOUS AS NEEDED
Status: DISCONTINUED | OUTPATIENT
Start: 2021-11-29 | End: 2021-11-29 | Stop reason: HOSPADM

## 2021-11-29 RX ORDER — SODIUM CHLORIDE 0.9 % (FLUSH) 0.9 %
5-40 SYRINGE (ML) INJECTION AS NEEDED
Status: DISCONTINUED | OUTPATIENT
Start: 2021-11-29 | End: 2021-11-30 | Stop reason: HOSPADM

## 2021-11-29 RX ORDER — LIDOCAINE HYDROCHLORIDE 10 MG/ML
INJECTION, SOLUTION EPIDURAL; INFILTRATION; INTRACAUDAL; PERINEURAL AS NEEDED
Status: DISCONTINUED | OUTPATIENT
Start: 2021-11-29 | End: 2021-11-29 | Stop reason: HOSPADM

## 2021-11-29 RX ADMIN — REGADENOSON 0.4 MG: 0.08 INJECTION, SOLUTION INTRAVENOUS at 08:40

## 2021-11-29 RX ADMIN — ENOXAPARIN SODIUM 40 MG: 100 INJECTION SUBCUTANEOUS at 22:36

## 2021-11-29 RX ADMIN — MIRTAZAPINE 15 MG: 15 TABLET, ORALLY DISINTEGRATING ORAL at 22:36

## 2021-11-29 RX ADMIN — LISINOPRIL 10 MG: 10 TABLET ORAL at 13:12

## 2021-11-29 RX ADMIN — Medication 10 ML: at 22:37

## 2021-11-29 RX ADMIN — FAMOTIDINE 20 MG: 20 TABLET ORAL at 18:21

## 2021-11-29 RX ADMIN — METOPROLOL SUCCINATE 25 MG: 25 TABLET, EXTENDED RELEASE ORAL at 13:12

## 2021-11-29 RX ADMIN — PIPERACILLIN AND TAZOBACTAM 3.38 G: 3; .375 INJECTION, POWDER, LYOPHILIZED, FOR SOLUTION INTRAVENOUS at 18:41

## 2021-11-29 RX ADMIN — SODIUM CHLORIDE 250 ML: 900 INJECTION, SOLUTION INTRAVENOUS at 08:40

## 2021-11-29 RX ADMIN — Medication 10 ML: at 18:25

## 2021-11-29 RX ADMIN — Medication 10 ML: at 05:52

## 2021-11-29 RX ADMIN — Medication 10 ML: at 22:38

## 2021-11-29 RX ADMIN — PIPERACILLIN AND TAZOBACTAM 3.38 G: 3; .375 INJECTION, POWDER, LYOPHILIZED, FOR SOLUTION INTRAVENOUS at 05:49

## 2021-11-29 RX ADMIN — PIPERACILLIN AND TAZOBACTAM 3.38 G: 3; .375 INJECTION, POWDER, LYOPHILIZED, FOR SOLUTION INTRAVENOUS at 13:12

## 2021-11-29 RX ADMIN — ASPIRIN 81 MG: 81 TABLET, COATED ORAL at 13:12

## 2021-11-29 RX ADMIN — FAMOTIDINE 20 MG: 20 TABLET ORAL at 13:12

## 2021-11-29 RX ADMIN — ATORVASTATIN CALCIUM 20 MG: 20 TABLET, FILM COATED ORAL at 22:36

## 2021-11-29 RX ADMIN — POTASSIUM CHLORIDE, DEXTROSE MONOHYDRATE AND SODIUM CHLORIDE 50 ML/HR: 150; 5; 900 INJECTION, SOLUTION INTRAVENOUS at 22:35

## 2021-11-29 RX ADMIN — POTASSIUM CHLORIDE 40 MEQ: 20 TABLET, EXTENDED RELEASE ORAL at 18:21

## 2021-11-29 RX ADMIN — POTASSIUM CHLORIDE, DEXTROSE MONOHYDRATE AND SODIUM CHLORIDE 50 ML/HR: 150; 5; 900 INJECTION, SOLUTION INTRAVENOUS at 07:01

## 2021-11-29 NOTE — PROGRESS NOTES
1600 Bedside and Verbal shift change report given to Gris Curry RN  (oncoming nurse) by Doris Gee RN  (offgoing nurse). Report included the following information SBAR, Kardex, Procedure Summary, Intake/Output, MAR and Recent Results. 1655 TRANSFER - OUT REPORT:    Verbal report given to Arline Vallecillo RN (name) on Gigi Joy  being transferred to  (unit) for routine progression of care       Report consisted of patients Situation, Background, Assessment and   Recommendations(SBAR). Information from the following report(s) SBAR, Kardex, Procedure Summary, Intake/Output, MAR and Recent Results was reviewed with the receiving nurse. Lines:   Peripheral IV 11/24/21 Left Antecubital (Active)   Site Assessment Clean, dry, & intact 11/29/21 0701   Phlebitis Assessment 0 11/29/21 0701   Infiltration Assessment 0 11/29/21 0701   Dressing Status Clean, dry, & intact 11/29/21 0701   Dressing Type Tape; Transparent 11/29/21 0701   Hub Color/Line Status Pink; Flushed 11/29/21 0701   Action Taken Open ports on tubing capped 11/29/21 0701       Peripheral IV 11/24/21 Right Forearm (Active)   Site Assessment Clean, dry, & intact 11/29/21 0701   Phlebitis Assessment 0 11/29/21 0701   Infiltration Assessment 0 11/29/21 0701   Dressing Status Clean, dry, & intact 11/29/21 0701   Dressing Type Tape; Transparent 11/29/21 0701   Hub Color/Line Status Blue; Infusing; Flushed 11/29/21 0701   Action Taken Blood drawn 11/29/21 0701        Opportunity for questions and clarification was provided. Patient transported with:   Registered Nurse     [id number] Site verified with floor RN at this time. R groin site is clean, dry and intact with no signs or symptoms of bleeding or hematoma at this time.

## 2021-11-29 NOTE — PROGRESS NOTES
Physician Progress Note      PATIENT:               Khloe Tony  CSN #:                  [id number]  :                       1955  ADMIT DATE:       2021 9:13 PM  [address] [place] DATE:  RESPONDING  PROVIDER #:        Shakeel Stewart MD [id number] Burr MD          QUERY TEXT:    Pt admitted with syncope. Noted documentation of severe malnutrition on  by RD consultant. If possible, please document in progress notes and discharge summary:      The medical record reflects the following:  Risk Factors: Past medical hx:  Depression, GI disorder, pancreatitic disease, pancreatitis, GERD  Clinical Indicators: Per  RD - Malnutrition Status:  Severe malnutrition  Context:  Chronic illness  Findings of the 6 clinical characteristics of malnutrition:  Energy Intake:  Mild decrease in energy intake (specify) (poor po intake of meals PTA)  Body Fat Loss:  7 - Severe body fat loss, Triceps, Orbital  Muscle Mass Loss:  7 - Severe muscle mass loss  Treatment: RD following    Thank you,  Heike Portillo, [facility], [facility]  . Options provided:  -- Severe malnutrition confirmed present on admission  -- Other - I will add my own diagnosis  -- Disagree - Not applicable / Not valid  -- Disagree - Clinically unable to determine / Unknown  -- Refer to Clinical Documentation Reviewer    PROVIDER RESPONSE TEXT:    The diagnosis of severe malnutrition was confirmed as present on admission.     Query created by: Leata Lennox on 2021 11:36 AM      Electronically signed by:  Shakeel Stewart MD [id number] Burr MD 2021 12:40 PM

## 2021-11-29 NOTE — PROGRESS NOTES
Cardiology ProgressNote      Date of  Admission: 11/24/2021  9:13 PM   Primary Care Physician: Clarissa Ann MD       Assessment:     Hospital Problems  Date Reviewed: 2/23/2017          Codes Class Noted POA    NSTEMI (non-ST elevated myocardial infarction) [facility]) ICD-10-CM: I21.4  ICD-9-CM: 410.70  11/25/2021 Yes        Severe sepsis ([address] ICD-10-CM: A41.9, R65.20  ICD-9-CM: 038.9, 995.92  11/25/2021 Yes        QT prolongation ICD-10-CM: R94.31  ICD-9-CM: 794.31  11/25/2021 Yes               1. Prolong QT- last EKG done on 11/26 shows  ms    2. Syncope- no recurrence   3. Cardiomyopathy with EF 30-35%- unclear etiology   4. Hypertension- on ACEi  5. Hypomagnesemia- resolved   6. Hypokalemia- resolved   7. History of depression with psychotic symptoms: [facility] here for [facility]ope. Off psych medications in the setting #1        Echo 11/21  · LV: Estimated LVEF is 30 - 35%. Visually measured ejection fraction. Normal cavity size. Moderate concentric hypertrophy. Moderately reduced systolic function. Moderate (grade 2) left ventricular diastolic dysfunction. · LA: Moderately dilated left atrium. Left Atrium volume index is 45 mL/m2. · AV: Aortic valve focal thickening present. With no significant stenosis. Mild to moderate aortic valve regurgitation is present. · TV: Mild tricuspid valve regurgitation is present. Right Ventricular Arterial Pressure (RVSP) is 28 mmHg. Pulmonary hypertension not suggested by Doppler findings. · PV: Mild to moderate pulmonic valve regurgitation is present.         Primary cardiologist: none     Plan:     Follow ekg today  Rhythm stable  Continue to monitor and replace electrolytes as needed  NST today to r/o ischemia   Continue ASA, Statin and BB  Continue ACEi  Given her history of syncope without any prodromal symptoms without clear etiology would be reasonable to consider a loop recorder prior to discharge given these episodes are every 5 to 6 months to make sure no tachyarrhythmias in the setting of new CM which is causing her episodes. Patient seen for h/o syncope/ severe LV dysfunction-- stress test revealed moderate reversible ischemia involving anteroseptal/ apical wall. Discussed with patient regarding further management. Willing to proceed with coronary angiogram to evaluate CAD. Meanwhile continue current meds  No new symptoms  Risks, benefits and alternatives of LHC/ptca/stent explained to patient. All questions answered    Overnight Events:     Overall feels better without significant complaints.      Past Medical History:     Past Medical History:   Diagnosis Date    Depression     Gastrointestinal disorder Pancreatitis    Pancreatic disease     Pancreatitis     Psychiatric disorder          Social History:     Social History     Socioeconomic History    Marital status:    Tobacco Use    Smoking status: Current Every Day Smoker     Packs/day: 1.00   Substance and Sexual Activity    Alcohol use: No     Comment: Per pt she quit in 2007    Drug use: No    Sexual activity: Not Currently        Family History:     Family History   Problem Relation Age of Onset    Diabetes Maternal Aunt     Cancer Maternal Aunt     Alzheimer's Disease Maternal Aunt     Cancer Paternal Aunt     Diabetes Paternal Aunt         Medications:   No Known Allergies     Current Facility-Administered Medications   Medication Dose Route Frequency    regadenoson (LEXISCAN) injection 0.4 mg  0.4 mg IntraVENous CARD ONCE    0.9% sodium chloride infusion 250 mL  250 mL IntraVENous ONCE    technetium sestamibi (CARDIOLITE) injection 33 millicurie  33 millicurie IntraVENous ONCE    lisinopriL (PRINIVIL, ZESTRIL) tablet 10 mg  10 mg Oral DAILY    potassium chloride (K-DUR, KLOR-CON M20) SR tablet 40 mEq  40 mEq Oral BID    traMADoL (ULTRAM) tablet 50 mg  50 mg Oral Q6H PRN    lidocaine 4 % patch 1 Patch  1 Patch TransDERmal Q24H    enoxaparin (LOVENOX) injection 40 mg  40 mg SubCUTAneous Q24H    metoprolol succinate (TOPROL-XL) XL tablet 25 mg  25 mg Oral DAILY    sodium chloride (NS) flush 5-40 mL  5-40 mL IntraVENous Q8H    sodium chloride (NS) flush 5-40 mL  5-40 mL IntraVENous PRN    acetaminophen (TYLENOL) tablet 650 mg  650 mg Oral Q6H PRN    Or    acetaminophen (TYLENOL) suppository 650 mg  650 mg Rectal Q6H PRN    polyethylene glycol (MIRALAX) packet 17 g  17 g Oral DAILY PRN    ondansetron (ZOFRAN ODT) tablet 4 mg  4 mg Oral Q8H PRN    Or    ondansetron (ZOFRAN) injection 4 mg  4 mg IntraVENous Q6H PRN    albuterol-ipratropium (DUO-NEB) 2.5 MG-0.5 MG/3 ML  3 mL Nebulization Q6H RT    multivitamin, tx-iron-ca-min (THERA-M w/ IRON) tablet 1 Tablet  1 Tablet Oral DAILY    atorvastatin (LIPITOR) tablet 20 mg  20 mg Oral QHS    famotidine (PEPCID) tablet 20 mg  20 mg Oral BID    mirtazapine (REMERON SOL-TAB) disintegrating tablet 15 mg  15 mg Oral QHS    aspirin delayed-release tablet 81 mg  81 mg Oral DAILY    dextrose 5% - 0.9% NaCl with KCl 20 mEq/L infusion  50 mL/hr IntraVENous CONTINUOUS    senna-docusate (PERICOLACE) 8.6-50 mg per tablet 1 Tablet  1 Tablet Oral DAILY    piperacillin-tazobactam (ZOSYN) 3.375 g in 0.9% sodium chloride (MBP/ADV) 100 mL MBP  3.375 g IntraVENous Q6H    sodium chloride (NS) flush 5-10 mL  5-10 mL IntraVENous PRN         Physical Exam:     Visit Vitals  /73 (BP 1 Location: Left upper arm, BP Patient Position: At rest)   Pulse 80   Temp 97.8 °F (36.6 °C)   Resp 18   Ht 5' 3\" (1.6 m)   Wt 47.6 kg (105 lb)   SpO2 97%   Breastfeeding No   BMI 18.60 kg/m²       TELE: normal sinus rhythm significantly prolonged QTC    BP Readings from Last 3 Encounters:   11/29/21 123/73   11/24/21 97/64   10/05/17 112/64     Pulse Readings from Last 3 Encounters:   11/29/21 80   11/24/21 97   10/05/17 76     Wt Readings from Last 3 Encounters:   11/29/21 47.6 kg (105 lb)   11/20/21 44.9 kg (99 lb)   10/05/17 41.7 kg (92 lb)       General:  alert, cooperative, no distress, appears stated age, cachectic  Neck:  no carotid bruit, no JVD  Lungs:  clear to auscultation bilaterally  Heart:  regular rate and rhythm, S1, S2 normal, no murmur, click, rub or gallop  Abdomen:  abdomen is soft without significant tenderness, masses, organomegaly or guarding  Extremities:  extremities normal, atraumatic, no cyanosis or edema  Skin: Warm and dry.  no hyperpigmentation, vitiligo, or suspicious lesions  Neuro: alert, oriented x3, affect appropriate, no focal neurological deficits, moves all extremities well, no involuntary movements, reflexes at knee and ankle intact       Data Review:     Recent Labs     11/28/21  0458 11/26/21  1636   WBC 12.6 17.2*   HGB 9.5* 9.5*   HCT 29.4* 29.5*   * 526*     Recent Labs     11/29/21  0345 11/28/21  1055 11/28/21  0458 11/27/21  0327   NA  --  135*  --  137   K  --  4.2  --  2.6*   CL  --  103  --  103   CO2  --  24  --  26   GLU  --  109*  --  95   BUN  --  6*  --  8   CREA  --  0.61  --  0.54*   CA  --  9.1  --  8.8   MG 2.2  --  1.5*  --        Results for orders placed or performed during the hospital encounter of 11/24/21   EKG, 12 LEAD, INITIAL   Result Value Ref Range    Ventricular Rate 81 BPM    Atrial Rate 81 BPM    P-R Interval 126 ms    QRS Duration 126 ms    Q-T Interval 526 ms    QTC Calculation (Bezet) 611 ms    Calculated P Axis 77 degrees    Calculated R Axis -81 degrees    Calculated T Axis -97 degrees    Diagnosis       Normal sinus rhythm  Right bundle branch block  Left anterior fascicular block  Inferior infarct (cited on or before 24-NOV-2021)  T wave abnormality, consider lateral ischemia  Abnormal ECG  When compared with ECG of 24-NOV-2021 21:19,  QT has lengthened  Confirmed by Cheryl Madrid M.D., [address] [place] [address] ([id number]) on 11/25/2021 8:35:25 PM         All Cardiac Markers in the last 24 hours:    No results found for: CPK, CK, CKMMB, CKMB, RCK3, CKMBT, CKNDX, CKND1, MIKE, TROPT, TROIQ, STEPHAN, TROPT, TNIPOC, BNP, BNPP    Last Lipid:    Lab Results   Component Value Date/Time    Cholesterol, total 195 11/25/2021 04:42 AM    HDL Cholesterol 48 11/25/2021 04:42 AM    LDL, calculated 126.4 (H) 11/25/2021 04:42 AM    Triglyceride 103 11/25/2021 04:42 AM    CHOL/HDL Ratio 4.1 11/25/2021 04:42 AM       Cardiographics:     EKG Results     Procedure 720 Value Units Date/Time    EKG, 12 LEAD, SUBSEQUENT [[id number]] Collected: 11/26/21 0838    Order Status: Completed Updated: 11/27/21 2135     Ventricular Rate 83 BPM      Atrial Rate 83 BPM      P-R Interval 138 ms      QRS Duration 126 ms      Q-T Interval 424 ms      QTC Calculation (Bezet) 498 ms      Calculated P Axis 48 degrees      Calculated R Axis -46 degrees      Calculated T Axis -90 degrees      Diagnosis --     Normal sinus rhythm  Left axis deviation  Right bundle branch block  T wave abnormality, consider lateral ischemia  Abnormal ECG  When compared with ECG of 24-NOV-2021 21:20,  QT has shortened  Confirmed by Tyrel Galindo M.D., [facility] ([id number]) on 11/27/2021 9:35:38 PM      EKG, 12 LEAD, INITIAL [[id number]] Collected: 11/24/21 2119    Order Status: Completed Updated: 11/25/21 2035     Ventricular Rate 81 BPM      Atrial Rate 81 BPM      P-R Interval 126 ms      QRS Duration 126 ms      Q-T Interval 526 ms      QTC Calculation (Bezet) 611 ms      Calculated P Axis 77 degrees      Calculated R Axis -81 degrees      Calculated T Axis -97 degrees      Diagnosis --     Normal sinus rhythm  Right bundle branch block  Left anterior fascicular block  Inferior infarct (cited on or before 24-NOV-2021)  T wave abnormality, consider lateral ischemia  Abnormal ECG  When compared with ECG of 24-NOV-2021 21:19,  QT has lengthened  Confirmed by Tyrel Galindo M.D., [facility] ([id number]) on 11/25/2021 8:35:25 PM            XR Results (most recent):  Results from [place] encounter on 11/24/21    XR SPINE LUMB 2 OR 3 V    Narrative  XR SPINE LUMB 2 OR 3 V: 11/26/2021 4:08 PM    CLINICAL INFORMATION  back pain. COMPARISON  CT abdomen/pelvis 25 November 2021    TECHNIQUE  3 views of the lumbar spine    FINDINGS:  No fracture or destructive osseous lesion. Slight levoconvex curvature of the  lumbar spine. Straightening of the expected lumbar lordosis. The joint spaces  are maintained. Enteric contrast outlines portions of the colon. Impression  1. No acute osseous findings. Signed By: NICK Mcgraw supervised    November 29, 2021      I have independently evaluated and examined the patient. All relevant labs and testing data's are reviewed. Care plan discussed and updated after review.     Sarahi Escobedo MD

## 2021-11-29 NOTE — PROGRESS NOTES
Bedside shift change report given to Choco Eli RN (oncoming nurse) by Lei Valdivia RN (offgoing nurse). Report included the following information SBAR, Kardex, Intake/Output and MAR.

## 2021-11-29 NOTE — PROGRESS NOTES
Hospitalist Progress Note    Subjective:   Daily Progress Note: 11/29/2021   Pt w/o complaints. Current Facility-Administered Medications   Medication Dose Route Frequency    lisinopriL (PRINIVIL, ZESTRIL) tablet 10 mg  10 mg Oral DAILY    potassium chloride (K-DUR, KLOR-CON M20) SR tablet 40 mEq  40 mEq Oral BID    traMADoL (ULTRAM) tablet 50 mg  50 mg Oral Q6H PRN    lidocaine 4 % patch 1 Patch  1 Patch TransDERmal Q24H    enoxaparin (LOVENOX) injection 40 mg  40 mg SubCUTAneous Q24H    metoprolol succinate (TOPROL-XL) XL tablet 25 mg  25 mg Oral DAILY    sodium chloride (NS) flush 5-40 mL  5-40 mL IntraVENous Q8H    sodium chloride (NS) flush 5-40 mL  5-40 mL IntraVENous PRN    acetaminophen (TYLENOL) tablet 650 mg  650 mg Oral Q6H PRN    Or    acetaminophen (TYLENOL) suppository 650 mg  650 mg Rectal Q6H PRN    polyethylene glycol (MIRALAX) packet 17 g  17 g Oral DAILY PRN    ondansetron (ZOFRAN ODT) tablet 4 mg  4 mg Oral Q8H PRN    Or    ondansetron (ZOFRAN) injection 4 mg  4 mg IntraVENous Q6H PRN    albuterol-ipratropium (DUO-NEB) 2.5 MG-0.5 MG/3 ML  3 mL Nebulization Q6H RT    multivitamin, tx-iron-ca-min (THERA-M w/ IRON) tablet 1 Tablet  1 Tablet Oral DAILY    atorvastatin (LIPITOR) tablet 20 mg  20 mg Oral QHS    famotidine (PEPCID) tablet 20 mg  20 mg Oral BID    mirtazapine (REMERON SOL-TAB) disintegrating tablet 15 mg  15 mg Oral QHS    aspirin delayed-release tablet 81 mg  81 mg Oral DAILY    dextrose 5% - 0.9% NaCl with KCl 20 mEq/L infusion  50 mL/hr IntraVENous CONTINUOUS    senna-docusate (PERICOLACE) 8.6-50 mg per tablet 1 Tablet  1 Tablet Oral DAILY    piperacillin-tazobactam (ZOSYN) 3.375 g in 0.9% sodium chloride (MBP/ADV) 100 mL MBP  3.375 g IntraVENous Q6H    sodium chloride (NS) flush 5-10 mL  5-10 mL IntraVENous PRN        Review of Systems  Pertinent items are noted in HPI.     Objective:     Visit Vitals  /73 (BP 1 Location: Left upper arm, BP Patient Position: At rest)   Pulse 80   Temp 97.8 °F (36.6 °C)   Resp 18   Ht 5' 3\" (1.6 m)   Wt 47.6 kg (105 lb)   SpO2 97%   Breastfeeding No   BMI 18.60 kg/m²      O2 Device: None (Room air)    Temp (24hrs), Av °F (36.7 °C), Min:97.8 °F (36.6 °C), Max:98.4 °F (36.9 °C)      701 - 1900  In: 60 [I.V.:60]  Out: -   1901 - 700  In: 4340.8 [I.V.:4340.8]  Out: -       General appearance - alert, cachectic, bilateral temporal wasting present, and in no distress  Chest -ctab  Heart - S1 and S2 normal  Abdomen - soft, nontender, nondistended, Bowel sounds present  Neurological - alert, oriented, normal speech, no focal findings noted, no tremors  Musculoskeletal - point tenderness of lower thoracic vertebrae    Extremities - no pedal edema noted, 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       Data Review    Recent Results (from the past 24 hour(s))   MAGNESIUM    Collection Time: 21  3:45 AM   Result Value Ref Range    Magnesium 2.2 1.6 - 2.6 mg/dL   EKG, 12 LEAD, SUBSEQUENT    Collection Time: 21  8:15 AM   Result Value Ref Range    Ventricular Rate 69 BPM    Atrial Rate 69 BPM    P-R Interval 124 ms    QRS Duration 124 ms    Q-T Interval 458 ms    QTC Calculation (Bezet) 490 ms    Calculated P Axis 17 degrees    Calculated R Axis 27 degrees    Calculated T Axis -83 degrees    Diagnosis       Normal sinus rhythm  Right bundle branch block  T wave abnormality, consider inferolateral ischemia  Abnormal ECG  When compared with ECG of 2021 08:38,  No significant change was found  Confirmed by Nabeel Newberry MD, ----- ([id number]) on 2021 3:13:08 PM     NUCLEAR CARDIAC STRESS TEST    Collection Time: 21 11:12 AM   Result Value Ref Range    Target  bpm    Exercise duration time 00:04:33     Stress Base Systolic  mmHg    Stress Base Diastolic BP 93 mmHg    Post peak HR 97 BPM    Baseline HR 69 BPM    Estimated workload 1.0 METS    Baseline  mmHg    Percent HR 63 %    ST Elevation (mm) 0 mm    ST Depression (mm) 0 mm    Stress Base Diastolic BP 71 mmHg    Stress Rate Pressure Product 12,804 BPM*mmHg         Assessment/Plan:     Active Problems:    NSTEMI (non-ST elevated myocardial infarction) ([facility] (11/25/2021)      Severe sepsis ([facility] (11/25/2021)      QT prolongation (11/25/2021)    58-year-old female admitted on 11/25 after presenting with chief complaint of dizziness/syncopal event    -Syncope: happened while at [facility] unit. Cause remains unclear. Work-up thus far significant for new diagnosis of heart failure with reduced ejection fraction on echo done on 11/25, abnormal stress test today 11/29. Also noted to have prolonged QTc, improved off her psych meds she takes for recurrent depressive d/o. Indeterminate level of troponin elevation. To get cardiac cath today. -Hypokalemia, low mag   Cause unclear, not on diuretics. K and mag improved, after replacement.     -Leukocytosis: Resolved, abx tapered down to zosyn, last dose today. Cultures remain negative. Source of infection unclear, suspicion that this might be aspiration pneumonia given right sided pulmonary streaky opacities on CT and chest x-ray imagings. Patient had had prominent complaints of back pain however MRI thoracic and lumbar spine negative for infectious etiology.     -Back pain: MRI thoracic and lumbar spine show evidence of DJD. -Multinodular thyroid gland. Largest nodules in the right thyroid lobe measure up to 3.6 cm and in the left  thyroid lobe measures 3.2 cm. Tissue sampling recommended.    -History of depression with psychotic symptoms: [facility] for [facility]. Off psych meds due to QTc prolognation. Psych input--> plans to monitor pt off psych meds noted. Appreciate assistance.      -MBS completed with recs of reg solid diet and thin liquids, meds as tolerated. PLAN:    -Cardiac cath today. Continue aspirin, statin and beta-blocker. m report is reviewed  -Consult IR for thyroid nodule bx, if patient ends up getting a cardiac stent todayshe may need to postpone biopsy  -Monitor off psych meds  -Avoid QTc prolonging meds, replace mag, monitor and replace K  -Stop zosyn after today's dose and monitor off abx   -May need to consult ID if leukocytosis continues   -Cont Remeron and multivitamin  -Dietitian to follow this patient  -Cont Ultram  for her back pain    Dispo: possibly dc tomorrow. Discussed with psychiatrist if patient needs to come back to psychiatry facility from which she came due to syncope. Psychiatrist would like to evaluate before making final recommendations. Patient reports that she has no suicidal or homicidal thoughts. She prefers to go home. However some degree of concern for patient going home previously suicidal now off her depression and various other psychiatry related medications due to QT prolongation. Disclaimer: Sections of this note are dictated using utilizing voice recognition software, which may have resulted in some phonetic based errors in grammar and contents. Even though attempts were made to correct all the mistakes, some may have been missed, and remained in the body of the document. If questions arise, please contact our department.

## 2021-11-29 NOTE — PROGRESS NOTES
Problem: Patient Education: Go to Patient Education Activity  Goal: Patient/Family Education  Outcome: Progressing Towards Goal     Problem: Unstable angina/NSTEMI: Day of Admission/Day 1  Goal: Off Pathway (Use only if patient is Off Pathway)  Outcome: Progressing Towards Goal  Goal: Activity/Safety  Outcome: Progressing Towards Goal  Goal: Consults, if ordered  Outcome: Progressing Towards Goal  Goal: Diagnostic Test/Procedures  Outcome: Progressing Towards Goal  Goal: Nutrition/Diet  Outcome: Progressing Towards Goal  Goal: Discharge Planning  Outcome: Progressing Towards Goal  Goal: Medications  Outcome: Progressing Towards Goal  Goal: Respiratory  Outcome: Progressing Towards Goal  Goal: Treatments/Interventions/Procedures  Outcome: Progressing Towards Goal  Goal: Psychosocial  Outcome: Progressing Towards Goal  Goal: *Hemodynamically stable  Outcome: Progressing Towards Goal  Goal: *Optimal pain control at patient's stated goal  Outcome: Progressing Towards Goal  Goal: *Lungs clear or at baseline  Outcome: Progressing Towards Goal     Problem: Unstable angina/NSTEMI: Day 2  Goal: Off Pathway (Use only if patient is Off Pathway)  Outcome: Progressing Towards Goal  Goal: Activity/Safety  Outcome: Progressing Towards Goal  Goal: Consults, if ordered  Outcome: Progressing Towards Goal  Goal: Diagnostic Test/Procedures  Outcome: Progressing Towards Goal  Goal: Nutrition/Diet  Outcome: Progressing Towards Goal  Goal: Discharge Planning  Outcome: Progressing Towards Goal  Goal: Medications  Outcome: Progressing Towards Goal  Goal: Respiratory  Outcome: Progressing Towards Goal  Goal: Treatments/Interventions/Procedures  Outcome: Progressing Towards Goal  Goal: Psychosocial  Outcome: Progressing Towards Goal  Goal: *Hemodynamically stable  Outcome: Progressing Towards Goal  Goal: *Optimal pain control at patient's stated goal  Outcome: Progressing Towards Goal  Goal: *Lungs clear or at baseline  Outcome: Progressing Towards Goal     Problem: Unstable Angina/NSTEMI: Discharge Outcomes  Goal: *Hemodynamically stable  Outcome: Progressing Towards Goal  Goal: *Stable cardiac rhythm  Outcome: Progressing Towards Goal  Goal: *Lungs clear or at baseline  Outcome: Progressing Towards Goal  Goal: *Optimal pain control at patient's stated goal  Outcome: Progressing Towards Goal  Goal: *Identifies cardiac risk factors  Outcome: Progressing Towards Goal  Goal: *Verbalizes home exercise program, activity guidelines, cardiac precautions  Outcome: Progressing Towards Goal  Goal: *Verbalizes understanding and describes prescribed diet  Outcome: Progressing Towards Goal  Goal: *Verbalizes name, dosage, time, side effects, and number of days to continue medications  Outcome: Progressing Towards Goal  Goal: *Anxiety reduced or absent  Outcome: Progressing Towards Goal  Goal: *Understands and describes signs and symptoms to report to providers(Stroke Metric)  Outcome: Progressing Towards Goal  Goal: *Describes follow-up/return visits to physicians  Outcome: Progressing Towards Goal  Goal: *Describes available resources and support systems  Outcome: Progressing Towards Goal  Goal: *Influenza immunization  Outcome: Progressing Towards Goal  Goal: *Pneumococcal immunization  Outcome: Progressing Towards Goal  Goal: *Describes smoking cessation resources  Outcome: Progressing Towards Goal     Problem: Pressure Injury - Risk of  Goal: *Prevention of pressure injury  Description: Document Reid Scale and appropriate interventions in the flowsheet. Outcome: Progressing Towards Goal  Note: Pressure Injury Interventions:             Activity Interventions: Assess need for specialty bed, Increase time out of bed, Pressure redistribution bed/mattress(bed type)    Mobility Interventions: Assess need for specialty bed, HOB 30 degrees or less, Pressure redistribution bed/mattress (bed type)    Nutrition Interventions: Document food/fluid/supplement intake Problem: Patient Education: Go to Patient Education Activity  Goal: Patient/Family Education  Outcome: Progressing Towards Goal     Problem: Patient Education: Go to Patient Education Activity  Goal: Patient/Family Education  Outcome: Progressing Towards Goal     Problem: Patient Education: Go to Patient Education Activity  Goal: Patient/Family Education  Outcome: Progressing Towards Goal     Problem: Patient Education: Go to Patient Education Activity  Goal: Patient/Family Education  Outcome: Progressing Towards Goal

## 2021-11-29 NOTE — PROGRESS NOTES
TRANSFER - IN REPORT:    Verbal report received from Mouna Thompson RN (name) on Олег Schwarz  being received from [address] [place] [address] (unit) for ordered procedure      Report consisted of patients Situation, Background, Assessment and   Recommendations(SBAR). Information from the following report(s) SBAR, Kardex, Intake/Output, MAR, Recent Results and Cardiac Rhythm NSR was reviewed with the receiving nurse. Opportunity for questions and clarification was provided. Assessment completed upon patients arrival to unit and care assumed. 1545: SHEATH PULL NOTE:    Patient informed of procedure with questions answered with review. Sheath site 4 fr sheath in RFA pulled by JIN Holland. Hand hold with manual compression to site. Handhold for 15 minutes. Tegaderm and hemostatic dressing applied to site. No bleeding, no hematoma, no pain/discomfort at site. Groin instructions provided with review. Continue to monitor procedure site and patient status. *Advised patient to keep head down and lie extremity flat to decrease risk of bleeding. *Instructed patient on risk for/prevention of bleeding. Patient verbalized understanding of instructions with review. 1600: Bedside and Verbal shift change report given to Bj Bazan RN (oncoming nurse) by Ema Aleman RN (offgoing nurse). Report included the following information SBAR, Kardex, Procedure Summary, Intake/Output, MAR, Recent Results and Cardiac Rhythm NSR.

## 2021-11-30 ENCOUNTER — APPOINTMENT (OUTPATIENT)
Dept: NON INVASIVE DIAGNOSTICS | Age: 66
DRG: 286 | End: 2021-11-30
Attending: NURSE PRACTITIONER
Payer: MEDICARE

## 2021-11-30 VITALS
SYSTOLIC BLOOD PRESSURE: 98 MMHG | RESPIRATION RATE: 16 BRPM | HEIGHT: 63 IN | TEMPERATURE: 98 F | DIASTOLIC BLOOD PRESSURE: 68 MMHG | WEIGHT: 104.41 LBS | BODY MASS INDEX: 18.5 KG/M2 | OXYGEN SATURATION: 100 % | HEART RATE: 84 BPM

## 2021-11-30 LAB
ANION GAP SERPL CALC-SCNC: 6 MMOL/L (ref 3–18)
ATRIAL RATE: 81 BPM
BACTERIA SPEC CULT: NORMAL
BACTERIA SPEC CULT: NORMAL
BUN SERPL-MCNC: 6 MG/DL (ref 7–18)
BUN/CREAT SERPL: 11 (ref 12–20)
CALCIUM SERPL-MCNC: 9.5 MG/DL (ref 8.5–10.1)
CALCULATED P AXIS, ECG09: 85 DEGREES
CALCULATED R AXIS, ECG10: -57 DEGREES
CALCULATED T AXIS, ECG11: -68 DEGREES
CHLORIDE SERPL-SCNC: 108 MMOL/L (ref 100–111)
CO2 SERPL-SCNC: 22 MMOL/L (ref 21–32)
CREAT SERPL-MCNC: 0.55 MG/DL (ref 0.6–1.3)
DIAGNOSIS, 93000: NORMAL
GLUCOSE SERPL-MCNC: 95 MG/DL (ref 74–99)
P-R INTERVAL, ECG05: 130 MS
POTASSIUM SERPL-SCNC: 4.8 MMOL/L (ref 3.5–5.5)
Q-T INTERVAL, ECG07: 422 MS
QRS DURATION, ECG06: 118 MS
QTC CALCULATION (BEZET), ECG08: 490 MS
SERVICE CMNT-IMP: NORMAL
SERVICE CMNT-IMP: NORMAL
SODIUM SERPL-SCNC: 136 MMOL/L (ref 136–145)
VENTRICULAR RATE, ECG03: 81 BPM

## 2021-11-30 PROCEDURE — 93005 ELECTROCARDIOGRAM TRACING: CPT

## 2021-11-30 PROCEDURE — 99239 HOSP IP/OBS DSCHRG MGMT >30: CPT | Performed by: INTERNAL MEDICINE

## 2021-11-30 PROCEDURE — 74011000250 HC RX REV CODE- 250: Performed by: INTERNAL MEDICINE

## 2021-11-30 PROCEDURE — 74011250637 HC RX REV CODE- 250/637: Performed by: STUDENT IN AN ORGANIZED HEALTH CARE EDUCATION/TRAINING PROGRAM

## 2021-11-30 PROCEDURE — 99232 SBSQ HOSP IP/OBS MODERATE 35: CPT | Performed by: STUDENT IN AN ORGANIZED HEALTH CARE EDUCATION/TRAINING PROGRAM

## 2021-11-30 PROCEDURE — 80048 BASIC METABOLIC PNL TOTAL CA: CPT

## 2021-11-30 PROCEDURE — 36415 COLL VENOUS BLD VENIPUNCTURE: CPT

## 2021-11-30 PROCEDURE — 94640 AIRWAY INHALATION TREATMENT: CPT

## 2021-11-30 PROCEDURE — 74011250637 HC RX REV CODE- 250/637: Performed by: INTERNAL MEDICINE

## 2021-11-30 PROCEDURE — 93270 REMOTE 30 DAY ECG REV/REPORT: CPT

## 2021-11-30 PROCEDURE — 99232 SBSQ HOSP IP/OBS MODERATE 35: CPT | Performed by: PSYCHIATRY & NEUROLOGY

## 2021-11-30 RX ORDER — ATORVASTATIN CALCIUM 20 MG/1
20 TABLET, FILM COATED ORAL
Qty: 30 TABLET | Refills: 0 | Status: SHIPPED | OUTPATIENT
Start: 2021-11-30 | End: 2021-12-30

## 2021-11-30 RX ORDER — LISINOPRIL 10 MG/1
10 TABLET ORAL DAILY
Qty: 30 TABLET | Refills: 0 | Status: SHIPPED | OUTPATIENT
Start: 2021-12-01 | End: 2021-12-31

## 2021-11-30 RX ORDER — ASPIRIN 81 MG/1
81 TABLET ORAL DAILY
Qty: 30 TABLET | Refills: 0 | Status: SHIPPED | OUTPATIENT
Start: 2021-12-01 | End: 2021-12-31

## 2021-11-30 RX ORDER — POTASSIUM CHLORIDE 20 MEQ/1
20 TABLET, EXTENDED RELEASE ORAL DAILY
Qty: 10 TABLET | Refills: 0 | Status: SHIPPED | OUTPATIENT
Start: 2021-11-30 | End: 2022-01-07 | Stop reason: SDUPTHER

## 2021-11-30 RX ORDER — METOPROLOL SUCCINATE 50 MG/1
50 TABLET, EXTENDED RELEASE ORAL DAILY
Qty: 30 TABLET | Refills: 0 | Status: SHIPPED | OUTPATIENT
Start: 2021-12-01 | End: 2021-12-31

## 2021-11-30 RX ORDER — AMOXICILLIN 250 MG
1 CAPSULE ORAL DAILY
Qty: 30 TABLET | Refills: 0 | Status: SHIPPED | OUTPATIENT
Start: 2021-12-01 | End: 2021-12-31

## 2021-11-30 RX ORDER — MIRTAZAPINE 15 MG/1
15 TABLET, ORALLY DISINTEGRATING ORAL
Qty: 30 TABLET | Refills: 0 | Status: SHIPPED | OUTPATIENT
Start: 2021-11-30 | End: 2021-12-30

## 2021-11-30 RX ORDER — METOPROLOL SUCCINATE 50 MG/1
50 TABLET, EXTENDED RELEASE ORAL DAILY
Status: DISCONTINUED | OUTPATIENT
Start: 2021-12-01 | End: 2021-11-30 | Stop reason: HOSPADM

## 2021-11-30 RX ADMIN — SENNOSIDES AND DOCUSATE SODIUM 1 TABLET: 50; 8.6 TABLET ORAL at 10:51

## 2021-11-30 RX ADMIN — Medication 10 ML: at 14:01

## 2021-11-30 RX ADMIN — IPRATROPIUM BROMIDE AND ALBUTEROL SULFATE 3 ML: .5; 2.5 SOLUTION RESPIRATORY (INHALATION) at 10:19

## 2021-11-30 RX ADMIN — Medication 10 ML: at 06:54

## 2021-11-30 RX ADMIN — MULTIPLE VITAMINS W/ MINERALS TAB 1 TABLET: TAB at 10:51

## 2021-11-30 RX ADMIN — METOPROLOL SUCCINATE 25 MG: 25 TABLET, EXTENDED RELEASE ORAL at 10:51

## 2021-11-30 RX ADMIN — ASPIRIN 81 MG: 81 TABLET, COATED ORAL at 10:51

## 2021-11-30 RX ADMIN — FAMOTIDINE 20 MG: 20 TABLET ORAL at 17:06

## 2021-11-30 RX ADMIN — POTASSIUM CHLORIDE 40 MEQ: 20 TABLET, EXTENDED RELEASE ORAL at 10:51

## 2021-11-30 RX ADMIN — Medication 10 ML: at 18:06

## 2021-11-30 RX ADMIN — LISINOPRIL 10 MG: 10 TABLET ORAL at 10:51

## 2021-11-30 RX ADMIN — FAMOTIDINE 20 MG: 20 TABLET ORAL at 10:51

## 2021-11-30 NOTE — PROGRESS NOTES
1700 - received verbal report from [address] [place] [address] E   Report included the following information SBAR, Kardex, Procedure Summary, Intake/Output, MAR, Recent Results and Cardiac Rhythm NSR.     1716 - Patient returned to unit alert and oriented X3. Patient denied any pain or discomfort. Right Groin Dsg clean dry and intact. No swelling, no bleeding, no hematoma to right groin. Skin warm and pulses present. Bedrest maintained. Will continue to monitor. 1900 - Patient laying in bed. No pain or discomfort noted. Right Groin bandage clean, dry, and intact. No swelling, no bleeding, no hematoma. Skin warm and pulses present.  Bedrest completed.

## 2021-11-30 NOTE — ROUTINE PROCESS
Bedside and [facility]  Report included the following information; SBAR, MAR, LABS, Intake/output, Kardex, and summary of care

## 2021-11-30 NOTE — PROGRESS NOTES
[address]  Inpatient Progress Note     Date of Service: 11/30/21  Hospital Day: 5     Subjective/Interval History   11/30/21    Treatment Team Notes:  Notes reviewed and/or discussed and report that Bianca Cespedes is a patient admitted to [facility] as a direct transfer from the behavioral unit with attention being invited to her admission note which is self-explanatory. For the purpose of information regarding patient's psychiatric history, please refer to the dictated discharge summary note from inpatient in the behavioral unit, the document being also self-explanatory. However in summary the patient had been admitted to the service of the undersigned with a history of having problems with increased depression questions also raised about her having some auditory hallucinations which apparently only occurred when the patient becomes depressed. While in the behavioral unit, the patient suffered with a syncopal episode which prompted her admission to the [address] [place] [address] as indicated. Patient interview: Bianca Cespedes was interviewed by this writer today. During session, the patient continues to indicate her improvement. She has decided not to go back to take care of her sister's children which appears to have been the main the stressor that prompted the patient's depressive symptoms recurrence. She is at present denying any depression, continues to deny the presence of any psychotic symptoms, and is willing to continue to be followed as an outpatient by Dr. Kaley Jarrell with [facility]. So from our point of view, the patient can be discharged with further outpatient psychiatric referrals.     Objective     Visit Vitals  /70 (BP 1 Location: Left upper arm, BP Patient Position: At rest)   Pulse 87   Temp 98.1 °F (36.7 °C)   Resp 18   Ht 5' 3\" (1.6 m)   Wt 47.4 kg (104 lb 6.6 oz)   SpO2 99%   Breastfeeding No   BMI 18.50 kg/m²     Vitals are stable    Recent Results (from the past 24 hour(s))   NUCLEAR CARDIAC STRESS TEST    Collection Time: 11/29/21 11:12 AM   Result Value Ref Range    Target  bpm    Exercise duration time 00:04:33     Stress Base Systolic  mmHg    Stress Base Diastolic BP 93 mmHg    Post peak HR 97 BPM    Baseline HR 69 BPM    Estimated workload 1.0 METS    Baseline  mmHg    Percent HR 63 %    ST Elevation (mm) 0 mm    ST Depression (mm) 0 mm    Stress Base Diastolic BP 71 mmHg    Stress Rate Pressure Product 12,804 BPM*mmHg   METABOLIC PANEL, BASIC    Collection Time: 11/30/21  9:41 AM   Result Value Ref Range    Sodium 136 136 - 145 mmol/L    Potassium 4.8 3.5 - 5.5 mmol/L    Chloride 108 100 - 111 mmol/L    CO2 22 21 - 32 mmol/L    Anion gap 6 3.0 - 18 mmol/L    Glucose 95 74 - 99 mg/dL    BUN 6 (L) 7.0 - 18 MG/DL    Creatinine 0.55 (L) 0.6 - 1.3 MG/DL    BUN/Creatinine ratio 11 (L) 12 - 20      GFR est AA >60 >60 ml/min/1.73m2    GFR est non-AA >60 >60 ml/min/1.73m2    Calcium 9.5 8.5 - 10.1 MG/DL   EKG, 12 LEAD, SUBSEQUENT    Collection Time: 11/30/21 10:15 AM   Result Value Ref Range    Ventricular Rate 81 BPM    Atrial Rate 81 BPM    P-R Interval 130 ms    QRS Duration 118 ms    Q-T Interval 422 ms    QTC Calculation (Bezet) 490 ms    Calculated P Axis 85 degrees    Calculated R Axis -57 degrees    Calculated T Axis -68 degrees    Diagnosis       Normal sinus rhythm  Left axis deviation  Right bundle branch block  T wave abnormality, consider inferolateral ischemia  Abnormal ECG  When compared with ECG of 29-NOV-2021 08:15,  No significant change was found       Above results noted, being managed by the primary care team and consultants    Mental Status Examination     Appearance/Hygiene 78 y.o.  BLACK/ female  Hygiene: Fair   Behavior/Social Relatedness Appropriate   Musculoskeletal Gait/Station: Not assessed  Tone (flaccid, cogwheeling, spastic): not assessed  Psychomotor (hyperkinetic, hypokinetic): calm   Involuntary movements (tics, dyskinesias, akathisa, stereotypies): none   Speech   Rate, rhythm, volume, fluency and articulation are appropriate   Mood   denies depression   Affect    appropriate to situation   Thought Process Linear and goal directed   Thought Content and Perceptual Disturbances Denies self-injurious behavior (SIB), suicidal ideation (SI), aggressive behavior or homicidal ideation (HI)    Denies auditory and visual hallucinations   Sensorium and Cognition  Grossly intact   Insight  appropriate   Judgment  appropriate        Assessment/Plan      Psychiatric Diagnoses:   Patient Active Problem List   Diagnosis Code    Itch of skin L29.9    Anxiety state F41.1    Weight loss, unintentional R63.4    Major depressive disorder, recurrent episode, severe, with psychosis ([address] F33.3    Pyelonephritis N12    SIRS (systemic inflammatory response syndrome) (HCC) R65.10    Sepsis ([address] A41.9    UTI (urinary tract infection) N39.0    Severe protein-calorie malnutrition ([address] E43    NSTEMI (non-ST elevated myocardial infarction) ([address] I21.4    Severe sepsis ([address] A41.9, R65.20    QT prolongation R94.31       Medical Diagnoses: Per attending physician and consultants    Psychosocial and contextual factors: Family stressors    Level of impairment/disability: Moderate from a psychiatric review    1. The patient is currently tolerating a prescription for mirtazapine, 15 mg at bedtime. She has shown no further evidence of QTC elongation, and is tolerating the antidepressant rather well. From a psychiatric point of view, the patient can be discharged to outpatient treatment which is being provided by Dr. Carlos Mar with [facility]. The patient is in agreement to continue to see her, and to take her medications as prescribed.   There is no evidence upon examination of any psychotic symptoms, the patient's depression is stable, and she is considered to have capacity. So we will suggest if medically stable to proceed with discharge as indicated with outpatient treatment referrals  2. Reviewed instructions, risks, benefits and side effects of medications  3.   Disposition/Discharge Date: self-care/home, per attending physician    Urbano Abbasi MD, [address] [place] [address]

## 2021-11-30 NOTE — DISCHARGE INSTRUCTIONS
Patient Education        Taking Aspirin and Other Antiplatelets Safely: Care Instructions  Your Care Instructions     Aspirin and other antiplatelet medicines help prevent blood clots from forming. They can help some people lower their risk of a heart attack or stroke. But these medicines can also make you more likely to bleed. That's why it's important to talk to your doctor before you start taking aspirin every day. It's not right for everyone. And if you and your doctor decide these medicines are right for you, learn how to take them safely. If you take aspirin, be sure you know how to take it. Your doctor can tell you what dose to take and how often to take it. One low-dose aspirin is 81 milligrams (mg). But the dose for daily aspirin can range from 81 mg to 325 mg. If you take another antiplatelet, take it as prescribed. Follow-up care is a key part of your treatment and safety. Be sure to make and go to all appointments, and call your doctor if you are having problems. It's also a good idea to know your test results and keep a list of the medicines you take. How can you care for yourself at home? · Before you start to take daily aspirin or some other antiplatelet, tell your doctor all the medicines, vitamins, herbal products, and supplements you take. · Tell your doctors, dentist, and pharmacist that you take an antiplatelet. · Take your medicine as your doctor directs. Make sure that you understand exactly what your doctor wants you to do. If another doctor says to stop taking the medicine for any reason, talk to the doctor who prescribed it before you stop. · Take your medicine at the same time every day. · Do not chew or crush the coated or time-release forms of your medicine. · If you miss a dose, don't take an extra dose to make up for it. · Ask your doctor whether you can drink alcohol. And ask how much you can drink.  When you take an antiplatelet, drinking too much raises your risk for liver damage and stomach bleeding. · If you are pregnant, are breastfeeding, or plan to become pregnant, talk to your doctor about what medicines are safe. · Talk with your doctor before you take a pain medicine. Many pain medicines have aspirin. Too much aspirin can be harmful. · Wear medical alert jewelry. This lets others know that you take an antiplatelet. You can buy it at most drugstores. · Try to avoid injuries that might make you bleed. For example, be careful when you exercise and when you play sports. Make your home safe to reduce your risk of falling. When should you call for help? Call 911  anytime you think you may need emergency care. For example, call if:    · You have a sudden, severe headache that is different from past headaches. Call your doctor now or seek immediate medical care if:    · You have any abnormal bleeding, such as:  ? A nosebleed that you can't easily stop. ? Bloody or black stools, or rectal bleeding. ? Bloody or pink urine.     · You feel dizzy or lightheaded or feel like you may faint. Watch closely for changes in your health, and be sure to contact your doctor if you have any problems. Where can you learn more? Go to http://www.gray.com/  Enter Y300 in the search box to learn more about \"Taking Aspirin and Other Antiplatelets Safely: Care Instructions. \"  Current as of: April 29, 2021               Content Version: 13.0  © 0943-8144 [employer]. Care instructions adapted under license by [employer] (which disclaims liability or warranty for this information). If you have questions about a medical condition or this instruction, always ask your healthcare professional. [place] [address] any warranty or liability for your use of this information. Patient Education        Learning About COPD and How to Prevent Lung Infections  How do lung infections affect COPD?      Lung infections like pneumonia and acute bronchitis are common causes of COPD flare-ups. And people who have COPD are more likely to get these lung infections, especially if they smoke. When you have COPD, it is important to know the symptoms of pneumonia and acute bronchitis and call your doctor if you have them. Symptoms include:  · A cough that brings up more mucus than usual.  · Fever. · Shortness of breath. What can you do to prevent these infections? Stay healthy   · If you must be around people with colds or the flu, wash your hands often. · Get the flu vaccine every year. · Get a pneumococcal vaccine shot. If you have had one before, ask your doctor whether you need another dose. Two different types of pneumococcal vaccines are recommended for people ages 72 and older. · Make sure you are current on your whooping cough (pertussis) vaccine to help prevent whooping cough. · Do not smoke. This is the most important step you can take to prevent more damage to your lungs. If you need help quitting, talk to your doctor about stop-smoking programs and medicines. These may increase your chances of quitting for good. · Avoid secondhand smoke and air pollution. Try to stay inside with your windows closed when air pollution is bad. Exercise and eat well   · If your doctor recommends it, get more exercise. Walking is a good choice. Bit by bit, increase the amount you walk every day. Try for at least 30 minutes on most days of the week. · Eat regular, well-balanced meals. Eating right keeps your energy levels up and helps your body fight infection. · Get plenty of rest and sleep. Follow-up care is a key part of your treatment and safety. Be sure to make and go to all appointments, and call your doctor if you are having problems. It's also a good idea to know your test results and keep a list of the medicines you take. Where can you learn more?   Go to http://www.gray.com/  Enter D958 in the search box to learn more about \"Learning About COPD and How to Prevent Lung Infections. \"  Current as of: July 6, 2021               Content Version: 13.0  © 2006-2021 [employer]. Care instructions adapted under license by [employer] (which disclaims liability or warranty for this information). If you have questions about a medical condition or this instruction, always ask your healthcare professional. [place] [address] any warranty or liability for your use of this information. Patient Education        Learning About Depression Screening  What is depression screening? Depression screening is a way to see if you have depression symptoms. It may be done by a doctor or counselor. It's often part of a routine checkup. That's because your mental health is just as important as your physical health. Depression is a medical illness that affects how you feel, think, and act. You may:  · Have less energy. · Lose interest in your daily activities. · Feel sad and grouchy for a long time. Depression is very common. It affects men and women of all ages. Many things can trigger depression. Some people become depressed after they have a stroke or find out they have a major illness like cancer or heart disease. The death of a loved one, a breakup, or changes in the natural brain chemicals may lead to depression. It can run in families. Most experts believe that a combination of inherited genes and stressful life events can cause it. What happens during screening? You may be asked to fill out a form about your depression symptoms. You and the doctor will discuss your answers. The doctor may ask you more questions to learn more about how you think, act, and feel. What happens after screening? If you have signs of depression, your doctor will talk to you about your options. Doctors usually treat depression with medicines or counseling.  Often, combining the two works best. Many people don't get help because they think that they'll get over the depression on their own. But people with depression may not get better unless they get treatment. Many people feel embarrassed or ashamed about having depression. But it isn't a sign of personal weakness. It's not a character flaw. A person who is depressed is not \"crazy. \" Depression is caused by changes in the brain. A serious symptom of depression is thinking about death or suicide. If you or someone you care about talks about this or about feeling hopeless, get help right away. It's important to know that depression can be treated. The first step toward feeling better is often just seeing that the problem exists. Where can you learn more? Go to http://www.gray.com/  Enter T185 in the search box to learn more about \"Learning About Depression Screening. \"  Current as of: June 16, 2021               Content Version: 13.0  © 0023-9459 [employer]. Care instructions adapted under license by [employer] (which disclaims liability or warranty for this information). If you have questions about a medical condition or this instruction, always ask your healthcare professional. [place] [address] any warranty or liability for your use of this information. Patient Education        Coronary Angioplasty: What to Expect at Home  Your Recovery     Coronary angioplasty is a procedure that is used to open a narrowed or blocked coronary artery. It may also be called a percutaneous coronary intervention (PCI). The doctor opened your narrowed or blocked artery by putting a thin tube, called a catheter, into your heart through a blood vessel. The catheter was inserted into the blood vessel in your groin or arm. The doctor may have placed a small tube, called a stent, in the artery. Your groin or arm may have a bruise and feel sore for a day or two after the procedure.  You can do light activities around the house. But don't do anything strenuous for a day or two. This care sheet gives you a general idea about how long it will take for you to recover. But each person recovers at a different pace. Follow the steps below to get better as quickly as possible. How can you care for yourself at home? Activity    · If the doctor gave you a sedative:  ? For 24 hours, don't do anything that requires attention to detail, such as going to work, making important decisions, or signing any legal documents. It takes time for the medicine's effects to completely wear off.  ? For your safety, do not drive or operate any machinery that could be dangerous. Wait until the medicine wears off and you can think clearly and react easily.     · Do not do strenuous exercise and do not lift, pull, or push anything heavy until your doctor says it is okay. This may be for a day or two. You can walk around the house and do light activity, such as cooking.     · If the catheter was placed in your groin, try not to walk up stairs for the first couple of days.     · If the catheter was placed in your arm near your wrist, do not bend your wrist deeply for the first couple of days. Be careful using your hand to get into and out of a chair or bed.     · Carry your stent identification card with you at all times.     · If your doctor recommends it, get more exercise. Walking is a good choice. Bit by bit, increase the amount you walk every day. Try for at least 30 minutes on most days of the week.     · If you haven't been set up with a cardiac rehab program, talk to your doctor about whether rehab is right for you. Cardiac rehab includes supervised exercise. It also includes help with diet and lifestyle changes and emotional support. Diet    · Drink plenty of fluids to help your body flush out the dye.  If you have kidney, heart, or liver disease and have to limit fluids, talk with your doctor before you increase the amount of fluids you drink.     · Keep eating a heart-healthy diet that has lots of fruits, vegetables, and whole grains. If you have not been eating this way, talk to your doctor. You also may want to talk to a dietitian. This expert can help you to learn about healthy foods and plan meals. Medicines    · Your doctor will tell you if and when you can restart your medicines. Your doctor will also give you instructions about taking any new medicines.     · If you take aspirin or some other blood thinner, ask your doctor if and when to start taking it again. Make sure that you understand exactly what your doctor wants you to do.     · Your doctor will prescribe blood-thinning medicines. You will likely take aspirin plus another antiplatelet, such as clopidogrel (Plavix). It is very important that you take these medicines exactly as directed. These medicines help keep the coronary artery open and reduce your risk of a heart attack.     · Call your doctor if you think you are having a problem with your medicine. Care of the catheter site    · For 1 or 2 days, keep a bandage over the spot where the catheter was inserted. The bandage probably will fall off in this time.     · Put ice or a cold pack on the area for 10 to 20 minutes at a time to help with soreness or swelling. Put a thin cloth between the ice and your skin.     · You may shower 24 to 48 hours after the procedure, if your doctor okays it. Pat the incision dry.     · Do not soak the catheter site until it is healed. Don't take a bath for 1 week, or until your doctor tells you it is okay.     · Watch for bleeding from the site. A small amount of blood (up to the size of a quarter) on the bandage can be normal.     · If you are bleeding, lie down and press on the area for 15 minutes to try to make it stop. If the bleeding does not stop, call your doctor or seek immediate medical care. Follow-up care is a key part of your treatment and safety.  Be sure to make and go to all appointments, and call your doctor if you are having problems. It's also a good idea to know your test results and keep a list of the medicines you take. When should you call for help? Call 911 anytime you think you may need emergency care. For example, call if:    · You passed out (lost consciousness).     · You have severe trouble breathing.     · You have sudden chest pain and shortness of breath, or you cough up blood.     · You have symptoms of a heart attack, such as:  ? Chest pain or pressure. ? Sweating. ? Shortness of breath. ? Nausea or vomiting. ? Pain that spreads from the chest to the neck, jaw, or one or both shoulders or arms. ? Dizziness or lightheadedness. ? A fast or uneven pulse. After calling 911, chew 1 adult-strength aspirin. Wait for an ambulance. Do not try to drive yourself.     · You have been diagnosed with angina, and you have angina symptoms that do not go away with rest or are not getting better within 5 minutes after you take one dose of nitroglycerin. Call your doctor now or seek immediate medical care if:    · You are bleeding from the area where the catheter was put in your artery.     · You have a fast-growing, painful lump at the catheter site.     · You have signs of infection, such as:  ? Increased pain, swelling, warmth, or redness. ? Red streaks leading from the catheter site. ? Pus draining from the catheter site. ? A fever.     · Your leg, arm, or hand is painful, looks blue, or feels cold, numb, or tingly. Watch closely for changes in your health, and be sure to contact your doctor if you have any problems. Where can you learn more? Go to http://www.gray.com/  Enter H506 in the search box to learn more about \"Coronary Angioplasty: What to Expect at Home. \"  Current as of: April 29, 2021               Content Version: 13.0  © 5125-0663 [employer].    Care instructions adapted under license by [employer] (which disclaims liability or warranty for this information). If you have questions about a medical condition or this instruction, always ask your healthcare professional. [address] any warranty or liability for your use of this information. DISCHARGE SUMMARY from Nurse    PATIENT INSTRUCTIONS:    After general anesthesia or intravenous sedation, for 24 hours or while taking prescription Narcotics:  · Limit your activities  · Do not drive and operate hazardous machinery  · Do not make important personal or business decisions  · Do  not drink alcoholic beverages  · If you have not urinated within 8 hours after discharge, please contact your surgeon on call. Report the following to your surgeon:  · Excessive pain, swelling, redness or odor of or around the surgical area  · Temperature over 100.5  · Nausea and vomiting lasting longer than 4 hours or if unable to take medications  · Any signs of decreased circulation or nerve impairment to extremity: change in color, persistent  numbness, tingling, coldness or increase pain  · Any questions    What to do at Home:  Recommended activity: Activity as tolerated,   If you experience any of the following symptoms fever greater than 100.4, chest pain, dizziness , fainting   please follow up with Primary Care Provider, or gotot the nearest Emergency Room. *  Please give a list of your current medications to your Primary Care Provider. *  Please update this list whenever your medications are discontinued, doses are      changed, or new medications (including over-the-counter products) are added. *  Please carry medication information at all times in case of emergency situations. These are general instructions for a healthy lifestyle:    No smoking/ No tobacco products/ Avoid exposure to second hand smoke  Surgeon General's Warning:  Quitting smoking now greatly reduces serious risk to your health.     Obesity, smoking, and sedentary lifestyle greatly increases your risk for illness    A healthy diet, regular physical exercise & weight monitoring are important for maintaining a healthy lifestyle    You may be retaining fluid if you have a history of heart failure or if you experience any of the following symptoms:  Weight gain of 3 pounds or more overnight or 5 pounds in a week, increased swelling in our hands or feet or shortness of breath while lying flat in bed. Please call your doctor as soon as you notice any of these symptoms; do not wait until your next office visit. The discharge information has been reviewed with the patient. The patient verbalized understanding. Discharge medications reviewed with the patient and appropriate educational materials and side effects teaching were provided.   ___________________________________________________________________________________________________________________________________Admit Date:   11/24/21    Discharge Date:   11/30/21    Discharge Diagnosis:

## 2021-11-30 NOTE — PROGRESS NOTES
Nutrition Note    Pt reporting fair appetite on remeron but c/o constipation which she describes is her reasoning for poor po intake. Encouraged pt to consume Ensure supplements when po intake poor & pt agreeable. Noted plan for discharge today. Soft BM 11/30, pericolace ordered daily & miralax prn. MBS completed 11/26 & SLP signed off. Remains on D5 NS with 20 mEq/L KCL at 50 mL/hr providing 204 kcal per day. Nutrition Recommendations/Plan:   - Continue oral diet with Ensure supplements, TID  - Monitor and encourage po intake as tolerated.    - Bowel regimen and IVF per MD.    Electronically signed by Gen Alexis RD on 11/30/2021 at 3:12 PM    Contact: [phone]

## 2021-11-30 NOTE — PROGRESS NOTES
Cardiology ProgressNote      Date of  Admission: 11/24/2021  9:13 PM   Primary Care Physician: Cesar Chavira MD       Assessment:     Hospital Problems  Date Reviewed: 2/23/2017          Codes Class Noted POA    NSTEMI (non-ST elevated myocardial infarction) [facility]) ICD-10-CM: I21.4  ICD-9-CM: 410.70  11/25/2021 Yes        Severe sepsis ([address] ICD-10-CM: A41.9, R65.20  ICD-9-CM: 038.9, 995.92  11/25/2021 Yes        QT prolongation ICD-10-CM: R94.31  ICD-9-CM: 794.31  11/25/2021 Yes               1. Prolong QT- last EKG done on 11/30 shows  QTc 490    2. Abnormal NST-11/29/21 s/p Cardiac Catheterization 11/29/21 that shows non obstructive coronary artery disease with severe LV dysfunction. 3. Syncope- no recurrence   4. Cardiomyopathy with EF 30-35%- unclear etiology   5. Hypertension- on ACEi  6. RBBB- old   9. Hypomagnesemia- resolved   8. Hypokalemia- resolved   9. History of depression with psychotic symptoms: [facility] here for syncope. She is on Remeron  only         Cardiac Cath 11/29/21    Non obstructive epicardial coronary arteries with severe LV dysfunction. 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 Recommend intense risk factor modification. ICD if EF remains low inspite of optimal medical therapy. Echo 11/21  · LV: Estimated LVEF is 30 - 35%. Visually measured ejection fraction. Normal cavity size. Moderate concentric hypertrophy. Moderately reduced systolic function. Moderate (grade 2) left ventricular diastolic dysfunction. · LA: Moderately dilated left atrium. Left Atrium volume index is 45 mL/m2. · AV: Aortic valve focal thickening present. With no significant stenosis. Mild to moderate aortic valve regurgitation is present. · TV: Mild tricuspid valve regurgitation is present. Right Ventricular Arterial Pressure (RVSP) is 28 mmHg. Pulmonary hypertension not suggested by Doppler findings.   · PV: Mild to moderate pulmonic valve regurgitation is present. Primary cardiologist: none     Plan:     EKG done today shows /QTc 490  Discussed with Dr. [facility]. She will continue with Remeron only. No need for other psych medications at this time  Continue to monitor and replace electrolytes as needed  LHC shows Non obstructive epicardial coronary arteries with severe LV dysfunction. Stable renal function post cath   Continue GMT as tolerated for non ischemic CMO  Continue ASA, Statin. Increase Metoprolol XL to 50 mg po daily   Continue ACEi  Will obtain event monitor prior discharge in the setting recent syncopal event and prolong QT/QTc    Discharge planning per medical team     Overnight Events:     Overall feels better without significant complaints.      Past Medical History:     Past Medical History:   Diagnosis Date    Depression     Gastrointestinal disorder Pancreatitis    Pancreatic disease     Pancreatitis     Psychiatric disorder          Social History:     Social History     Socioeconomic History    Marital status:    Tobacco Use    Smoking status: Current Every Day Smoker     Packs/day: 1.00   Substance and Sexual Activity    Alcohol use: No     Comment: Per pt she quit in 2007    Drug use: No    Sexual activity: Not Currently        Family History:     Family History   Problem Relation Age of Onset    Diabetes Maternal Aunt     Cancer Maternal Aunt     Alzheimer's Disease Maternal Aunt     Cancer Paternal Aunt     Diabetes Paternal Aunt         Medications:   No Known Allergies     Current Facility-Administered Medications   Medication Dose Route Frequency    sodium chloride (NS) flush 5-40 mL  5-40 mL IntraVENous Q8H    sodium chloride (NS) flush 5-40 mL  5-40 mL IntraVENous PRN    lisinopriL (PRINIVIL, ZESTRIL) tablet 10 mg  10 mg Oral DAILY    potassium chloride (K-DUR, KLOR-CON M20) SR tablet 40 mEq  40 mEq Oral BID    traMADoL (ULTRAM) tablet 50 mg  50 mg Oral Q6H PRN    lidocaine 4 % patch 1 Patch  1 Patch TransDERmal Q24H    enoxaparin (LOVENOX) injection 40 mg  40 mg SubCUTAneous Q24H    metoprolol succinate (TOPROL-XL) XL tablet 25 mg  25 mg Oral DAILY    sodium chloride (NS) flush 5-40 mL  5-40 mL IntraVENous Q8H    sodium chloride (NS) flush 5-40 mL  5-40 mL IntraVENous PRN    acetaminophen (TYLENOL) tablet 650 mg  650 mg Oral Q6H PRN    Or    acetaminophen (TYLENOL) suppository 650 mg  650 mg Rectal Q6H PRN    polyethylene glycol (MIRALAX) packet 17 g  17 g Oral DAILY PRN    ondansetron (ZOFRAN ODT) tablet 4 mg  4 mg Oral Q8H PRN    Or    ondansetron (ZOFRAN) injection 4 mg  4 mg IntraVENous Q6H PRN    albuterol-ipratropium (DUO-NEB) 2.5 MG-0.5 MG/3 ML  3 mL Nebulization Q6H RT    multivitamin, tx-iron-ca-min (THERA-M w/ IRON) tablet 1 Tablet  1 Tablet Oral DAILY    atorvastatin (LIPITOR) tablet 20 mg  20 mg Oral QHS    famotidine (PEPCID) tablet 20 mg  20 mg Oral BID    mirtazapine (REMERON SOL-TAB) disintegrating tablet 15 mg  15 mg Oral QHS    aspirin delayed-release tablet 81 mg  81 mg Oral DAILY    dextrose 5% - 0.9% NaCl with KCl 20 mEq/L infusion  50 mL/hr IntraVENous CONTINUOUS    senna-docusate (PERICOLACE) 8.6-50 mg per tablet 1 Tablet  1 Tablet Oral DAILY    sodium chloride (NS) flush 5-10 mL  5-10 mL IntraVENous PRN         Physical Exam:     Visit Vitals  /70 (BP 1 Location: Left upper arm, BP Patient Position: At rest)   Pulse 87   Temp 98.1 °F (36.7 °C)   Resp 18   Ht 5' 3\" (1.6 m)   Wt 47.6 kg (105 lb)   SpO2 99%   Breastfeeding No   BMI 18.60 kg/m²       TELE: normal sinus rhythm significantly prolonged QTC    BP Readings from Last 3 Encounters:   11/30/21 116/70   11/24/21 97/64   10/05/17 112/64     Pulse Readings from Last 3 Encounters:   11/30/21 87   11/24/21 97   10/05/17 76     Wt Readings from Last 3 Encounters:   11/29/21 47.6 kg (105 lb)   11/20/21 44.9 kg (99 lb)   10/05/17 41.7 kg (92 lb)       General:  alert, cooperative, no distress, appears stated age, cachectic  Neck:  no carotid bruit, no JVD  Lungs:  clear to auscultation bilaterally  Heart:  regular rate and rhythm, S1, S2 normal, no murmur, click, rub or gallop  Abdomen:  abdomen is soft without significant tenderness, masses, organomegaly or guarding  Extremities:  extremities normal, atraumatic, no cyanosis or edema  Skin: Warm and dry.  no hyperpigmentation, vitiligo, or suspicious lesions  Neuro: alert, oriented x3, affect appropriate, no focal neurological deficits, moves all extremities well, no involuntary movements, reflexes at knee and ankle intact       Data Review:     Recent Labs     11/28/21  0458   WBC 12.6   HGB 9.5*   HCT 29.4*   *     Recent Labs     11/29/21  0345 11/28/21  1055 11/28/21  0458   NA  --  135*  --    K  --  4.2  --    CL  --  103  --    CO2  --  24  --    GLU  --  109*  --    BUN  --  6*  --    CREA  --  0.61  --    CA  --  9.1  --    MG 2.2  --  1.5*       Results for orders placed or performed during the hospital encounter of 11/24/21   EKG, 12 LEAD, INITIAL   Result Value Ref Range    Ventricular Rate 81 BPM    Atrial Rate 81 BPM    P-R Interval 126 ms    QRS Duration 126 ms    Q-T Interval 526 ms    QTC Calculation (Bezet) 611 ms    Calculated P Axis 77 degrees    Calculated R Axis -81 degrees    Calculated T Axis -97 degrees    Diagnosis       Normal sinus rhythm  Right bundle branch block  Left anterior fascicular block  Inferior infarct (cited on or before 24-NOV-2021)  T wave abnormality, consider lateral ischemia  Abnormal ECG  When compared with ECG of 24-NOV-2021 21:19,  QT has lengthened  Confirmed by Paty Orellana M.D., [address] [place] [address] [id number]) on 11/25/2021 8:35:25 PM         All Cardiac Markers in the last 24 hours:    No results found for: CPK, CK, CKMMB, CKMB, RCK3, CKMBT, CKNDX, CKND1, MIKE, TROPT, TROIQ, STEPHAN, TROPT, TNIPOC, BNP, BNPP    Last Lipid:    Lab Results   Component Value Date/Time    Cholesterol, total 195 11/25/2021 04:42 AM HDL Cholesterol 48 11/25/2021 04:42 AM    LDL, calculated 126.4 (H) 11/25/2021 04:42 AM    Triglyceride 103 11/25/2021 04:42 AM    CHOL/HDL Ratio 4.1 11/25/2021 04:42 AM       Cardiographics:     EKG Results     Procedure 720 Value Units Date/Time    EKG, 12 LEAD, SUBSEQUENT [[id number]]     Order Status: Sent     EKG, 12 LEAD, SUBSEQUENT [[id number]] Collected: 11/29/21 0815    Order Status: Completed Updated: 11/29/21 1513     Ventricular Rate 69 BPM      Atrial Rate 69 BPM      P-R Interval 124 ms      QRS Duration 124 ms      Q-T Interval 458 ms      QTC Calculation (Bezet) 490 ms      Calculated P Axis 17 degrees      Calculated R Axis 27 degrees      Calculated T Axis -83 degrees      Diagnosis --     Normal sinus rhythm  Right bundle branch block  T wave abnormality, consider inferolateral ischemia  Abnormal ECG  When compared with ECG of 26-NOV-2021 08:38,  No significant change was found  Confirmed by Solange Pedersen MD, ----- ([id number]) on 11/29/2021 3:13:08 PM      EKG, 12 LEAD, SUBSEQUENT [[id number]] Collected: 11/26/21 0838    Order Status: Completed Updated: 11/27/21 2135     Ventricular Rate 83 BPM      Atrial Rate 83 BPM      P-R Interval 138 ms      QRS Duration 126 ms      Q-T Interval 424 ms      QTC Calculation (Bezet) 498 ms      Calculated P Axis 48 degrees      Calculated R Axis -46 degrees      Calculated T Axis -90 degrees      Diagnosis --     Normal sinus rhythm  Left axis deviation  Right bundle branch block  T wave abnormality, consider lateral ischemia  Abnormal ECG  When compared with ECG of 24-NOV-2021 21:20,  QT has shortened  Confirmed by Lizet Orlando M.D., [address] [place] [address] ([id number]) on 11/27/2021 9:35:38 PM      EKG, 12 LEAD, INITIAL [[id number]] Collected: 11/24/21 2119    Order Status: Completed Updated: 11/25/21 2035     Ventricular Rate 81 BPM      Atrial Rate 81 BPM      P-R Interval 126 ms      QRS Duration 126 ms      Q-T Interval 526 ms      QTC Calculation (Bezet) 611 ms      Calculated P Axis 77 degrees      Calculated R Axis -81 degrees      Calculated T Axis -97 degrees      Diagnosis --     Normal sinus rhythm  Right bundle branch block  Left anterior fascicular block  Inferior infarct (cited on or before 24-NOV-2021)  T wave abnormality, consider lateral ischemia  Abnormal ECG  When compared with ECG of 24-NOV-2021 21:19,  QT has lengthened  Confirmed by Sonu Rivera M.D., [address] [place] [address] ([id number]) on 11/25/2021 8:35:25 PM            XR Results (most recent):  Results from [place] encounter on 11/24/21    XR SPINE LUMB 2 OR 3 V    Narrative  XR SPINE LUMB 2 OR 3 V: 11/26/2021 4:08 PM    CLINICAL INFORMATION  back pain. COMPARISON  CT abdomen/pelvis 25 November 2021    TECHNIQUE  3 views of the lumbar spine    FINDINGS:  No fracture or destructive osseous lesion. Slight levoconvex curvature of the  lumbar spine. Straightening of the expected lumbar lordosis. The joint spaces  are maintained. Enteric contrast outlines portions of the colon. Impression  1. No acute osseous findings.         Signed By: NICK Luther     November 30, 2021

## 2021-11-30 NOTE — ROUTINE PROCESS
Bedside and Verbal shift change report given to [address] (oncoming nurse) by Baljeet Pack RN (offgoing nurse). Report included the following information SBAR, Kardex, Intake/Output, MAR and Recent Results.

## 2021-11-30 NOTE — DISCHARGE INSTR - DIET
Return to the ED with worsening lightheadedness, chest pains, if you feel like you are about to pass out or if you pass out.

## 2021-11-30 NOTE — DISCHARGE SUMMARY
[facility]  Discharge Summary       Patient: Ute Leon Age: 78 y.o. : 1955 MR#: [id number] SSN: [id number]  PCP on record: Barbra Braswell MD  Admit date: 2021  Discharge date: 2021    Consults:  -Dr Marylee Junker.,- cardiology  -   Procedures: Cardiac cath on 21:  Conclusion    Non obstructive epicardial coronary arteries with severe LV dysfunction. 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 Recommend intense risk factor modification. ICD if EF remains low inspite of optimal medical therapy. -     Significant Diagnostic Studies: -Cardiac stress test on 21:  Interpretation Summary    · Baseline ECG: Normal sinus rhythm, right bundle branch blockT wave abnormality, consider inferolateral ischemia. · SPECT: Left ventricular function post-stress was abnormal. Calculated ejection fraction is 39% (normal EF value is equal to or greater than 50%). Left ventricular wall motion was abnormal at stress as described below in the wall scoring diagram. There is no evidence of transient ischemic dilation (TID). The TID ratio is 1.04.  · SPECT: Left ventricular perfusion is abnormal. Myocardial perfusion imaging defect 1: There is a defect that is medium in size with a moderate reduction in uptake of the anterior and septal wall(s) that is reversible. There is abnormal wall motion in the defect area. The defect appears to be ischemia. Myocardial perfusion imaging defect 2: There is a defect that is small in size with a moderate reduction in uptake of the inferior wall(s) that is non-reversible. There is abnormal wall motion in the defect area. The possibility of infarction cannot be excluded. · SPECT: Left ventricular perfusion is abnormal. Myocardial perfusion imaging supports an intermediate risk stress test.      - Cardiac echo 21:   Result status: Final result  · LV: Estimated LVEF is 30 - 35%. Visually measured ejection fraction. Normal cavity size. Moderate concentric hypertrophy. Moderately reduced systolic function. Moderate (grade 2) left ventricular diastolic dysfunction. · LA: Moderately dilated left atrium. Left Atrium volume index is 45 mL/m2. · AV: Aortic valve focal thickening present. With no significant stenosis. Mild to moderate aortic valve regurgitation is present. · TV: Mild tricuspid valve regurgitation is present. Right Ventricular Arterial Pressure (RVSP) is 28 mmHg. Pulmonary hypertension not suggested by Doppler findings. · PV: Mild to moderate pulmonic valve regurgitation is present. Interpretation Summary    Result status: Final result  · LV: Estimated LVEF is 30 - 35%. Visually measured ejection fraction. Normal cavity size. Moderate concentric hypertrophy. Moderately reduced systolic function. Moderate (grade 2) left ventricular diastolic dysfunction. · LA: Moderately dilated left atrium. Left Atrium volume index is 45 mL/m2. · AV: Aortic valve focal thickening present. With no significant stenosis. Mild to moderate aortic valve regurgitation is present. · TV: Mild tricuspid valve regurgitation is present. Right Ventricular Arterial Pressure (RVSP) is 28 mmHg. Pulmonary hypertension not suggested by Doppler findings. · PV: Mild to moderate pulmonic valve regurgitation is present.    -Thyroid ultrasound on 11/24/21:    IMPRESSION     Multinodular thyroid gland.     Largest nodules in the right thyroid lobe measure up to 3.6 cm and in the left  thyroid lobe measures 3.2 cm. Tissue sampling recommended. -MRI lumbar spine 11/27/21:  IMPRESSION     Multilevel mild degenerative changes lumbar spine. No spinal stenosis.     Multiple levels mild foraminal narrowing. -MRI thoracic spine 11/27/21:  CONCLUSION:     Mild degenerative changes thoracic spine. No acute abnormalities.     Small pleural effusions and likely dependent atelectasis at the lung bases.   Cardiomegaly.     Degenerative changes lower cervical spine, incompletely included.     Multinodular thyroid. Consider nonemergent follow-up with ultrasound.    -X ray thoracic sine 11/26/21:  IMPRESSION  1. No acute osseous findings. -X ray lumbar spine 11/26/21:    IMPRESSION  1. No acute osseous findings. -X ray barium swallow 11/26/21:  IMPRESSION     No lluvia penetration or aspiration with all tested consistencies.     Please see speech pathologist report for additional details and recommendations. -CXR 11/24/21:  IMPRESSION     Right basilar streaky infiltrate appears slightly improved compared to  11/20/2021.    -CTA chest 11/24/21:  IMPRESSION     No evidence of pulmonary embolus or right heart strain.     Residual right lung base streaky opacities notably improved compared to  11/20/2021    -CT head 11/24/21:  IMPRESSION  No acute infarct, hemorrhage or mass effect identified. -CT abd pelv 11/25/21:    IMPRESSION     No clearly acute findings.     Nonspecific bladder distention. Status post hysterectomy. Atherosclerosis. See  additional details above.     Discharge Diagnoses:  -Syncope  -New dx of HFrEF  -Hypokalemia  -Hypomagnesemia  -Pneumonia  -Back pain  -Multinodular thyroid gland                                         Patient Active Problem List   Diagnosis Code    Itch of skin L29.9    Anxiety state F41.1    Weight loss, unintentional R63.4    Major depressive disorder, recurrent episode, severe, with psychosis ([address] F33.3    Pyelonephritis N12    SIRS (systemic inflammatory response syndrome) ([facility]) R65.10    Sepsis ([facility]) A41.9    UTI (urinary tract infection) N39.0    Severe protein-calorie malnutrition ([facility]) E43    NSTEMI (non-ST elevated myocardial infarction) ([facility] I21.4    Severe sepsis ([facility]) A41.9, R65.20    QT prolongation R94.31       Hospital Course by Problem   78year-old female admitted on 11/25 after presenting with chief complaint of dizziness/syncopal event     -Syncope: Event took place while at [facility] unit. Cause remains unclear. Work-up thus far significant for new diagnosis of heart failure with reduced ejection fraction on echo done on 11/25, abnormal stress test 11/29, subsequently patient had cardiac catheterization on 1129 with findings of nonobstructive epicardial coronary arteries and severe LV dysfunction additional details as pasted above. Recommendations made for intense risk factor modification and ICD if ejection fraction remains low despite optimal medical therapy. Patient has been instructed to follow-up with the cardiology service in about 4 weeks for continued evaluation and management. Work-up for the syncope also included CT angiogram of the chest looking for pulmonary embolism which was negative. As part of her work-up for syncope she was Also noted to have prolonged QTc, improved off her psych meds she was on for recurrent depressive d/o. Indeterminate level of troponin elevation. Patient has been discharged on a 30-day monitor for continued evaluation. Patient to follow-up with cardiology about this in 3 to 4 weeks. She has been discharged on metoprolol succinate 50 mg daily as well as lisinopril, low-dose aspirin and statin.     -Hypokalemia, low mag   Cause unclear, not on diuretics. K and mag improved, after replacement. Patient to take potassium in the outpatient setting. On her prior to admission medication list she has potassium 40 p.o. twice a day. This has been decreased to once daily given her last potassium level was well within normal limits.     -Leukocytosis:  Thought to be due to pneumonia. Resolved, abx tapered down to zosyn, finished course during her hospitalization stay. Cultures remain negative. Source of infection unclear, suspicion that this might be aspiration pneumonia given right sided pulmonary streaky opacities on CT and chest x-ray imagings.   Patient had had prominent complaints of back pain however MRI thoracic and lumbar spine negative for infectious etiology.      -Back pain: MRI thoracic and lumbar spine show evidence of DJD.     -Multinodular thyroid gland. Largest nodules in the right thyroid lobe measure up to 3.6 cm and in the left  thyroid lobe measures 3.2 cm. Tissue sampling recommended. I have instructed patient to find a primary care physician to get further evaluation for this finding including biopsy of the lesions to see if lesions are benign versus neoplastic in nature. She has expressed understanding and that she plans on finding a primary care physician soon.     -History of depression with psychotic symptoms: pt was at the [facility] prior to her transfer here for syncope. Off psych meds due to QTc prolognation. Psych input-->  she has been discharged on Remeron per recommendations of the psychiatry physician who saw her on date of discharge and recommended discharge to home    -MBS completed with recs of reg solid diet and thin liquids, meds as tolerated.      Overall patient has been discharged in stable condition. She has been strongly advised to follow-up with a primary care physician in the outpatient setting for her multinodular thyroid gland. She has also been instructed to follow-up with the cardiology team for continued work-up of her low ejection fraction and to see into the need for ICD.             Today's examination of the patient revealed:     Subjective:     Objective:   VS:   Visit Vitals  /69 (BP 1 Location: Left upper arm, BP Patient Position: At rest)   Pulse 77   Temp 98.6 °F (37 °C)   Resp 18   Ht 5' 3\" (1.6 m)   Wt 47.4 kg (104 lb 6.6 oz)   SpO2 99%   Breastfeeding No   BMI 18.50 kg/m²      Tmax/24hrs: Temp (24hrs), Av.3 °F (36.8 °C), Min:98 °F (36.7 °C), Max:98.6 °F (37 °C)     Input/Output:     Intake/Output Summary (Last 24 hours) at 2021 1340  Last data filed at 2021 0615  Gross per 24 hour   Intake 100 ml   Output 350 ml   Net -250 ml     General appearance - alert, cachectic, bilateral temporal wasting present, and in no distress  Chest -ctab  Heart - S1 and S2 normal  Abdomen - soft, nontender, nondistended, Bowel sounds present  Neurological - alert, oriented, normal speech, no focal findings noted, no tremors  Musculoskeletal - point tenderness of lower thoracic vertebrae    Extremities - no pedal edema noted, 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, non tender, no skin change overlying the tumor c/o lipoma    Labs:    Recent Results (from the past 24 hour(s))   METABOLIC PANEL, BASIC    Collection Time: 11/30/21  9:41 AM   Result Value Ref Range    Sodium 136 136 - 145 mmol/L    Potassium 4.8 3.5 - 5.5 mmol/L    Chloride 108 100 - 111 mmol/L    CO2 22 21 - 32 mmol/L    Anion gap 6 3.0 - 18 mmol/L    Glucose 95 74 - 99 mg/dL    BUN 6 (L) 7.0 - 18 MG/DL    Creatinine 0.55 (L) 0.6 - 1.3 MG/DL    BUN/Creatinine ratio 11 (L) 12 - 20      GFR est AA >60 >60 ml/min/1.73m2    GFR est non-AA >60 >60 ml/min/1.73m2    Calcium 9.5 8.5 - 10.1 MG/DL   EKG, 12 LEAD, SUBSEQUENT    Collection Time: 11/30/21 10:15 AM   Result Value Ref Range    Ventricular Rate 81 BPM    Atrial Rate 81 BPM    P-R Interval 130 ms    QRS Duration 118 ms    Q-T Interval 422 ms    QTC Calculation (Bezet) 490 ms    Calculated P Axis 85 degrees    Calculated R Axis -57 degrees    Calculated T Axis -68 degrees    Diagnosis       Normal sinus rhythm  Left axis deviation  Right bundle branch block  T wave abnormality, consider inferolateral ischemia  Abnormal ECG  When compared with ECG of 29-NOV-2021 08:15,  No significant change was found       Additional Data Reviewed:     Condition on discharge:stable   Disposition:    [x]Home   []Home with Home Health   []SNF/NH   []Rehab   []Home with family   []Alternate Facility:____________________      Discharge Medications:     Current Discharge Medication List      START taking these medications    Details   aspirin delayed-release 81 mg tablet Take 1 Tablet by mouth daily for 30 days. Qty: 30 Tablet, Refills: 0      atorvastatin (LIPITOR) 20 mg tablet Take 1 Tablet by mouth nightly for 30 days. Qty: 30 Tablet, Refills: 0      lisinopriL (PRINIVIL, ZESTRIL) 10 mg tablet Take 1 Tablet by mouth daily for 30 days. Qty: 30 Tablet, Refills: 0      metoprolol succinate (TOPROL-XL) 50 mg XL tablet Take 1 Tablet by mouth daily for 30 days. Qty: 30 Tablet, Refills: 0      mirtazapine (REMERON SOL-TAB) 15 mg disintegrating tablet Take 1 Tablet by mouth nightly for 30 days. Qty: 30 Tablet, Refills: 0      multivitamin, tx-iron-ca-min (THERA-M w/ IRON) 9 mg iron-400 mcg tab tablet Take 1 Tablet by mouth daily for 30 days. Qty: 30 Tablet, Refills: 0      senna-docusate (PERICOLACE) 8.6-50 mg per tablet Take 1 Tablet by mouth daily for 30 days. Qty: 30 Tablet, Refills: 0         CONTINUE these medications which have CHANGED    Details   potassium chloride (K-DUR, KLOR-CON M20) 20 mEq tablet Take 1 Tablet by mouth daily. Qty: 10 Tablet, Refills: 0         CONTINUE these medications which have NOT CHANGED    Details   therapeutic multivitamin (THERAGRAN) tablet Take 1 Tab by mouth daily. Indications: VITAMIN DEFICIENCY PREVENTION  Qty: 30 Tab, Refills: 0      docusate sodium (COLACE) 100 mg capsule Take 100 mg by mouth two (2) times daily as needed for Constipation. STOP taking these medications       DULoxetine (CYMBALTA) 60 mg capsule Comments:   Reason for Stopping:         OLANZapine (ZYPREXA ZYDIS) 10 mg disintegrating tablet Comments:   Reason for Stopping: Follow-up Appointments:   1. Your PCP: James Ray MD, within 7-10days  2.   Cardiology in 4 wks          >30 minutes spent coordinating this discharge (review instructions/follow-up, prescriptions, preparing report for sign off)    Signed:  Elvia Johnson MD  11/30/2021  1:40 PM

## 2022-01-07 ENCOUNTER — OFFICE VISIT (OUTPATIENT)
Dept: CARDIOLOGY CLINIC | Age: 67
End: 2022-01-07
Payer: COMMERCIAL

## 2022-01-07 VITALS
HEART RATE: 79 BPM | HEIGHT: 63 IN | SYSTOLIC BLOOD PRESSURE: 139 MMHG | OXYGEN SATURATION: 98 % | BODY MASS INDEX: 18.43 KG/M2 | DIASTOLIC BLOOD PRESSURE: 64 MMHG | WEIGHT: 104 LBS

## 2022-01-07 DIAGNOSIS — I21.4 NSTEMI (NON-ST ELEVATED MYOCARDIAL INFARCTION) (HCC): ICD-10-CM

## 2022-01-07 DIAGNOSIS — I45.10 RBBB: ICD-10-CM

## 2022-01-07 DIAGNOSIS — F33.3 MAJOR DEPRESSIVE DISORDER, RECURRENT EPISODE, SEVERE, WITH PSYCHOSIS (HCC): ICD-10-CM

## 2022-01-07 DIAGNOSIS — Z09 HOSPITAL DISCHARGE FOLLOW-UP: ICD-10-CM

## 2022-01-07 DIAGNOSIS — I42.8 NONISCHEMIC CARDIOMYOPATHY (HCC): Primary | ICD-10-CM

## 2022-01-07 DIAGNOSIS — R94.31 QT PROLONGATION: ICD-10-CM

## 2022-01-07 DIAGNOSIS — R55 SYNCOPE AND COLLAPSE: ICD-10-CM

## 2022-01-07 PROCEDURE — G8419 CALC BMI OUT NRM PARAM NOF/U: HCPCS | Performed by: NURSE PRACTITIONER

## 2022-01-07 PROCEDURE — G8400 PT W/DXA NO RESULTS DOC: HCPCS | Performed by: NURSE PRACTITIONER

## 2022-01-07 PROCEDURE — 1090F PRES/ABSN URINE INCON ASSESS: CPT | Performed by: NURSE PRACTITIONER

## 2022-01-07 PROCEDURE — 1101F PT FALLS ASSESS-DOCD LE1/YR: CPT | Performed by: NURSE PRACTITIONER

## 2022-01-07 PROCEDURE — G8427 DOCREV CUR MEDS BY ELIG CLIN: HCPCS | Performed by: NURSE PRACTITIONER

## 2022-01-07 PROCEDURE — 3017F COLORECTAL CA SCREEN DOC REV: CPT | Performed by: NURSE PRACTITIONER

## 2022-01-07 PROCEDURE — G9717 DOC PT DX DEP/BP F/U NT REQ: HCPCS | Performed by: NURSE PRACTITIONER

## 2022-01-07 PROCEDURE — 1111F DSCHRG MED/CURRENT MED MERGE: CPT | Performed by: NURSE PRACTITIONER

## 2022-01-07 PROCEDURE — G8536 NO DOC ELDER MAL SCRN: HCPCS | Performed by: NURSE PRACTITIONER

## 2022-01-07 PROCEDURE — 99214 OFFICE O/P EST MOD 30 MIN: CPT | Performed by: NURSE PRACTITIONER

## 2022-01-07 RX ORDER — LISINOPRIL 10 MG/1
TABLET ORAL DAILY
COMMUNITY
End: 2022-01-07 | Stop reason: SDUPTHER

## 2022-01-07 RX ORDER — LISINOPRIL 20 MG/1
20 TABLET ORAL DAILY
Qty: 90 TABLET | Refills: 3 | Status: SHIPPED | OUTPATIENT
Start: 2022-01-07 | End: 2022-02-23 | Stop reason: SDUPTHER

## 2022-01-07 RX ORDER — METOPROLOL SUCCINATE 50 MG/1
TABLET, EXTENDED RELEASE ORAL DAILY
COMMUNITY
End: 2022-01-07 | Stop reason: SDUPTHER

## 2022-01-07 RX ORDER — POTASSIUM CHLORIDE 750 MG/1
10 TABLET, EXTENDED RELEASE ORAL DAILY
Qty: 90 TABLET | Refills: 2 | Status: SHIPPED | OUTPATIENT
Start: 2022-01-07 | End: 2022-02-23 | Stop reason: ALTCHOICE

## 2022-01-07 RX ORDER — LISINOPRIL 10 MG/1
10 TABLET ORAL DAILY
Qty: 90 TABLET | Refills: 2 | Status: SHIPPED | OUTPATIENT
Start: 2022-01-07 | End: 2022-01-07 | Stop reason: SDUPTHER

## 2022-01-07 RX ORDER — ATORVASTATIN CALCIUM 20 MG/1
20 TABLET, FILM COATED ORAL DAILY
Qty: 90 TABLET | Refills: 2 | Status: ON HOLD | OUTPATIENT
Start: 2022-01-07 | End: 2022-05-01

## 2022-01-07 RX ORDER — MIRTAZAPINE 15 MG/1
TABLET, FILM COATED ORAL
Status: ON HOLD | COMMUNITY
End: 2022-05-01

## 2022-01-07 RX ORDER — FUROSEMIDE 20 MG/1
20 TABLET ORAL AS NEEDED
Qty: 60 TABLET | Refills: 3 | Status: ON HOLD | OUTPATIENT
Start: 2022-01-07 | End: 2022-05-01

## 2022-01-07 RX ORDER — METOPROLOL SUCCINATE 50 MG/1
50 TABLET, EXTENDED RELEASE ORAL DAILY
Qty: 90 TABLET | Refills: 2 | Status: SHIPPED | OUTPATIENT
Start: 2022-01-07 | End: 2022-11-04

## 2022-01-07 RX ORDER — ATORVASTATIN CALCIUM 20 MG/1
TABLET, FILM COATED ORAL DAILY
COMMUNITY
End: 2022-01-07 | Stop reason: SDUPTHER

## 2022-01-07 RX ORDER — ASPIRIN 81 MG/1
TABLET ORAL DAILY
COMMUNITY
End: 2022-01-07 | Stop reason: SDUPTHER

## 2022-01-07 RX ORDER — ASPIRIN 81 MG/1
81 TABLET ORAL DAILY
Qty: 100 TABLET | Refills: 2 | Status: SHIPPED | OUTPATIENT
Start: 2022-01-07

## 2022-01-07 NOTE — PROGRESS NOTES
HISTORY OF PRESENT ILLNESS  Paulie Benavidez is a 78 y.o. female. 1/7/2022  Patient presents to f/u for admission to [facility] 11/24-11/30/2021 for syncope and found to have non-ischemic cardiomyopathy EF 30-35%, cardiac cath revealed Non obstructive epicardial coronary arteries with severe LV dysfunction. Since discharge she reports intermittent bilateral lower extremity edema. She denies chest pain, shortness of breath or palpitations      Review of Systems   Constitutional: Negative for malaise/fatigue. HENT: Negative for congestion. Eyes: Negative for double vision and redness. Respiratory: Negative for cough, shortness of breath and wheezing. Cardiovascular: Positive for leg swelling. Negative for chest pain, palpitations, orthopnea, claudication and PND. Gastrointestinal: Negative for nausea and vomiting. Musculoskeletal: Negative for falls. Neurological: Negative for dizziness. Endo/Heme/Allergies: Does not bruise/bleed easily. Physical Exam  Vitals and nursing note reviewed. Constitutional:       Appearance: She is well-developed. Neck:      Vascular: No JVD. Cardiovascular:      Rate and Rhythm: Normal rate and regular rhythm. Pulses: Intact distal pulses. Heart sounds: Normal heart sounds. No murmur heard. No friction rub. No gallop. Pulmonary:      Effort: Pulmonary effort is normal. No respiratory distress. Breath sounds: Normal breath sounds. No wheezing or rales. Chest:      Chest wall: No tenderness. Abdominal:      General: There is no distension. Palpations: Abdomen is soft. There is no mass. Tenderness: There is no abdominal tenderness. Musculoskeletal:         General: Normal range of motion. Right lower leg: Edema (trace ankle) present. Left lower leg: Edema (trace ankle) present. Skin:     General: Skin is warm and dry. Neurological:      Mental Status: She is alert and oriented to person, place, and time.      Echo 11/2021  Interpretation Summary    · LV: Estimated LVEF is 30 - 35%. Visually measured ejection fraction. Normal cavity size. Moderate concentric hypertrophy. Moderately reduced systolic function. Moderate (grade 2) left ventricular diastolic dysfunction. · LA: Moderately dilated left atrium. Left Atrium volume index is 45 mL/m2. · AV: Aortic valve focal thickening present. With no significant stenosis. Mild to moderate aortic valve regurgitation is present. · TV: Mild tricuspid valve regurgitation is present. Right Ventricular Arterial Pressure (RVSP) is 28 mmHg. Pulmonary hypertension not suggested by Doppler findings. · PV: Mild to moderate pulmonic valve regurgitation is present. Comparison Study Information      Prior Study    There is no prior study available for comparison     11/2021  NST  Procedure Conclusion  Nuclear Stress Test  Nuclear Cardiac Spect Rest then Gated Stress with tomographic imaging/tomography utilized for the tomographic myocardical perfusion imaging performed. Helane Civatte was used as the stressing method and agent. (Helane Civatte given via a 10 - 20 sec injection.) Left ventricular perfusion is abnormal. Myocardial perfusion imaging supports an intermediate risk stress test.   There is no prior study available for comparison. . This Single Photon Emission Computer Tomograph (SPECT) study utilized tomographic imaging/tomography for the tomographic myocardial perfusion imaging performed during this study. 11/30/2021  Holter monitor  Interpretation Summary   12 events were transmitted. 0 patient triggered; 12 auto triggered   Patient monitored for 6d 23h 45m   637 PACs with PAC burden of <1%   515 PVCs with PVC burden of <1%      ASSESSMENT and PLAN  Ms. Rosa Najera has a reminder for a \"due or due soon\" health maintenance. I have asked that she contact her primary care provider for follow-up on this health maintenance. No flowsheet data found.     Assessment         ICD-10-CM ICD-9-CM 1. Nonischemic cardiomyopathy ([facility]  V96.1 144.1 METABOLIC PANEL, BASIC   2. NSTEMI (non-ST elevated myocardial infarction) ([facility]  I21.4 410.70    3. QT prolongation  R94.31 794.31    4. Major depressive disorder, recurrent episode, severe, with psychosis ([facility]  F33.3 296.34    5. Syncope and collapse  R55 780.2    6. RBBB  I45.10 426.4    7. Hospital discharge follow-up  Z09 V67.59 DC DISCHARGE MEDS RECONCILED W/ CURRENT OUTPATIENT MED LIST     1/2022  Patient recently admitted for syncope and found to have NSTEMI with nonischemic cardiomyopathy ejection fraction 30-35%. Cardiac cath reviewed and discussed with patient no epicardial stenosis noted. We will continue to optimize medications continue beta-blocker daily, will increase lisinopril to 20 mg/day, encourage low-sodium diet less than 2000 mg/day. Will Rx Lasix 20 mg as needed for edema. Check BMP in 1 week. Medications Discontinued During This Encounter   Medication Reason    potassium chloride (K-DUR, KLOR-CON M20) 20 mEq tablet REORDER    aspirin delayed-release 81 mg tablet REORDER    atorvastatin (LIPITOR) 20 mg tablet REORDER    lisinopriL (PRINIVIL, ZESTRIL) 10 mg tablet REORDER    metoprolol succinate (TOPROL-XL) 50 mg XL tablet REORDER    lisinopriL (PRINIVIL, ZESTRIL) 10 mg tablet REORDER       Orders Placed This Encounter    METABOLIC PANEL, BASIC     Standing Status:   Future     Standing Expiration Date:   1/8/2023    DC DISCHARGE MEDS RECONCILED W/ CURRENT OUTPATIENT MED LIST    atorvastatin (LIPITOR) 20 mg tablet     Sig: Take 1 Tablet by mouth daily. Dispense:  90 Tablet     Refill:  2    DISCONTD: lisinopriL (PRINIVIL, ZESTRIL) 10 mg tablet     Sig: Take 1 Tablet by mouth daily. Dispense:  90 Tablet     Refill:  2    metoprolol succinate (TOPROL-XL) 50 mg XL tablet     Sig: Take 1 Tablet by mouth daily.      Dispense:  90 Tablet     Refill:  2    potassium chloride (KLOR-CON M10) 10 mEq tablet     Sig: Take 1 Tablet by mouth daily. Dispense:  90 Tablet     Refill:  2    aspirin delayed-release 81 mg tablet     Sig: Take 1 Tablet by mouth daily. Dispense:  100 Tablet     Refill:  2    furosemide (LASIX) 20 mg tablet     Sig: Take 1 Tablet by mouth as needed (edema). Dispense:  60 Tablet     Refill:  3    lisinopriL (PRINIVIL, ZESTRIL) 20 mg tablet     Sig: Take 1 Tablet by mouth daily. Dispense:  90 Tablet     Refill:  3       Follow-up and Dispositions    · Return in about 6 weeks (around 2/18/2022) for Follow up with Dr. Candice Li or [facility].

## 2022-01-07 NOTE — PATIENT INSTRUCTIONS
Increase lisinopril to 20 mg / day    Have blood work drawn next week. Lasix as needed for swelling. Low Sodium Diet (2,000 Milligram): Care Instructions  Overview     Limiting sodium can be an important part of managing some health problems. The most common source of sodium is salt. People get most of the salt in their diet from canned, prepared, and packaged foods. Fast food and restaurant meals also are very high in sodium. Your doctor will probably limit your sodium to less than 2,000 milligrams (mg) a day. This limit counts all the sodium in prepared and packaged foods and any salt you add to your food. Follow-up care is a key part of your treatment and safety. Be sure to make and go to all appointments, and call your doctor if you are having problems. It's also a good idea to know your test results and keep a list of the medicines you take. How can you care for yourself at home? Read food labels  · Read labels on cans and food packages. The labels tell you how much sodium is in each serving. Make sure that you look at the serving size. If you eat more than the serving size, you have eaten more sodium. · Food labels also tell you the Percent Daily Value for sodium. Choose products with low Percent Daily Values for sodium. · Be aware that sodium can come in forms other than salt, including monosodium glutamate (MSG), sodium citrate, and sodium bicarbonate (baking soda). MSG is often added to Asian food. When you eat out, you can sometimes ask for food without MSG or added salt. Buy low-sodium foods  · Buy foods that are labeled \"unsalted\" (no salt added), \"sodium-free\" (less than 5 mg of sodium per serving), or \"low-sodium\" (140 mg or less of sodium per serving). Foods labeled \"reduced-sodium\" and \"light sodium\" may still have too much sodium. Be sure to read the label to see how much sodium you are getting. · Buy fresh vegetables, or frozen vegetables without added sauces.  Buy low-sodium versions of canned vegetables, soups, and other canned goods. Prepare low-sodium meals  · Cut back on the amount of salt you use in cooking. This will help you adjust to the taste. Do not add salt after cooking. One teaspoon of salt has about 2,300 mg of sodium. · Take the salt shaker off the table. · Flavor your food with garlic, lemon juice, onion, vinegar, herbs, and spices. Do not use soy sauce, lite soy sauce, steak sauce, onion salt, garlic salt, celery salt, or ketchup on your food. · Use low-sodium salad dressings, sauces, and ketchup. Or make your own salad dressings and sauces without adding salt. · Use less salt (or none) when recipes call for it. You can often use half the salt a recipe calls for without losing flavor. Other foods such as rice, pasta, and grains do not need added salt. · Rinse canned vegetables, and cook them in fresh water. This removes some--but not all--of the salt. · Avoid water that is naturally high in sodium or that has been treated with water softeners, which add sodium. If you buy bottled water, read the label and choose a sodium-free brand. Avoid high-sodium foods  · Avoid eating:  ? Smoked, cured, salted, and canned meat, fish, and poultry. ? Ham, stone, hot dogs, and luncheon meats. ? Regular, hard, and processed cheese and regular peanut butter. ? Crackers with salted tops, and other salted snack foods such as pretzels, chips, and salted popcorn. ? Frozen prepared meals, unless labeled low-sodium. ? Canned and dried soups, broths, and bouillon, unless labeled sodium-free or low-sodium. ? Canned vegetables, unless labeled sodium-free or low-sodium. ? Western Mayra fries, pizza, tacos, and other fast foods. ? Pickles, olives, ketchup, and other condiments, especially soy sauce, unless labeled sodium-free or low-sodium. Where can you learn more?   Go to http://www.gray.com/  Enter V081 in the search box to learn more about \"Low Sodium Diet (2,000 Milligram): Care Instructions. \"  Current as of: December 17, 2020               Content Version: 13.0  © 5213-6763 [employer]. Care instructions adapted under license by [employer] (which disclaims liability or warranty for this information). If you have questions about a medical condition or this instruction, always ask your healthcare professional. [address] any warranty or liability for your use of this information.

## 2022-02-23 ENCOUNTER — OFFICE VISIT (OUTPATIENT)
Dept: CARDIOLOGY CLINIC | Age: 67
End: 2022-02-23
Payer: COMMERCIAL

## 2022-02-23 VITALS
WEIGHT: 104 LBS | HEIGHT: 63 IN | SYSTOLIC BLOOD PRESSURE: 145 MMHG | HEART RATE: 78 BPM | OXYGEN SATURATION: 100 % | DIASTOLIC BLOOD PRESSURE: 69 MMHG | BODY MASS INDEX: 18.43 KG/M2

## 2022-02-23 DIAGNOSIS — I45.10 RBBB: ICD-10-CM

## 2022-02-23 DIAGNOSIS — I42.8 NONISCHEMIC CARDIOMYOPATHY (HCC): Primary | ICD-10-CM

## 2022-02-23 DIAGNOSIS — R94.31 QT PROLONGATION: ICD-10-CM

## 2022-02-23 DIAGNOSIS — I21.4 NSTEMI (NON-ST ELEVATED MYOCARDIAL INFARCTION) (HCC): ICD-10-CM

## 2022-02-23 DIAGNOSIS — I50.22 SYSTOLIC CHF, CHRONIC (HCC): ICD-10-CM

## 2022-02-23 PROCEDURE — 1090F PRES/ABSN URINE INCON ASSESS: CPT | Performed by: NURSE PRACTITIONER

## 2022-02-23 PROCEDURE — 1101F PT FALLS ASSESS-DOCD LE1/YR: CPT | Performed by: NURSE PRACTITIONER

## 2022-02-23 PROCEDURE — G8427 DOCREV CUR MEDS BY ELIG CLIN: HCPCS | Performed by: NURSE PRACTITIONER

## 2022-02-23 PROCEDURE — G8400 PT W/DXA NO RESULTS DOC: HCPCS | Performed by: NURSE PRACTITIONER

## 2022-02-23 PROCEDURE — 3017F COLORECTAL CA SCREEN DOC REV: CPT | Performed by: NURSE PRACTITIONER

## 2022-02-23 PROCEDURE — G8536 NO DOC ELDER MAL SCRN: HCPCS | Performed by: NURSE PRACTITIONER

## 2022-02-23 PROCEDURE — 99214 OFFICE O/P EST MOD 30 MIN: CPT | Performed by: NURSE PRACTITIONER

## 2022-02-23 PROCEDURE — G8419 CALC BMI OUT NRM PARAM NOF/U: HCPCS | Performed by: NURSE PRACTITIONER

## 2022-02-23 PROCEDURE — G9717 DOC PT DX DEP/BP F/U NT REQ: HCPCS | Performed by: NURSE PRACTITIONER

## 2022-02-23 RX ORDER — LISINOPRIL 40 MG/1
40 TABLET ORAL DAILY
Qty: 90 TABLET | Refills: 1 | Status: ON HOLD | OUTPATIENT
Start: 2022-02-23 | End: 2022-05-01

## 2022-02-23 NOTE — PROGRESS NOTES
HISTORY OF PRESENT ILLNESS  Johanna Sampson is a 78 y.o. female. 1/7/2022  Patient presents to f/u for admission to [facility] 11/24-11/30/2021 for syncope and found to have non-ischemic cardiomyopathy EF 30-35%, cardiac cath revealed Non obstructive epicardial coronary arteries with severe LV dysfunction. Since discharge she reports intermittent bilateral lower extremity edema. She denies chest pain, shortness of breath or palpitations  2/23/2022  Patient presents to f/u for [facility]. She denies chest pain, shortness of breath, palpitations or edema. Follow-up  The history is provided by the patient and medical records. Pertinent negatives include no chest pain and no shortness of breath. Review of Systems   Constitutional: Negative for malaise/fatigue. HENT: Negative for congestion. Eyes: Negative for double vision and redness. Respiratory: Negative for cough, shortness of breath and wheezing. Cardiovascular: Negative for chest pain, palpitations, orthopnea, claudication, leg swelling and PND. Gastrointestinal: Negative for nausea and vomiting. Musculoskeletal: Negative for falls. Neurological: Negative for dizziness. Endo/Heme/Allergies: Does not bruise/bleed easily. Physical Exam  Vitals and nursing note reviewed. Constitutional:       Appearance: She is well-developed. Neck:      Vascular: No JVD. Cardiovascular:      Rate and Rhythm: Normal rate and regular rhythm. Pulses: Intact distal pulses. Heart sounds: Normal heart sounds. No murmur heard. No friction rub. No gallop. Pulmonary:      Effort: Pulmonary effort is normal. No respiratory distress. Breath sounds: Normal breath sounds. No wheezing or rales. Chest:      Chest wall: No tenderness. Abdominal:      General: There is no distension. Palpations: Abdomen is soft. There is no mass. Tenderness: There is no abdominal tenderness. Musculoskeletal:         General: Normal range of motion. Right lower leg: No edema. Left lower leg: No edema. Skin:     General: Skin is warm and dry. Neurological:      Mental Status: She is alert and oriented to person, place, and time. Echo 11/2021  Interpretation Summary    · LV: Estimated LVEF is 30 - 35%. Visually measured ejection fraction. Normal cavity size. Moderate concentric hypertrophy. Moderately reduced systolic function. Moderate (grade 2) left ventricular diastolic dysfunction. · LA: Moderately dilated left atrium. Left Atrium volume index is 45 mL/m2. · AV: Aortic valve focal thickening present. With no significant stenosis. Mild to moderate aortic valve regurgitation is present. · TV: Mild tricuspid valve regurgitation is present. Right Ventricular Arterial Pressure (RVSP) is 28 mmHg. Pulmonary hypertension not suggested by Doppler findings. · PV: Mild to moderate pulmonic valve regurgitation is present. Comparison Study Information      Prior Study    There is no prior study available for comparison     11/2021  NST  Procedure Conclusion  Nuclear Stress Test  Nuclear Cardiac Spect Rest then Gated Stress with tomographic imaging/tomography utilized for the tomographic myocardical perfusion imaging performed. Ledon Grange was used as the stressing method and agent. (Ledon Grange given via a 10 - 20 sec injection.) Left ventricular perfusion is abnormal. Myocardial perfusion imaging supports an intermediate risk stress test.   There is no prior study available for comparison. . This Single Photon Emission Computer Tomograph (SPECT) study utilized tomographic imaging/tomography for the tomographic myocardial perfusion imaging performed during this study. 11/30/2021  Holter monitor  Interpretation Summary   12 events were transmitted.  0 patient triggered; 12 auto triggered   Patient monitored for 6d 23h 45m   865 PACs with PAC burden of <1%   515 PVCs with PVC burden of <1%      ASSESSMENT and PLAN  Ms. Slim Acosta has a reminder for a \"due or due soon\" health maintenance. I have asked that she contact her primary care provider for follow-up on this health maintenance. No flowsheet data found. Assessment         ICD-10-CM ICD-9-CM    1. Nonischemic cardiomyopathy (HCC)  I65.7 015.0 METABOLIC PANEL, BASIC   2. NSTEMI (non-ST elevated myocardial infarction) ([facility] [address]  I21.4 410.70    3. RBBB  I45.10 426.4    4. QT prolongation  R94.31 794.31    5. Systolic CHF, chronic (HCC)  I50.22 428.22      428.0      1/2022  Patient recently admitted for syncope and found to have NSTEMI with nonischemic cardiomyopathy ejection fraction 30-35%. Cardiac cath reviewed and discussed with patient no epicardial stenosis noted. We will continue to optimize medications continue beta-blocker daily, will increase lisinopril to 20 mg/day, encourage low-sodium diet less than 2000 mg/day. Will Rx Lasix 20 mg as needed for edema. Check BMP in 1 week. 2/2022  Patient seen in follow-up for nonischemic cardiomyopathy. Currently asymptomatic. Recent BMP reviewed and discussed with patient normal renal function with high normal potassium. Will discontinue potassium. She has not needed Lasix since last office visit. Blood pressure remains mildly elevated will increase lisinopril to 40 mg/day repeat BMP in 1 week. We will continue goal directed medical therapy and uptitrate treatment medications as blood pressure will allow. Repeat limited echo after follow-up visit to reevaluate LV function. If EF remains less than 35% consider ICD for primary prevention. Medications Discontinued During This Encounter   Medication Reason    potassium chloride (KLOR-CON M10) 10 mEq tablet Therapy Completed    lisinopriL (PRINIVIL, ZESTRIL) 20 mg tablet REORDER       Orders Placed This Encounter    METABOLIC PANEL, BASIC     Standing Status:   Future     Standing Expiration Date:   2/24/2023    lisinopriL (PRINIVIL, ZESTRIL) 40 mg tablet     Sig: Take 1 Tablet by mouth daily. Dispense:  90 Tablet     Refill:  1       Follow-up and Dispositions    · Return in about 1 month (around 3/23/2022) for Follow up with Dr. Maria L Alan or Yisel Mcgill.

## 2022-02-23 NOTE — PATIENT INSTRUCTIONS
Increase lisinopril to 40 mg // day  Check BMP in 1 week  Follow up in office in 4 weeks. Heart-Healthy Diet: Care Instructions  Your Care Instructions     A heart-healthy diet has lots of vegetables, fruits, nuts, beans, and whole grains, and is low in salt. It limits foods that are high in saturated fat, such as meats, cheeses, and fried foods. It may be hard to change your diet, but even small changes can lower your risk of heart attack and heart disease. Follow-up care is a key part of your treatment and safety. Be sure to make and go to all appointments, and call your doctor if you are having problems. It's also a good idea to know your test results and keep a list of the medicines you take. How can you care for yourself at home? Watch your portions  · Use food labels to learn what the recommended servings are for the foods you eat. · Eat only the number of calories you need to stay at a healthy weight. If you need to lose weight, eat fewer calories than your body burns (through exercise and other physical activity). Eat more fruits and vegetables  · Eat a variety of fruit and vegetables every day. Dark green, deep orange, red, or yellow fruits and vegetables are especially good for you. Examples include spinach, carrots, peaches, and berries. · Keep carrots, celery, and other veggies handy for snacks. Buy fruit that is in season and store it where you can see it so that you will be tempted to eat it. · Cook dishes that have a lot of veggies in them, such as stir-fries and soups. Limit saturated fat  · Read food labels, and try to avoid saturated fats. They increase your risk of heart disease. · Use olive or canola oil when you cook. · Bake, broil, grill, or steam foods instead of frying them. · Choose lean meats instead of high-fat meats such as hot dogs and sausages. Cut off all visible fat when you prepare meat.   · Eat fish, skinless poultry, and meat alternatives such as soy products instead of high-fat meats. Soy products, such as tofu, may be especially good for your heart. · Choose low-fat or fat-free milk and dairy products. Eat foods high in fiber  · Eat a variety of grain products every day. Include whole-grain foods that have lots of fiber and nutrients. Examples of whole-grain foods include oats, whole wheat bread, and brown rice. · Buy whole-grain breads and cereals, instead of white bread or pastries. Limit salt and sodium  · Limit how much salt and sodium you eat to help lower your blood pressure. · Taste food before you salt it. Add only a little salt when you think you need it. With time, your taste buds will adjust to less salt. · Eat fewer snack items, fast foods, and other high-salt, processed foods. Check food labels for the amount of sodium in packaged foods. · Choose low-sodium versions of canned goods (such as soups, vegetables, and beans). Limit sugar  · Limit drinks and foods with added sugar. These include candy, desserts, and soda pop. Limit alcohol  · Limit alcohol to no more than 2 drinks a day for men and 1 drink a day for women. Too much alcohol can cause health problems. When should you call for help? Watch closely for changes in your health, and be sure to contact your doctor if:    · You would like help planning heart-healthy meals. Where can you learn more? Go to http://www.lr.com/  Enter V137 in the search box to learn more about \"Heart-Healthy Diet: Care Instructions. \"  Current as of: December 17, 2020               Content Version: 13.0  © 2006-2021 [employer]. Care instructions adapted under license by [employer] (which disclaims liability or warranty for this information). If you have questions about a medical condition or this instruction, always ask your healthcare professional. [address] any warranty or liability for your use of this information.

## 2022-02-23 NOTE — PROGRESS NOTES
1. Have you been to the ER, urgent care clinic since your last visit? Hospitalized since your last visit? No    2. Have you seen or consulted any other health care providers outside of the [address] [place] [address] since your last visit? Include any pap smears or colon screening.       No

## 2022-03-02 DIAGNOSIS — I42.8 NONISCHEMIC CARDIOMYOPATHY (HCC): ICD-10-CM

## 2022-03-18 PROBLEM — N12 PYELONEPHRITIS: Status: ACTIVE | Noted: 2017-02-21

## 2022-03-18 PROBLEM — R94.31 QT PROLONGATION: Status: ACTIVE | Noted: 2021-11-25

## 2022-03-18 PROBLEM — E43 SEVERE PROTEIN-CALORIE MALNUTRITION (HCC): Status: ACTIVE | Noted: 2021-11-23

## 2022-03-18 PROBLEM — R65.10 SIRS (SYSTEMIC INFLAMMATORY RESPONSE SYNDROME) (HCC): Status: ACTIVE | Noted: 2017-02-21

## 2022-03-19 PROBLEM — R65.20 SEVERE SEPSIS (HCC): Status: ACTIVE | Noted: 2021-11-25

## 2022-03-19 PROBLEM — A41.9 SEVERE SEPSIS (HCC): Status: ACTIVE | Noted: 2021-11-25

## 2022-03-19 PROBLEM — N39.0 UTI (URINARY TRACT INFECTION): Status: ACTIVE | Noted: 2017-02-23

## 2022-03-19 PROBLEM — A41.9 SEPSIS (HCC): Status: ACTIVE | Noted: 2017-02-22

## 2022-03-20 PROBLEM — I21.4 NSTEMI (NON-ST ELEVATED MYOCARDIAL INFARCTION) (HCC): Status: ACTIVE | Noted: 2021-11-25

## 2022-05-01 ENCOUNTER — HOSPITAL ENCOUNTER (INPATIENT)
Age: 67
LOS: 2 days | Discharge: HOME HEALTH | DRG: 683 | End: 2022-05-03
Attending: EMERGENCY MEDICINE | Admitting: INTERNAL MEDICINE
Payer: MEDICARE

## 2022-05-01 ENCOUNTER — APPOINTMENT (OUTPATIENT)
Dept: CT IMAGING | Age: 67
DRG: 683 | End: 2022-05-01
Attending: PHYSICIAN ASSISTANT
Payer: MEDICARE

## 2022-05-01 DIAGNOSIS — N17.9 STAGE 3 ACUTE KIDNEY INJURY (HCC): ICD-10-CM

## 2022-05-01 DIAGNOSIS — F99 PSYCHIATRIC DISORDER: ICD-10-CM

## 2022-05-01 DIAGNOSIS — R44.3 HALLUCINATION: ICD-10-CM

## 2022-05-01 DIAGNOSIS — N17.9 AKI (ACUTE KIDNEY INJURY) (HCC): Primary | ICD-10-CM

## 2022-05-01 DIAGNOSIS — R94.31 QT PROLONGATION: ICD-10-CM

## 2022-05-01 PROBLEM — R00.1 BRADYCARDIA: Status: ACTIVE | Noted: 2022-05-01

## 2022-05-01 PROBLEM — I95.9 HYPOTENSION: Status: ACTIVE | Noted: 2022-05-01

## 2022-05-01 LAB
ALBUMIN SERPL-MCNC: 3.2 G/DL (ref 3.4–5)
ALBUMIN/GLOB SERPL: 1.4 {RATIO} (ref 0.8–1.7)
ALP SERPL-CCNC: 36 U/L (ref 45–117)
ALT SERPL-CCNC: 14 U/L (ref 13–56)
AMPHET UR QL SCN: NEGATIVE
ANION GAP SERPL CALC-SCNC: 10 MMOL/L (ref 3–18)
ANION GAP SERPL CALC-SCNC: 7 MMOL/L (ref 3–18)
APPEARANCE UR: CLEAR
AST SERPL-CCNC: 18 U/L (ref 10–38)
ATRIAL RATE: 72 BPM
BACTERIA URNS QL MICRO: NEGATIVE /HPF
BARBITURATES UR QL SCN: NEGATIVE
BASOPHILS # BLD: 0 K/UL (ref 0–0.1)
BASOPHILS NFR BLD: 0 % (ref 0–2)
BENZODIAZ UR QL: NEGATIVE
BILIRUB DIRECT SERPL-MCNC: 0.4 MG/DL (ref 0–0.2)
BILIRUB SERPL-MCNC: 0.8 MG/DL (ref 0.2–1)
BILIRUB UR QL: NEGATIVE
BUN SERPL-MCNC: 28 MG/DL (ref 7–18)
BUN SERPL-MCNC: 38 MG/DL (ref 7–18)
BUN/CREAT SERPL: 15 (ref 12–20)
BUN/CREAT SERPL: 20 (ref 12–20)
CALCIUM SERPL-MCNC: 8.8 MG/DL (ref 8.5–10.1)
CALCIUM SERPL-MCNC: 9.7 MG/DL (ref 8.5–10.1)
CALCULATED P AXIS, ECG09: 15 DEGREES
CALCULATED R AXIS, ECG10: -14 DEGREES
CALCULATED T AXIS, ECG11: 33 DEGREES
CANNABINOIDS UR QL SCN: POSITIVE
CHLORIDE SERPL-SCNC: 106 MMOL/L (ref 100–111)
CHLORIDE SERPL-SCNC: 94 MMOL/L (ref 100–111)
CHLORIDE UR-SCNC: 43 MMOL/L (ref 55–125)
CO2 SERPL-SCNC: 26 MMOL/L (ref 21–32)
CO2 SERPL-SCNC: 28 MMOL/L (ref 21–32)
COCAINE UR QL SCN: NEGATIVE
COLOR UR: YELLOW
CREAT SERPL-MCNC: 1.4 MG/DL (ref 0.6–1.3)
CREAT SERPL-MCNC: 2.46 MG/DL (ref 0.6–1.3)
CREAT UR-MCNC: 106 MG/DL (ref 30–125)
DIAGNOSIS, 93000: NORMAL
DIFFERENTIAL METHOD BLD: ABNORMAL
EOSINOPHIL # BLD: 0 K/UL (ref 0–0.4)
EOSINOPHIL NFR BLD: 0 % (ref 0–5)
EPITH CASTS URNS QL MICRO: ABNORMAL /LPF (ref 0–5)
ERYTHROCYTE [DISTWIDTH] IN BLOOD BY AUTOMATED COUNT: 13.2 % (ref 11.6–14.5)
GLOBULIN SER CALC-MCNC: 2.3 G/DL (ref 2–4)
GLUCOSE BLD STRIP.AUTO-MCNC: 146 MG/DL (ref 70–110)
GLUCOSE SERPL-MCNC: 139 MG/DL (ref 74–99)
GLUCOSE SERPL-MCNC: 83 MG/DL (ref 74–99)
GLUCOSE UR STRIP.AUTO-MCNC: NEGATIVE MG/DL
HCT VFR BLD AUTO: 34.2 % (ref 35–45)
HDSCOM,HDSCOM: ABNORMAL
HGB BLD-MCNC: 11.4 G/DL (ref 12–16)
HGB UR QL STRIP: NEGATIVE
HYALINE CASTS URNS QL MICRO: ABNORMAL /LPF (ref 0–2)
IMM GRANULOCYTES # BLD AUTO: 0.1 K/UL (ref 0–0.04)
IMM GRANULOCYTES NFR BLD AUTO: 1 % (ref 0–0.5)
KETONES UR QL STRIP.AUTO: ABNORMAL MG/DL
LACTATE SERPL-SCNC: 1.9 MMOL/L (ref 0.4–2)
LEUKOCYTE ESTERASE UR QL STRIP.AUTO: NEGATIVE
LYMPHOCYTES # BLD: 3.9 K/UL (ref 0.9–3.6)
LYMPHOCYTES NFR BLD: 36 % (ref 21–52)
MAGNESIUM SERPL-MCNC: 2.2 MG/DL (ref 1.6–2.6)
MCH RBC QN AUTO: 30.2 PG (ref 24–34)
MCHC RBC AUTO-ENTMCNC: 33.3 G/DL (ref 31–37)
MCV RBC AUTO: 90.5 FL (ref 78–100)
METHADONE UR QL: NEGATIVE
MONOCYTES # BLD: 0.9 K/UL (ref 0.05–1.2)
MONOCYTES NFR BLD: 8 % (ref 3–10)
NEUTS SEG # BLD: 5.9 K/UL (ref 1.8–8)
NEUTS SEG NFR BLD: 54 % (ref 40–73)
NITRITE UR QL STRIP.AUTO: NEGATIVE
NRBC # BLD: 0 K/UL (ref 0–0.01)
NRBC BLD-RTO: 0 PER 100 WBC
OPIATES UR QL: NEGATIVE
P-R INTERVAL, ECG05: 120 MS
PCP UR QL: NEGATIVE
PH UR STRIP: 5.5 [PH] (ref 5–8)
PLATELET # BLD AUTO: 328 K/UL (ref 135–420)
PMV BLD AUTO: 11.5 FL (ref 9.2–11.8)
POTASSIUM SERPL-SCNC: 2.8 MMOL/L (ref 3.5–5.5)
POTASSIUM SERPL-SCNC: 3.7 MMOL/L (ref 3.5–5.5)
PROCALCITONIN SERPL-MCNC: 0.09 NG/ML
PROT SERPL-MCNC: 5.5 G/DL (ref 6.4–8.2)
PROT UR STRIP-MCNC: NEGATIVE MG/DL
Q-T INTERVAL, ECG07: 512 MS
QRS DURATION, ECG06: 134 MS
QTC CALCULATION (BEZET), ECG08: 560 MS
RBC # BLD AUTO: 3.78 M/UL (ref 4.2–5.3)
RBC #/AREA URNS HPF: NEGATIVE /HPF (ref 0–5)
SODIUM SERPL-SCNC: 132 MMOL/L (ref 136–145)
SODIUM SERPL-SCNC: 139 MMOL/L (ref 136–145)
SODIUM UR-SCNC: 37 MMOL/L (ref 20–110)
SP GR UR REFRACTOMETRY: 1.01 (ref 1–1.03)
TROPONIN-HIGH SENSITIVITY: 27 NG/L (ref 0–54)
TSH SERPL DL<=0.05 MIU/L-ACNC: 1.08 UIU/ML (ref 0.36–3.74)
UROBILINOGEN UR QL STRIP.AUTO: 1 EU/DL (ref 0.2–1)
UUN UR-MCNC: 395 MG/DL
VALPROATE SERPL-MCNC: 21 UG/ML (ref 50–100)
VENTRICULAR RATE, ECG03: 72 BPM
WBC # BLD AUTO: 10.8 K/UL (ref 4.6–13.2)
WBC URNS QL MICRO: ABNORMAL /HPF (ref 0–4)

## 2022-05-01 PROCEDURE — 74011000250 HC RX REV CODE- 250: Performed by: INTERNAL MEDICINE

## 2022-05-01 PROCEDURE — 36415 COLL VENOUS BLD VENIPUNCTURE: CPT

## 2022-05-01 PROCEDURE — 87086 URINE CULTURE/COLONY COUNT: CPT

## 2022-05-01 PROCEDURE — 65660000004 HC RM CVT STEPDOWN

## 2022-05-01 PROCEDURE — 81001 URINALYSIS AUTO W/SCOPE: CPT

## 2022-05-01 PROCEDURE — 74011250636 HC RX REV CODE- 250/636: Performed by: PHYSICIAN ASSISTANT

## 2022-05-01 PROCEDURE — 74011250637 HC RX REV CODE- 250/637: Performed by: PHYSICIAN ASSISTANT

## 2022-05-01 PROCEDURE — 74011250636 HC RX REV CODE- 250/636: Performed by: EMERGENCY MEDICINE

## 2022-05-01 PROCEDURE — 84443 ASSAY THYROID STIM HORMONE: CPT

## 2022-05-01 PROCEDURE — 80076 HEPATIC FUNCTION PANEL: CPT

## 2022-05-01 PROCEDURE — 74176 CT ABD & PELVIS W/O CONTRAST: CPT

## 2022-05-01 PROCEDURE — 83605 ASSAY OF LACTIC ACID: CPT

## 2022-05-01 PROCEDURE — 83735 ASSAY OF MAGNESIUM: CPT

## 2022-05-01 PROCEDURE — 74011250636 HC RX REV CODE- 250/636: Performed by: INTERNAL MEDICINE

## 2022-05-01 PROCEDURE — 82436 ASSAY OF URINE CHLORIDE: CPT

## 2022-05-01 PROCEDURE — 84540 ASSAY OF URINE/UREA-N: CPT

## 2022-05-01 PROCEDURE — 84484 ASSAY OF TROPONIN QUANT: CPT

## 2022-05-01 PROCEDURE — 84145 PROCALCITONIN (PCT): CPT

## 2022-05-01 PROCEDURE — 99222 1ST HOSP IP/OBS MODERATE 55: CPT | Performed by: INTERNAL MEDICINE

## 2022-05-01 PROCEDURE — APPSS60 APP SPLIT SHARED TIME 46-60 MINUTES: Performed by: PHYSICIAN ASSISTANT

## 2022-05-01 PROCEDURE — 82962 GLUCOSE BLOOD TEST: CPT

## 2022-05-01 PROCEDURE — 93005 ELECTROCARDIOGRAM TRACING: CPT

## 2022-05-01 PROCEDURE — 80048 BASIC METABOLIC PNL TOTAL CA: CPT

## 2022-05-01 PROCEDURE — 80164 ASSAY DIPROPYLACETIC ACD TOT: CPT

## 2022-05-01 PROCEDURE — 85025 COMPLETE CBC W/AUTO DIFF WBC: CPT

## 2022-05-01 PROCEDURE — 80307 DRUG TEST PRSMV CHEM ANLYZR: CPT

## 2022-05-01 PROCEDURE — 99285 EMERGENCY DEPT VISIT HI MDM: CPT

## 2022-05-01 PROCEDURE — 82570 ASSAY OF URINE CREATININE: CPT

## 2022-05-01 PROCEDURE — 84300 ASSAY OF URINE SODIUM: CPT

## 2022-05-01 PROCEDURE — 83935 ASSAY OF URINE OSMOLALITY: CPT

## 2022-05-01 RX ORDER — SODIUM CHLORIDE 0.9 % (FLUSH) 0.9 %
5-40 SYRINGE (ML) INJECTION AS NEEDED
Status: DISCONTINUED | OUTPATIENT
Start: 2022-05-01 | End: 2022-05-03 | Stop reason: HOSPADM

## 2022-05-01 RX ORDER — ONDANSETRON 2 MG/ML
4 INJECTION INTRAMUSCULAR; INTRAVENOUS
Status: DISCONTINUED | OUTPATIENT
Start: 2022-05-01 | End: 2022-05-01

## 2022-05-01 RX ORDER — MIDODRINE HYDROCHLORIDE 5 MG/1
5 TABLET ORAL
Status: DISCONTINUED | OUTPATIENT
Start: 2022-05-02 | End: 2022-05-01

## 2022-05-01 RX ORDER — POTASSIUM CHLORIDE 20 MEQ/1
40 TABLET, EXTENDED RELEASE ORAL 2 TIMES DAILY
Status: DISCONTINUED | OUTPATIENT
Start: 2022-05-01 | End: 2022-05-01

## 2022-05-01 RX ORDER — DIVALPROEX SODIUM 250 MG/1
250 TABLET, DELAYED RELEASE ORAL 2 TIMES DAILY
COMMUNITY

## 2022-05-01 RX ORDER — ONDANSETRON 4 MG/1
4 TABLET, ORALLY DISINTEGRATING ORAL
Status: DISCONTINUED | OUTPATIENT
Start: 2022-05-01 | End: 2022-05-01

## 2022-05-01 RX ORDER — ACETAMINOPHEN 325 MG/1
650 TABLET ORAL
Status: DISCONTINUED | OUTPATIENT
Start: 2022-05-01 | End: 2022-05-03 | Stop reason: HOSPADM

## 2022-05-01 RX ORDER — POTASSIUM CHLORIDE 7.45 MG/ML
10 INJECTION INTRAVENOUS
Status: COMPLETED | OUTPATIENT
Start: 2022-05-01 | End: 2022-05-02

## 2022-05-01 RX ORDER — MIDODRINE HYDROCHLORIDE 5 MG/1
10 TABLET ORAL
Status: DISCONTINUED | OUTPATIENT
Start: 2022-05-02 | End: 2022-05-01

## 2022-05-01 RX ORDER — LANOLIN ALCOHOL/MO/W.PET/CERES
325 CREAM (GRAM) TOPICAL
COMMUNITY

## 2022-05-01 RX ORDER — MIDODRINE HYDROCHLORIDE 5 MG/1
5 TABLET ORAL
Status: DISCONTINUED | OUTPATIENT
Start: 2022-05-01 | End: 2022-05-02

## 2022-05-01 RX ORDER — POTASSIUM CHLORIDE 750 MG/1
10 TABLET, FILM COATED, EXTENDED RELEASE ORAL DAILY
COMMUNITY

## 2022-05-01 RX ORDER — POLYETHYLENE GLYCOL 3350 17 G/17G
17 POWDER, FOR SOLUTION ORAL DAILY PRN
Status: DISCONTINUED | OUTPATIENT
Start: 2022-05-01 | End: 2022-05-03 | Stop reason: HOSPADM

## 2022-05-01 RX ORDER — SODIUM CHLORIDE 0.9 % (FLUSH) 0.9 %
5-40 SYRINGE (ML) INJECTION EVERY 8 HOURS
Status: DISCONTINUED | OUTPATIENT
Start: 2022-05-01 | End: 2022-05-03 | Stop reason: HOSPADM

## 2022-05-01 RX ORDER — ACETAMINOPHEN 650 MG/1
650 SUPPOSITORY RECTAL
Status: DISCONTINUED | OUTPATIENT
Start: 2022-05-01 | End: 2022-05-03 | Stop reason: HOSPADM

## 2022-05-01 RX ORDER — DIVALPROEX SODIUM 250 MG/1
250 TABLET, DELAYED RELEASE ORAL 2 TIMES DAILY
Status: DISCONTINUED | OUTPATIENT
Start: 2022-05-01 | End: 2022-05-03 | Stop reason: HOSPADM

## 2022-05-01 RX ORDER — VALPROIC ACID 250 MG/1
250 CAPSULE, LIQUID FILLED ORAL
Status: ON HOLD | COMMUNITY
End: 2022-05-02

## 2022-05-01 RX ORDER — ENOXAPARIN SODIUM 100 MG/ML
30 INJECTION SUBCUTANEOUS DAILY
Status: DISCONTINUED | OUTPATIENT
Start: 2022-05-02 | End: 2022-05-02

## 2022-05-01 RX ORDER — AMLODIPINE BESYLATE 5 MG/1
5 TABLET ORAL DAILY
COMMUNITY
End: 2022-09-24

## 2022-05-01 RX ORDER — SODIUM CHLORIDE 9 MG/ML
50 INJECTION, SOLUTION INTRAVENOUS CONTINUOUS
Status: DISCONTINUED | OUTPATIENT
Start: 2022-05-01 | End: 2022-05-03

## 2022-05-01 RX ORDER — MIRTAZAPINE 45 MG/1
45 TABLET, FILM COATED ORAL
COMMUNITY

## 2022-05-01 RX ORDER — LISINOPRIL 20 MG/1
20 TABLET ORAL DAILY
COMMUNITY
End: 2022-09-24

## 2022-05-01 RX ORDER — POTASSIUM CHLORIDE 20 MEQ/1
40 TABLET, EXTENDED RELEASE ORAL EVERY 4 HOURS
Status: COMPLETED | OUTPATIENT
Start: 2022-05-01 | End: 2022-05-01

## 2022-05-01 RX ORDER — MIDODRINE HYDROCHLORIDE 5 MG/1
10 TABLET ORAL
Status: DISCONTINUED | OUTPATIENT
Start: 2022-05-01 | End: 2022-05-01

## 2022-05-01 RX ADMIN — POTASSIUM CHLORIDE 40 MEQ: 20 TABLET, EXTENDED RELEASE ORAL at 20:27

## 2022-05-01 RX ADMIN — POTASSIUM CHLORIDE 10 MEQ: 7.45 INJECTION INTRAVENOUS at 16:01

## 2022-05-01 RX ADMIN — SODIUM CHLORIDE 1000 ML: 9 INJECTION, SOLUTION INTRAVENOUS at 13:36

## 2022-05-01 RX ADMIN — SODIUM CHLORIDE 1000 ML: 9 INJECTION, SOLUTION INTRAVENOUS at 14:39

## 2022-05-01 RX ADMIN — SODIUM CHLORIDE 1000 ML: 9 INJECTION, SOLUTION INTRAVENOUS at 14:16

## 2022-05-01 RX ADMIN — SODIUM CHLORIDE, PRESERVATIVE FREE 10 ML: 5 INJECTION INTRAVENOUS at 14:38

## 2022-05-01 RX ADMIN — POTASSIUM CHLORIDE 10 MEQ: 7.45 INJECTION INTRAVENOUS at 14:49

## 2022-05-01 RX ADMIN — SODIUM CHLORIDE, PRESERVATIVE FREE 10 ML: 5 INJECTION INTRAVENOUS at 22:00

## 2022-05-01 RX ADMIN — POTASSIUM CHLORIDE 10 MEQ: 7.45 INJECTION INTRAVENOUS at 17:18

## 2022-05-01 RX ADMIN — MIDODRINE HYDROCHLORIDE 10 MG: 5 TABLET ORAL at 17:16

## 2022-05-01 RX ADMIN — SODIUM CHLORIDE 100 ML/HR: 9 INJECTION, SOLUTION INTRAVENOUS at 20:27

## 2022-05-01 RX ADMIN — MIDODRINE HYDROCHLORIDE 5 MG: 5 TABLET ORAL at 22:38

## 2022-05-01 RX ADMIN — POTASSIUM CHLORIDE 40 MEQ: 20 TABLET, EXTENDED RELEASE ORAL at 17:16

## 2022-05-01 RX ADMIN — DIVALPROEX SODIUM 250 MG: 250 TABLET, DELAYED RELEASE ORAL at 22:38

## 2022-05-01 NOTE — H&P
History and Physical          Subjective     HPI: Renae Davis is a 75 y.o. female with a PMHx of psych disorder on depakote who presented to the ED with c/o not feeling well for the past week. She states she's been too weak to get out of bed. She hasn't had anything to eat/drink and hasn't taken any of her medications either. She reports not urinating or having a BM in the last week. She states she almost fell trying to get to the breakfast table this morning and that's why she wanted to go to the ER. She denies any pain - no cp, abd pain, muscle pain, back pain, headache. She denies nvd, cough, URI sx. She also denies having any auditory or visual hallucinations at this time. States her normal SBP is 180. I spoke with her sister (who is an LPN) who stated the patient has in fact not been eating for the last week. She's not sure whether or not she's been taking her medications, but states she probably hasn't. She will take her depakote for a while, but once she starts to feel better, she stops taking it. She also states she has been having hallucinations this week of bugs crawling on her which is what she does when her psych disorder is not controlled. She also states that her blood pressure is very labile, and she is concerned about the amount of BP medications she's been given. PTA medications have been updated according to the medications she brought in with her. In the ED, WBC 10.8, afebrile, BMP with creatinine/BUN 2.46/38 (baseline 0.6/6) and K 2.8, Na 132, Cl 94, CO2 28. She was hypotensive (MAP 49-63) and did not initially respond to 3L of fluids. BP improved once multiple layers of clothing were removed from under the cuff. UA not obtained as she had not made any urine. No imaging obtained. Patient was admitted for further evaluation and treatment.      PMHx:  Past Medical History:   Diagnosis Date    Depression     Gastrointestinal disorder Pancreatitis    Pancreatic disease     Pancreatitis  Psychiatric disorder        PSurgHx:  Past Surgical History:   Procedure Laterality Date    HX GYN      HX HYSTERECTOMY         SocialHx:  Social History     Socioeconomic History    Marital status:      Spouse name: Not on file    Number of children: Not on file    Years of education: Not on file    Highest education level: Not on file   Occupational History    Not on file   Tobacco Use    Smoking status: Former Smoker     Packs/day: 0.50     Types: Cigarettes     Quit date: 2022     Years since quittin.1    Smokeless tobacco: Former User   Substance and Sexual Activity    Alcohol use: No     Comment: Per pt she quit in     Drug use: No    Sexual activity: Not Currently   Other Topics Concern    Not on file   Social History Narrative    Not on file     Social Determinants of Health     Financial Resource Strain:     Difficulty of Paying Living Expenses: Not on file   Food Insecurity:     Worried About [address] in the Last Year: Not on file    [address] in the Last Year: Not on file   Transportation Needs:     Lack of Transportation (Medical): Not on file    Lack of Transportation (Non-Medical):  Not on file   Physical Activity:     Days of Exercise per Week: Not on file    Minutes of Exercise per Session: Not on file   Stress:     Feeling of Stress : Not on file   Social Connections:     Frequency of Communication with Friends and Family: Not on file    Frequency of Social Gatherings with Friends and Family: Not on file    Attends Islam Services: Not on file    Active Member of Clubs or Organizations: Not on file    Attends Club or Organization Meetings: Not on file    Marital Status: Not on file   Intimate Partner Violence:     Fear of Current or Ex-Partner: Not on file    Emotionally Abused: Not on file    Physically Abused: Not on file    Sexually Abused: Not on file   Housing Stability:     Unable to Pay for Housing in the Last Year: Not on file    Number of Places Lived in the Last Year: Not on file    Unstable Housing in the Last Year: Not on file       FamilyHx:  Family History   Problem Relation Age of Onset    Diabetes Maternal Aunt     Cancer Maternal Aunt     Alzheimer's Disease Maternal Aunt     Cancer Paternal Aunt     Diabetes Paternal Aunt        Home Medications:  Prior to Admission Medications   Prescriptions Last Dose Informant Taking? amLODIPine (NORVASC) 5 mg tablet   Yes   Sig: Take 5 mg by mouth daily. aspirin delayed-release 81 mg tablet   No   Sig: Take 1 Tablet by mouth daily. divalproex DR (Depakote) 250 mg tablet   Yes   Sig: Take 250 mg by mouth two (2) times a day. ferrous sulfate (FeroSuL) 325 mg (65 mg iron) tablet   Yes   Sig: Take 325 mg by mouth Daily (before breakfast). lisinopriL (PRINIVIL, ZESTRIL) 20 mg tablet   Yes   Sig: Take 20 mg by mouth daily. metoprolol succinate (TOPROL-XL) 50 mg XL tablet   No   Sig: Take 1 Tablet by mouth daily. mirtazapine (REMERON) 45 mg tablet   Yes   Sig: Take 45 mg by mouth nightly. potassium chloride SR (KLOR-CON 10) 10 mEq tablet   Yes   Sig: Take 10 mEq by mouth daily. valproic acid (DEPAKENE) 250 mg capsule   Yes   Sig: Take 250 mg by mouth Before breakfast and dinner. Facility-Administered Medications: None         Allergies:  No Known Allergies     Review of Systems:  CONST: no weight loss, no fever or chills +generalized weakness, +malaise  HEENT: No change in vision, no earache, no tinnitus, no sore throat or sinus congestion. NECK: No pain or stiffness. PULM: No shortness of breath, no cough or wheeze. CV: no pnd or orthopnea, no CP, no palpitations, no edema  GI: No abdominal pain, no nausea, no vomiting or diarrhea +no BM in 1 week  : No urinary frequency, no urgency, no hesitancy or dysuria. +anuria x1 week  MSK: No joint or muscle pain, no back pain, no recent trauma. INTEG: No rash, no itching, no lesions.    ENDO: +cold intolerance  NEURO No headache, no dizziness, no numbness, no tingling or weakness. PSYCH: No anxiety, no depression +hallucination      Objective     Physical Exam:  Visit Vitals  BP (!) 106/51   Pulse (!) 51   Temp 98.1 °F (36.7 °C)   Resp 11   Ht 5' 3\" (1.6 m)   Wt 47.2 kg (104 lb)   SpO2 99%   BMI 18.42 kg/m²       General: NAD, appears stated age, alert  Skin: warm, dry, no rashes  Eyes: PERRL, sclera is non-icteric  HENT: normocephalic/atraumatic, dry mucus membranes  Respiratory: CTA with no signs of respiratory distress  Cardiovascular: bradycardia, no m/r/g  GI: soft, non-tender, +bowel sounds  Extremities: no cyanosis, no peripheral edema  Neuro: moves all extremities, no focal deficits, normal speech  Psych: appropriate mood and affect, no visual or auditory hallucinations currently    Laboratory Studies:  Recent Results (from the past 24 hour(s))   CBC WITH AUTOMATED DIFF    Collection Time: 05/01/22 11:33 AM   Result Value Ref Range    WBC 10.8 4.6 - 13.2 K/uL    RBC 3.78 (L) 4.20 - 5.30 M/uL    HGB 11.4 (L) 12.0 - 16.0 g/dL    HCT 34.2 (L) 35.0 - 45.0 %    MCV 90.5 78.0 - 100.0 FL    MCH 30.2 24.0 - 34.0 PG    MCHC 33.3 31.0 - 37.0 g/dL    RDW 13.2 11.6 - 14.5 %    PLATELET 445 578 - 150 K/uL    MPV 11.5 9.2 - 11.8 FL    NRBC 0.0 0  WBC    ABSOLUTE NRBC 0.00 0.00 - 0.01 K/uL    NEUTROPHILS 54 40 - 73 %    LYMPHOCYTES 36 21 - 52 %    MONOCYTES 8 3 - 10 %    EOSINOPHILS 0 0 - 5 %    BASOPHILS 0 0 - 2 %    IMMATURE GRANULOCYTES 1 (H) 0.0 - 0.5 %    ABS. NEUTROPHILS 5.9 1.8 - 8.0 K/UL    ABS. LYMPHOCYTES 3.9 (H) 0.9 - 3.6 K/UL    ABS. MONOCYTES 0.9 0.05 - 1.2 K/UL    ABS. EOSINOPHILS 0.0 0.0 - 0.4 K/UL    ABS. BASOPHILS 0.0 0.0 - 0.1 K/UL    ABS. IMM.  GRANS. 0.1 (H) 0.00 - 0.04 K/UL    DF AUTOMATED     METABOLIC PANEL, BASIC    Collection Time: 05/01/22 11:33 AM   Result Value Ref Range    Sodium 132 (L) 136 - 145 mmol/L    Potassium 2.8 (LL) 3.5 - 5.5 mmol/L    Chloride 94 (L) 100 - 111 mmol/L CO2 28 21 - 32 mmol/L    Anion gap 10 3.0 - 18 mmol/L    Glucose 139 (H) 74 - 99 mg/dL    BUN 38 (H) 7.0 - 18 MG/DL    Creatinine 2.46 (H) 0.6 - 1.3 MG/DL    BUN/Creatinine ratio 15 12 - 20      GFR est AA 24 (L) >60 ml/min/1.73m2    GFR est non-AA 20 (L) >60 ml/min/1.73m2    Calcium 9.7 8.5 - 10.1 MG/DL   TROPONIN-HIGH SENSITIVITY    Collection Time: 05/01/22 11:33 AM   Result Value Ref Range    Troponin-High Sensitivity 27 0 - 54 ng/L   MAGNESIUM    Collection Time: 05/01/22 11:33 AM   Result Value Ref Range    Magnesium 2.2 1.6 - 2.6 mg/dL   GLUCOSE, POC    Collection Time: 05/01/22 11:34 AM   Result Value Ref Range    Glucose (POC) 146 (H) 70 - 110 mg/dL   EKG, 12 LEAD, INITIAL    Collection Time: 05/01/22 11:45 AM   Result Value Ref Range    Ventricular Rate 72 BPM    Atrial Rate 72 BPM    P-R Interval 120 ms    QRS Duration 134 ms    Q-T Interval 512 ms    QTC Calculation (Bezet) 560 ms    Calculated P Axis 15 degrees    Calculated R Axis -14 degrees    Calculated T Axis 33 degrees    Diagnosis       Normal sinus rhythm  Right bundle branch block  Cannot rule out Inferior infarct , age undetermined  Abnormal ECG  When compared with ECG of 30-NOV-2021 10:15,  QRS duration has increased  Minimal criteria for Inferior infarct are now present  T wave inversion less evident in Inferior leads  T wave inversion no longer evident in Lateral leads  QT has lengthened  Confirmed by Patriciaann Angle ([id number]) on 5/1/2022 2:01:39 PM         Imaging Reviewed:  No results found.         Assessment/Plan     Hospital Problems  Date Reviewed: 2/23/2017          Codes Class Noted POA    * (Principal) Stage 3 acute kidney injury ([facility] [address] ICD-10-CM: N17.9  ICD-9-CM: 584.9  5/1/2022 Yes        Hypotension ICD-10-CM: I95.9  ICD-9-CM: 458.9  5/1/2022 Yes        Psychiatric disorder ICD-10-CM: F99  ICD-9-CM: 298.9  5/1/2022 Yes        Bradycardia ICD-10-CM: R00.1  ICD-9-CM: 427.89  5/1/2022 Yes        Hallucination ICD-10-CM: R44.3  ICD-9-CM: 780.1  5/1/2022 Yes        QT prolongation ICD-10-CM: R94.31  ICD-9-CM: 794.31  11/25/2021 Yes            Addendum: repeat BMP tonight with significantly improved renal function. Potassium normalized. Lactic 1.9. BP improved initially, now trending back down. Will continue normal saline for fluid replacement. UDS +THC. Valproic acid 21 - will resume. Urine with trace ketones, negative for bacteria. JOSE - unclear etiology, suspect multifactorial - hypotension, dehydration, maybe ACEI but patient states she hasn't been taking. R/o obstruction  - IV fluids   - urine chemistries  - monitor BMP  - f/u CT abd/pelv to r/o urinary obstruction  - valiente placed - 550cc drained immediately  - strict I&Os  - appreciate nephrology assistance  - hold ACEI    Hypotension - hypovolemia likely. No evidence for infection/sepsis. Procal 0.09. Blood pressure cuff was adjusted and MAP improved to >65  - monitor in stepdown  - continue IV fluids  - start midodrine, but can likely d/c tomorrow  - will hold off on q6 hour albumin since MAP now >70  - hold all BP medications    Hypokalemia   - replaced oral and IV  - recheck tonight     Bradycardia - asymptomatic. Patient stated she hasn't been taking metoprolol  - check TSH given hypotension, cold intolerance, ?constipation, fatigue  - cardiac monitoring  - hold BB    QT prolongation   - hold QT prolonging medications and recheck ECG tomorrow    Psych disorder, hallucinations prior to admission - no hallucinations since admission. Could be due to valproic acid or due to the lack of VPA.  Has both valproic acid and depakote in bag of home medications  - check valproic acid level prior to resuming  - consider psych consult if hallucinations continue   - r/o infection        Anticipated Discharge: >2 days    DVT Prophylaxis:  [x]Lovenox  []Hep SQ  []SCDs  []Coumadin   []On Heparin gtt []PO anticoagulant    I have personally reviewed all pertinent labs, films and EKGs that have officially resulted. I reviewed available electronic documentation outlining the initial presentation as well as the emergency room physician's encounter.     ADRYAN [facility]  Hospitalist Division  Office:  [phone]  Pager: [phone]

## 2022-05-01 NOTE — ED TRIAGE NOTES
Brought in by EMS via stretcher to room 13, patient endorses she stood up from table and fell but did not hit her head. Per EMS, denies hitting hear head. Denies suicidal ideation, denies homicidal ideations. Endorses white bugs and things falling off of her mouth and skin from a bug that stung her and things swimming around her head. Systolic Blood pressure 28'H. IV left AC. 98% Room Air. Standing in driveway upon arrival of ambulance and walked to meet them.

## 2022-05-01 NOTE — PROGRESS NOTES
conducted an initial consultation and Spiritual Assessment for Claudia Elliott, who is a 75 y.o.,female. Patient's Primary Language is: [place]. According to the patient's EMR Gnosticism Affiliation is: [facility].     The reason the Patient came to the hospital is:   Patient Active Problem List    Diagnosis Date Noted    Stage 3 acute kidney injury ([facility] [address] 05/01/2022    NSTEMI (non-ST elevated myocardial infarction) ([facility] [address] 11/25/2021    Severe sepsis ([address] 11/25/2021    QT prolongation 11/25/2021    Severe protein-calorie malnutrition ([address] 11/23/2021    UTI (urinary tract infection) 02/23/2017    Sepsis ([facility] [address] 02/22/2017    Pyelonephritis 02/21/2017    SIRS (systemic inflammatory response syndrome) ([facility] [address] 02/21/2017    Major depressive disorder, recurrent episode, severe, with psychosis ([facility] [address] 08/28/2016    Itch of skin 07/15/2016    Anxiety state 07/15/2016    Weight loss, unintentional 07/15/2016        The  provided the following Interventions:  Initiated a relationship of care and support. Explored issues of rabia, belief, spirituality and Voodoo/ritual needs while hospitalized. Listened as she shared her health care wishes (which are also reflected in her AMD), to be a DNR. Spoke words of blessing over her and invited her to request a  if desired during her hospitalization. As chart reviewed. The following outcomes where achieved:  Patient shared limited information about both their medical narrative and spiritual journey/beliefs. .  Patient expressed gratitude for 's visit. Assessment:  Patient does not have any Voodoo/cultural needs that will affect patient's preferences in health care. There are no spiritual or Voodoo issues which require intervention at this time. Plan:  Chaplains will continue to follow and will provide pastoral care on an as needed/requested basis.    recommends bedside caregivers page  on duty if patient shows signs of acute spiritual or emotional distress.     [address] [place]   [phone]

## 2022-05-01 NOTE — ED PROVIDER NOTES
EMERGENCY DEPARTMENT HISTORY AND PHYSICAL EXAM    2:04 PM late entry, patient was seen at the time assigned on      Date: 5/1/2022  Patient Name: Myesha Dobbins    History of Presenting Illness     Chief Complaint   Patient presents with    Dizziness         History Provided By: patient    Additional History (Context): Myesha Dobbins is a 75 y.o. female presents with several complaints, bugs crawling on her, white bugs only visible when she is wearing dark clothing. Ultimately determined that she is been in her bedroom for 2 weeks not eating or drinking. States her weight fluctuates dramatically. No pain nausea or vomiting no diarrhea. Denies drug use. Record does not reveal any psychotic medical history. PCP: Leandro Lowe MD    Chief Complaint:   Duration:    Timing:    Location:   Quality:   Severity:   Modifying Factors:   Associated Symptoms:       Current Facility-Administered Medications   Medication Dose Route Frequency Provider Last Rate Last Admin    sodium chloride 0.9 % bolus infusion 1,000 mL  1,000 mL IntraVENous KAYLEEN Capps, Aziza Beltran MD        sodium chloride 0.9 % bolus infusion 1,000 mL  1,000 mL IntraVENous Marvene Fleischer I, MD         Current Outpatient Medications   Medication Sig Dispense Refill    lisinopriL (PRINIVIL, ZESTRIL) 40 mg tablet Take 1 Tablet by mouth daily. 90 Tablet 1    mirtazapine (REMERON) 15 mg tablet Take  by mouth nightly.  atorvastatin (LIPITOR) 20 mg tablet Take 1 Tablet by mouth daily. 90 Tablet 2    metoprolol succinate (TOPROL-XL) 50 mg XL tablet Take 1 Tablet by mouth daily. 90 Tablet 2    aspirin delayed-release 81 mg tablet Take 1 Tablet by mouth daily. 100 Tablet 2    furosemide (LASIX) 20 mg tablet Take 1 Tablet by mouth as needed (edema). 60 Tablet 3    therapeutic multivitamin (THERAGRAN) tablet Take 1 Tab by mouth daily.  Indications: VITAMIN DEFICIENCY PREVENTION 30 Tab 0    docusate sodium (COLACE) 100 mg capsule Take 100 mg by mouth two (2) times daily as needed for Constipation. Past History     Past Medical History:  Past Medical History:   Diagnosis Date    Depression     Gastrointestinal disorder Pancreatitis    Pancreatic disease     Pancreatitis     Psychiatric disorder        Past Surgical History:  Past Surgical History:   Procedure Laterality Date    HX GYN      HX HYSTERECTOMY         Family History:  Family History   Problem Relation Age of Onset    Diabetes Maternal Aunt     Cancer Maternal Aunt     Alzheimer's Disease Maternal Aunt     Cancer Paternal Aunt     Diabetes Paternal Aunt        Social History:  Social History     Tobacco Use    Smoking status: Former Smoker     Packs/day: 0.50     Types: Cigarettes     Quit date: 2022     Years since quittin.1    Smokeless tobacco: Former User   Substance Use Topics    Alcohol use: No     Comment: Per pt she quit in     Drug use: No       Allergies:  No Known Allergies      Review of Systems     Review of Systems   Constitutional: Negative for diaphoresis and fever. HENT: Negative for congestion and sore throat. Eyes: Negative for pain and itching. Respiratory: Negative for cough and shortness of breath. Cardiovascular: Negative for chest pain and palpitations. Gastrointestinal: Negative for abdominal pain and diarrhea. Endocrine: Negative for polydipsia and polyuria. Genitourinary: Negative for dysuria and hematuria. Musculoskeletal: Negative for arthralgias and myalgias. Skin: Negative for rash and wound. Neurological: Negative for seizures and syncope. Hematological: Does not bruise/bleed easily. Psychiatric/Behavioral: Positive for hallucinations. Negative for agitation.          Physical Exam       Patient Vitals for the past 12 hrs:   Temp Pulse Resp BP SpO2   22 1315 98.1 °F (36.7 °C) (!) 52 16 (!) 89/45 --   22 1300 -- (!) 53 16 (!) 83/44 --   22 1230 -- 70 18 (!) 76/46 100 %   22 1200 -- 69 18 (!) 73/48 100 %       IPVITALS  Patient Vitals for the past 24 hrs:   BP Temp Pulse Resp SpO2 Height Weight   05/01/22 1344 -- -- -- -- -- 5' 3\" (1.6 m) 47.2 kg (104 lb)   05/01/22 1315 (!) 89/45 98.1 °F (36.7 °C) (!) 52 16 -- -- --   05/01/22 1300 (!) 83/44 -- (!) 53 16 -- -- --   05/01/22 1230 (!) 76/46 -- 70 18 100 % -- --   05/01/22 1200 (!) 73/48 -- 69 18 100 % -- --       Physical Exam  Vitals and nursing note reviewed. Constitutional:       Appearance: She is well-developed. Comments: Very thin   HENT:      Head: Normocephalic and atraumatic. Eyes:      General: No scleral icterus. Conjunctiva/sclera: Conjunctivae normal.   Neck:      Vascular: No JVD. Cardiovascular:      Rate and Rhythm: Normal rate and regular rhythm. Heart sounds: Normal heart sounds. Comments: 4 intact extremity pulses  Pulmonary:      Effort: Pulmonary effort is normal.      Breath sounds: Normal breath sounds. Abdominal:      Palpations: Abdomen is soft. There is no mass. Tenderness: There is no abdominal tenderness. Musculoskeletal:         General: Normal range of motion. Cervical back: Normal range of motion and neck supple. Lymphadenopathy:      Cervical: No cervical adenopathy. Skin:     General: Skin is warm and dry. Neurological:      Mental Status: She is alert. Psychiatric:      Comments: Full range affect good speech appropriate word choice and rate. No obvious psychosis. Possibly a fixed delusion regarding bugs.            Diagnostic Study Results   Labs -  Recent Results (from the past 24 hour(s))   CBC WITH AUTOMATED DIFF    Collection Time: 05/01/22 11:33 AM   Result Value Ref Range    WBC 10.8 4.6 - 13.2 K/uL    RBC 3.78 (L) 4.20 - 5.30 M/uL    HGB 11.4 (L) 12.0 - 16.0 g/dL    HCT 34.2 (L) 35.0 - 45.0 %    MCV 90.5 78.0 - 100.0 FL    MCH 30.2 24.0 - 34.0 PG    MCHC 33.3 31.0 - 37.0 g/dL    RDW 13.2 11.6 - 14.5 %    PLATELET 350 878 - 835 K/uL    MPV 11.5 9.2 - 11.8 FL NRBC 0.0 0  WBC    ABSOLUTE NRBC 0.00 0.00 - 0.01 K/uL    NEUTROPHILS 54 40 - 73 %    LYMPHOCYTES 36 21 - 52 %    MONOCYTES 8 3 - 10 %    EOSINOPHILS 0 0 - 5 %    BASOPHILS 0 0 - 2 %    IMMATURE GRANULOCYTES 1 (H) 0.0 - 0.5 %    ABS. NEUTROPHILS 5.9 1.8 - 8.0 K/UL    ABS. LYMPHOCYTES 3.9 (H) 0.9 - 3.6 K/UL    ABS. MONOCYTES 0.9 0.05 - 1.2 K/UL    ABS. EOSINOPHILS 0.0 0.0 - 0.4 K/UL    ABS. BASOPHILS 0.0 0.0 - 0.1 K/UL    ABS. IMM.  GRANS. 0.1 (H) 0.00 - 0.04 K/UL    DF AUTOMATED     METABOLIC PANEL, BASIC    Collection Time: 05/01/22 11:33 AM   Result Value Ref Range    Sodium 132 (L) 136 - 145 mmol/L    Potassium 2.8 (LL) 3.5 - 5.5 mmol/L    Chloride 94 (L) 100 - 111 mmol/L    CO2 28 21 - 32 mmol/L    Anion gap 10 3.0 - 18 mmol/L    Glucose 139 (H) 74 - 99 mg/dL    BUN 38 (H) 7.0 - 18 MG/DL    Creatinine 2.46 (H) 0.6 - 1.3 MG/DL    BUN/Creatinine ratio 15 12 - 20      GFR est AA 24 (L) >60 ml/min/1.73m2    GFR est non-AA 20 (L) >60 ml/min/1.73m2    Calcium 9.7 8.5 - 10.1 MG/DL   TROPONIN-HIGH SENSITIVITY    Collection Time: 05/01/22 11:33 AM   Result Value Ref Range    Troponin-High Sensitivity 27 0 - 54 ng/L   MAGNESIUM    Collection Time: 05/01/22 11:33 AM   Result Value Ref Range    Magnesium 2.2 1.6 - 2.6 mg/dL   GLUCOSE, POC    Collection Time: 05/01/22 11:34 AM   Result Value Ref Range    Glucose (POC) 146 (H) 70 - 110 mg/dL   EKG, 12 LEAD, INITIAL    Collection Time: 05/01/22 11:45 AM   Result Value Ref Range    Ventricular Rate 72 BPM    Atrial Rate 72 BPM    P-R Interval 120 ms    QRS Duration 134 ms    Q-T Interval 512 ms    QTC Calculation (Bezet) 560 ms    Calculated P Axis 15 degrees    Calculated R Axis -14 degrees    Calculated T Axis 33 degrees    Diagnosis       Normal sinus rhythm  Right bundle branch block  Cannot rule out Inferior infarct , age undetermined  Abnormal ECG  When compared with ECG of 30-NOV-2021 10:15,  QRS duration has increased  Minimal criteria for Inferior infarct are now present  T wave inversion less evident in Inferior leads  T wave inversion no longer evident in Lateral leads  QT has lengthened  Confirmed by Vernetirasema Roche ([id number]) on 5/1/2022 2:01:39 PM         Radiologic Studies -   No orders to display     No results found. Medications ordered:   Medications   sodium chloride 0.9 % bolus infusion 1,000 mL (has no administration in time range)   sodium chloride 0.9 % bolus infusion 1,000 mL (has no administration in time range)         Medical Decision Making   Initial Medical Decision Making and DDx:  Labs reveal JOSE and hypokalemia no blood sugar disorder no anemia no UTI. Discussed with Dr. Juno Pearson hospitalist for admission, discussed the psychiatric component possibly will get psych consult in the hospital.    ED Course: Progress Notes, Reevaluation, and Consults:         I am the first provider for this patient. I reviewed the vital signs, available nursing notes, past medical history, past surgical history, family history and social history. Patient Vitals for the past 12 hrs:   Temp Pulse Resp BP SpO2   05/01/22 1315 98.1 °F (36.7 °C) (!) 52 16 (!) 89/45 --   05/01/22 1300 -- (!) 53 16 (!) 83/44 --   05/01/22 1230 -- 70 18 (!) 76/46 100 %   05/01/22 1200 -- 69 18 (!) 73/48 100 %       Vital Signs-Reviewed the patient's vital signs. Pulse Oximetry Analysis, Cardiac Monitor, 12 lead ekg:      Interpreted by the EP. Records Reviewed: Nursing notes reviewed (Time of Review: 2:04 PM)    Procedures:   Critical Care Time:   Aspirin: (was aspirin given for stroke?)    Diagnosis     Clinical Impression:   1. JOSE (acute kidney injury) ([facility] [address]        Disposition: Admitted      Follow-up Information    None          Patient's Medications   Start Taking    No medications on file   Continue Taking    ASPIRIN DELAYED-RELEASE 81 MG TABLET    Take 1 Tablet by mouth daily. ATORVASTATIN (LIPITOR) 20 MG TABLET    Take 1 Tablet by mouth daily.     DOCUSATE SODIUM (COLACE) 100 MG CAPSULE    Take 100 mg by mouth two (2) times daily as needed for Constipation. FUROSEMIDE (LASIX) 20 MG TABLET    Take 1 Tablet by mouth as needed (edema). LISINOPRIL (PRINIVIL, ZESTRIL) 40 MG TABLET    Take 1 Tablet by mouth daily. METOPROLOL SUCCINATE (TOPROL-XL) 50 MG XL TABLET    Take 1 Tablet by mouth daily. MIRTAZAPINE (REMERON) 15 MG TABLET    Take  by mouth nightly. THERAPEUTIC MULTIVITAMIN (THERAGRAN) TABLET    Take 1 Tab by mouth daily.  Indications: VITAMIN DEFICIENCY PREVENTION   These Medications have changed    No medications on file   Stop Taking    No medications on file     _______________________________    Notes:    Bharathi Friedman MD using Dragon dictation      _______________________________

## 2022-05-02 LAB
ALBUMIN SERPL-MCNC: 2.6 G/DL (ref 3.4–5)
ALBUMIN/GLOB SERPL: 1.2 {RATIO} (ref 0.8–1.7)
ALP SERPL-CCNC: 29 U/L (ref 45–117)
ALT SERPL-CCNC: 12 U/L (ref 13–56)
AMMONIA PLAS-SCNC: 27 UMOL/L (ref 11–32)
ANION GAP SERPL CALC-SCNC: 5 MMOL/L (ref 3–18)
AST SERPL-CCNC: 14 U/L (ref 10–38)
ATRIAL RATE: 47 BPM
BASOPHILS # BLD: 0 K/UL (ref 0–0.1)
BASOPHILS NFR BLD: 0 % (ref 0–2)
BILIRUB SERPL-MCNC: 0.8 MG/DL (ref 0.2–1)
BUN SERPL-MCNC: 24 MG/DL (ref 7–18)
BUN/CREAT SERPL: 29 (ref 12–20)
CALCIUM SERPL-MCNC: 8.6 MG/DL (ref 8.5–10.1)
CALCULATED P AXIS, ECG09: 56 DEGREES
CALCULATED R AXIS, ECG10: 9 DEGREES
CALCULATED T AXIS, ECG11: 13 DEGREES
CHLORIDE SERPL-SCNC: 115 MMOL/L (ref 100–111)
CO2 SERPL-SCNC: 24 MMOL/L (ref 21–32)
CREAT SERPL-MCNC: 0.82 MG/DL (ref 0.6–1.3)
DIAGNOSIS, 93000: NORMAL
DIFFERENTIAL METHOD BLD: ABNORMAL
EOSINOPHIL # BLD: 0 K/UL (ref 0–0.4)
EOSINOPHIL NFR BLD: 0 % (ref 0–5)
ERYTHROCYTE [DISTWIDTH] IN BLOOD BY AUTOMATED COUNT: 13.7 % (ref 11.6–14.5)
GLOBULIN SER CALC-MCNC: 2.2 G/DL (ref 2–4)
GLUCOSE SERPL-MCNC: 84 MG/DL (ref 74–99)
HCT VFR BLD AUTO: 28.9 % (ref 35–45)
HGB BLD-MCNC: 9.3 G/DL (ref 12–16)
IMM GRANULOCYTES # BLD AUTO: 0 K/UL (ref 0–0.04)
IMM GRANULOCYTES NFR BLD AUTO: 0 % (ref 0–0.5)
LYMPHOCYTES # BLD: 3.4 K/UL (ref 0.9–3.6)
LYMPHOCYTES NFR BLD: 41 % (ref 21–52)
MCH RBC QN AUTO: 30.3 PG (ref 24–34)
MCHC RBC AUTO-ENTMCNC: 32.2 G/DL (ref 31–37)
MCV RBC AUTO: 94.1 FL (ref 78–100)
MONOCYTES # BLD: 0.8 K/UL (ref 0.05–1.2)
MONOCYTES NFR BLD: 9 % (ref 3–10)
NEUTS SEG # BLD: 4 K/UL (ref 1.8–8)
NEUTS SEG NFR BLD: 49 % (ref 40–73)
NRBC # BLD: 0 K/UL (ref 0–0.01)
NRBC BLD-RTO: 0 PER 100 WBC
P-R INTERVAL, ECG05: 146 MS
PLATELET # BLD AUTO: 242 K/UL (ref 135–420)
PMV BLD AUTO: 11.7 FL (ref 9.2–11.8)
POTASSIUM SERPL-SCNC: 4.8 MMOL/L (ref 3.5–5.5)
PROT SERPL-MCNC: 4.8 G/DL (ref 6.4–8.2)
Q-T INTERVAL, ECG07: 488 MS
QRS DURATION, ECG06: 122 MS
QTC CALCULATION (BEZET), ECG08: 431 MS
RBC # BLD AUTO: 3.07 M/UL (ref 4.2–5.3)
SODIUM SERPL-SCNC: 144 MMOL/L (ref 136–145)
VENTRICULAR RATE, ECG03: 47 BPM
WBC # BLD AUTO: 8.3 K/UL (ref 4.6–13.2)

## 2022-05-02 PROCEDURE — 77030018842 HC SOL IRR SOD CL 9% BAXT -A

## 2022-05-02 PROCEDURE — 85025 COMPLETE CBC W/AUTO DIFF WBC: CPT

## 2022-05-02 PROCEDURE — 74011250637 HC RX REV CODE- 250/637: Performed by: PHYSICIAN ASSISTANT

## 2022-05-02 PROCEDURE — 99233 SBSQ HOSP IP/OBS HIGH 50: CPT | Performed by: STUDENT IN AN ORGANIZED HEALTH CARE EDUCATION/TRAINING PROGRAM

## 2022-05-02 PROCEDURE — 82140 ASSAY OF AMMONIA: CPT

## 2022-05-02 PROCEDURE — 36415 COLL VENOUS BLD VENIPUNCTURE: CPT

## 2022-05-02 PROCEDURE — 93005 ELECTROCARDIOGRAM TRACING: CPT

## 2022-05-02 PROCEDURE — 80053 COMPREHEN METABOLIC PANEL: CPT

## 2022-05-02 PROCEDURE — 2709999900 HC NON-CHARGEABLE SUPPLY

## 2022-05-02 PROCEDURE — 65270000046 HC RM TELEMETRY

## 2022-05-02 PROCEDURE — 74011250636 HC RX REV CODE- 250/636: Performed by: HOSPITALIST

## 2022-05-02 PROCEDURE — 74011000250 HC RX REV CODE- 250: Performed by: INTERNAL MEDICINE

## 2022-05-02 PROCEDURE — 74011250636 HC RX REV CODE- 250/636: Performed by: INTERNAL MEDICINE

## 2022-05-02 PROCEDURE — 74011250636 HC RX REV CODE- 250/636: Performed by: PHYSICIAN ASSISTANT

## 2022-05-02 RX ORDER — ENOXAPARIN SODIUM 100 MG/ML
40 INJECTION SUBCUTANEOUS EVERY 24 HOURS
Status: DISCONTINUED | OUTPATIENT
Start: 2022-05-03 | End: 2022-05-03 | Stop reason: HOSPADM

## 2022-05-02 RX ORDER — SODIUM CHLORIDE 9 MG/ML
500 INJECTION, SOLUTION INTRAVENOUS ONCE
Status: COMPLETED | OUTPATIENT
Start: 2022-05-02 | End: 2022-05-02

## 2022-05-02 RX ORDER — SODIUM CHLORIDE 9 MG/ML
1000 INJECTION, SOLUTION INTRAVENOUS ONCE
Status: COMPLETED | OUTPATIENT
Start: 2022-05-02 | End: 2022-05-02

## 2022-05-02 RX ADMIN — MIDODRINE HYDROCHLORIDE 5 MG: 5 TABLET ORAL at 08:19

## 2022-05-02 RX ADMIN — SODIUM CHLORIDE, PRESERVATIVE FREE 10 ML: 5 INJECTION INTRAVENOUS at 05:40

## 2022-05-02 RX ADMIN — DIVALPROEX SODIUM 250 MG: 250 TABLET, DELAYED RELEASE ORAL at 18:19

## 2022-05-02 RX ADMIN — SODIUM CHLORIDE, PRESERVATIVE FREE 10 ML: 5 INJECTION INTRAVENOUS at 14:04

## 2022-05-02 RX ADMIN — SODIUM CHLORIDE 500 ML: 9 INJECTION, SOLUTION INTRAVENOUS at 05:15

## 2022-05-02 RX ADMIN — DIVALPROEX SODIUM 250 MG: 250 TABLET, DELAYED RELEASE ORAL at 08:19

## 2022-05-02 RX ADMIN — SODIUM CHLORIDE 100 ML/HR: 9 INJECTION, SOLUTION INTRAVENOUS at 22:23

## 2022-05-02 RX ADMIN — MIDODRINE HYDROCHLORIDE 5 MG: 5 TABLET ORAL at 12:50

## 2022-05-02 RX ADMIN — ENOXAPARIN SODIUM 30 MG: 30 INJECTION SUBCUTANEOUS at 08:18

## 2022-05-02 RX ADMIN — SODIUM CHLORIDE 100 ML/HR: 9 INJECTION, SOLUTION INTRAVENOUS at 09:30

## 2022-05-02 RX ADMIN — MIDODRINE HYDROCHLORIDE 5 MG: 5 TABLET ORAL at 18:18

## 2022-05-02 RX ADMIN — SODIUM CHLORIDE, PRESERVATIVE FREE 10 ML: 5 INJECTION INTRAVENOUS at 22:18

## 2022-05-02 RX ADMIN — SODIUM CHLORIDE 1000 ML: 9 INJECTION, SOLUTION INTRAVENOUS at 06:00

## 2022-05-02 NOTE — CONSULTS
Consult Note        Consult requested by: Zoraida Abrams DO    ADMIT DATE: 5/1/2022    CONSULT DATE: May 2, 2022           Admission diagnosis: Stage 3 acute kidney injury [facility])   Reason for Nephrology Consultation: JOSE      Assessment and plan:  #1 acute kidney injury, nonoliguric, his etiology prerenal azotemia in the setting of poor intake/hypotension, suspect polypharmacy, patient on multiple antihypertensives including lisinopril amlodipine metoprolol. #2 shock/hypotension, appears to be hypovolemic shock. Started on midodrine and IV fluids blood pressures improved  #3 hypokalemia replaced  #4 bradycardia  #5 psych disorder/hallucinations    Plan:  #1 renal functions have improved with hydration, brisk improvement due to prerenal azotemia. Continue IV fluids  #2 keep MAP greater than 65, continue fluids and wean midodrine as possible  #3 continue to hold blood pressure medications including lisinopril  #4 avoid IV contrast nephrotoxins    Discussed with patient's nurse and hospitalist team    Please call with questions,    Kalani Mora MD [facility]  Cell [phone]  Pager: [phone]    HPI:   Patient is a 59-year-old female who presented to [facility] with weakness and falls. She has had recently very poor intake, has had hallucinations with seeing crawling insects on her hands then falling from her mouth. On presentation she was noted to be hypotensive, bradycardic, normal white count, abnormal electrolytes with sodium of 132 potassium 2.8 BUN of 38 creatinine of 2.46. Nephrology was consulted due to JOSE.      Past Medical History:   Diagnosis Date    Depression     Gastrointestinal disorder Pancreatitis    Pancreatic disease     Pancreatitis     Psychiatric disorder       Past Surgical History:   Procedure Laterality Date    HX GYN      HX HYSTERECTOMY         Social History     Socioeconomic History    Marital status:      Spouse name: Not on file    Number of children: Not on file    Years of education: Not on file    Highest education level: Not on file   Occupational History    Not on file   Tobacco Use    Smoking status: Former Smoker     Packs/day: 0.50     Types: Cigarettes     Quit date: 2022     Years since quittin.1    Smokeless tobacco: Former User   Substance and Sexual Activity    Alcohol use: No     Comment: Per pt she quit in     Drug use: No    Sexual activity: Not Currently   Other Topics Concern    Not on file   Social History Narrative    Not on file     Social Determinants of Health     Financial Resource Strain:     Difficulty of Paying Living Expenses: Not on file   Food Insecurity:     Worried About [address] in the Last Year: Not on file    [place] of Food in the Last Year: Not on file   Transportation Needs:     Lack of Transportation (Medical): Not on file    Lack of Transportation (Non-Medical):  Not on file   Physical Activity:     Days of Exercise per Week: Not on file    Minutes of Exercise per Session: Not on file   Stress:     Feeling of Stress : Not on file   Social Connections:     Frequency of Communication with Friends and Family: Not on file    Frequency of Social Gatherings with Friends and Family: Not on file    Attends Yarsani Services: Not on file    Active Member of [address] [place] [address] [employer] or Organizations: Not on file    Attends Club or Organization Meetings: Not on file    Marital Status: Not on file   Intimate Partner Violence:     Fear of Current or Ex-Partner: Not on file    Emotionally Abused: Not on file    Physically Abused: Not on file    Sexually Abused: Not on file   Housing Stability:     Unable to Pay for Housing in the Last Year: Not on file    Number of [place] in the Last Year: Not on file    Unstable Housing in the Last Year: Not on file       Family History   Problem Relation Age of Onset    Diabetes Maternal Aunt     Cancer Maternal Aunt     Alzheimer's Disease Maternal Aunt     Cancer Paternal Aunt     Diabetes Paternal Aunt      No Known Allergies     Home Medications:     Medications Prior to Admission   Medication Sig    lisinopriL (PRINIVIL, ZESTRIL) 20 mg tablet Take 20 mg by mouth daily.  mirtazapine (REMERON) 45 mg tablet Take 45 mg by mouth nightly.  amLODIPine (NORVASC) 5 mg tablet Take 5 mg by mouth daily.  ferrous sulfate (FeroSuL) 325 mg (65 mg iron) tablet Take 325 mg by mouth Daily (before breakfast).  divalproex DR (Depakote) 250 mg tablet Take 250 mg by mouth two (2) times a day.  potassium chloride SR (KLOR-CON 10) 10 mEq tablet Take 10 mEq by mouth daily.  metoprolol succinate (TOPROL-XL) 50 mg XL tablet Take 1 Tablet by mouth daily.  aspirin delayed-release 81 mg tablet Take 1 Tablet by mouth daily. Current Inpatient Medications:     Current Facility-Administered Medications   Medication Dose Route Frequency    [START ON 5/3/2022] enoxaparin (LOVENOX) injection 40 mg  40 mg SubCUTAneous Q24H    sodium chloride (NS) flush 5-40 mL  5-40 mL IntraVENous Q8H    sodium chloride (NS) flush 5-40 mL  5-40 mL IntraVENous PRN    acetaminophen (TYLENOL) tablet 650 mg  650 mg Oral Q6H PRN    Or    acetaminophen (TYLENOL) suppository 650 mg  650 mg Rectal Q6H PRN    polyethylene glycol (MIRALAX) packet 17 g  17 g Oral DAILY PRN    0.9% sodium chloride infusion  100 mL/hr IntraVENous CONTINUOUS    divalproex DR (DEPAKOTE) tablet 250 mg  250 mg Oral BID    midodrine (PROAMATINE) tablet 5 mg  5 mg Oral TID WITH MEALS       Review of Systems:   No fever or chills. No sore throat. No cough or hemoptysis. No shortness of breath or chest pain. No orthopnea or paroxysmal nocturnal dyspneano gross hematuria of voiding difficulties. No ankle swelling, no joint paints. No muscle aches. No skin changes. No dizziness or lightheadedness. No headaches.        Physical Assessment:     Vitals:    05/02/22 1300 05/02/22 1352 05/02/22 1356 05/02/22 1545   BP: (!) 123/58  118/67 135/81   Pulse: (!) 48  (!) 56 (!) 58   Resp: 19  18 16   Temp:   98.2 °F (36.8 °C) 98.6 °F (37 °C)   SpO2: 100%  98% 98%   Weight:  47.7 kg (105 lb 1.6 oz)     Height:         Last 3 Recorded Weights in this Encounter    05/01/22 1344 05/02/22 1352   Weight: 47.2 kg (104 lb) 47.7 kg (105 lb 1.6 oz)     Admission weight: Weight: 47.2 kg (104 lb) (05/01/22 1344)      Intake/Output Summary (Last 24 hours) at 5/2/2022 1615  Last data filed at 5/2/2022 1300  Gross per 24 hour   Intake 2660 ml   Output 1695 ml   Net 965 ml     Patient is in no apparent distress. HEENT: Head is normocephalic and atraumatic. Lungs: good air entry, clear to auscultation bilaterally. Cardiovascular system: S1, S2, regular rate and rhythm. Extremities: no clubbing, cyanosis or edema. Integumentary: skin is grossly intact. Neurologic: Alert, oriented time three. Data Review:    Labs: Results:       Chemistry Recent Labs     05/02/22  0350 05/01/22  1930 05/01/22  1133   GLU 84 83 139*    139 132*   K 4.8 3.7 2.8*   * 106 94*   CO2 24 26 28   BUN 24* 28* 38*   CREA 0.82 1.40* 2.46*   CA 8.6 8.8 9.7   AGAP 5 7 10   BUCR 29* 20 15   AP 29* 36*  --    TP 4.8* 5.5*  --    ALB 2.6* 3.2*  --    GLOB 2.2 2.3  --    AGRAT 1.2 1.4  --          CBC w/Diff Recent Labs     05/02/22  0350 05/01/22  1133   WBC 8.3 10.8   RBC 3.07* 3.78*   HGB 9.3* 11.4*   HCT 28.9* 34.2*    328   GRANS 49 54   LYMPH 41 36   EOS 0 0         Iron/Ferritin No results for input(s): IRON in the last 72 hours. No lab exists for component: TIBCCALC   PTH/VIT D No results for input(s): PTH in the last 72 hours.     No lab exists for component: VITD           Matthew Gallagher MD  5/2/2022  4:15 PM      May 2, 2022

## 2022-05-02 NOTE — PROGRESS NOTES
0500: Patient with MAP of 45. Still currently bradycardic but asymptomatic. Hospitalist notified. 500 CC bolus ordered and given. 0600: MAP still <65 after NS bolus. Hospitalist notified and 1L bolus ordered and given. Will continue to monitor.

## 2022-05-02 NOTE — PROGRESS NOTES
Problem: Falls - Risk of  Goal: *Absence of Falls  Description: Document Emma Reyes Fall Risk and appropriate interventions in the flowsheet.   Outcome: Progressing Towards Goal  Note: Fall Risk Interventions:  Mobility Interventions: Assess mobility with egress test,Patient to call before getting OOB         Medication Interventions: Assess postural VS orthostatic hypotension,Patient to call before getting OOB,Teach patient to arise slowly,Evaluate medications/consider consulting pharmacy    Elimination Interventions: Call light in reach,Patient to call for help with toileting needs,Stay With Me (per policy)    History of Falls Interventions: Consult care management for discharge planning,Door open when patient unattended,Evaluate medications/consider consulting pharmacy,Investigate reason for fall,Room close to nurse's station,Assess for delayed presentation/identification of injury for 48 hrs (comment for end date)

## 2022-05-02 NOTE — PROGRESS NOTES
Progress Note  Hospitalist Service    Patient: Jose M Monroe MRN: [id number]   SSN: [id number]  YOB: 1955   Age: 75 y.o. Sex: female      Admit Date: 5/1/2022    LOS: 1 day   Chief Complaint   Patient presents with    Dizziness       Subjective:     Patient seems to be much improved this afternoon. Not having any delusions. AOx4. Objective:     Vitals:  Visit Vitals  /81   Pulse (!) 57   Temp 98.6 °F (37 °C)   Resp 16   Ht 5' 3\" (1.6 m)   Wt 47.7 kg (105 lb 1.6 oz)   SpO2 98%   BMI 18.62 kg/m²       Physical Exam:   General: NAD, appears stated age, alert  Skin: warm, dry, no rashes  Eyes: PERRL, sclera is non-icteric  HENT: normocephalic/atraumatic  Respiratory: CTA with no signs of respiratory distress  Cardiovascular: bradycardia, no m/r/g  GI: soft, non-tender, +bowel sounds  Extremities: no cyanosis, no peripheral edema  Neuro: moves all extremities, no focal deficits, normal speech  Psych: appropriate mood and affect, no visual or auditory hallucinations currently    Intake and Output:  Current Shift: 05/02 0701 - 05/02 1900  In: 202 [P.O.:325; I.V.:600]  Out: 860 [Urine:860]  Last three shifts: 04/30 1901 - 05/02 0700  In: 3219 [P.O.:180; I.V.:1555]  Out: 1160 [Urine:1160]    Lab/Data Review:  Recent Results (from the past 12 hour(s))   EKG, 12 LEAD, INITIAL    Collection Time: 05/02/22  1:03 PM   Result Value Ref Range    Ventricular Rate 47 BPM    Atrial Rate 47 BPM    P-R Interval 146 ms    QRS Duration 122 ms    Q-T Interval 488 ms    QTC Calculation (Bezet) 431 ms    Calculated P Axis 56 degrees    Calculated R Axis 9 degrees    Calculated T Axis 13 degrees    Diagnosis       Sinus bradycardia  Right bundle branch block  Abnormal ECG  When compared with ECG of 01-MAY-2022 11:45,  Vent.  rate has decreased BY  25 BPM  Minimal criteria for Inferior infarct are no longer present  QT has shortened           Key Findings or tests:       Telemetry NONE   Oxygen NONE     Assessment and Plan:     JOSE - now resolved. Unclear etiology, suspect multifactorial - hypotension, dehydration, maybe ACEI.  - IV fluids   - urine chemistries  - monitor BMP  - valiente placed  - strict I&Os  - appreciate nephrology assistance  - hold ACEI     Hypotension, resolved. hypovolemia likely. No evidence for infection/sepsis. Procal 0.09. Blood pressure cuff was adjusted and MAP improved to >65  - continue IV fluids  - discontinued midodrine  - hold all BP medications     Hypokalemia, resolved   - replaced oral and IV     Bradycardia - asymptomatic. Normal TSH.   - check T4  - cardiac monitoring  - hold BB     QT prolongation   - hold QT prolonging medications and recheck ECG tomorrow     Psych disorder, hallucinations prior to admission - no hallucinations since admission. Could be due to valproic acid or due to the lack of VPA. Has both valproic acid and depakote in bag of home medications. 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 States she ate strawberries she did not wash and is now infested. - Valproic acid level low. Will likely discharge tomorrow  Patient will update friend. Declines my update.          Dayana Jeffers DO, hospitalist   May 2, 2022

## 2022-05-02 NOTE — CONSULTS
Comprehensive Nutrition Assessment    Type and Reason for Visit: Initial,Consult    Nutrition Recommendations/Plan:   1. Continue current nutrition interventions. Encourage/ monitor po intake of meals and supplements. Malnutrition Assessment:  Malnutrition Status: At risk for malnutrition (specify) (underweight; decreased po intake PTA) (05/02/22 1702)      Nutrition History and Allergies: Past medical hx:   Depression, GI disorder, pancreatic disease/ pancreatitis, psychiatric disorder. Per H&P, pt had not been eating or drinking appropriate for past 1-2 weeks PTA. Wt fluctuations PTA per chart hx:  110 lb on 8/10/2012,   93 lb on 11/20/2015,    104 lb on 11/30/2021,    105 lb on 5/2/2022. No known food allergies     Nutrition Assessment:    Per H&P, pt admitted for JOSE. Has hx of psychiatric disorder as well, hallucinations. Noted not eating/ drinking appropriately for past 1-2 weeks PTA per chart review. Pt is underweight; BMI 18.4 kg/m^2. Pt is on po diet; noted fair meal intake per chart documentation. Noted Ensure drink BID ordered. Attempted to visit with pt, but pt asking for nursing assistance to go to bathroom; per chart documentation, pt reported last BM was >1 week ago    Nutrition Related Findings:    BM >1 week ago per pt report per chart documentation. No edema. IVF, NS at 100 mL/hr. Wound Type: None    Current Nutrition Intake & Therapies:  Average Meal Intake: 26-50%  Average Supplement Intake: Unable to assess  ADULT DIET Easy to Chew  ADULT ORAL NUTRITION SUPPLEMENT Lunch, Dinner; Standard High Calorie/High Protein    Anthropometric Measures:  Height: 5' 3\" (160 cm)  Ideal Body Weight (IBW): 115 lbs (52 kg)  Admission Body Weight: 104 lb 0.9 oz (pt stated)  Current Body Wt:  47.7 kg (105 lb 2.6 oz), 91.4 % IBW.  Not specified  Current BMI (kg/m2): 18.6  Usual Body Weight: 47.2 kg (104 lb) (2/23/2022 per chart hx)  % Weight Change (Calculated): 1.1  Weight Adjustment: No adjustment   BMI Category: Underweight (BMI less than 22) age over 72    Estimated Daily Nutrient Needs:  Energy Requirements Based On: Kcal/kg (x30-35)  Weight Used for Energy Requirements: Current  Energy (kcal/day): 0454-7048  Weight Used for Protein Requirements: Current (x1-1. 2)  Protein (g/day): 48-57  Method Used for Fluid Requirements: Standard renal (for JOSE)  Fluid (ml/day): 500 mL + total output    Nutrition Diagnosis:   · Underweight related to inadequate protein-energy intake as evidenced by BMI      Nutrition Interventions:   Food and/or Nutrient Delivery: Continue current diet,Continue oral nutrition supplement,IV fluid delivery  Nutrition Education/Counseling: No recommendations at this time,Education not indicated  Coordination of Nutrition Care: Continue to monitor while inpatient  Plan of Care discussed with: pt unavailable    Goals:     Goals: Meet at least 75% of estimated needs,PO intake 75% or greater,by next RD assessment       Nutrition Monitoring and Evaluation:   Behavioral-Environmental Outcomes: None identified  Food/Nutrient Intake Outcomes: Diet advancement/tolerance,Food and nutrient intake,Supplement intake,IVF intake  Physical Signs/Symptoms Outcomes: Biochemical data,Chewing or swallowing,Meal time behavior    Discharge Planning:    Continue oral nutrition supplement,Continue current diet    Delbert Meyers  Contact: [phone]

## 2022-05-02 NOTE — PROGRESS NOTES
Reason for Admission:  JOSE (acute kidney injury) ([facility] [address] [N17.9]                 RUR Score:    15           Plan for utilizing home health: To be determined                      Likelihood of Readmission:   Moderate                         Do you (patient/family) have any concerns for transition/discharge?  no    Transition of Care Plan:       Initial assessment completed with patient. Cognitive status of patient: oriented to time, place, person and situation. Face sheet information confirmed:  yes. The patient designates her sister Kimberly Valladares and her friend Kameron Meyer  to participate in her discharge plan and to receive any needed information. This patient lives in a mobile home with patient. Patient is able to navigate steps as needed. Prior to hospitalization, patient was considered to be independent with ADLs/IADLS : yes . Patient has a current ACP document on file: yes      Healthcare Decision Maker:   Primary Decision Maker: Carter Martinez - [phone]    Click here to complete [address] including selection of the Healthcare Decision Maker Relationship (ie \"Primary\")    The sister will be available to transport patient home upon discharge. The patient already has none reported  medical equipment available in the home. Patient is not currently active with home health. Patient has not stayed in a skilled nursing facility or rehab. Was  stay within last 60 days : no. This patient is on dialysis :no     Currently, the discharge plan is to be determined. The patient states that she can obtain her medications from the pharmacy, and take her medications as directed. [facility]'s [facility]      Care Management Interventions  PCP Verified by CM: Yes  Palliative Care Criteria Met (RRAT>21 & CHF Dx)?: No  Mode of Transport at Discharge:  Other (see comment) (family)  Transition of Care Consult (CM Consult): Discharge Planning  Support Systems: Other Family Member(s),Friend/Neighbor  Confirm Follow Up Transport: Self  Discharge Location  Patient Expects to be Discharged to[de-identified] Unable to determine at this time        REAGAN Llanes RN  Care Management  Pager: [phone]

## 2022-05-02 NOTE — PROGRESS NOTES
Reason for Renal Dosing:  Per Renal Dosing Policy    Patient clinical status and labs ordered/reviewed. Pt Weight Weight: 47.2 kg (104 lb)   Serum Creatinine Lab Results   Component Value Date/Time    Creatinine 0.82 05/02/2022 03:50 AM    Creatinine, POC 0.8 10/02/2017 04:15 PM       Creatinine Clearance Estimated Creatinine Clearance: 48.9 mL/min (based on SCr of 0.82 mg/dL). BUN Lab Results   Component Value Date/Time    BUN 24 (H) 05/02/2022 03:50 AM    BUN, POC 17 10/02/2017 04:15 PM       WBC Lab Results   Component Value Date/Time    WBC 8.3 05/02/2022 03:50 AM      Temperature Temp: 98.2 °F (36.8 °C)   HR Pulse (Heart Rate): (!) 50     BP BP: 104/61           Drug type: Non-Antibiotic      Drug/dose: was adjusted to : enoxaparin 40mg sq q24h    Continue to monitor.     Signed Kirk Simeon  Date 5/2/2022  Time 12:22 PM

## 2022-05-02 NOTE — PROGRESS NOTES
07:15  Received pt in bed sleeping. IVF infusing at 100 ml/hr of 0.9% saline. Pt bradycardic in the 50s with last BP 90/58.  08:19  Assisted pt OOB to recliner chair for breakfast.  Pt. With no c/o dizziness. Midodrine administered. 10:45  Dr. Arabella Hurd at  Bedside. 12:20  TRANSFER - OUT REPORT:    Verbal report given to JIN Gonzalez(name) on Marilee Stanford  being transferred to [place] [address] of Stepdown(unit) for routine progression of care       Report consisted of patients Situation, Background, Assessment and   Recommendations(SBAR). Information from the following report(s) SBAR, Kardex, ED Summary, Intake/Output, MAR, Recent Results and Cardiac Rhythm Sinus bradycardia with BBB was reviewed with the receiving nurse. Lines:   Peripheral IV 05/01/22 Right Forearm (Active)   Site Assessment Clean, dry, & intact 05/02/22 0800   Phlebitis Assessment 0 05/02/22 0800   Infiltration Assessment 0 05/02/22 0800   Dressing Status Clean, dry, & intact 05/02/22 0800   Dressing Type Transparent 05/02/22 0800   Hub Color/Line Status Pink; Infusing;Flushed 05/02/22 0800   Action Taken Open ports on tubing capped 05/02/22 0800   Alcohol Cap Used Yes 05/02/22 0800        Opportunity for questions and clarification was provided. Patient transported with:   Monitor  Patient's medications from home  Registered Nurse   Pt's sister, Katelyn Lin to  home meds. 13:40  Pt. Transferred via wheelchair to [place] [address] with all belongings. 14:14  Spoke with Sister, Cecilia Bellamy, she will be visiting today and will take home meds home. She will also look for pt's cell phone at pt's house as well. 14:30  Received call from Katelyn Ceballos, pt's cell phone is at pt's home.

## 2022-05-02 NOTE — PROGRESS NOTES
Pt transferred to stepdown at 1220. Room set up and placed on telemetry. Pt up X2 for BM in bathroom. Cousin visited, pt has order for transfer to telemetry.

## 2022-05-03 ENCOUNTER — HOME HEALTH ADMISSION (OUTPATIENT)
Dept: HOME HEALTH SERVICES | Facility: HOME HEALTH | Age: 67
End: 2022-05-03

## 2022-05-03 VITALS
DIASTOLIC BLOOD PRESSURE: 66 MMHG | HEIGHT: 63 IN | BODY MASS INDEX: 18.62 KG/M2 | OXYGEN SATURATION: 91 % | TEMPERATURE: 98.7 F | SYSTOLIC BLOOD PRESSURE: 118 MMHG | RESPIRATION RATE: 18 BRPM | WEIGHT: 105.1 LBS | HEART RATE: 65 BPM

## 2022-05-03 LAB
BACTERIA SPEC CULT: NORMAL
OSMOLALITY UR: 296 MOSMOL/KG
SERVICE CMNT-IMP: NORMAL
T4 FREE SERPL-MCNC: 1.2 NG/DL (ref 0.7–1.5)

## 2022-05-03 PROCEDURE — 74011250637 HC RX REV CODE- 250/637: Performed by: PHYSICIAN ASSISTANT

## 2022-05-03 PROCEDURE — 74011000250 HC RX REV CODE- 250: Performed by: INTERNAL MEDICINE

## 2022-05-03 PROCEDURE — 84439 ASSAY OF FREE THYROXINE: CPT

## 2022-05-03 PROCEDURE — 74011250636 HC RX REV CODE- 250/636: Performed by: PHYSICIAN ASSISTANT

## 2022-05-03 PROCEDURE — 36415 COLL VENOUS BLD VENIPUNCTURE: CPT

## 2022-05-03 PROCEDURE — 74011250636 HC RX REV CODE- 250/636: Performed by: INTERNAL MEDICINE

## 2022-05-03 PROCEDURE — 99239 HOSP IP/OBS DSCHRG MGMT >30: CPT | Performed by: STUDENT IN AN ORGANIZED HEALTH CARE EDUCATION/TRAINING PROGRAM

## 2022-05-03 RX ADMIN — ENOXAPARIN SODIUM 40 MG: 100 INJECTION SUBCUTANEOUS at 08:43

## 2022-05-03 RX ADMIN — SODIUM CHLORIDE, PRESERVATIVE FREE 10 ML: 5 INJECTION INTRAVENOUS at 06:00

## 2022-05-03 RX ADMIN — SODIUM CHLORIDE 100 ML/HR: 9 INJECTION, SOLUTION INTRAVENOUS at 08:39

## 2022-05-03 RX ADMIN — SODIUM CHLORIDE, PRESERVATIVE FREE 10 ML: 5 INJECTION INTRAVENOUS at 14:00

## 2022-05-03 RX ADMIN — DIVALPROEX SODIUM 250 MG: 250 TABLET, DELAYED RELEASE ORAL at 08:45

## 2022-05-03 NOTE — PROGRESS NOTES
Pt in room at baseline, IVF running, no issues. Pt able to ambulate to bathroom and make needs known.  Will continue to monitor

## 2022-05-03 NOTE — PROGRESS NOTES
Problem: Falls - Risk of  Goal: *Absence of Falls  Description: Document Lynn Joyce Fall Risk and appropriate interventions in the flowsheet.   Outcome: Progressing Towards Goal  Note: Fall Risk Interventions:  Mobility Interventions: Assess mobility with egress test         Medication Interventions: Patient to call before getting OOB    Elimination Interventions: Call light in reach    History of Falls Interventions: Consult care management for discharge planning

## 2022-05-03 NOTE — PROGRESS NOTES
Pt discharged home with home health this afternoon. Agreeable to plan, waiting for sister to pick her up.

## 2022-05-03 NOTE — ED NOTES
Purposeful rounding completed:    Side rails up x 2:  YES  Bed in low position and wheels locked: YES  Call bell within reach: YES  Comfort addressed: YES    Toileting needs addressed: YES  Plan of care reviewed/updated with patient and or family members: YES  IV site assessed: YES  Pain assessed and addressed: YES, 0    Patient laying in semi greer position, blankets covering up to shoulder, eyes closed. No respiratory distress observed.

## 2022-05-03 NOTE — PROGRESS NOTES
Physician Progress Note      PATIENT:               Joselito Esposito  CSN #:                  [id number]  :                       1955  ADMIT DATE:       2022 11:24 AM  DISCH DATE:  RESPONDING  PROVIDER #:        Dianna FRANCISCO DO          QUERY TEXT:    Pt admitted with hypotension, JOSE. Noted documentation of hypovolemic shock on  by ordered Nephrology consultant. If possible, please document in progress notes and discharge summary. The medical record reflects the following:  Risk Factors: 74 yo  female admitted with dehydration and JOSE    Clinical Indicators: Initial BP's in ED  73/48 MAP 76/46 MAP 53    83/44 MAP 53 89/45 MAP 56  81/50 MAP 57    Per Nephrology consult   \"shock/hypotension, appears to be hypovolemic shock\"    Treatment: IV fluid boluses x 5, Proamatine, telemetry monitoring, nephrology consult        Juan KIRK, RN, [address]  C: [phone]    [email or web address]  Options provided:  -- Hypovolemic shock confirmed present on admission  -- Hypovolemic shock  ruled out  -- Other - I will add my own diagnosis  -- Disagree - Not applicable / Not valid  -- Disagree - Clinically unable to determine / Unknown  -- Refer to Clinical Documentation Reviewer    PROVIDER RESPONSE TEXT:    The diagnosis of hypovolemic shock was ruled out.     Query created by: Robin Singletary on 5/3/2022 1:54 PM      Electronically signed by:  Vikki Gillespie DO 5/3/2022 3:23 PM

## 2022-05-03 NOTE — PROGRESS NOTES
In Patient Progress note    Admit Date: 5/1/2022    Impression:     #1 acute kidney injury, nonoliguric, his etiology prerenal azotemia in the setting of poor intake/hypotension, suspect polypharmacy, patient on multiple antihypertensives including lisinopril amlodipine metoprolol. #2 shock/hypotension, appears to be hypovolemic shock. Started on midodrine and IV fluids blood pressures improved  #3 hypokalemia replaced  #4 bradycardia  #5 psych disorder/hallucinations     Plan:  #1 cut down IVF , encourage po intake , wean off midodrine   #2 continue to hold blood pressure medications including lisinopril  #3 avoid IV contrast nephrotoxins     Discussed with patient's nurse,     Please call with questions,     Rebecca Juarez MD [facility]  Cell [phone]  Pager: [phone]     Subjective:     - No acute over night events. - respiratory - stable  - hemodynamics - stable  - UOP-good  - Nutrition -poor    Objective:     Visit Vitals  BP (!) 114/56 (BP 1 Location: Left upper arm, BP Patient Position: At rest)   Pulse (!) 53   Temp 98.3 °F (36.8 °C)   Resp 18   Ht 5' 3\" (1.6 m)   Wt 47.7 kg (105 lb 1.6 oz)   SpO2 99%   BMI 18.62 kg/m²         Intake/Output Summary (Last 24 hours) at 5/3/2022 1506  Last data filed at 5/3/2022 1120  Gross per 24 hour   Intake --   Output 1550 ml   Net -1550 ml       Physical Exam:       Patient is in no apparent distress. HEENT: Head is normocephalic and atraumatic. Lungs: good air entry, clear to auscultation bilaterally. Cardiovascular system: S1, S2, regular rate and rhythm. Extremities: no clubbing, cyanosis or edema. Integumentary: skin is grossly intact. Neurologic: Alert, oriented time three.       Data Review:    Recent Labs     05/02/22  0350   WBC 8.3   RBC 3.07*   HCT 28.9*   MCV 94.1   MCH 30.3   MCHC 32.2   RDW 13.7     Recent Labs     05/02/22  0350 05/01/22  1930 05/01/22  1133   BUN 24* 28* 38*   CREA 0.82 1.40* 2.46*   CA 8.6 8.8 9.7   ALB 2.6* 3.2*  --    K 4.8 3.7 2.8*    139 132*   * 106 94*   CO2 24 26 28   GLU 84 83 139*       Maryanne White MD

## 2022-05-03 NOTE — PROGRESS NOTES
Home health orders noted. Sent text message to Dr. Briseyda Pike to modify order and specify labs needed to be drawn and when. Met with pt and she is in agreement with home health. Spoke with Sirisha of [address] [place] [address] and orders sent. Discharge order noted for today. Pt has been accepted to [facility] agency. Met with patient  and are agreeable to the transition plan today. Transport has been arranged through pt's sister. Patient's discharge summary and home health  orders have been forwarded to [facility] home health  agency. Updated bedside RN, ,  to the transition plan.   Discharge information has been documented on the AVS.               REAGAN Mcintyre RN  Care Management  Pager: [phone]

## 2022-05-04 NOTE — PROGRESS NOTES
Today 5/4/22 at [facility] called stating they are unable to accept pt because she does not have a qualifying diagnosis for home health. Sent text message to Dr. Collette Naas to modify order to home health instead of case management and also to specify labs needed to be drawn so CM can look for another home health agency.         SPENCER GonzalezN RN  Care Management  Pager: [phone]

## 2022-05-04 NOTE — HOME CARE
5/3/22 - 1700  After reviewing chart. Noted no qualifying PDGM diagnosis. Diagnosis noted resolved. Emailed  for assistance. Noted same. Will update CM.    5/4/22 - 9701  Called and spoke with CM to notify, referral to be deferred due to not having a qualifying primary diagnosis at this time.           ----   Chino Chen, EMILYN  [address] [facility] Liaison

## 2022-05-17 NOTE — DISCHARGE SUMMARY
Discharge Summary    Patient: Maida Lee MRN: [id number]  CSN: [id number]    YOB: 1955  Age: 75 y.o. Sex: female    DOA: 5/1/2022 LOS:  LOS: 2 days   Discharge Date: 5/3/2022     Admission Diagnosis: JOSE (acute kidney injury) ([facility] [N17.9]    Discharge Diagnosis:    Hospital Problems  Date Reviewed: 2/23/2017          Codes Class Noted POA    * (Principal) Stage 3 acute kidney injury ([facility] ICD-10-CM: N17.9  ICD-9-CM: 584.9  5/1/2022 Yes        Hypotension ICD-10-CM: I95.9  ICD-9-CM: 458.9  5/1/2022 Yes        Bradycardia ICD-10-CM: R00.1  ICD-9-CM: 427.89  5/1/2022 Yes        Hallucination ICD-10-CM: R44.3  ICD-9-CM: 780.1  5/1/2022 Yes        QT prolongation ICD-10-CM: R94.31  ICD-9-CM: 794.31  11/25/2021 Yes              Discharge Condition: Stable    PHYSICAL EXAM  Visit Vitals  /66 (BP 1 Location: Left upper arm, BP Patient Position: At rest)   Pulse 65   Temp 98.7 °F (37.1 °C)   Resp 18   Ht 5' 3\" (1.6 m)   Wt 47.7 kg (105 lb 1.6 oz)   SpO2 91%   BMI 18.62 kg/m²       General: Alert, cooperative, no acute distress    HEENT: NC, Atraumatic. PERRLA, EOMI. Anicteric sclerae. Lungs:  CTA Bilaterally. No Wheezing/Rhonchi/Rales. Heart:  Regular  rhythm,  No murmur, No Rubs, No Gallops  Abdomen: Soft, Non distended, Non tender. +Bowel sounds, no HSM  Extremities: No c/c/e  Psych:   Good insight. Not anxious or agitated. Neurologic:  CN 2-12 grossly intact, oriented X 3. No acute neurological                                 Deficits,     Hospital Course By Problem:   JOSE - Resolved upon discharge. Unclear etiology, suspect multifactorial - hypotension, dehydration, maybe ACEI. ACE inhibitor was held. IV fluids given. Cr upon discharge of 0.82.      Hypotension, resolved. Felt likely to be secondary to hypovolemia. No evidence for infection/sepsis. Procal 0.09. Blood pressure cuff was adjusted and MAP improved to >65. All BP medications were held. Hypokalemia, resolved.  Replaced orally and via IV. Bradycardia - asymptomatic. Normal TSH, T4. Beta blocker was held.      QT prolongation - held QT prolonging medications. Resolved upon second EKG.    Psych disorder, hallucinations prior to admission - No hallucinations since admission. 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 States she ate strawberries she did not wash and is now infested. Home medications continued. Consults:   Nephrology - Dr. Art Villegas     Significant Diagnostic Studies:   Ct abd/pelvis (5/1/22)  IMPRESSION  1. No hydronephrosis or perinephric stranding. Nonobstructing stone left kidney. Likely cyst in right kidney. 2. Possible mild enterocolitis with no obstruction or extraluminal inflammation. Discharge Medications:     Discharge Medication List as of 5/3/2022  3:48 PM      CONTINUE these medications which have NOT CHANGED    Details   lisinopriL (PRINIVIL, ZESTRIL) 20 mg tablet Take 20 mg by mouth daily. , Historical Med      mirtazapine (REMERON) 45 mg tablet Take 45 mg by mouth nightly., Historical Med      amLODIPine (NORVASC) 5 mg tablet Take 5 mg by mouth daily. , Historical Med      ferrous sulfate (FeroSuL) 325 mg (65 mg iron) tablet Take 325 mg by mouth Daily (before breakfast). , Historical Med      divalproex DR (Depakote) 250 mg tablet Take 250 mg by mouth two (2) times a day., Historical Med      potassium chloride SR (KLOR-CON 10) 10 mEq tablet Take 10 mEq by mouth daily. , Historical Med      metoprolol succinate (TOPROL-XL) 50 mg XL tablet Take 1 Tablet by mouth daily. , Normal, Disp-90 Tablet, R-2      aspirin delayed-release 81 mg tablet Take 1 Tablet by mouth daily. , Normal, Disp-100 Tablet, R-2         .   Activity: activity as tolerated    Diet: Regular Diet    Wound Care: None needed    Follow-up: with PCP, Ashish Garcia MD in 7-10days    Minutes spent on discharge: >30 minutes spent coordinating this discharge (review instructions/follow-up, prescriptions, preparing report for sign off)    Rose Mary Acme, DO

## 2022-09-22 ENCOUNTER — APPOINTMENT (OUTPATIENT)
Dept: GENERAL RADIOLOGY | Age: 67
DRG: 280 | End: 2022-09-22
Attending: EMERGENCY MEDICINE
Payer: MEDICARE

## 2022-09-22 ENCOUNTER — APPOINTMENT (OUTPATIENT)
Dept: CT IMAGING | Age: 67
DRG: 280 | End: 2022-09-22
Attending: PHYSICIAN ASSISTANT
Payer: MEDICARE

## 2022-09-22 ENCOUNTER — HOSPITAL ENCOUNTER (INPATIENT)
Age: 67
LOS: 1 days | Discharge: HOME | DRG: 280 | End: 2022-09-24
Attending: EMERGENCY MEDICINE | Admitting: INTERNAL MEDICINE
Payer: MEDICARE

## 2022-09-22 DIAGNOSIS — R94.31 ABNORMAL EKG: ICD-10-CM

## 2022-09-22 DIAGNOSIS — R11.2 NAUSEA AND VOMITING, UNSPECIFIED VOMITING TYPE: ICD-10-CM

## 2022-09-22 DIAGNOSIS — I25.10 CORONARY ARTERY DISEASE INVOLVING NATIVE HEART, UNSPECIFIED VESSEL OR LESION TYPE, UNSPECIFIED WHETHER ANGINA PRESENT: ICD-10-CM

## 2022-09-22 DIAGNOSIS — R10.13 ABDOMINAL PAIN, EPIGASTRIC: Primary | ICD-10-CM

## 2022-09-22 DIAGNOSIS — I21.4 NSTEMI (NON-ST ELEVATED MYOCARDIAL INFARCTION) (HCC): ICD-10-CM

## 2022-09-22 DIAGNOSIS — R77.8 ELEVATED TROPONIN: ICD-10-CM

## 2022-09-22 LAB
ALBUMIN SERPL-MCNC: 4.4 G/DL (ref 3.4–5)
ALBUMIN/GLOB SERPL: 1.3 {RATIO} (ref 0.8–1.7)
ALP SERPL-CCNC: 56 U/L (ref 45–117)
ALT SERPL-CCNC: 27 U/L (ref 13–56)
ANION GAP BLD CALC-SCNC: 13 MMOL/L (ref 10–20)
ANION GAP SERPL CALC-SCNC: 13 MMOL/L (ref 3–18)
APPEARANCE UR: CLEAR
APTT PPP: 31.2 SEC (ref 23–36.4)
AST SERPL-CCNC: 28 U/L (ref 10–38)
BACTERIA URNS QL MICRO: ABNORMAL /HPF
BASE DEFICIT BLD-SCNC: 0.4 MMOL/L
BASOPHILS # BLD: 0 K/UL (ref 0–0.1)
BASOPHILS NFR BLD: 0 % (ref 0–2)
BILIRUB SERPL-MCNC: 0.8 MG/DL (ref 0.2–1)
BILIRUB UR QL: NEGATIVE
BNP SERPL-MCNC: ABNORMAL PG/ML (ref 0–900)
BUN SERPL-MCNC: 12 MG/DL (ref 7–18)
BUN/CREAT SERPL: 16 (ref 12–20)
CA-I BLD-MCNC: 0.99 MMOL/L (ref 1.12–1.32)
CALCIUM SERPL-MCNC: 10.9 MG/DL (ref 8.5–10.1)
CHLORIDE BLD-SCNC: 97 MMOL/L (ref 98–107)
CHLORIDE SERPL-SCNC: 98 MMOL/L (ref 100–111)
CO2 BLD-SCNC: 23 MMOL/L (ref 19–24)
CO2 SERPL-SCNC: 24 MMOL/L (ref 21–32)
COLOR UR: YELLOW
CREAT BLD-MCNC: 0.6 MG/DL (ref 0.6–1.3)
CREAT SERPL-MCNC: 0.75 MG/DL (ref 0.6–1.3)
D DIMER PPP FEU-MCNC: 0.28 UG/ML(FEU)
DIFFERENTIAL METHOD BLD: ABNORMAL
EOSINOPHIL # BLD: 0 K/UL (ref 0–0.4)
EOSINOPHIL NFR BLD: 0 % (ref 0–5)
EPITH CASTS URNS QL MICRO: ABNORMAL /LPF (ref 0–5)
ERYTHROCYTE [DISTWIDTH] IN BLOOD BY AUTOMATED COUNT: 13.9 % (ref 11.6–14.5)
GLOBULIN SER CALC-MCNC: 3.5 G/DL (ref 2–4)
GLUCOSE BLD-MCNC: 109 MG/DL (ref 65–100)
GLUCOSE SERPL-MCNC: 112 MG/DL (ref 74–99)
GLUCOSE UR STRIP.AUTO-MCNC: NEGATIVE MG/DL
HCO3 BLD-SCNC: 23.1 MMOL/L (ref 22–26)
HCT VFR BLD AUTO: 40.7 % (ref 35–45)
HGB BLD-MCNC: 13.9 G/DL (ref 12–16)
HGB UR QL STRIP: ABNORMAL
IMM GRANULOCYTES # BLD AUTO: 0.1 K/UL (ref 0–0.04)
IMM GRANULOCYTES NFR BLD AUTO: 0 % (ref 0–0.5)
INR PPP: 0.9 (ref 0.8–1.2)
KETONES UR QL STRIP.AUTO: ABNORMAL MG/DL
LACTATE BLD-SCNC: 2.28 MMOL/L (ref 0.4–2)
LACTATE BLD-SCNC: 2.73 MMOL/L (ref 0.4–2)
LEUKOCYTE ESTERASE UR QL STRIP.AUTO: ABNORMAL
LIPASE SERPL-CCNC: 146 U/L (ref 73–393)
LYMPHOCYTES # BLD: 2.6 K/UL (ref 0.9–3.6)
LYMPHOCYTES NFR BLD: 18 % (ref 21–52)
MAGNESIUM SERPL-MCNC: 1.9 MG/DL (ref 1.6–2.6)
MCH RBC QN AUTO: 30.7 PG (ref 24–34)
MCHC RBC AUTO-ENTMCNC: 34.2 G/DL (ref 31–37)
MCV RBC AUTO: 89.8 FL (ref 78–100)
MONOCYTES # BLD: 1.1 K/UL (ref 0.05–1.2)
MONOCYTES NFR BLD: 8 % (ref 3–10)
MUCOUS THREADS URNS QL MICRO: ABNORMAL /LPF
NEUTS SEG # BLD: 10.9 K/UL (ref 1.8–8)
NEUTS SEG NFR BLD: 74 % (ref 40–73)
NITRITE UR QL STRIP.AUTO: NEGATIVE
NRBC # BLD: 0 K/UL (ref 0–0.01)
NRBC BLD-RTO: 0 PER 100 WBC
PCO2 BLDV: 33.3 MMHG (ref 41–51)
PH BLDV: 7.45 [PH] (ref 7.32–7.42)
PH UR STRIP: 6.5 [PH] (ref 5–8)
PLATELET # BLD AUTO: 359 K/UL (ref 135–420)
PMV BLD AUTO: 10.7 FL (ref 9.2–11.8)
PO2 BLDV: 34 MMHG (ref 25–40)
POTASSIUM BLD-SCNC: 5.6 MMOL/L (ref 3.5–5.1)
POTASSIUM SERPL-SCNC: 3.3 MMOL/L (ref 3.5–5.5)
PROT SERPL-MCNC: 7.9 G/DL (ref 6.4–8.2)
PROT UR STRIP-MCNC: 30 MG/DL
PROTHROMBIN TIME: 12.4 SEC (ref 11.5–15.2)
RBC # BLD AUTO: 4.53 M/UL (ref 4.2–5.3)
RBC #/AREA URNS HPF: ABNORMAL /HPF (ref 0–5)
SAO2 % BLD: 68 %
SERVICE CMNT-IMP: ABNORMAL
SODIUM BLD-SCNC: 132 MMOL/L (ref 136–145)
SODIUM SERPL-SCNC: 135 MMOL/L (ref 136–145)
SP GR UR REFRACTOMETRY: 1.01 (ref 1–1.03)
SPECIMEN SITE: ABNORMAL
TROPONIN-HIGH SENSITIVITY: 1348 NG/L (ref 0–54)
TROPONIN-HIGH SENSITIVITY: 1495 NG/L (ref 0–54)
UROBILINOGEN UR QL STRIP.AUTO: 1 EU/DL (ref 0.2–1)
VALPROATE SERPL-MCNC: <3 UG/ML (ref 50–100)
WBC # BLD AUTO: 14.7 K/UL (ref 4.6–13.2)
WBC URNS QL MICRO: ABNORMAL /HPF (ref 0–4)

## 2022-09-22 PROCEDURE — 85379 FIBRIN DEGRADATION QUANT: CPT

## 2022-09-22 PROCEDURE — 83880 ASSAY OF NATRIURETIC PEPTIDE: CPT

## 2022-09-22 PROCEDURE — 85730 THROMBOPLASTIN TIME PARTIAL: CPT

## 2022-09-22 PROCEDURE — 96365 THER/PROPH/DIAG IV INF INIT: CPT

## 2022-09-22 PROCEDURE — 80164 ASSAY DIPROPYLACETIC ACD TOT: CPT

## 2022-09-22 PROCEDURE — 99285 EMERGENCY DEPT VISIT HI MDM: CPT

## 2022-09-22 PROCEDURE — 81001 URINALYSIS AUTO W/SCOPE: CPT

## 2022-09-22 PROCEDURE — 83605 ASSAY OF LACTIC ACID: CPT

## 2022-09-22 PROCEDURE — 83690 ASSAY OF LIPASE: CPT

## 2022-09-22 PROCEDURE — 74011000258 HC RX REV CODE- 258: Performed by: PHYSICIAN ASSISTANT

## 2022-09-22 PROCEDURE — 82947 ASSAY GLUCOSE BLOOD QUANT: CPT

## 2022-09-22 PROCEDURE — 84484 ASSAY OF TROPONIN QUANT: CPT

## 2022-09-22 PROCEDURE — 74011250636 HC RX REV CODE- 250/636: Performed by: EMERGENCY MEDICINE

## 2022-09-22 PROCEDURE — 74177 CT ABD & PELVIS W/CONTRAST: CPT

## 2022-09-22 PROCEDURE — 93005 ELECTROCARDIOGRAM TRACING: CPT

## 2022-09-22 PROCEDURE — 80053 COMPREHEN METABOLIC PANEL: CPT

## 2022-09-22 PROCEDURE — 87040 BLOOD CULTURE FOR BACTERIA: CPT

## 2022-09-22 PROCEDURE — 83735 ASSAY OF MAGNESIUM: CPT

## 2022-09-22 PROCEDURE — 96367 TX/PROPH/DG ADDL SEQ IV INF: CPT

## 2022-09-22 PROCEDURE — 85610 PROTHROMBIN TIME: CPT

## 2022-09-22 PROCEDURE — 71045 X-RAY EXAM CHEST 1 VIEW: CPT

## 2022-09-22 PROCEDURE — 96376 TX/PRO/DX INJ SAME DRUG ADON: CPT

## 2022-09-22 PROCEDURE — 96375 TX/PRO/DX INJ NEW DRUG ADDON: CPT

## 2022-09-22 PROCEDURE — 96368 THER/DIAG CONCURRENT INF: CPT

## 2022-09-22 PROCEDURE — 74011000636 HC RX REV CODE- 636: Performed by: EMERGENCY MEDICINE

## 2022-09-22 PROCEDURE — 96366 THER/PROPH/DIAG IV INF ADDON: CPT

## 2022-09-22 PROCEDURE — 74011250636 HC RX REV CODE- 250/636: Performed by: PHYSICIAN ASSISTANT

## 2022-09-22 PROCEDURE — 85025 COMPLETE CBC W/AUTO DIFF WBC: CPT

## 2022-09-22 RX ORDER — SODIUM CHLORIDE 0.9 % (FLUSH) 0.9 %
5-10 SYRINGE (ML) INJECTION AS NEEDED
Status: DISCONTINUED | OUTPATIENT
Start: 2022-09-22 | End: 2022-09-24 | Stop reason: HOSPADM

## 2022-09-22 RX ORDER — HEPARIN SODIUM 10000 [USP'U]/100ML
12-25 INJECTION, SOLUTION INTRAVENOUS
Status: DISCONTINUED | OUTPATIENT
Start: 2022-09-22 | End: 2022-09-24 | Stop reason: HOSPADM

## 2022-09-22 RX ORDER — HEPARIN SODIUM 1000 [USP'U]/ML
60 INJECTION, SOLUTION INTRAVENOUS; SUBCUTANEOUS ONCE
Status: COMPLETED | OUTPATIENT
Start: 2022-09-22 | End: 2022-09-22

## 2022-09-22 RX ORDER — MAGNESIUM SULFATE HEPTAHYDRATE 40 MG/ML
2 INJECTION, SOLUTION INTRAVENOUS ONCE
Status: COMPLETED | OUTPATIENT
Start: 2022-09-22 | End: 2022-09-22

## 2022-09-22 RX ORDER — POTASSIUM CHLORIDE 7.45 MG/ML
10 INJECTION INTRAVENOUS
Status: DISPENSED | OUTPATIENT
Start: 2022-09-22 | End: 2022-09-22

## 2022-09-22 RX ADMIN — IOPAMIDOL 80 ML: 612 INJECTION, SOLUTION INTRAVENOUS at 18:21

## 2022-09-22 RX ADMIN — HEPARIN SODIUM 12 UNITS/KG/HR: 10000 INJECTION, SOLUTION INTRAVENOUS at 18:32

## 2022-09-22 RX ADMIN — SODIUM CHLORIDE 1000 ML: 9 INJECTION, SOLUTION INTRAVENOUS at 17:41

## 2022-09-22 RX ADMIN — SODIUM CHLORIDE 428 ML: 9 INJECTION, SOLUTION INTRAVENOUS at 17:42

## 2022-09-22 RX ADMIN — HEPARIN SODIUM 2820 UNITS: 1000 INJECTION INTRAVENOUS; SUBCUTANEOUS at 18:33

## 2022-09-22 RX ADMIN — MAGNESIUM SULFATE HEPTAHYDRATE 2 G: 40 INJECTION, SOLUTION INTRAVENOUS at 18:30

## 2022-09-22 RX ADMIN — POTASSIUM CHLORIDE 10 MEQ: 7.46 INJECTION, SOLUTION INTRAVENOUS at 18:29

## 2022-09-22 RX ADMIN — PIPERACILLIN AND TAZOBACTAM 4.5 G: 4; .5 INJECTION, POWDER, LYOPHILIZED, FOR SOLUTION INTRAVENOUS at 17:40

## 2022-09-22 RX ADMIN — POTASSIUM CHLORIDE 10 MEQ: 7.46 INJECTION, SOLUTION INTRAVENOUS at 19:55

## 2022-09-22 NOTE — Clinical Note
TRANSFER - IN REPORT:     Verbal report received from: PATEL.     Report consisted of patient's Situation, Background, Assessment and   Recommendations(SBAR). Opportunity for questions and clarification was provided. Assessment completed upon patient's arrival to unit and care assumed. Patient transported with a Cardiac Cath Tech / Patient Care Tech.

## 2022-09-22 NOTE — ED PROVIDER NOTES
EMERGENCY DEPARTMENT HISTORY AND PHYSICAL EXAM    5:47 PM      Date: 9/22/2022  Patient Name: Janine Prajapati    History of Presenting Illness     Chief Complaint   Patient presents with    Vomiting    Dizziness         History Provided By: Patient  Location/Duration/Severity/Modifying factors   The patient is a 56-year-old female with a history of nonischemic cardiomyopathy, elevated troponin, prolonged QTC,  persistent nausea and vomiting, weight loss, psychiatric disorder, remote history of pancreatitis, that returns emergency department with complaint of persistent nausea. The patient says that she has not been able to tolerate anything by mouth for the past week. She has not been having a bowel movement and she has been feeling weaker and weaker and having some intermittent chest pressure. The patient denies any fevers, chills, or shortness of breath. Patient denies any other aggravating or alleviating factors. The patient follows with her primary doctor and has been compliant with her medications. Patient is a former smoker, denies any alcohol use, denies any drug use.       PCP: Valeria Bolaños MD    Current Facility-Administered Medications   Medication Dose Route Frequency Provider Last Rate Last Admin    sodium chloride (NS) flush 5-10 mL  5-10 mL IntraVENous PRN PARESH Bowman        piperacillin-tazobactam (ZOSYN) 3.375 g in 0.9% sodium chloride (MBP/ADV) 100 mL MBP  3.375 g IntraVENous Q8H Beltran Wing MD        potassium chloride 10 mEq in 100 ml IVPB  10 mEq IntraVENous Q1H Deejay Cintron  mL/hr at 09/22/22 1829 10 mEq at 09/22/22 1829    magnesium sulfate 2 g/50 ml IVPB (premix or compounded)  2 g IntraVENous ONCE Deejay Cintron MD 25 mL/hr at 09/22/22 1830 2 g at 09/22/22 1830    heparin (porcine) 25,000 units in 0.45% saline 250 ml infusion  12-25 Units/kg/hr (Order-Specific) IntraVENous TITRATE Deejay Cintron MD 5.6 mL/hr at 09/22/22 1832 12 Units/kg/hr at 22 1832     Current Outpatient Medications   Medication Sig Dispense Refill    lisinopriL (PRINIVIL, ZESTRIL) 20 mg tablet Take 20 mg by mouth daily. mirtazapine (REMERON) 45 mg tablet Take 45 mg by mouth nightly. amLODIPine (NORVASC) 5 mg tablet Take 5 mg by mouth daily. ferrous sulfate (FeroSuL) 325 mg (65 mg iron) tablet Take 325 mg by mouth Daily (before breakfast). divalproex DR (Depakote) 250 mg tablet Take 250 mg by mouth two (2) times a day. potassium chloride SR (KLOR-CON 10) 10 mEq tablet Take 10 mEq by mouth daily. metoprolol succinate (TOPROL-XL) 50 mg XL tablet Take 1 Tablet by mouth daily. 90 Tablet 2    aspirin delayed-release 81 mg tablet Take 1 Tablet by mouth daily. 100 Tablet 2       Past History     Past Medical History:  Past Medical History:   Diagnosis Date    Depression     Gastrointestinal disorder Pancreatitis    Pancreatic disease     Pancreatitis     Psychiatric disorder        Past Surgical History:  Past Surgical History:   Procedure Laterality Date    HX GYN      HX HYSTERECTOMY         Family History:  Family History   Problem Relation Age of Onset    Diabetes Maternal Aunt     Cancer Maternal Aunt     Alzheimer's Disease Maternal Aunt     Cancer Paternal Aunt     Diabetes Paternal Aunt        Social History:  Social History     Tobacco Use    Smoking status: Former     Packs/day: 0.50     Types: Cigarettes     Quit date: 2022     Years since quittin.5    Smokeless tobacco: Former   Substance Use Topics    Alcohol use: No     Comment: Per pt she quit in     Drug use: No       Allergies:  No Known Allergies      Review of Systems       Review of Systems   Constitutional:  Positive for activity change, fatigue and unexpected weight change. Negative for fever. HENT:  Negative for congestion and rhinorrhea. Eyes:  Negative for visual disturbance. Respiratory:  Positive for chest tightness. Negative for shortness of breath. Cardiovascular:  Negative for chest pain and palpitations. Gastrointestinal:  Positive for nausea and vomiting. Negative for abdominal pain and diarrhea. Genitourinary:  Negative for dysuria and hematuria. Musculoskeletal:  Negative for back pain. Skin:  Negative for rash. Neurological:  Negative for dizziness, weakness and light-headedness. All other systems reviewed and are negative. Physical Exam   Visit Vitals  /80   Pulse (!) 102   Temp 98.6 °F (37 °C)   Resp 16   SpO2 99%         Physical Exam  Vitals and nursing note reviewed. Constitutional:       General: She is not in acute distress. Appearance: She is well-developed. She is ill-appearing. Comments: Thin   HENT:      Head: Normocephalic and atraumatic. Right Ear: External ear normal.      Left Ear: External ear normal.      Nose: Nose normal.   Eyes:      General: No scleral icterus. Conjunctiva/sclera: Conjunctivae normal.      Pupils: Pupils are equal, round, and reactive to light. Neck:      Thyroid: No thyromegaly. Vascular: No JVD. Trachea: No tracheal deviation. Cardiovascular:      Rate and Rhythm: Regular rhythm. Tachycardia present. Heart sounds: Normal heart sounds. No murmur heard. No friction rub. No gallop. Pulmonary:      Effort: Pulmonary effort is normal.      Breath sounds: Normal breath sounds. Chest:      Chest wall: No tenderness. Abdominal:      General: Bowel sounds are normal. There is no distension. Palpations: Abdomen is soft. Tenderness: There is abdominal tenderness. There is no guarding or rebound. Comments: Epigastric pain without guarding   Musculoskeletal:         General: No tenderness. Normal range of motion. Cervical back: Normal range of motion and neck supple. Lymphadenopathy:      Cervical: No cervical adenopathy. Skin:     General: Skin is warm and dry.    Neurological:      Mental Status: She is alert and oriented to person, place, and time. Cranial Nerves: No cranial nerve deficit. Coordination: Coordination normal.      Comments: No sensory loss, Gait normal, Motor 5/5   Psychiatric:         Judgment: Judgment normal.      Comments: Supportive aunt at the bedside         Diagnostic Study Results     Labs -  Recent Results (from the past 12 hour(s))   EKG, 12 LEAD, INITIAL    Collection Time: 09/22/22  4:37 PM   Result Value Ref Range    Ventricular Rate 112 BPM    Atrial Rate 112 BPM    P-R Interval 112 ms    QRS Duration 110 ms    Q-T Interval 382 ms    QTC Calculation (Bezet) 521 ms    Calculated P Axis 81 degrees    Calculated R Axis -109 degrees    Calculated T Axis -83 degrees    Diagnosis       Sinus tachycardia  Biatrial enlargement  Possible Lateral infarct , age undetermined  Inferior-posterior infarct , age undetermined  Prolonged QT  Abnormal ECG  When compared with ECG of 02-MAY-2022 13:03,  Vent. rate has increased BY  65 BPM  Right bundle branch block is no longer present  Borderline criteria for Lateral infarct are now present  Inferior-posterior infarct is now present     CBC WITH AUTOMATED DIFF    Collection Time: 09/22/22  4:43 PM   Result Value Ref Range    WBC 14.7 (H) 4.6 - 13.2 K/uL    RBC 4.53 4.20 - 5.30 M/uL    HGB 13.9 12.0 - 16.0 g/dL    HCT 40.7 35.0 - 45.0 %    MCV 89.8 78.0 - 100.0 FL    MCH 30.7 24.0 - 34.0 PG    MCHC 34.2 31.0 - 37.0 g/dL    RDW 13.9 11.6 - 14.5 %    PLATELET 532 885 - 485 K/uL    MPV 10.7 9.2 - 11.8 FL    NRBC 0.0 0  WBC    ABSOLUTE NRBC 0.00 0.00 - 0.01 K/uL    NEUTROPHILS 74 (H) 40 - 73 %    LYMPHOCYTES 18 (L) 21 - 52 %    MONOCYTES 8 3 - 10 %    EOSINOPHILS 0 0 - 5 %    BASOPHILS 0 0 - 2 %    IMMATURE GRANULOCYTES 0 0.0 - 0.5 %    ABS. NEUTROPHILS 10.9 (H) 1.8 - 8.0 K/UL    ABS. LYMPHOCYTES 2.6 0.9 - 3.6 K/UL    ABS. MONOCYTES 1.1 0.05 - 1.2 K/UL    ABS. EOSINOPHILS 0.0 0.0 - 0.4 K/UL    ABS. BASOPHILS 0.0 0.0 - 0.1 K/UL    ABS. IMM.  GRANS. 0.1 (H) 0.00 - 0.04 K/UL    DF AUTOMATED     METABOLIC PANEL, COMPREHENSIVE    Collection Time: 09/22/22  4:43 PM   Result Value Ref Range    Sodium 135 (L) 136 - 145 mmol/L    Potassium 3.3 (L) 3.5 - 5.5 mmol/L    Chloride 98 (L) 100 - 111 mmol/L    CO2 24 21 - 32 mmol/L    Anion gap 13 3.0 - 18 mmol/L    Glucose 112 (H) 74 - 99 mg/dL    BUN 12 7.0 - 18 MG/DL    Creatinine 0.75 0.6 - 1.3 MG/DL    BUN/Creatinine ratio 16 12 - 20      GFR est AA >60 >60 ml/min/1.73m2    GFR est non-AA >60 >60 ml/min/1.73m2    Calcium 10.9 (H) 8.5 - 10.1 MG/DL    Bilirubin, total 0.8 0.2 - 1.0 MG/DL    ALT (SGPT) 27 13 - 56 U/L    AST (SGOT) 28 10 - 38 U/L    Alk.  phosphatase 56 45 - 117 U/L    Protein, total 7.9 6.4 - 8.2 g/dL    Albumin 4.4 3.4 - 5.0 g/dL    Globulin 3.5 2.0 - 4.0 g/dL    A-G Ratio 1.3 0.8 - 1.7     LIPASE    Collection Time: 09/22/22  4:43 PM   Result Value Ref Range    Lipase 146 73 - 393 U/L   MAGNESIUM    Collection Time: 09/22/22  4:43 PM   Result Value Ref Range    Magnesium 1.9 1.6 - 2.6 mg/dL   URINALYSIS W/ RFLX MICROSCOPIC    Collection Time: 09/22/22  4:43 PM   Result Value Ref Range    Color YELLOW      Appearance CLEAR      Specific gravity 1.013 1.005 - 1.030      pH (UA) 6.5 5.0 - 8.0      Protein 30 (A) NEG mg/dL    Glucose Negative NEG mg/dL    Ketone TRACE (A) NEG mg/dL    Bilirubin Negative NEG      Blood SMALL (A) NEG      Urobilinogen 1.0 0.2 - 1.0 EU/dL    Nitrites Negative NEG      Leukocyte Esterase TRACE (A) NEG     TROPONIN-HIGH SENSITIVITY    Collection Time: 09/22/22  4:43 PM   Result Value Ref Range    Troponin-High Sensitivity 1,495 (HH) 0 - 54 ng/L   URINE MICROSCOPIC ONLY    Collection Time: 09/22/22  4:43 PM   Result Value Ref Range    WBC 3 to 5 0 - 4 /hpf    RBC 8 to 10 0 - 5 /hpf    Epithelial cells 2+ 0 - 5 /lpf    Bacteria 2+ (A) NEG /hpf    Mucus 1+ (A) NEG /lpf   NT-PRO BNP    Collection Time: 09/22/22  4:43 PM   Result Value Ref Range    NT pro-BNP 28,688 (H) 0 - 900 PG/ML   VALPROIC ACID Collection Time: 09/22/22  4:43 PM   Result Value Ref Range    Valproic acid <3 (L) 50 - 100 ug/ml   PTT    Collection Time: 09/22/22  4:43 PM   Result Value Ref Range    aPTT 31.2 23.0 - 36.4 SEC   POC LACTIC ACID    Collection Time: 09/22/22  4:57 PM   Result Value Ref Range    Lactic Acid (POC) 2.28 (HH) 0.40 - 2.00 mmol/L   BLOOD GAS,CHEM8,LACTIC ACID POC    Collection Time: 09/22/22  5:22 PM   Result Value Ref Range    Calcium, ionized (POC) 0.99 (L) 1.12 - 1.32 mmol/L    Base deficit (POC) 0.4 mmol/L    HCO3 (POC) 23.1 22 - 26 MMOL/L    CO2, POC 23 19 - 24 MMOL/L    O2 SAT 68 %    Sample source VENOUS BLOOD      Performed by Heriberto Cortes     Sodium (POC) 132 (L) 136 - 145 mmol/L    Potassium (POC) 5.6 (H) 3.5 - 5.1 mmol/L    Glucose (POC) 109 (H) 65 - 100 mg/dL    Creatinine (POC) 0.60 0.6 - 1.3 mg/dL    Lactic Acid (POC) 2.73 (HH) 0.40 - 2.00 mmol/L    Chloride (POC) 97 (L) 98 - 107 mmol/L    Anion gap, POC 13 10 - 20      GFRAA, POC >60 >60 ml/min/1.73m2    GFRNA, POC >60 >60 ml/min/1.73m2    pH, venous (POC) 7.45 (H) 7.32 - 7.42      pCO2, venous (POC) 33.3 (L) 41 - 51 MMHG    pO2, venous (POC) 34 25 - 40 mmHg   EKG, 12 LEAD, SUBSEQUENT    Collection Time: 09/22/22  6:46 PM   Result Value Ref Range    Ventricular Rate 98 BPM    Atrial Rate 98 BPM    P-R Interval 112 ms    QRS Duration 118 ms    Q-T Interval 518 ms    QTC Calculation (Bezet) 661 ms    Calculated P Axis 85 degrees    Calculated R Axis -85 degrees    Calculated T Axis -87 degrees    Diagnosis       Sinus rhythm with occasional premature ventricular complexes  Left axis deviation  Right bundle branch block  Inferior infarct (cited on or before 01-MAY-2022)  Marked T wave abnormality, consider lateral ischemia  Abnormal ECG  When compared with ECG of 22-SEP-2022 16:37,  premature ventricular complexes are now present  Right bundle branch block is now present  Borderline criteria for Lateral infarct are no longer present  Questionable change in initial forces of Inferior leads     TROPONIN-HIGH SENSITIVITY    Collection Time: 09/22/22  6:54 PM   Result Value Ref Range    Troponin-High Sensitivity 1,348 (HH) 0 - 54 ng/L       Radiologic Studies -   CT ABD PELV W CONT   Final Result      1. No definite acute findings. Trace nonspecific free fluid of uncertain   significance. 2. Mild sequela of chronic pancreatitis. A small hypodensity in the pancreatic   head is stable, possible small cyst. Nonemergent MR abdomen assessment could be   utilized. 3. Nonobstructing left nephrolithiasis. 4. Chronic liver lesions, likely representing small cysts. Right hepatic dome   stable hyperenhancing/filling hemangiomas or vascular shunts. 5. Other stable chronic findings as above. XR CHEST SNGL V    (Results Pending)         Medical Decision Making   I am the first provider for this patient. I reviewed the vital signs, available nursing notes, past medical history, past surgical history, family history and social history. Vital Signs-Reviewed the patient's vital signs. EKG: Sinus tachycardia with prolonged QTC, abnormal ST segments diffusely right bundle branch block, R atrial enlargement, interpreted by me    EKG #2 sinus rhythm at 98, right bundle branch block, improved ST elevations diffusely, no chest pain, interpreted by me    Records Reviewed: Nursing Notes, Old Medical Records, Previous Radiology Studies, and Previous Laboratory Studies (Time of Review: 5:47 PM)    ED Course: Progress Notes, Reevaluation, and Consults: The patient has a downtrending troponin and the case was presented to cardiology on-call Dr. Linn Steven and briefly with Dr. Jarquin [facility].  Given the patient's not having chest pain and has a downtrending troponin and improving EKG we will hold on catheterization and continue heparinization.   Awaiting CT scan of the abdomen and we will sign out to the evening team to follow the CAT scan results and then admit the patient to a medical service if no surgical intervention is needed. 7:49 PM : Pt care transferred to Dr. Fry [facility]  ,ED provider. History of patient complaint(s), available diagnostic reports and current treatment plan has been discussed thoroughly. Bedside rounding on patient occured : yes . Intended disposition of patient : Admit  Pending diagnostics reports and/or labs (please list): CT AP and admit     Dr. Sara Pradhan assistance in completion of this plan is greatly appreciated but it should be noted that I will be the provider of record for this patient. Provider Notes (Medical Decision Making):   MDM  Number of Diagnoses or Management Options  Diagnosis management comments: Patient is a 76-year-old female with a history of pancreatitis in the past, prolonged QTC, nonischemic cardiomyopathy, hypertension, the presents emergency department with complaint of 1 week of nausea and vomiting and having able to eat or drink anything. Patient today has been having some lightheadedness and some chest pressure as well. Patient has initial EKG shows a prolonged QTC and some abnormalities of her ST segments are concerning for a potassium derangement. Patient has mild hypokalemia and was found to have a troponin that was elevated. Patient complaining mostly of nausea and vomiting and not feeling well and with the troponin elevation discussed the case with cardiology and will heparinize the patient. We will also repeat EKG and reevaluate the patient clinically. Patient also has elevated lactic acid and white count so will be started on empiric antibiotics and was sent from triage for CT of her abdomen and pelvis. We will continue to follow patient closely. Mary Holly DO 6:11 PM          Procedures    Critical Care Time: Critical Care Time:  The services I provided to this patient were to treat and/or prevent clinically significant deterioration that could result in the failure of one or more body systems and/or organ systems due to abnormal EKG and elevated troponin, multiple consults. Services included the following:  -reviewing nursing notes and old charts  -vital sign assessments  -direct patient care  -medication orders and management  -interpreting and reviewing diagnostic studies/labs  -re-evaluations  -documentation time    Aggregate critical care time was 60 minutes, which includes only time during which I was engaged in work directly related to the patient's care as described above, whether I was at bedside or elsewhere in the Emergency Department. It did not include time spent performing other reported procedures or the services of residents, students, nurses, or advance practice providers. Lamar Singh,  7:49 PM        Diagnosis     Clinical Impression:   1. Abdominal pain, epigastric    2. Abnormal EKG    3. Elevated troponin    4. Nausea and vomiting, unspecified vomiting type        Disposition: Admit     Follow-up Information    None          Patient's Medications   Start Taking    No medications on file   Continue Taking    AMLODIPINE (NORVASC) 5 MG TABLET    Take 5 mg by mouth daily. ASPIRIN DELAYED-RELEASE 81 MG TABLET    Take 1 Tablet by mouth daily. DIVALPROEX DR (DEPAKOTE) 250 MG TABLET    Take 250 mg by mouth two (2) times a day. FERROUS SULFATE (FEROSUL) 325 MG (65 MG IRON) TABLET    Take 325 mg by mouth Daily (before breakfast). LISINOPRIL (PRINIVIL, ZESTRIL) 20 MG TABLET    Take 20 mg by mouth daily. METOPROLOL SUCCINATE (TOPROL-XL) 50 MG XL TABLET    Take 1 Tablet by mouth daily. MIRTAZAPINE (REMERON) 45 MG TABLET    Take 45 mg by mouth nightly. POTASSIUM CHLORIDE SR (KLOR-CON 10) 10 MEQ TABLET    Take 10 mEq by mouth daily. These Medications have changed    No medications on file   Stop Taking    No medications on file     Disclaimer: Sections of this note are dictated using utilizing voice recognition software. Minor typographical errors may be present.  If questions arise, please do not hesitate to contact me or call our department.

## 2022-09-22 NOTE — Clinical Note
Contrast Dose Calculator:   Patient's age: 75.   Patient's sex: Female. Patient weight (kg) = 47.6. Creatinine level (mg/dL) = 0.73. Creatinine clearance (mL/min): 55.   Contrast concentration (mg/mL) = 300. MACD = 300 mL. Max Contrast dose per Creatinine Cl calculator = 123.75 mL.

## 2022-09-22 NOTE — Clinical Note
Status[de-identified] INPATIENT [101]   Type of Bed: Telemetry [19]   Cardiac Monitoring Required?: Yes   Inpatient Hospitalization Certified Necessary for the Following Reasons: 3.  Patient receiving treatment that can only be provided in an inpatient setting (further clarification in H&P documentation)   Admitting Diagnosis: Elevated troponin [[id number]]   Admitting Physician: Bindu Jesus [[id number]]   Attending Physician: Bindu Jesus [[id number]]   Estimated Length of Stay: 2 Midnights   Discharge Plan[de-identified] Home with Office Follow-up

## 2022-09-22 NOTE — Clinical Note
TRANSFER - OUT REPORT:     Verbal report given to: JT.     Report consisted of patient's Situation, Background, Assessment and   Recommendations(SBAR). Opportunity for questions and clarification was provided. Patient transported with a Cardiac Cath Tech / Patient Care Tech. Patient transported to: holding area.

## 2022-09-22 NOTE — PROGRESS NOTES
Pharmacy Note     Please enter current weight for dosing service. Zosyn 3.375 gm q6h ordered for treatment of intra-abdominal infection. Per [address] [place] [address], order will be changed to 4.5 gm IV x 1 dose followed by Zosyn 3.375 grams IV over 240 minutes every 8 hours. Estimated Creatinine Clearance: CrCl cannot be calculated (Unknown ideal weight.). Dialysis Status, JOSE, CKD: . BMI:  There is no height or weight on file to calculate BMI. Rationale for Adjustment:  [facility] B-Lactam extended infusion policy    Pharmacy will continue to monitor and adjust dose as necessary. Please call with any questions.     Thank you,  Nilam Isabel, PHARMD

## 2022-09-23 ENCOUNTER — APPOINTMENT (OUTPATIENT)
Dept: NON INVASIVE DIAGNOSTICS | Age: 67
DRG: 280 | End: 2022-09-23
Attending: PHYSICIAN ASSISTANT
Payer: MEDICARE

## 2022-09-23 PROBLEM — R77.8 ELEVATED TROPONIN: Status: ACTIVE | Noted: 2022-09-23

## 2022-09-23 PROBLEM — Q44.6 CYSTIC DISEASE OF LIVER: Status: ACTIVE | Noted: 2022-09-23

## 2022-09-23 PROBLEM — R00.0 TACHYCARDIA: Status: ACTIVE | Noted: 2022-09-23

## 2022-09-23 PROBLEM — R79.89 ELEVATED TROPONIN: Status: ACTIVE | Noted: 2022-09-23

## 2022-09-23 PROBLEM — I10 HYPERTENSION: Status: ACTIVE | Noted: 2022-09-23

## 2022-09-23 PROBLEM — E87.1 HYPONATREMIA: Status: ACTIVE | Noted: 2022-09-23

## 2022-09-23 PROBLEM — D72.829 LEUKOCYTOSIS: Status: ACTIVE | Noted: 2022-09-23

## 2022-09-23 LAB
ALBUMIN SERPL-MCNC: 3.3 G/DL (ref 3.4–5)
ALBUMIN/GLOB SERPL: 1.1 {RATIO} (ref 0.8–1.7)
ALP SERPL-CCNC: 43 U/L (ref 45–117)
ALT SERPL-CCNC: 19 U/L (ref 13–56)
ANION GAP SERPL CALC-SCNC: 8 MMOL/L (ref 3–18)
APTT PPP: 60 SEC (ref 23–36.4)
APTT PPP: >180 SEC (ref 23–36.4)
AST SERPL-CCNC: 19 U/L (ref 10–38)
ATRIAL RATE: 112 BPM
ATRIAL RATE: 98 BPM
BASOPHILS # BLD: 0 K/UL (ref 0–0.1)
BASOPHILS # BLD: 0.1 K/UL (ref 0–0.1)
BASOPHILS NFR BLD: 0 % (ref 0–2)
BASOPHILS NFR BLD: 1 % (ref 0–2)
BILIRUB SERPL-MCNC: 0.9 MG/DL (ref 0.2–1)
BUN SERPL-MCNC: 13 MG/DL (ref 7–18)
BUN/CREAT SERPL: 18 (ref 12–20)
CALCIUM SERPL-MCNC: 9.1 MG/DL (ref 8.5–10.1)
CALCULATED P AXIS, ECG09: 81 DEGREES
CALCULATED P AXIS, ECG09: 85 DEGREES
CALCULATED R AXIS, ECG10: -109 DEGREES
CALCULATED R AXIS, ECG10: -85 DEGREES
CALCULATED T AXIS, ECG11: -83 DEGREES
CALCULATED T AXIS, ECG11: -87 DEGREES
CHLORIDE SERPL-SCNC: 106 MMOL/L (ref 100–111)
CO2 SERPL-SCNC: 25 MMOL/L (ref 21–32)
CREAT SERPL-MCNC: 0.73 MG/DL (ref 0.6–1.3)
DIAGNOSIS, 93000: NORMAL
DIAGNOSIS, 93000: NORMAL
DIFFERENTIAL METHOD BLD: ABNORMAL
DIFFERENTIAL METHOD BLD: ABNORMAL
EOSINOPHIL # BLD: 0 K/UL (ref 0–0.4)
EOSINOPHIL # BLD: 0 K/UL (ref 0–0.4)
EOSINOPHIL NFR BLD: 0 % (ref 0–5)
EOSINOPHIL NFR BLD: 0 % (ref 0–5)
ERYTHROCYTE [DISTWIDTH] IN BLOOD BY AUTOMATED COUNT: 13.9 % (ref 11.6–14.5)
ERYTHROCYTE [DISTWIDTH] IN BLOOD BY AUTOMATED COUNT: 14.1 % (ref 11.6–14.5)
FOLATE SERPL-MCNC: 18.6 NG/ML (ref 3.1–17.5)
GLOBULIN SER CALC-MCNC: 3.1 G/DL (ref 2–4)
GLUCOSE SERPL-MCNC: 88 MG/DL (ref 74–99)
HCT VFR BLD AUTO: 36.5 % (ref 35–45)
HCT VFR BLD AUTO: 37.4 % (ref 35–45)
HGB BLD-MCNC: 12.2 G/DL (ref 12–16)
HGB BLD-MCNC: 12.7 G/DL (ref 12–16)
IMM GRANULOCYTES # BLD AUTO: 0 K/UL
IMM GRANULOCYTES # BLD AUTO: 0 K/UL (ref 0–0.04)
IMM GRANULOCYTES NFR BLD AUTO: 0 %
IMM GRANULOCYTES NFR BLD AUTO: 0 % (ref 0–0.5)
LACTATE SERPL-SCNC: 0.9 MMOL/L (ref 0.4–2)
LYMPHOCYTES # BLD: 5.2 K/UL (ref 0.9–3.6)
LYMPHOCYTES # BLD: 5.5 K/UL (ref 0.9–3.6)
LYMPHOCYTES NFR BLD: 33 % (ref 21–52)
LYMPHOCYTES NFR BLD: 38 % (ref 21–52)
MAGNESIUM SERPL-MCNC: 2.4 MG/DL (ref 1.6–2.6)
MCH RBC QN AUTO: 31.1 PG (ref 24–34)
MCH RBC QN AUTO: 31.6 PG (ref 24–34)
MCHC RBC AUTO-ENTMCNC: 33.4 G/DL (ref 31–37)
MCHC RBC AUTO-ENTMCNC: 34 G/DL (ref 31–37)
MCV RBC AUTO: 91.4 FL (ref 78–100)
MCV RBC AUTO: 94.6 FL (ref 78–100)
MONOCYTES # BLD: 1.2 K/UL (ref 0.05–1.2)
MONOCYTES # BLD: 1.2 K/UL (ref 0.05–1.2)
MONOCYTES NFR BLD: 7 % (ref 3–10)
MONOCYTES NFR BLD: 9 % (ref 3–10)
NEUTS SEG # BLD: 10 K/UL (ref 1.8–8)
NEUTS SEG # BLD: 7.2 K/UL (ref 1.8–8)
NEUTS SEG NFR BLD: 52 % (ref 40–73)
NEUTS SEG NFR BLD: 60 % (ref 40–73)
NRBC # BLD: 0 K/UL (ref 0–0.01)
NRBC # BLD: 0 K/UL (ref 0–0.01)
NRBC BLD-RTO: 0 PER 100 WBC
NRBC BLD-RTO: 0 PER 100 WBC
P-R INTERVAL, ECG05: 112 MS
P-R INTERVAL, ECG05: 112 MS
PLATELET # BLD AUTO: 285 K/UL (ref 135–420)
PLATELET # BLD AUTO: 331 K/UL (ref 135–420)
PLATELET COMMENTS,PCOM: ABNORMAL
PLATELET COMMENTS,PCOM: ABNORMAL
PMV BLD AUTO: 11 FL (ref 9.2–11.8)
PMV BLD AUTO: 11.2 FL (ref 9.2–11.8)
POTASSIUM SERPL-SCNC: 3.4 MMOL/L (ref 3.5–5.5)
PREALB SERPL-MCNC: 17.4 MG/DL (ref 20–40)
PROCALCITONIN SERPL-MCNC: <0.05 NG/ML
PROT SERPL-MCNC: 6.4 G/DL (ref 6.4–8.2)
Q-T INTERVAL, ECG07: 382 MS
Q-T INTERVAL, ECG07: 518 MS
QRS DURATION, ECG06: 110 MS
QRS DURATION, ECG06: 118 MS
QTC CALCULATION (BEZET), ECG08: 521 MS
QTC CALCULATION (BEZET), ECG08: 661 MS
RBC # BLD AUTO: 3.86 M/UL (ref 4.2–5.3)
RBC # BLD AUTO: 4.09 M/UL (ref 4.2–5.3)
RBC MORPH BLD: ABNORMAL
RBC MORPH BLD: ABNORMAL
SODIUM SERPL-SCNC: 139 MMOL/L (ref 136–145)
TROPONIN-HIGH SENSITIVITY: 492 NG/L (ref 0–54)
TSH SERPL DL<=0.05 MIU/L-ACNC: 3.11 UIU/ML (ref 0.36–3.74)
VENTRICULAR RATE, ECG03: 112 BPM
VENTRICULAR RATE, ECG03: 98 BPM
VIT B12 SERPL-MCNC: 391 PG/ML (ref 211–911)
WBC # BLD AUTO: 13.7 K/UL (ref 4.6–13.2)
WBC # BLD AUTO: 16.7 K/UL (ref 4.6–13.2)

## 2022-09-23 PROCEDURE — C1894 INTRO/SHEATH, NON-LASER: HCPCS | Performed by: INTERNAL MEDICINE

## 2022-09-23 PROCEDURE — 82607 VITAMIN B-12: CPT

## 2022-09-23 PROCEDURE — 74011000250 HC RX REV CODE- 250: Performed by: INTERNAL MEDICINE

## 2022-09-23 PROCEDURE — 99223 1ST HOSP IP/OBS HIGH 75: CPT | Performed by: INTERNAL MEDICINE

## 2022-09-23 PROCEDURE — B2151ZZ FLUOROSCOPY OF LEFT HEART USING LOW OSMOLAR CONTRAST: ICD-10-PCS | Performed by: INTERNAL MEDICINE

## 2022-09-23 PROCEDURE — 77030018842 HC SOL IRR SOD CL 9% BAXT -A

## 2022-09-23 PROCEDURE — G0378 HOSPITAL OBSERVATION PER HR: HCPCS

## 2022-09-23 PROCEDURE — 2709999900 HC NON-CHARGEABLE SUPPLY

## 2022-09-23 PROCEDURE — 74011250637 HC RX REV CODE- 250/637: Performed by: PHYSICIAN ASSISTANT

## 2022-09-23 PROCEDURE — 84443 ASSAY THYROID STIM HORMONE: CPT

## 2022-09-23 PROCEDURE — 80053 COMPREHEN METABOLIC PANEL: CPT

## 2022-09-23 PROCEDURE — 93458 L HRT ARTERY/VENTRICLE ANGIO: CPT | Performed by: INTERNAL MEDICINE

## 2022-09-23 PROCEDURE — 85025 COMPLETE CBC W/AUTO DIFF WBC: CPT

## 2022-09-23 PROCEDURE — 99152 MOD SED SAME PHYS/QHP 5/>YRS: CPT | Performed by: INTERNAL MEDICINE

## 2022-09-23 PROCEDURE — 74011000258 HC RX REV CODE- 258: Performed by: EMERGENCY MEDICINE

## 2022-09-23 PROCEDURE — 77030013797 HC KT TRNSDUC PRSSR EDWD -A: Performed by: INTERNAL MEDICINE

## 2022-09-23 PROCEDURE — 74011250636 HC RX REV CODE- 250/636: Performed by: EMERGENCY MEDICINE

## 2022-09-23 PROCEDURE — 85730 THROMBOPLASTIN TIME PARTIAL: CPT

## 2022-09-23 PROCEDURE — 84145 PROCALCITONIN (PCT): CPT

## 2022-09-23 PROCEDURE — 84134 ASSAY OF PREALBUMIN: CPT

## 2022-09-23 PROCEDURE — 74011250636 HC RX REV CODE- 250/636: Performed by: INTERNAL MEDICINE

## 2022-09-23 PROCEDURE — 84484 ASSAY OF TROPONIN QUANT: CPT

## 2022-09-23 PROCEDURE — 83735 ASSAY OF MAGNESIUM: CPT

## 2022-09-23 PROCEDURE — 99219 PR INITIAL OBSERVATION CARE/DAY 50 MINUTES: CPT | Performed by: INTERNAL MEDICINE

## 2022-09-23 PROCEDURE — 74011250637 HC RX REV CODE- 250/637: Performed by: FAMILY MEDICINE

## 2022-09-23 PROCEDURE — 83605 ASSAY OF LACTIC ACID: CPT

## 2022-09-23 PROCEDURE — 65270000029 HC RM PRIVATE

## 2022-09-23 PROCEDURE — 77030038269 HC DRN EXT URIN PURWCK BARD -A

## 2022-09-23 PROCEDURE — 77030027845 HC BND COM RDL D-STAT TELE -B: Performed by: INTERNAL MEDICINE

## 2022-09-23 PROCEDURE — B2111ZZ FLUOROSCOPY OF MULTIPLE CORONARY ARTERIES USING LOW OSMOLAR CONTRAST: ICD-10-PCS | Performed by: INTERNAL MEDICINE

## 2022-09-23 PROCEDURE — 36415 COLL VENOUS BLD VENIPUNCTURE: CPT

## 2022-09-23 PROCEDURE — 77030013744: Performed by: INTERNAL MEDICINE

## 2022-09-23 PROCEDURE — 77030015766: Performed by: INTERNAL MEDICINE

## 2022-09-23 PROCEDURE — 74011250637 HC RX REV CODE- 250/637: Performed by: INTERNAL MEDICINE

## 2022-09-23 PROCEDURE — 4A023N7 MEASUREMENT OF CARDIAC SAMPLING AND PRESSURE, LEFT HEART, PERCUTANEOUS APPROACH: ICD-10-PCS | Performed by: INTERNAL MEDICINE

## 2022-09-23 PROCEDURE — 74011000258 HC RX REV CODE- 258: Performed by: INTERNAL MEDICINE

## 2022-09-23 RX ORDER — LISINOPRIL 20 MG/1
20 TABLET ORAL DAILY
Status: DISCONTINUED | OUTPATIENT
Start: 2022-09-23 | End: 2022-09-24 | Stop reason: HOSPADM

## 2022-09-23 RX ORDER — ONDANSETRON 2 MG/ML
4 INJECTION INTRAMUSCULAR; INTRAVENOUS
Status: DISCONTINUED | OUTPATIENT
Start: 2022-09-23 | End: 2022-09-24 | Stop reason: HOSPADM

## 2022-09-23 RX ORDER — POLYETHYLENE GLYCOL 3350 17 G/17G
17 POWDER, FOR SOLUTION ORAL DAILY PRN
Status: DISCONTINUED | OUTPATIENT
Start: 2022-09-23 | End: 2022-09-24 | Stop reason: HOSPADM

## 2022-09-23 RX ORDER — MIDAZOLAM HYDROCHLORIDE 1 MG/ML
INJECTION, SOLUTION INTRAMUSCULAR; INTRAVENOUS
Status: DISPENSED
Start: 2022-09-23 | End: 2022-09-24

## 2022-09-23 RX ORDER — SODIUM CHLORIDE 0.9 % (FLUSH) 0.9 %
5-40 SYRINGE (ML) INJECTION EVERY 8 HOURS
Status: DISCONTINUED | OUTPATIENT
Start: 2022-09-23 | End: 2022-09-24 | Stop reason: HOSPADM

## 2022-09-23 RX ORDER — SODIUM CHLORIDE 0.9 % (FLUSH) 0.9 %
5-40 SYRINGE (ML) INJECTION AS NEEDED
Status: DISCONTINUED | OUTPATIENT
Start: 2022-09-23 | End: 2022-09-24 | Stop reason: HOSPADM

## 2022-09-23 RX ORDER — SODIUM CHLORIDE 9 MG/ML
50 INJECTION, SOLUTION INTRAVENOUS CONTINUOUS
Status: DISCONTINUED | OUTPATIENT
Start: 2022-09-23 | End: 2022-09-24 | Stop reason: HOSPADM

## 2022-09-23 RX ORDER — LIDOCAINE HYDROCHLORIDE 10 MG/ML
INJECTION, SOLUTION EPIDURAL; INFILTRATION; INTRACAUDAL; PERINEURAL
Status: DISPENSED
Start: 2022-09-23 | End: 2022-09-24

## 2022-09-23 RX ORDER — ATORVASTATIN CALCIUM 20 MG/1
20 TABLET, FILM COATED ORAL
Status: DISCONTINUED | OUTPATIENT
Start: 2022-09-23 | End: 2022-09-24 | Stop reason: HOSPADM

## 2022-09-23 RX ORDER — LIDOCAINE HYDROCHLORIDE 10 MG/ML
INJECTION, SOLUTION EPIDURAL; INFILTRATION; INTRACAUDAL; PERINEURAL AS NEEDED
Status: DISCONTINUED | OUTPATIENT
Start: 2022-09-23 | End: 2022-09-23 | Stop reason: HOSPADM

## 2022-09-23 RX ORDER — GUAIFENESIN 100 MG/5ML
81 LIQUID (ML) ORAL DAILY
Status: DISCONTINUED | OUTPATIENT
Start: 2022-09-23 | End: 2022-09-24 | Stop reason: HOSPADM

## 2022-09-23 RX ORDER — FENTANYL CITRATE 50 UG/ML
INJECTION, SOLUTION INTRAMUSCULAR; INTRAVENOUS
Status: DISPENSED
Start: 2022-09-23 | End: 2022-09-24

## 2022-09-23 RX ORDER — HEPARIN SODIUM 1000 [USP'U]/ML
INJECTION, SOLUTION INTRAVENOUS; SUBCUTANEOUS
Status: DISPENSED
Start: 2022-09-23 | End: 2022-09-24

## 2022-09-23 RX ORDER — ACETAMINOPHEN 650 MG/1
650 SUPPOSITORY RECTAL
Status: DISCONTINUED | OUTPATIENT
Start: 2022-09-23 | End: 2022-09-24 | Stop reason: HOSPADM

## 2022-09-23 RX ORDER — ACETAMINOPHEN 325 MG/1
650 TABLET ORAL
Status: DISCONTINUED | OUTPATIENT
Start: 2022-09-23 | End: 2022-09-24 | Stop reason: HOSPADM

## 2022-09-23 RX ORDER — SODIUM CHLORIDE 9 MG/ML
500 INJECTION, SOLUTION INTRAVENOUS ONCE
Status: COMPLETED | OUTPATIENT
Start: 2022-09-23 | End: 2022-09-23

## 2022-09-23 RX ORDER — HEPARIN SODIUM 200 [USP'U]/100ML
INJECTION, SOLUTION INTRAVENOUS
Status: DISPENSED
Start: 2022-09-23 | End: 2022-09-24

## 2022-09-23 RX ORDER — HEPARIN SODIUM 1000 [USP'U]/ML
INJECTION, SOLUTION INTRAVENOUS; SUBCUTANEOUS
Status: COMPLETED
Start: 2022-09-23 | End: 2022-09-23

## 2022-09-23 RX ORDER — VERAPAMIL HYDROCHLORIDE 2.5 MG/ML
INJECTION, SOLUTION INTRAVENOUS
Status: DISPENSED
Start: 2022-09-23 | End: 2022-09-24

## 2022-09-23 RX ORDER — ONDANSETRON 8 MG/1
4 TABLET, ORALLY DISINTEGRATING ORAL
Status: DISCONTINUED | OUTPATIENT
Start: 2022-09-23 | End: 2022-09-24 | Stop reason: HOSPADM

## 2022-09-23 RX ORDER — MIDODRINE HYDROCHLORIDE 5 MG/1
10 TABLET ORAL 3 TIMES DAILY
Status: DISCONTINUED | OUTPATIENT
Start: 2022-09-24 | End: 2022-09-24 | Stop reason: HOSPADM

## 2022-09-23 RX ORDER — MIDODRINE HYDROCHLORIDE 5 MG/1
10 TABLET ORAL
Status: COMPLETED | OUTPATIENT
Start: 2022-09-23 | End: 2022-09-24

## 2022-09-23 RX ORDER — ASPIRIN 81 MG/1
81 TABLET ORAL DAILY
Status: DISCONTINUED | OUTPATIENT
Start: 2022-09-23 | End: 2022-09-24 | Stop reason: HOSPADM

## 2022-09-23 RX ORDER — AMLODIPINE BESYLATE 5 MG/1
5 TABLET ORAL DAILY
Status: DISCONTINUED | OUTPATIENT
Start: 2022-09-23 | End: 2022-09-24 | Stop reason: HOSPADM

## 2022-09-23 RX ORDER — METOPROLOL SUCCINATE 50 MG/1
50 TABLET, EXTENDED RELEASE ORAL DAILY
Status: DISCONTINUED | OUTPATIENT
Start: 2022-09-23 | End: 2022-09-24 | Stop reason: HOSPADM

## 2022-09-23 RX ORDER — FENTANYL CITRATE 50 UG/ML
INJECTION, SOLUTION INTRAMUSCULAR; INTRAVENOUS AS NEEDED
Status: DISCONTINUED | OUTPATIENT
Start: 2022-09-23 | End: 2022-09-23 | Stop reason: HOSPADM

## 2022-09-23 RX ORDER — MIDAZOLAM HYDROCHLORIDE 1 MG/ML
INJECTION, SOLUTION INTRAMUSCULAR; INTRAVENOUS AS NEEDED
Status: DISCONTINUED | OUTPATIENT
Start: 2022-09-23 | End: 2022-09-23 | Stop reason: HOSPADM

## 2022-09-23 RX ADMIN — HEPARIN SODIUM: 1000 INJECTION, SOLUTION INTRAVENOUS; SUBCUTANEOUS at 07:20

## 2022-09-23 RX ADMIN — SODIUM CHLORIDE, PRESERVATIVE FREE 10 ML: 5 INJECTION INTRAVENOUS at 21:13

## 2022-09-23 RX ADMIN — PIPERACILLIN AND TAZOBACTAM 3.38 G: 3; .375 INJECTION, POWDER, FOR SOLUTION INTRAVENOUS at 19:19

## 2022-09-23 RX ADMIN — ATORVASTATIN CALCIUM 20 MG: 20 TABLET, FILM COATED ORAL at 21:13

## 2022-09-23 RX ADMIN — ASPIRIN 81 MG CHEWABLE TABLET 81 MG: 81 TABLET CHEWABLE at 09:38

## 2022-09-23 RX ADMIN — POTASSIUM BICARBONATE 40 MEQ: 782 TABLET, EFFERVESCENT ORAL at 19:19

## 2022-09-23 RX ADMIN — SODIUM CHLORIDE 500 ML: 9 INJECTION, SOLUTION INTRAVENOUS at 15:30

## 2022-09-23 RX ADMIN — ASPIRIN 81 MG: 81 TABLET, COATED ORAL at 11:29

## 2022-09-23 RX ADMIN — PIPERACILLIN AND TAZOBACTAM 3.38 G: 3; .375 INJECTION, POWDER, FOR SOLUTION INTRAVENOUS at 00:00

## 2022-09-23 RX ADMIN — PIPERACILLIN AND TAZOBACTAM 3.38 G: 3; .375 INJECTION, POWDER, FOR SOLUTION INTRAVENOUS at 07:30

## 2022-09-23 RX ADMIN — SODIUM CHLORIDE 50 ML/HR: 9 INJECTION, SOLUTION INTRAVENOUS at 10:33

## 2022-09-23 NOTE — PROGRESS NOTES
Attempted to call report to CVT step dpwn, nurse will call ext [phone] when available   Pt will be transferring to room 2314 s/p cardiac cath.

## 2022-09-23 NOTE — ROUTINE PROCESS
OT order received and chart reviewed. OT eval attempted at 11:30 with pt currently with RN. OT screen completed with pt who states she is ambulatory without AD, feels safe and confident in her ability to care for herself and has all needed AD at home as well as roommate who lives with her and can provide support.  Will complete order per pt request. Thank you, Justice Nicholson, Viridiana Bhagat OTRL

## 2022-09-23 NOTE — H&P
GENERAL GENERIC H&P/CONSULT    Subjective:    Past Medical History:   Diagnosis Date    Depression     Gastrointestinal disorder Pancreatitis    Pancreatic disease     Pancreatitis     Psychiatric disorder       Past Surgical History:   Procedure Laterality Date    HX GYN      HX HYSTERECTOMY        Prior to Admission medications    Medication Sig Start Date End Date Taking? Authorizing Provider   lisinopriL (PRINIVIL, ZESTRIL) 20 mg tablet Take 20 mg by mouth daily. Provider, Historical   mirtazapine (REMERON) 45 mg tablet Take 45 mg by mouth nightly. Provider, Historical   amLODIPine (NORVASC) 5 mg tablet Take 5 mg by mouth daily. Provider, Historical   ferrous sulfate (FeroSuL) 325 mg (65 mg iron) tablet Take 325 mg by mouth Daily (before breakfast). Provider, Historical   divalproex DR (Depakote) 250 mg tablet Take 250 mg by mouth two (2) times a day. Provider, Historical   potassium chloride SR (KLOR-CON 10) 10 mEq tablet Take 10 mEq by mouth daily. Provider, Historical   metoprolol succinate (TOPROL-XL) 50 mg XL tablet Take 1 Tablet by mouth daily. 22   Neptali Vallejo NP   aspirin delayed-release 81 mg tablet Take 1 Tablet by mouth daily. 22   Maty Vallejo NP     No Known Allergies   Social History     Tobacco Use    Smoking status: Former     Packs/day: 0.50     Types: Cigarettes     Quit date: 2022     Years since quittin.5    Smokeless tobacco: Former   Substance Use Topics    Alcohol use: No     Comment: Per pt she quit in       Family History   Problem Relation Age of Onset    Diabetes Maternal Aunt     Cancer Maternal Aunt     Alzheimer's Disease Maternal Aunt     Cancer Paternal Aunt     Diabetes Paternal Aunt       Review of Systems   Constitutional:  Positive for activity change, appetite change and fatigue. Ill appearing   HENT:  Negative for congestion. Eyes:  Negative for discharge. Respiratory:  Positive for chest tightness.     Cardiovascular: Negative for chest pain. Gastrointestinal:  Positive for abdominal pain, nausea and vomiting. Endocrine: Negative for cold intolerance. Genitourinary:  Positive for dysuria. Negative for difficulty urinating. Musculoskeletal:  Negative for arthralgias. Skin:  Negative for color change. Allergic/Immunologic: Negative for environmental allergies. Neurological:  Negative for dizziness. Hematological:  Negative for adenopathy. Psychiatric/Behavioral:  Negative for agitation. Objective:    No intake/output data recorded. 09/21 1901 - 09/23 0700  In: 1100 [I.V.:1100]  Out: -   No data found. Physical Exam  HENT:      Head: Normocephalic. Nose: Nose normal.      Mouth/Throat:      Mouth: Mucous membranes are moist.   Eyes:      Pupils: Pupils are equal, round, and reactive to light. Cardiovascular:      Rate and Rhythm: Tachycardia present. Pulmonary:      Effort: Pulmonary effort is normal.   Abdominal:      Tenderness: There is abdominal tenderness. Genitourinary:     Comments: deferred  Musculoskeletal:         General: No swelling. Cervical back: Normal range of motion. Skin:     General: Skin is warm. Neurological:      General: No focal deficit present. Mental Status: She is alert.    Psychiatric:         Mood and Affect: Mood normal.        Labs:    Recent Results (from the past 24 hour(s))   CULTURE, BLOOD    Collection Time: 09/22/22  4:30 PM    Specimen: Blood   Result Value Ref Range    Special Requests: NO SPECIAL REQUESTS      Culture result: NO GROWTH AFTER 13 HOURS     EKG, 12 LEAD, INITIAL    Collection Time: 09/22/22  4:37 PM   Result Value Ref Range    Ventricular Rate 112 BPM    Atrial Rate 112 BPM    P-R Interval 112 ms    QRS Duration 110 ms    Q-T Interval 382 ms    QTC Calculation (Bezet) 521 ms    Calculated P Axis 81 degrees    Calculated R Axis -109 degrees    Calculated T Axis -83 degrees    Diagnosis       Sinus tachycardia  Biatrial enlargement  Possible Lateral infarct , age undetermined  Inferior-posterior infarct , age undetermined  Prolonged QT  Abnormal ECG  When compared with ECG of 02-MAY-2022 13:03,  Vent. rate has increased BY  65 BPM  Right bundle branch block is no longer present  Borderline criteria for Lateral infarct are now present  Inferior-posterior infarct is now present     CBC WITH AUTOMATED DIFF    Collection Time: 09/22/22  4:43 PM   Result Value Ref Range    WBC 14.7 (H) 4.6 - 13.2 K/uL    RBC 4.53 4.20 - 5.30 M/uL    HGB 13.9 12.0 - 16.0 g/dL    HCT 40.7 35.0 - 45.0 %    MCV 89.8 78.0 - 100.0 FL    MCH 30.7 24.0 - 34.0 PG    MCHC 34.2 31.0 - 37.0 g/dL    RDW 13.9 11.6 - 14.5 %    PLATELET 829 154 - 605 K/uL    MPV 10.7 9.2 - 11.8 FL    NRBC 0.0 0  WBC    ABSOLUTE NRBC 0.00 0.00 - 0.01 K/uL    NEUTROPHILS 74 (H) 40 - 73 %    LYMPHOCYTES 18 (L) 21 - 52 %    MONOCYTES 8 3 - 10 %    EOSINOPHILS 0 0 - 5 %    BASOPHILS 0 0 - 2 %    IMMATURE GRANULOCYTES 0 0.0 - 0.5 %    ABS. NEUTROPHILS 10.9 (H) 1.8 - 8.0 K/UL    ABS. LYMPHOCYTES 2.6 0.9 - 3.6 K/UL    ABS. MONOCYTES 1.1 0.05 - 1.2 K/UL    ABS. EOSINOPHILS 0.0 0.0 - 0.4 K/UL    ABS. BASOPHILS 0.0 0.0 - 0.1 K/UL    ABS. IMM. GRANS. 0.1 (H) 0.00 - 0.04 K/UL    DF AUTOMATED     METABOLIC PANEL, COMPREHENSIVE    Collection Time: 09/22/22  4:43 PM   Result Value Ref Range    Sodium 135 (L) 136 - 145 mmol/L    Potassium 3.3 (L) 3.5 - 5.5 mmol/L    Chloride 98 (L) 100 - 111 mmol/L    CO2 24 21 - 32 mmol/L    Anion gap 13 3.0 - 18 mmol/L    Glucose 112 (H) 74 - 99 mg/dL    BUN 12 7.0 - 18 MG/DL    Creatinine 0.75 0.6 - 1.3 MG/DL    BUN/Creatinine ratio 16 12 - 20      GFR est AA >60 >60 ml/min/1.73m2    GFR est non-AA >60 >60 ml/min/1.73m2    Calcium 10.9 (H) 8.5 - 10.1 MG/DL    Bilirubin, total 0.8 0.2 - 1.0 MG/DL    ALT (SGPT) 27 13 - 56 U/L    AST (SGOT) 28 10 - 38 U/L    Alk.  phosphatase 56 45 - 117 U/L    Protein, total 7.9 6.4 - 8.2 g/dL    Albumin 4.4 3.4 - 5.0 g/dL Globulin 3.5 2.0 - 4.0 g/dL    A-G Ratio 1.3 0.8 - 1.7     LIPASE    Collection Time: 09/22/22  4:43 PM   Result Value Ref Range    Lipase 146 73 - 393 U/L   MAGNESIUM    Collection Time: 09/22/22  4:43 PM   Result Value Ref Range    Magnesium 1.9 1.6 - 2.6 mg/dL   URINALYSIS W/ RFLX MICROSCOPIC    Collection Time: 09/22/22  4:43 PM   Result Value Ref Range    Color YELLOW      Appearance CLEAR      Specific gravity 1.013 1.005 - 1.030      pH (UA) 6.5 5.0 - 8.0      Protein 30 (A) NEG mg/dL    Glucose Negative NEG mg/dL    Ketone TRACE (A) NEG mg/dL    Bilirubin Negative NEG      Blood SMALL (A) NEG      Urobilinogen 1.0 0.2 - 1.0 EU/dL    Nitrites Negative NEG      Leukocyte Esterase TRACE (A) NEG     CULTURE, BLOOD    Collection Time: 09/22/22  4:43 PM    Specimen: Blood   Result Value Ref Range    Special Requests: NO SPECIAL REQUESTS      Culture result: NO GROWTH AFTER 13 HOURS     TROPONIN-HIGH SENSITIVITY    Collection Time: 09/22/22  4:43 PM   Result Value Ref Range    Troponin-High Sensitivity 1,495 (HH) 0 - 54 ng/L   URINE MICROSCOPIC ONLY    Collection Time: 09/22/22  4:43 PM   Result Value Ref Range    WBC 3 to 5 0 - 4 /hpf    RBC 8 to 10 0 - 5 /hpf    Epithelial cells 2+ 0 - 5 /lpf    Bacteria 2+ (A) NEG /hpf    Mucus 1+ (A) NEG /lpf   NT-PRO BNP    Collection Time: 09/22/22  4:43 PM   Result Value Ref Range    NT pro-BNP 28,688 (H) 0 - 900 PG/ML   VALPROIC ACID    Collection Time: 09/22/22  4:43 PM   Result Value Ref Range    Valproic acid <3 (L) 50 - 100 ug/ml   PTT    Collection Time: 09/22/22  4:43 PM   Result Value Ref Range    aPTT 31.2 23.0 - 36.4 SEC   D DIMER    Collection Time: 09/22/22  4:43 PM   Result Value Ref Range    D DIMER 0.28 <0.46 ug/ml(FEU)   PROTHROMBIN TIME + INR    Collection Time: 09/22/22  4:43 PM   Result Value Ref Range    Prothrombin time 12.4 11.5 - 15.2 sec    INR 0.9 0.8 - 1.2     POC LACTIC ACID    Collection Time: 09/22/22  4:57 PM   Result Value Ref Range    Lactic Acid (POC) 2.28 (HH) 0.40 - 2.00 mmol/L   BLOOD GAS,CHEM8,LACTIC ACID POC    Collection Time: 09/22/22  5:22 PM   Result Value Ref Range    Calcium, ionized (POC) 0.99 (L) 1.12 - 1.32 mmol/L    Base deficit (POC) 0.4 mmol/L    HCO3 (POC) 23.1 22 - 26 MMOL/L    CO2, POC 23 19 - 24 MMOL/L    O2 SAT 68 %    Sample source VENOUS BLOOD      Performed by Joost     Sodium (POC) 132 (L) 136 - 145 mmol/L    Potassium (POC) 5.6 (H) 3.5 - 5.1 mmol/L    Glucose (POC) 109 (H) 65 - 100 mg/dL    Creatinine (POC) 0.60 0.6 - 1.3 mg/dL    Lactic Acid (POC) 2.73 (HH) 0.40 - 2.00 mmol/L    Chloride (POC) 97 (L) 98 - 107 mmol/L    Anion gap, POC 13 10 - 20      GFRAA, POC >60 >60 ml/min/1.73m2    GFRNA, POC >60 >60 ml/min/1.73m2    pH, venous (POC) 7.45 (H) 7.32 - 7.42      pCO2, venous (POC) 33.3 (L) 41 - 51 MMHG    pO2, venous (POC) 34 25 - 40 mmHg   EKG, 12 LEAD, SUBSEQUENT    Collection Time: 09/22/22  6:46 PM   Result Value Ref Range    Ventricular Rate 98 BPM    Atrial Rate 98 BPM    P-R Interval 112 ms    QRS Duration 118 ms    Q-T Interval 518 ms    QTC Calculation (Bezet) 661 ms    Calculated P Axis 85 degrees    Calculated R Axis -85 degrees    Calculated T Axis -87 degrees    Diagnosis       Sinus rhythm with occasional premature ventricular complexes  Left axis deviation  Right bundle branch block  Inferior infarct (cited on or before 01-MAY-2022)  Marked T wave abnormality, consider lateral ischemia  Abnormal ECG  When compared with ECG of 22-SEP-2022 16:37,  premature ventricular complexes are now present  Right bundle branch block is now present  Borderline criteria for Lateral infarct are no longer present  Questionable change in initial forces of Inferior leads     TROPONIN-HIGH SENSITIVITY    Collection Time: 09/22/22  6:54 PM   Result Value Ref Range    Troponin-High Sensitivity 1,348 (HH) 0 - 54 ng/L   PTT    Collection Time: 09/23/22  1:32 AM   Result Value Ref Range    aPTT 60.0 (H) 23.0 - 36.4 SEC   CBC WITH AUTOMATED DIFF    Collection Time: 09/23/22  1:32 AM   Result Value Ref Range    WBC 16.7 (H) 4.6 - 13.2 K/uL    RBC 4.09 (L) 4.20 - 5.30 M/uL    HGB 12.7 12.0 - 16.0 g/dL    HCT 37.4 35.0 - 45.0 %    MCV 91.4 78.0 - 100.0 FL    MCH 31.1 24.0 - 34.0 PG    MCHC 34.0 31.0 - 37.0 g/dL    RDW 14.1 11.6 - 14.5 %    PLATELET 124 168 - 704 K/uL    MPV 11.2 9.2 - 11.8 FL    NRBC 0.0 0  WBC    ABSOLUTE NRBC 0.00 0.00 - 0.01 K/uL    NEUTROPHILS 60 40 - 73 %    LYMPHOCYTES 33 21 - 52 %    MONOCYTES 7 3 - 10 %    EOSINOPHILS 0 0 - 5 %    BASOPHILS 0 0 - 2 %    IMMATURE GRANULOCYTES 0 %    ABS. NEUTROPHILS 10.0 (H) 1.8 - 8.0 K/UL    ABS. LYMPHOCYTES 5.5 (H) 0.9 - 3.6 K/UL    ABS. MONOCYTES 1.2 0.05 - 1.2 K/UL    ABS. EOSINOPHILS 0.0 0.0 - 0.4 K/UL    ABS. BASOPHILS 0.0 0.0 - 0.1 K/UL    ABS. IMM. GRANS. 0.0 K/UL    DF MANUAL      PLATELET COMMENTS ADEQUATE PLATELETS      RBC COMMENTS ANISOCYTOSIS  SLIGHT           ECG:   Sinus rhythm with occasional premature ventricular complexes   Left axis deviation   Right bundle branch block   Inferior infarct (cited on or before 01-MAY-2022)   Marked T wave abnormality, consider lateral ischemia   Abnormal ECG   When compared with ECG of 22-SEP-2022 16:37,   premature ventricular complexes are now present   Right bundle branch block is now present   Borderline criteria for Lateral infarct are no longer present   Questionable change in initial forces of Inferior leads     CT Abdomen/Pelvis  1. No definite acute findings. Trace nonspecific free fluid of uncertain  significance. 2. Mild sequela of chronic pancreatitis. A small hypodensity in the pancreatic  head is stable, possible small cyst. Nonemergent MR abdomen assessment could be  utilized. 3. Nonobstructing left nephrolithiasis. 4. Chronic liver lesions, likely representing small cysts. Right hepatic dome  stable hyperenhancing/filling hemangiomas or vascular shunts. 5. Other stable chronic findings as above.     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

## 2022-09-23 NOTE — PROGRESS NOTES
Blood pressure trending low reported to [facility], [address] for fluid bolus received.

## 2022-09-23 NOTE — PROGRESS NOTES
[facility] Hospitalists  Progress Note    Patient: Mary Sanchez Age: 75 y.o. : 1955 MR#: [id number] SSN: [id number]  Date: 2022     Subjective/24-hour events:     Chart reviewed, patient admitted early this morning by night staff. Presented to the emergency department for evaluation of dizziness with nausea/vomiting of several days duration. Found to have an elevated troponin on admission with  mildly decompensated CHF. Referred for admission for further evaluation and treatment. Assessment:   Elevated troponin, concern for NSTEMI  Acute on chronic combined CHF  Hypokalemia on admission, now mildly hyperkalemic  Cardiomyopathy, nonischemic, EF 30-35%    Plan:   Cardiac catheterization today per cardiology. Continue current medical management as initiated. Hospitalist service will follow-up again formally in the morning.       Objective:   VS: Visit Vitals  BP 91/61   Pulse 91   Temp 98 °F (36.7 °C)   Resp 20   Ht 5' 3\" (1.6 m)   Wt 47.6 kg (105 lb)   SpO2 98%   Breastfeeding No   BMI 18.60 kg/m²      Tmax/24hrs: Temp (24hrs), Av.3 °F (36.8 °C), Min:98 °F (36.7 °C), Max:98.6 °F (37 °C)    Intake/Output Summary (Last 24 hours) at 2022 1319  Last data filed at 2022  Gross per 24 hour   Intake 1200 ml   Output --   Net 1200 ml       Labs:    Recent Results (from the past 24 hour(s))   CULTURE, BLOOD    Collection Time: 22  4:30 PM    Specimen: Blood   Result Value Ref Range    Special Requests: NO SPECIAL REQUESTS      Culture result: NO GROWTH AFTER 13 HOURS     EKG, 12 LEAD, INITIAL    Collection Time: 22  4:37 PM   Result Value Ref Range    Ventricular Rate 112 BPM    Atrial Rate 112 BPM    P-R Interval 112 ms    QRS Duration 110 ms    Q-T Interval 382 ms    QTC Calculation (Bezet) 521 ms    Calculated P Axis 81 degrees    Calculated R Axis -109 degrees    Calculated T Axis -83 degrees    Diagnosis       Sinus tachycardia  Biatrial enlargement  Possible Lateral infarct , age undetermined  Nonspecific intraventricular block , RBBB pattern  Prolonged QT  Abnormal ECG  Confirmed by Geryl Pump ([id number]) on 9/23/2022 7:54:04 AM     CBC WITH AUTOMATED DIFF    Collection Time: 09/22/22  4:43 PM   Result Value Ref Range    WBC 14.7 (H) 4.6 - 13.2 K/uL    RBC 4.53 4.20 - 5.30 M/uL    HGB 13.9 12.0 - 16.0 g/dL    HCT 40.7 35.0 - 45.0 %    MCV 89.8 78.0 - 100.0 FL    MCH 30.7 24.0 - 34.0 PG    MCHC 34.2 31.0 - 37.0 g/dL    RDW 13.9 11.6 - 14.5 %    PLATELET 169 958 - 423 K/uL    MPV 10.7 9.2 - 11.8 FL    NRBC 0.0 0  WBC    ABSOLUTE NRBC 0.00 0.00 - 0.01 K/uL    NEUTROPHILS 74 (H) 40 - 73 %    LYMPHOCYTES 18 (L) 21 - 52 %    MONOCYTES 8 3 - 10 %    EOSINOPHILS 0 0 - 5 %    BASOPHILS 0 0 - 2 %    IMMATURE GRANULOCYTES 0 0.0 - 0.5 %    ABS. NEUTROPHILS 10.9 (H) 1.8 - 8.0 K/UL    ABS. LYMPHOCYTES 2.6 0.9 - 3.6 K/UL    ABS. MONOCYTES 1.1 0.05 - 1.2 K/UL    ABS. EOSINOPHILS 0.0 0.0 - 0.4 K/UL    ABS. BASOPHILS 0.0 0.0 - 0.1 K/UL    ABS. IMM. GRANS. 0.1 (H) 0.00 - 0.04 K/UL    DF AUTOMATED     METABOLIC PANEL, COMPREHENSIVE    Collection Time: 09/22/22  4:43 PM   Result Value Ref Range    Sodium 135 (L) 136 - 145 mmol/L    Potassium 3.3 (L) 3.5 - 5.5 mmol/L    Chloride 98 (L) 100 - 111 mmol/L    CO2 24 21 - 32 mmol/L    Anion gap 13 3.0 - 18 mmol/L    Glucose 112 (H) 74 - 99 mg/dL    BUN 12 7.0 - 18 MG/DL    Creatinine 0.75 0.6 - 1.3 MG/DL    BUN/Creatinine ratio 16 12 - 20      GFR est AA >60 >60 ml/min/1.73m2    GFR est non-AA >60 >60 ml/min/1.73m2    Calcium 10.9 (H) 8.5 - 10.1 MG/DL    Bilirubin, total 0.8 0.2 - 1.0 MG/DL    ALT (SGPT) 27 13 - 56 U/L    AST (SGOT) 28 10 - 38 U/L    Alk.  phosphatase 56 45 - 117 U/L    Protein, total 7.9 6.4 - 8.2 g/dL    Albumin 4.4 3.4 - 5.0 g/dL    Globulin 3.5 2.0 - 4.0 g/dL    A-G Ratio 1.3 0.8 - 1.7     LIPASE    Collection Time: 09/22/22  4:43 PM   Result Value Ref Range    Lipase 146 73 - 393 U/L   MAGNESIUM Collection Time: 09/22/22  4:43 PM   Result Value Ref Range    Magnesium 1.9 1.6 - 2.6 mg/dL   URINALYSIS W/ RFLX MICROSCOPIC    Collection Time: 09/22/22  4:43 PM   Result Value Ref Range    Color YELLOW      Appearance CLEAR      Specific gravity 1.013 1.005 - 1.030      pH (UA) 6.5 5.0 - 8.0      Protein 30 (A) NEG mg/dL    Glucose Negative NEG mg/dL    Ketone TRACE (A) NEG mg/dL    Bilirubin Negative NEG      Blood SMALL (A) NEG      Urobilinogen 1.0 0.2 - 1.0 EU/dL    Nitrites Negative NEG      Leukocyte Esterase TRACE (A) NEG     CULTURE, BLOOD    Collection Time: 09/22/22  4:43 PM    Specimen: Blood   Result Value Ref Range    Special Requests: NO SPECIAL REQUESTS      Culture result: NO GROWTH AFTER 13 HOURS     TROPONIN-HIGH SENSITIVITY    Collection Time: 09/22/22  4:43 PM   Result Value Ref Range    Troponin-High Sensitivity 1,495 (HH) 0 - 54 ng/L   URINE MICROSCOPIC ONLY    Collection Time: 09/22/22  4:43 PM   Result Value Ref Range    WBC 3 to 5 0 - 4 /hpf    RBC 8 to 10 0 - 5 /hpf    Epithelial cells 2+ 0 - 5 /lpf    Bacteria 2+ (A) NEG /hpf    Mucus 1+ (A) NEG /lpf   NT-PRO BNP    Collection Time: 09/22/22  4:43 PM   Result Value Ref Range    NT pro-BNP 28,688 (H) 0 - 900 PG/ML   VALPROIC ACID    Collection Time: 09/22/22  4:43 PM   Result Value Ref Range    Valproic acid <3 (L) 50 - 100 ug/ml   PTT    Collection Time: 09/22/22  4:43 PM   Result Value Ref Range    aPTT 31.2 23.0 - 36.4 SEC   D DIMER    Collection Time: 09/22/22  4:43 PM   Result Value Ref Range    D DIMER 0.28 <0.46 ug/ml(FEU)   PROTHROMBIN TIME + INR    Collection Time: 09/22/22  4:43 PM   Result Value Ref Range    Prothrombin time 12.4 11.5 - 15.2 sec    INR 0.9 0.8 - 1.2     POC LACTIC ACID    Collection Time: 09/22/22  4:57 PM   Result Value Ref Range    Lactic Acid (POC) 2.28 (HH) 0.40 - 2.00 mmol/L   BLOOD GAS,CHEM8,LACTIC ACID POC    Collection Time: 09/22/22  5:22 PM   Result Value Ref Range    Calcium, ionized (POC) 0.99 (L) 1.12 - 1.32 mmol/L    Base deficit (POC) 0.4 mmol/L    HCO3 (POC) 23.1 22 - 26 MMOL/L    CO2, POC 23 19 - 24 MMOL/L    O2 SAT 68 %    Sample source VENOUS BLOOD      Performed by Osiris Carbone     Sodium (POC) 132 (L) 136 - 145 mmol/L    Potassium (POC) 5.6 (H) 3.5 - 5.1 mmol/L    Glucose (POC) 109 (H) 65 - 100 mg/dL    Creatinine (POC) 0.60 0.6 - 1.3 mg/dL    Lactic Acid (POC) 2.73 (HH) 0.40 - 2.00 mmol/L    Chloride (POC) 97 (L) 98 - 107 mmol/L    Anion gap, POC 13 10 - 20      GFRAA, POC >60 >60 ml/min/1.73m2    GFRNA, POC >60 >60 ml/min/1.73m2    pH, venous (POC) 7.45 (H) 7.32 - 7.42      pCO2, venous (POC) 33.3 (L) 41 - 51 MMHG    pO2, venous (POC) 34 25 - 40 mmHg   EKG, 12 LEAD, SUBSEQUENT    Collection Time: 09/22/22  6:46 PM   Result Value Ref Range    Ventricular Rate 98 BPM    Atrial Rate 98 BPM    P-R Interval 112 ms    QRS Duration 118 ms    Q-T Interval 518 ms    QTC Calculation (Bezet) 661 ms    Calculated P Axis 85 degrees    Calculated R Axis -85 degrees    Calculated T Axis -87 degrees    Diagnosis       Sinus rhythm with occasional premature ventricular complexes  Left axis deviation  Right bundle branch block  Inferior infarct (cited on or before 01-MAY-2022)  Marked T wave abnormality, consider lateral ischemia  Abnormal ECG  When compared with ECG of 22-SEP-2022 16:37,  premature ventricular complexes are now present  Right bundle branch block is now present  Borderline criteria for Lateral infarct are no longer present  Questionable change in initial forces of Inferior leads     TROPONIN-HIGH SENSITIVITY    Collection Time: 09/22/22  6:54 PM   Result Value Ref Range    Troponin-High Sensitivity 1,348 (HH) 0 - 54 ng/L   PTT    Collection Time: 09/23/22  1:32 AM   Result Value Ref Range    aPTT 60.0 (H) 23.0 - 36.4 SEC   CBC WITH AUTOMATED DIFF    Collection Time: 09/23/22  1:32 AM   Result Value Ref Range    WBC 16.7 (H) 4.6 - 13.2 K/uL    RBC 4.09 (L) 4.20 - 5.30 M/uL    HGB 12.7 12.0 - 16.0 g/dL    HCT 37.4 35.0 - 45.0 %    MCV 91.4 78.0 - 100.0 FL    MCH 31.1 24.0 - 34.0 PG    MCHC 34.0 31.0 - 37.0 g/dL    RDW 14.1 11.6 - 14.5 %    PLATELET 833 282 - 992 K/uL    MPV 11.2 9.2 - 11.8 FL    NRBC 0.0 0  WBC    ABSOLUTE NRBC 0.00 0.00 - 0.01 K/uL    NEUTROPHILS 60 40 - 73 %    LYMPHOCYTES 33 21 - 52 %    MONOCYTES 7 3 - 10 %    EOSINOPHILS 0 0 - 5 %    BASOPHILS 0 0 - 2 %    IMMATURE GRANULOCYTES 0 %    ABS. NEUTROPHILS 10.0 (H) 1.8 - 8.0 K/UL    ABS. LYMPHOCYTES 5.5 (H) 0.9 - 3.6 K/UL    ABS. MONOCYTES 1.2 0.05 - 1.2 K/UL    ABS. EOSINOPHILS 0.0 0.0 - 0.4 K/UL    ABS. BASOPHILS 0.0 0.0 - 0.1 K/UL    ABS. IMM. GRANS. 0.0 K/UL    DF MANUAL      PLATELET COMMENTS ADEQUATE PLATELETS      RBC COMMENTS ANISOCYTOSIS  SLIGHT       PTT    Collection Time: 09/23/22  9:00 AM   Result Value Ref Range    aPTT >180.0 (HH) 23.0 - 36.4 SEC   LACTIC ACID    Collection Time: 09/23/22  9:00 AM   Result Value Ref Range    Lactic acid 0.9 0.4 - 2.0 MMOL/L   METABOLIC PANEL, COMPREHENSIVE    Collection Time: 09/23/22 11:03 AM   Result Value Ref Range    Sodium 139 136 - 145 mmol/L    Potassium 3.4 (L) 3.5 - 5.5 mmol/L    Chloride 106 100 - 111 mmol/L    CO2 25 21 - 32 mmol/L    Anion gap 8 3.0 - 18 mmol/L    Glucose 88 74 - 99 mg/dL    BUN 13 7.0 - 18 MG/DL    Creatinine 0.73 0.6 - 1.3 MG/DL    BUN/Creatinine ratio 18 12 - 20      GFR est AA >60 >60 ml/min/1.73m2    GFR est non-AA >60 >60 ml/min/1.73m2    Calcium 9.1 8.5 - 10.1 MG/DL    Bilirubin, total 0.9 0.2 - 1.0 MG/DL    ALT (SGPT) 19 13 - 56 U/L    AST (SGOT) 19 10 - 38 U/L    Alk.  phosphatase 43 (L) 45 - 117 U/L    Protein, total 6.4 6.4 - 8.2 g/dL    Albumin 3.3 (L) 3.4 - 5.0 g/dL    Globulin 3.1 2.0 - 4.0 g/dL    A-G Ratio 1.1 0.8 - 1.7     PROCALCITONIN    Collection Time: 09/23/22 11:03 AM   Result Value Ref Range    Procalcitonin <0.05 ng/mL   TSH 3RD GENERATION    Collection Time: 09/23/22 11:03 AM   Result Value Ref Range    TSH 3.11 0.36 - 3.74 uIU/mL PREALBUMIN    Collection Time: 09/23/22 11:03 AM   Result Value Ref Range    Prealbumin 17.4 (L) 20 - 40 MG/DL   MAGNESIUM    Collection Time: 09/23/22 11:03 AM   Result Value Ref Range    Magnesium 2.4 1.6 - 2.6 mg/dL   CBC WITH AUTOMATED DIFF    Collection Time: 09/23/22 11:03 AM   Result Value Ref Range    WBC 13.7 (H) 4.6 - 13.2 K/uL    RBC 3.86 (L) 4.20 - 5.30 M/uL    HGB 12.2 12.0 - 16.0 g/dL    HCT 36.5 35.0 - 45.0 %    MCV 94.6 78.0 - 100.0 FL    MCH 31.6 24.0 - 34.0 PG    MCHC 33.4 31.0 - 37.0 g/dL    RDW 13.9 11.6 - 14.5 %    PLATELET 180 110 - 322 K/uL    MPV 11.0 9.2 - 11.8 FL    NRBC 0.0 0  WBC    ABSOLUTE NRBC 0.00 0.00 - 0.01 K/uL    NEUTROPHILS 52 40 - 73 %    LYMPHOCYTES 38 21 - 52 %    MONOCYTES 9 3 - 10 %    EOSINOPHILS 0 0 - 5 %    BASOPHILS 1 0 - 2 %    IMMATURE GRANULOCYTES 0 0.0 - 0.5 %    ABS. NEUTROPHILS 7.2 1.8 - 8.0 K/UL    ABS. LYMPHOCYTES 5.2 (H) 0.9 - 3.6 K/UL    ABS. MONOCYTES 1.2 0.05 - 1.2 K/UL    ABS. EOSINOPHILS 0.0 0.0 - 0.4 K/UL    ABS. BASOPHILS 0.1 0.0 - 0.1 K/UL    ABS. IMM.  GRANS. 0.0 0.00 - 0.04 K/UL    DF MANUAL      PLATELET COMMENTS ADEQUATE PLATELETS      RBC COMMENTS NORMOCYTIC, NORMOCHROMIC     TROPONIN-HIGH SENSITIVITY    Collection Time: 09/23/22 11:03 AM   Result Value Ref Range    Troponin-High Sensitivity 492 (HH) 0 - 54 ng/L       Signed By: Alex Vasquez MD     September 23, 2022

## 2022-09-23 NOTE — ROUTINE PROCESS
TRANSFER - OUT REPORT:    Verbal report given to JIN Oden(name) on Malcolm Conley  being transferred to [facility](unit) for routine progression of care       Report consisted of patients Situation, Background, Assessment and   Recommendations(SBAR). Information from the following report(s) SBAR was reviewed with the receiving nurse. Lines:   Peripheral IV 09/22/22 Left Antecubital (Active)       Peripheral IV 09/22/22 Right Wrist (Active)   Site Assessment Clean, dry, & intact 09/22/22 1856   Phlebitis Assessment 0 09/22/22 1856   Infiltration Assessment 0 09/22/22 1856   Dressing Status Clean, dry, & intact 09/22/22 1856       Peripheral IV 09/22/22 Left Forearm (Active)   Site Assessment Clean, dry, & intact 09/22/22 1856   Phlebitis Assessment 0 09/22/22 1856   Infiltration Assessment 0 09/22/22 1856   Dressing Status Clean, dry, & intact 09/22/22 1856        Opportunity for questions and clarification was provided. Patient transported with:   Registered Nurse: LAST Lezama

## 2022-09-23 NOTE — PROGRESS NOTES
completed the initial Spiritual Assessment of the patient in bed 11 of the emergency room  and offered Pastoral Care support to the patient. Patient has an advance directive on file here. Patient does not have any Yazidi/cultural needs that will affect patients preferences in health care. Chaplains will continue to follow and will provide pastoral care on an as needed/requested basis.     Mihaela [facility]  Spiritual Care Department  [phone]

## 2022-09-23 NOTE — ACP (ADVANCE CARE PLANNING)
Advance Care Planning   Advance Care Planning Inpatient Note  [facility]  Spiritual Care Department    Today's Date: 9/23/2022  Unit: [facility] 2S TELEMETRY    Received request from . Upon review of chart and communication with care team, patient's decision making abilities are not in question. Patient was/were present in the room during visit. Goals of ACP Conversation:  Discuss Advance Care planning documents    Health Care Decision Makers:      Primary Decision Maker: Marilu Ramirez - [facility] - [phone]    Summary:  Verified Documents    Advance Care Planning Documents (Patient Wishes) on file:  Healthcare Power of /Advance Directive appointment of Health care agent  Living Will/ Advance Directive  Anatomical Gift/Organ donation     Assessment:    Patient seen as a new admit from the emergency room. Patient was asked about her advance directive and she replied yes.     Interventions:      Care Preferences Communicated:  No    Outcomes/Plan:  Existing Advance Directive reviewed with patient; no changes to patient's previously recorded wishes    Kathy Hatch [facility] on 9/23/2022 at 10:32 AM

## 2022-09-23 NOTE — CONSULTS
Cardiology Initial Patient Referral Note    Cardiology referral request from Dr. Leda Cary for evaluation and management/treatment of elevated troponin    Date of  Admission: 9/22/2022  4:27 PM   Primary Care Physician: Humphrey Moran MD    Attending Cardiologist: Dr. Nima Dasilva:     Hospital Problems  Date Reviewed: 9/23/2022            Codes Class Noted POA    Elevated troponin ICD-10-CM: R77.8  ICD-9-CM: 790.6  9/23/2022 Unknown        Hyponatremia ICD-10-CM: E87.1  ICD-9-CM: 276.1  9/23/2022 Unknown        Leukocytosis ICD-10-CM: D72.829  ICD-9-CM: 288.60  9/23/2022 Unknown        Tachycardia ICD-10-CM: R00.0  ICD-9-CM: 785.0  9/23/2022 Unknown        Cystic disease of liver ICD-10-CM: Q44.6  ICD-9-CM: 573.8  9/23/2022 Unknown        Hypertension ICD-10-CM: I10  ICD-9-CM: 401.9  9/23/2022 Unknown        QT prolongation ICD-10-CM: R94.31  ICD-9-CM: 794.31  11/25/2021 Yes        SIRS (systemic inflammatory response syndrome) ([facility] [address] ICD-10-CM: R65.10  ICD-9-CM: 995.90  2/21/2017 Yes         -N/V with associated diaphoresis. CT abdomen/pelvis negative for acute process.   -Elevated troponin, initially 1,495 and down trending. ECG with RBBB with worsening lateral ST changes. -A/c diastolic and systolic heart failure. Appears euvolemic at this time.   -Leukocytosis, unclear source. On IV Abx.   -Hypokalemic initially, now hyperkalemic with K+ 5.6?   -Prolonged QTc, 661.   -NICMY. Consider ICD if no improvement in EF despite optimal GDMT. Echo 11/2021, EF 30-35%, grade 2 LVDD. S/p LHC 11/2021, non obstructive CAD. 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 on Lisinopril, Toprol, lasix and Amlodipine as outpatient.    -Chronic RBBB  -H/o Syncope, 12 day monitor 1% PAC burden, 1% PVC burden.   -Active Tobacco abuse  -H/o EtOH abuse. Primary cardiologist is Dr. Nieves Mow:     Addendum: Independently seen and evaluated. Agree with below.   Patient has atypical symptoms. She has changes on her ECG. We will proceed with coronary angiography to diagnose or exclude CAD as etiology for her presenting symptoms. If there are no significant occlusive CAD, no further cardiac evaluation at this time. This was discussed with the patient. She agrees with the plan. -Will keep NPO for tentative LHC. This was d/w the patient who agrees with the plan. -Continued on heparin gtt. Start ASA and statin.   -Avoid Qtc prolonging agents, monitor with serial ECGs. -Hyperkalemia mgmt per primary team.   -Echo ordered.   -Would gently hydrate with close monitoring of respiratory status. -BP medications held for soft pressures.   -Tobacco cessation advised. -Further recommendations pending hospital course/test results. History of Present Illness: This is a 75 y.o. female admitted for Elevated troponin [R77.8]. Patient complains of: N/V  Jose Miller is a 75 y.o. female, pmhx as stated above, who we are seeing for elevated troponin. Patient states she has not been feeling well for the last 2 days. She reports frequent nausea and vomiting with associated diaphoresis. She also has lower abdominal tenderness that is only noticeable with palpation. Prior to two days ago, she was in her normal state of health. She states she went to [facility] for lab work and they recommended she come to the ER. Denies CP, SOB, diarrhea, dizziness, near syncope or syncope. Cardiac risk factors: smoking/ tobacco exposure, hypertension, post-menopausal    Review of Symptoms:  Except as stated above include:  Constitutional:  negative  Respiratory:  negative  Cardiovascular:  negative  Gastrointestinal: negative  Genitourinary:  negative  Musculoskeletal:  Negative  Neurological:  Negative  Dermatological:  Negative  Endocrinological: Negative  Psychological:  Negative    A comprehensive review of systems was negative except for that written in the HPI.      Past Medical History: Past Medical History:   Diagnosis Date    Depression     Gastrointestinal disorder Pancreatitis    Pancreatic disease     Pancreatitis     Psychiatric disorder          Social History:     Social History     Socioeconomic History    Marital status:    Tobacco Use    Smoking status: Former     Packs/day: 0.50     Types: Cigarettes     Quit date: 2022     Years since quittin.5    Smokeless tobacco: Former   Substance and Sexual Activity    Alcohol use: No     Comment: Per pt she quit in     Drug use: No    Sexual activity: Not Currently        Family History:     Family History   Problem Relation Age of Onset    Diabetes Maternal Aunt     Cancer Maternal Aunt     Alzheimer's Disease Maternal Aunt     Cancer Paternal Aunt     Diabetes Paternal Aunt         Medications:   No Known Allergies     Current Facility-Administered Medications   Medication Dose Route Frequency    heparin (porcine) 1,000 unit/mL injection        sodium chloride (NS) flush 5-10 mL  5-10 mL IntraVENous PRN    piperacillin-tazobactam (ZOSYN) 3.375 g in 0.9% sodium chloride (MBP/ADV) 100 mL MBP  3.375 g IntraVENous Q8H    heparin (porcine) 25,000 units in 0.45% saline 250 ml infusion  12-25 Units/kg/hr (Order-Specific) IntraVENous TITRATE     Current Outpatient Medications   Medication Sig    lisinopriL (PRINIVIL, ZESTRIL) 20 mg tablet Take 20 mg by mouth daily. mirtazapine (REMERON) 45 mg tablet Take 45 mg by mouth nightly. amLODIPine (NORVASC) 5 mg tablet Take 5 mg by mouth daily. ferrous sulfate (FeroSuL) 325 mg (65 mg iron) tablet Take 325 mg by mouth Daily (before breakfast). divalproex DR (Depakote) 250 mg tablet Take 250 mg by mouth two (2) times a day. potassium chloride SR (KLOR-CON 10) 10 mEq tablet Take 10 mEq by mouth daily. metoprolol succinate (TOPROL-XL) 50 mg XL tablet Take 1 Tablet by mouth daily. aspirin delayed-release 81 mg tablet Take 1 Tablet by mouth daily.          Physical Exam: Visit Vitals  /80   Pulse (!) 102   Temp 98.6 °F (37 °C)   Resp 16   SpO2 99%       TELE: normal sinus rhythm    BP Readings from Last 3 Encounters:   09/22/22 124/80   05/03/22 118/66   02/23/22 (!) 145/69     Pulse Readings from Last 3 Encounters:   09/22/22 (!) 102   05/03/22 65   02/23/22 78     Wt Readings from Last 3 Encounters:   05/02/22 47.7 kg (105 lb 1.6 oz)   02/23/22 47.2 kg (104 lb)   01/07/22 47.2 kg (104 lb)       General:  alert, cooperative, no distress, appears stated age, cachectic  Neck:  no JVD  Lungs:  clear to auscultation bilaterally  Heart:  regular rate and rhythm, S1, S2 normal, no murmur, click, rub or gallop  Abdomen:  lower abdomen TTP   Extremities:  extremities normal, atraumatic, no cyanosis or edema  Skin: Warm and dry. no hyperpigmentation, vitiligo, or suspicious lesions  Neuro: alert, oriented x3, affect appropriate  Psych: non focal     Data Review:     Recent Labs     09/23/22  0132 09/22/22  1643   WBC 16.7* 14.7*   HGB 12.7 13.9   HCT 37.4 40.7    359     Recent Labs     09/22/22  1643   *   K 3.3*   CL 98*   CO2 24   *   BUN 12   CREA 0.75   CA 10.9*   MG 1.9   ALB 4.4   ALT 27   INR 0.9       Results for orders placed or performed during the hospital encounter of 09/22/22   EKG, 12 LEAD, INITIAL   Result Value Ref Range    Ventricular Rate 112 BPM    Atrial Rate 112 BPM    P-R Interval 112 ms    QRS Duration 110 ms    Q-T Interval 382 ms    QTC Calculation (Bezet) 521 ms    Calculated P Axis 81 degrees    Calculated R Axis -109 degrees    Calculated T Axis -83 degrees    Diagnosis       Sinus tachycardia  Biatrial enlargement  Possible Lateral infarct , age undetermined  Inferior-posterior infarct , age undetermined  Prolonged QT  Abnormal ECG  When compared with ECG of 02-MAY-2022 13:03,  Vent.  rate has increased BY  65 BPM  Right bundle branch block is no longer present  Borderline criteria for Lateral infarct are now present  Inferior-posterior infarct is now present         All Cardiac Markers in the last 24 hours:  No results found for: CPK, CK, CKMMB, CKMB, RCK3, CKMBT, CKNDX, CKND1, MIKE, TROPT, TROIQ, STEPHAN, TROPT, TNIPOC, BNP, BNPP    Last Lipid:    Lab Results   Component Value Date/Time    Cholesterol, total 195 11/25/2021 04:42 AM    HDL Cholesterol 48 11/25/2021 04:42 AM    LDL, calculated 126.4 (H) 11/25/2021 04:42 AM    Triglyceride 103 11/25/2021 04:42 AM    CHOL/HDL Ratio 4.1 11/25/2021 04:42 AM       Cardiographics:     EKG Results       Procedure 720 Value Units Date/Time    EKG, 12 LEAD, SUBSEQUENT [[id number]] Collected: 09/22/22 1846    Order Status: Completed Updated: 09/22/22 1847     Ventricular Rate 98 BPM      Atrial Rate 98 BPM      P-R Interval 112 ms      QRS Duration 118 ms      Q-T Interval 518 ms      QTC Calculation (Bezet) 661 ms      Calculated P Axis 85 degrees      Calculated R Axis -85 degrees      Calculated T Axis -87 degrees      Diagnosis --     Sinus rhythm with occasional premature ventricular complexes  Left axis deviation  Right bundle branch block  Inferior infarct (cited on or before 01-MAY-2022)  Marked T wave abnormality, consider lateral ischemia  Abnormal ECG  When compared with ECG of 22-SEP-2022 16:37,  premature ventricular complexes are now present  Right bundle branch block is now present  Borderline criteria for Lateral infarct are no longer present  Questionable change in initial forces of Inferior leads      EKG, 12 LEAD, INITIAL [[id number]] Collected: 09/22/22 1637    Order Status: Completed Updated: 09/22/22 1638     Ventricular Rate 112 BPM      Atrial Rate 112 BPM      P-R Interval 112 ms      QRS Duration 110 ms      Q-T Interval 382 ms      QTC Calculation (Bezet) 521 ms      Calculated P Axis 81 degrees      Calculated R Axis -109 degrees      Calculated T Axis -83 degrees      Diagnosis --     Sinus tachycardia  Biatrial enlargement  Possible Lateral infarct , age undetermined  Inferior-posterior infarct , age undetermined  Prolonged QT  Abnormal ECG  When compared with ECG of 02-MAY-2022 13:03,  Vent. rate has increased BY  65 BPM  Right bundle branch block is no longer present  Borderline criteria for Lateral infarct are now present  Inferior-posterior infarct is now present            11/24/21    ECHO ADULT COMPLETE 11/25/2021 11/25/2021    Interpretation Summary  · LV: Estimated LVEF is 30 - 35%. Visually measured ejection fraction. Normal cavity size. Moderate concentric hypertrophy. Moderately reduced systolic function. Moderate (grade 2) left ventricular diastolic dysfunction. · LA: Moderately dilated left atrium. Left Atrium volume index is 45 mL/m2. · AV: Aortic valve focal thickening present. With no significant stenosis. Mild to moderate aortic valve regurgitation is present. · TV: Mild tricuspid valve regurgitation is present. Right Ventricular Arterial Pressure (RVSP) is 28 mmHg. Pulmonary hypertension not suggested by Doppler findings. · PV: Mild to moderate pulmonic valve regurgitation is present. Signed by: Jose Daniel Lewis DO on 11/25/2021  3:53 PM      11/24/21    NUCLEAR CARDIAC STRESS TEST 11/29/2021 11/29/2021    Interpretation Summary  · Baseline ECG: Normal sinus rhythm, right bundle branch blockT wave abnormality, consider inferolateral ischemia. · SPECT: Left ventricular function post-stress was abnormal. Calculated ejection fraction is 39% (normal EF value is equal to or greater than 50%). Left ventricular wall motion was abnormal at stress as described below in the wall scoring diagram. There is no evidence of transient ischemic dilation (TID). The TID ratio is 1.04.  · SPECT: Left ventricular perfusion is abnormal. Myocardial perfusion imaging defect 1: There is a defect that is medium in size with a moderate reduction in uptake of the anterior and septal wall(s) that is reversible. There is abnormal wall motion in the defect area.  The defect appears to be ischemia. Myocardial perfusion imaging defect 2: There is a defect that is small in size with a moderate reduction in uptake of the inferior wall(s) that is non-reversible. There is abnormal wall motion in the defect area. The possibility of infarction cannot be excluded. · SPECT: Left ventricular perfusion is abnormal. Myocardial perfusion imaging supports an intermediate risk stress test.    Signed by: Latrice Feldman MD on 11/29/2021  2:22 PM    11/24/21    CARDIAC PROCEDURE 11/29/2021 11/29/2021    Conclusion  Non obstructive epicardial coronary arteries with severe LV dysfunction. 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 Recommend intense risk factor modification. ICD if EF remains low inspite of optimal medical therapy. Signed by: Latrice Feldman MD on 11/29/2021  3:14 PM      XR Results (most recent):  Results from [place] encounter on 11/24/21    XR SPINE LUMB 2 OR 3 V    Narrative  XR SPINE LUMB 2 OR 3 V: 11/26/2021 4:08 PM    CLINICAL INFORMATION  back pain. COMPARISON  CT abdomen/pelvis 25 November 2021    TECHNIQUE  3 views of the lumbar spine    FINDINGS:  No fracture or destructive osseous lesion. Slight levoconvex curvature of the  lumbar spine. Straightening of the expected lumbar lordosis. The joint spaces  are maintained. Enteric contrast outlines portions of the colon. Impression  1. No acute osseous findings.         Signed By: Franklyn Hernandez PA-C     September 23, 2022

## 2022-09-23 NOTE — ED NOTES
Assumed care of patient. Laying in bed, laying left side, eyes closed, blankets covering up to shoulders. No respiratory distress observed. Skin is warm and dry to touch. Cardiac, SpO2 and blood pressure monitored. IV infusing. Will continue to monitor.

## 2022-09-23 NOTE — PROGRESS NOTES
Report received from Maude Cleveland RN-Emergency department   Pt is a/ox4, denies pain at this time, pt is on heparin gtt, soft bp, MD is aware. Pt is on normal saline 50cc/hr. Morning BP meds held d/t low bp. Will continue to monitor.

## 2022-09-23 NOTE — PROGRESS NOTES
Bedside and Verbal shift change report received from Cecille Pena RN (offgoing nurse). Report included the following information SBAR, Kardex, Intake/Output, Recent Results, and Cardiac Rhythm NSR .      2001: AOX4, on room  air, resting in bed, with regular, nonlabored breathing; denies any pain or other discomforts; v/s monitored; shift assessment done    2113: due meds given; no needs voiced    2230: resting comfortably; needs within reach    0020: BP 80's/50's; Dr Arthur Munguia informed; order for Midodrine 10 mg po noted and given    0200: sleeping comfortably    0605: assisted with bathroom use and back to bed; observed  fall precautions    0700: resting in bed, IVF infusing well    Bedside and Verbal shift change report given to David William RN (oncoming nurse) by Antoinette Soares RN (offgoing nurse).  Report included the following information SBAR, Kardex, Intake/Output, Recent Results, and Cardiac Rhythm NSR /Sinus Bradycardia    Wound Prevention Checklist    Patient: Columba Reece (47 y.o. female)  Date: 9/24/2022  Diagnosis: Elevated troponin [R77.8]  NSTEMI (non-ST elevated [facility]) [facility] [I21.4] <principal problem not specified>    Precautions:         []  Heel prevention boots placed on patient    []  Patient turned q2h during shift    []  Lift team ordered    [x]  Patient on [place] bed/Specialty bed    []  Each Wound is documented during shift (Stage, Color, drainage, odor, measurements, and dressings)    [x]  Dual skin check done with Alicia Soares RN

## 2022-09-23 NOTE — PROGRESS NOTES
Attempted echocardiogram at bedside. Patient off the floor at this time. Will re-attempt at a later time.  Thank you, Kenan Miller

## 2022-09-23 NOTE — PROGRESS NOTES
Patient tolerated coronary angiography without difficulty. Right radial approach was utilized. She has low normal LV function. She has LVEDP 5 mmHg which is likely consistent with hypovolemia. No significant, occlusive CAD is noted. She requires no further coronary evaluation at this time. No new cardiac recommendations at this time. We will be available as needed. Thank you.

## 2022-09-23 NOTE — PROGRESS NOTES
Right wrist D-STAT removed, no bleeding or swelling. Sterile hemostatic dressing applied. Normal radial pulse, normal distal circulation and neuro check. Safety instructions reviewed with the patient.

## 2022-09-23 NOTE — PROGRESS NOTES
Problem: Falls - Risk of  Goal: *Absence of Falls  Description: Document Renetta Reinoso Fall Risk and appropriate interventions in the flowsheet.   Outcome: Progressing Towards Goal  Note: Fall Risk Interventions:            Medication Interventions: Teach patient to arise slowly, Patient to call before getting OOB, Evaluate medications/consider consulting pharmacy                   Problem: Patient Education: Go to Patient Education Activity  Goal: Patient/Family Education  Outcome: Progressing Towards Goal     Problem: Patient Education: Go to Patient Education Activity  Goal: Patient/Family Education  Outcome: Progressing Towards Goal     Problem: Pain  Goal: *Control of Pain  Outcome: Progressing Towards Goal  Goal: *PALLIATIVE CARE:  Alleviation of Pain  Outcome: Progressing Towards Goal     Problem: Pain  Goal: *Control of Pain  Outcome: Progressing Towards Goal     Problem: Patient Education: Go to Patient Education Activity  Goal: Patient/Family Education  Outcome: Progressing Towards Goal     Problem: Patient Education: Go to Patient Education Activity  Goal: Patient/Family Education  Outcome: Progressing Towards Goal

## 2022-09-24 ENCOUNTER — APPOINTMENT (OUTPATIENT)
Dept: NON INVASIVE DIAGNOSTICS | Age: 67
DRG: 280 | End: 2022-09-24
Attending: PHYSICIAN ASSISTANT
Payer: MEDICARE

## 2022-09-24 VITALS
WEIGHT: 105 LBS | OXYGEN SATURATION: 98 % | BODY MASS INDEX: 18.61 KG/M2 | RESPIRATION RATE: 23 BRPM | DIASTOLIC BLOOD PRESSURE: 68 MMHG | HEIGHT: 63 IN | HEART RATE: 83 BPM | TEMPERATURE: 98.4 F | SYSTOLIC BLOOD PRESSURE: 124 MMHG

## 2022-09-24 LAB
ANION GAP SERPL CALC-SCNC: 5 MMOL/L (ref 3–18)
ATRIAL RATE: 54 BPM
BASOPHILS # BLD: 0.1 K/UL (ref 0–0.1)
BASOPHILS NFR BLD: 1 % (ref 0–2)
BUN SERPL-MCNC: 14 MG/DL (ref 7–18)
BUN/CREAT SERPL: 26 (ref 12–20)
CALCIUM SERPL-MCNC: 8.5 MG/DL (ref 8.5–10.1)
CALCULATED P AXIS, ECG09: 70 DEGREES
CALCULATED R AXIS, ECG10: -49 DEGREES
CALCULATED T AXIS, ECG11: -112 DEGREES
CHLORIDE SERPL-SCNC: 109 MMOL/L (ref 100–111)
CO2 SERPL-SCNC: 26 MMOL/L (ref 21–32)
CREAT SERPL-MCNC: 0.53 MG/DL (ref 0.6–1.3)
DIAGNOSIS, 93000: NORMAL
DIFFERENTIAL METHOD BLD: ABNORMAL
ECHO AO ROOT DIAM: 2.6 CM
ECHO AO ROOT INDEX: 1.77 CM/M2
ECHO AR MAX VEL PISA: 3.5 M/S
ECHO AV PEAK GRADIENT: 10 MMHG
ECHO AV PEAK VELOCITY: 1.6 M/S
ECHO AV REGURGITANT PHT: 923.9 MILLISECOND
ECHO AV VELOCITY RATIO: 0.56
ECHO EST RA PRESSURE: 8 MMHG
ECHO LA VOL 2C: 76 ML (ref 22–52)
ECHO LA VOL 4C: 45 ML (ref 22–52)
ECHO LA VOLUME AREA LENGTH: 63 ML
ECHO LA VOLUME INDEX A2C: 52 ML/M2 (ref 16–34)
ECHO LA VOLUME INDEX A4C: 31 ML/M2 (ref 16–34)
ECHO LA VOLUME INDEX AREA LENGTH: 43 ML/M2 (ref 16–34)
ECHO LV E' LATERAL VELOCITY: 7 CM/S
ECHO LV E' SEPTAL VELOCITY: 6 CM/S
ECHO LV FRACTIONAL SHORTENING: 29 % (ref 28–44)
ECHO LV INTERNAL DIMENSION DIASTOLE INDEX: 2.79 CM/M2
ECHO LV INTERNAL DIMENSION DIASTOLIC: 4.1 CM (ref 3.9–5.3)
ECHO LV INTERNAL DIMENSION SYSTOLIC INDEX: 1.97 CM/M2
ECHO LV INTERNAL DIMENSION SYSTOLIC: 2.9 CM
ECHO LV IVSD: 1.1 CM (ref 0.6–0.9)
ECHO LV MASS 2D: 171.7 G (ref 67–162)
ECHO LV MASS INDEX 2D: 116.8 G/M2 (ref 43–95)
ECHO LV POSTERIOR WALL DIASTOLIC: 1.3 CM (ref 0.6–0.9)
ECHO LV RELATIVE WALL THICKNESS RATIO: 0.63
ECHO LVOT MEAN GRADIENT: 1 MMHG
ECHO LVOT PEAK GRADIENT: 3 MMHG
ECHO LVOT PEAK VELOCITY: 0.9 M/S
ECHO LVOT VTI: 19.9 CM
ECHO PULMONARY ARTERY END DIASTOLIC PRESSURE: 21 MMHG
ECHO PULMONARY ARTERY END DIASTOLIC PRESSURE: 6 MMHG
ECHO PULMONARY ARTERY SYSTOLIC PRESSURE (PASP): 51 MMHG
ECHO PV REGURGITANT MAX VELOCITY: 1.2 M/S
ECHO RIGHT VENTRICULAR SYSTOLIC PRESSURE (RVSP): 51 MMHG
ECHO RV FREE WALL PEAK S': 10 CM/S
ECHO RV LONGITUDINAL DIMENSION: 3.5 CM
ECHO RV TAPSE: 2.5 CM (ref 1.7–?)
ECHO TV REGURGITANT MAX VELOCITY: 3.26 M/S
ECHO TV REGURGITANT PEAK GRADIENT: 42 MMHG
EOSINOPHIL # BLD: 0 K/UL (ref 0–0.4)
EOSINOPHIL NFR BLD: 0 % (ref 0–5)
ERYTHROCYTE [DISTWIDTH] IN BLOOD BY AUTOMATED COUNT: 14.1 % (ref 11.6–14.5)
GLUCOSE BLD STRIP.AUTO-MCNC: 76 MG/DL (ref 70–110)
GLUCOSE SERPL-MCNC: 81 MG/DL (ref 74–99)
HCT VFR BLD AUTO: 26 % (ref 35–45)
HGB BLD-MCNC: 8.6 G/DL (ref 12–16)
IMM GRANULOCYTES # BLD AUTO: 0 K/UL (ref 0–0.04)
IMM GRANULOCYTES NFR BLD AUTO: 0 % (ref 0–0.5)
LYMPHOCYTES # BLD: 2.9 K/UL (ref 0.9–3.6)
LYMPHOCYTES NFR BLD: 40 % (ref 21–52)
MCH RBC QN AUTO: 30.8 PG (ref 24–34)
MCHC RBC AUTO-ENTMCNC: 33.1 G/DL (ref 31–37)
MCV RBC AUTO: 93.2 FL (ref 78–100)
MONOCYTES # BLD: 0.7 K/UL (ref 0.05–1.2)
MONOCYTES NFR BLD: 9 % (ref 3–10)
NEUTS SEG # BLD: 3.6 K/UL (ref 1.8–8)
NEUTS SEG NFR BLD: 50 % (ref 40–73)
NRBC # BLD: 0 K/UL (ref 0–0.01)
NRBC BLD-RTO: 0 PER 100 WBC
P-R INTERVAL, ECG05: 136 MS
PLATELET # BLD AUTO: 225 K/UL (ref 135–420)
PMV BLD AUTO: 11 FL (ref 9.2–11.8)
POTASSIUM SERPL-SCNC: 3.5 MMOL/L (ref 3.5–5.5)
Q-T INTERVAL, ECG07: 592 MS
QRS DURATION, ECG06: 120 MS
QTC CALCULATION (BEZET), ECG08: 561 MS
RBC # BLD AUTO: 2.79 M/UL (ref 4.2–5.3)
SODIUM SERPL-SCNC: 140 MMOL/L (ref 136–145)
VENTRICULAR RATE, ECG03: 54 BPM
WBC # BLD AUTO: 7.3 K/UL (ref 4.6–13.2)

## 2022-09-24 PROCEDURE — 82962 GLUCOSE BLOOD TEST: CPT

## 2022-09-24 PROCEDURE — 93005 ELECTROCARDIOGRAM TRACING: CPT

## 2022-09-24 PROCEDURE — 97161 PT EVAL LOW COMPLEX 20 MIN: CPT

## 2022-09-24 PROCEDURE — 74011250637 HC RX REV CODE- 250/637: Performed by: INTERNAL MEDICINE

## 2022-09-24 PROCEDURE — 74011250636 HC RX REV CODE- 250/636: Performed by: INTERNAL MEDICINE

## 2022-09-24 PROCEDURE — 74011250637 HC RX REV CODE- 250/637: Performed by: PHYSICIAN ASSISTANT

## 2022-09-24 PROCEDURE — 74011000258 HC RX REV CODE- 258: Performed by: INTERNAL MEDICINE

## 2022-09-24 PROCEDURE — 94762 N-INVAS EAR/PLS OXIMTRY CONT: CPT

## 2022-09-24 PROCEDURE — 36415 COLL VENOUS BLD VENIPUNCTURE: CPT

## 2022-09-24 PROCEDURE — 74011000250 HC RX REV CODE- 250: Performed by: INTERNAL MEDICINE

## 2022-09-24 PROCEDURE — 99238 HOSP IP/OBS DSCHRG MGMT 30/<: CPT | Performed by: FAMILY MEDICINE

## 2022-09-24 PROCEDURE — 99232 SBSQ HOSP IP/OBS MODERATE 35: CPT | Performed by: INTERNAL MEDICINE

## 2022-09-24 PROCEDURE — 74011250637 HC RX REV CODE- 250/637: Performed by: FAMILY MEDICINE

## 2022-09-24 PROCEDURE — 80048 BASIC METABOLIC PNL TOTAL CA: CPT

## 2022-09-24 PROCEDURE — 93306 TTE W/DOPPLER COMPLETE: CPT

## 2022-09-24 PROCEDURE — 85025 COMPLETE CBC W/AUTO DIFF WBC: CPT

## 2022-09-24 RX ADMIN — ACETAMINOPHEN 650 MG: 325 TABLET, FILM COATED ORAL at 12:40

## 2022-09-24 RX ADMIN — METOPROLOL SUCCINATE 50 MG: 50 TABLET, EXTENDED RELEASE ORAL at 12:49

## 2022-09-24 RX ADMIN — SODIUM CHLORIDE, PRESERVATIVE FREE 10 ML: 5 INJECTION INTRAVENOUS at 05:59

## 2022-09-24 RX ADMIN — MIDODRINE HYDROCHLORIDE 10 MG: 5 TABLET ORAL at 00:20

## 2022-09-24 RX ADMIN — PIPERACILLIN AND TAZOBACTAM 3.38 G: 3; .375 INJECTION, POWDER, FOR SOLUTION INTRAVENOUS at 04:00

## 2022-09-24 RX ADMIN — PIPERACILLIN AND TAZOBACTAM 3.38 G: 3; .375 INJECTION, POWDER, FOR SOLUTION INTRAVENOUS at 12:38

## 2022-09-24 RX ADMIN — SODIUM CHLORIDE, PRESERVATIVE FREE 10 ML: 5 INJECTION INTRAVENOUS at 17:45

## 2022-09-24 RX ADMIN — MIDODRINE HYDROCHLORIDE 10 MG: 5 TABLET ORAL at 08:50

## 2022-09-24 RX ADMIN — ASPIRIN 81 MG CHEWABLE TABLET 81 MG: 81 TABLET CHEWABLE at 08:51

## 2022-09-24 RX ADMIN — ONDANSETRON 4 MG: 2 INJECTION INTRAMUSCULAR; INTRAVENOUS at 12:46

## 2022-09-24 RX ADMIN — POTASSIUM BICARBONATE 40 MEQ: 782 TABLET, EFFERVESCENT ORAL at 00:32

## 2022-09-24 RX ADMIN — AMLODIPINE BESYLATE 5 MG: 5 TABLET ORAL at 12:49

## 2022-09-24 RX ADMIN — LISINOPRIL 20 MG: 20 TABLET ORAL at 12:50

## 2022-09-24 NOTE — PROGRESS NOTES
PHYSICAL THERAPY EVALUATION AND DISCHARGE    Patient: Joana Mohan (50 y.o. female)  Date: 9/24/2022  Primary Diagnosis: Elevated troponin [R77.8]  NSTEMI (non-ST elevated myocardial infarction) ([facility] [address] [I21.4]  Procedure(s) (LRB):  LEFT HEART CATH (N/A)  CORONARY ANGIOGRAPHY (N/A)  LEFT VENTRICULOGRAPHY (N/A) 1 Day Post-Op   Precautions: scratching pain upon light touch on back       ASSESSMENT :  Based on the objective data described below, the patient presents with decreased functional capacity with recent cardiac issue. Patient presents with independent bed mobility, supine to sit, sit to stand and walking without need for assistance and/or AD. Patient reports skin irritation on back with light touch, reported to nursing staff and was suspected to be a psychosomatic origin by MD.    Patient does not require further skilled intervention at this level of care. PLAN :  Recommendations and Planned Interventions:  No formal PT needs identified at this time. Further Equipment Recommendations for Discharge: N/A    AMPAC: At this time and based on an AM-PAC score of 23/24, no further PT is recommended upon discharge due to (i.e. patient at baseline functional statusetc). Recommend patient returns to prior setting with prior services. This AMPAC score should be considered in conjunction with interdisciplinary team recommendations to determine the most appropriate discharge setting. Patient's social support, diagnosis, medical stability, and prior level of function should also be taken into consideration. SUBJECTIVE:   Patient stated scratching on my back.     OBJECTIVE DATA SUMMARY:     Past Medical History:   Diagnosis Date    Depression     Gastrointestinal disorder Pancreatitis    Pancreatic disease     Pancreatitis     Psychiatric disorder      Past Surgical History:   Procedure Laterality Date    HX GYN      HX HYSTERECTOMY       Barriers to Learning/Limitations: None  Compensate with: N/A  Home Situation:   Home Situation  Patient Expects to be Discharged to[de-identified] Home  Critical Behavior:  Neurologic State: Alert; Restless  Orientation Level: Oriented X4  Cognition: Follows commands     Psychosocial  Patient Behaviors: Restless; Appropriate for age  Purposeful Interaction: Yes  Pt Identified Daily Priority: Clinical issues (comment)  Caritas Process: Establish trust;Nurture loving kindness; Teaching/learning; Attend basic human needs;Create healing environment  Caring Interventions: Therapeutic modalities; Reassure  Reassure: Caring rounds  Therapeutic Modalities: Humor; Intentional therapeutic touch; Deep breathing    Strength:    Strength: Generally decreased, functional     Posture:  Posture (WDL): Exceptions to WDL  Posture Assessment: Forward head   Functional Mobility:  Bed Mobility:  Rolling: Independent  Supine to Sit: Independent  Sit to Supine: Independent  Scooting: Independent  Transfers:  Sit to Stand: Independent  Stand to Sit: Independent  Stand Pivot Transfers: Independent       Balance:   Sitting: Intact  Standing: Intact    Ambulation/Gait Training:  Gait Description (WDL): Within defined limits    Activity Tolerance:   Please refer to the flowsheet for vital signs taken during this treatment. After treatment:   []         Patient left in no apparent distress sitting up in chair  [x]         Patient left in no apparent distress in bed  [x]         Call bell left within reach  [x]         Nursing notified  []         Caregiver present  []         Bed alarm activated  []         SCDs applied    COMMUNICATION/EDUCATION:   [x]         Role of Physical Therapy in the acute care setting. [x]         Fall prevention education was provided and the patient/caregiver indicated understanding. [x]         Patient/family have participated as able in goal setting and plan of care. []         Patient/family agree to work toward stated goals and plan of care.   []         Patient understands intent and goals of therapy, but is neutral about his/her participation. []         Patient is unable to participate in goal setting/plan of care: ongoing with therapy staff.  []         Other: Thank you for this referral.  Hua Arciniega, PT   Time Calculation: 15 mins      Eval Complexity: History: MEDIUM  Complexity : 1-2 comorbidities / personal factors will impact the outcome/ POC Exam:LOW Complexity : 1-2 Standardized tests and measures addressing body structure, function, activity limitation and / or participation in recreation  Presentation: LOW Complexity : Stable, uncomplicated  Clinical Decision Making:Low Complexity    Overall Complexity:LOW     Kalie Vann AM-PAC® Basic Mobility Inpatient Short Form (6-Clicks) Version 2    How much HELP from another person does the patient currently need    (If the patient hasn't done an activity recently, how much help from another person do you think he/she would need if he/she tried?)   Total (Total A or Dep)   A Lot  (Mod to Max A)   A Little (Sup or Min A)   None (Mod I to I)   Turning from your back to your side while in a flat bed without using bedrails? [] 1 [] 2 [] 3 [x] 4   2. Moving from lying on your back to sitting on the side of a flat bed without using bedrails? [] 1 [] 2 [] 3 [x] 4   3. Moving to and from a bed to a chair (including a wheelchair)? [] 1 [] 2 [] 3 [x] 4   4. Standing up from a chair using your arms (e.g., wheelchair, or bedside chair)? [] 1 [] 2 [] 3 [x] 4   5. Walking in hospital room? [] 1 [] 2 [] 3 [x] 4   6. Climbing 3-5 steps with a railing?+   [] 1 [] 2 [x] 3 [] 4   +If stair climbing cannot be assessed, skip item #6. Sum responses from items 1-5. At this time and based on an AM-PAC score of 23/24, no further PT is recommended upon discharge due to (i.e. patient at baseline functional statusetc). Recommend patient returns to prior setting with prior services.

## 2022-09-24 NOTE — DISCHARGE INSTRUCTIONS
Cardiac Catheterization/Angiography Discharge Instructions    *Check the puncture site frequently for swelling or bleeding. If you see any bleeding, lie down and apply pressure over the area with a clean town or washcloth. Notify your doctor for any redness, swelling, drainage or oozing from the puncture site. Notify your doctor for any fever or chills. *If the leg or arm with the puncture becomes cold, numb or painful, call Dr Richa Rodriguez at  [phone]    *Activity should be limited for the next 48 hours. Climb stairs as little as possible and avoid any stooping, bending or strenuous activity for 48 hours. No heavy lifting (anything over 10 pounds) for three days. *Do not drive for 48 hours. *You may resume your usual diet. Drink more fluids than usual.    *Have a responsible person drive you home and stay with you for at least 24 hours after your heart catheterization/angiography. *You may remove the bandage from your Right and Arm in 24 hours. You may shower in 24 hours. No tub baths, hot tubs or swimming for one week. Do not place any lotions, creams, powders, ointments over the puncture site for one week. You may place a clean band-aid over the puncture site each day for 5 days. Change this daily. Dehydration: Care Instructions  Your Care Instructions  Dehydration happens when your body loses too much fluid. This might happen when you do not drink enough water or you lose large amounts of fluids from your body because of diarrhea, vomiting, or sweating. Severe dehydration can be life-threatening. Water and minerals called electrolytes help put your body fluids back in balance. Learn the early signs of fluid loss, and drink more fluids to prevent dehydration. Follow-up care is a key part of your treatment and safety. Be sure to make and go to all appointments, and call your doctor if you are having problems.  It's also a good idea to know your test results and keep a list of the medicines you take. How can you care for yourself at home? Drink plenty of fluids. Choose water and other clear liquids until you feel better. If you have kidney, heart, or liver disease and have to limit fluids, talk with your doctor before you increase the amount of fluids you drink. If you do not feel like eating or drinking, try taking small sips of water, sports drinks, or other rehydration drinks. Get plenty of rest.  To prevent dehydration  Add more fluids to your diet and daily routine, unless your doctor has told you not to. During hot weather, drink more fluids. Drink even more fluids if you exercise a lot. Stay away from drinks with alcohol. Watch for the symptoms of dehydration. These include:  A dry, sticky mouth. Not much urine. Dry and sunken eyes. Feeling very tired. Learn what problems can lead to dehydration. These include:  Diarrhea, fever, and vomiting. Any illness with a fever, such as pneumonia or the flu. Activities that cause heavy sweating, such as endurance races and heavy outdoor work in hot or humid weather. Certain medicines, such as cold and allergy pills (antihistamines), pills that remove water from the body (diuretics), and laxatives. Certain diseases, such as diabetes, cancer, and heart or kidney disease. When should you call for help? Call 911 anytime you think you may need emergency care. For example, call if:    You passed out (lost consciousness). Call your doctor now or seek immediate medical care if:    You are confused and cannot think clearly. You are dizzy or lightheaded, or you feel like you may faint. You have signs of needing more fluids. You have sunken eyes, a dry mouth, and you pass only a little urine. You cannot keep fluids down. You have diarrhea that lasts for more than a few days. Watch closely for changes in your health, and be sure to contact your doctor if:    You are not making tears.      Your skin is very dry and sags slowly back into place after you pinch it. Your mouth and eyes are very dry. Where can you learn more? Go to http://www.gray.com/  Enter Q814 in the search box to learn more about \"Dehydration: Care Instructions. \"  Current as of: July 1, 2021               Content Version: 13.2  © 2006-2022 [employer]. Care instructions adapted under license by [employer] (which disclaims liability or warranty for this information). If you have questions about a medical condition or this instruction, always ask your healthcare professional. [place] [address] any warranty or liability for your use of this information.

## 2022-09-24 NOTE — ROUTINE PROCESS
Wound Prevention Checklist    Patient: Ever Leyva (89 y.o. female)  Date: 9/23/2022  Diagnosis: Elevated troponin [R77.8]  [facility] (non[facility] [I21.4] <principal problem not specified>    Precautions:         []  Heel prevention boots placed on patient    [x]  Patient turned q2h during shift  Encourage repositioning    []  Lift team ordered    []  Patient on Murphy bed/Specialty bed    []  Each Wound is documented during shift (Stage, Color, drainage, odor, measurements, and dressings)    [x]  Dual skin check done with Pamella Osler. RN   Skin is intact but dry.        Cecille Pena RN

## 2022-09-24 NOTE — ROUTINE PROCESS
1230 Patient complained of tingling sensation  on her neck and something  bothering her on her throat and feels nauseous. Tylenol was given and zofran was given. BP was now elevated from this morning. Scheduled antihypertensive medications given. Will monitor. [address] worked with Patient. [phone] Patient still complaining of tingling sensations. It feels like something is running through her veins and now going to her shoulder blades. Patient's BP is better from noon time. Sister at bedside and saying this is all new for the Patient. Will inform Dr Richard Alan.

## 2022-09-24 NOTE — ROUTINE PROCESS
1900 Patient for discharge today. Discharge instruction given. Patient advised to stop taking norvasc and lisinopril until seen by PCP. Patient encouraged to manage nutrition. Instructed to follow-up with PCP and Cardiology and to call offices on Monday. Patient and sister verbalized understanding.

## 2022-09-24 NOTE — PROGRESS NOTES
[facility]  -  Progress Note      Patient: Twyla Mccauley MRN: [id number]  SSN: [id number]    YOB: 1955  Age: 75 y.o. Sex: female      Admit Date: 9/22/2022    Assessment:     Hospital Problems  Date Reviewed: 9/23/2022            Codes Class Noted POA    Elevated troponin ICD-10-CM: R77.8  ICD-9-CM: 790.6  9/23/2022 Unknown        Hyponatremia ICD-10-CM: E87.1  ICD-9-CM: 276.1  9/23/2022 Unknown        Leukocytosis ICD-10-CM: D72.829  ICD-9-CM: 288.60  9/23/2022 Unknown        Tachycardia ICD-10-CM: R00.0  ICD-9-CM: 785.0  9/23/2022 Unknown        Cystic disease of liver ICD-10-CM: Q44.6  ICD-9-CM: 573.8  9/23/2022 Unknown        Hypertension ICD-10-CM: I10  ICD-9-CM: 401.9  9/23/2022 Unknown        NSTEMI (non-ST elevated myocardial infarction) [facility]) ICD-10-CM: I21.4  ICD-9-CM: 410.70  11/25/2021 Unknown        QT prolongation ICD-10-CM: R94.31  ICD-9-CM: 794.31  11/25/2021 Yes        SIRS (systemic inflammatory response syndrome) ([facility] [address] ICD-10-CM: R65.10  ICD-9-CM: 995.90  2/21/2017 Yes         -N/V with associated diaphoresis. CT abdomen/pelvis negative for acute process.   -Elevated troponin, initially 1,495 and down trending. ECG with RBBB with worsening lateral ST changes. -A/c diastolic and systolic heart failure. Appears euvolemic at this time.   -Leukocytosis, unclear source. On IV Abx.   -Hypokalemic initially, now hyperkalemic with K+ 5.6?   -Prolonged QTc, 661.   -NICMY. Consider ICD if no improvement in EF despite optimal GDMT. Echo 11/2021, EF 30-35%, grade 2 LVDD. S/p LHC 11/2021, non obstructive CAD. 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 on Lisinopril, Toprol, lasix and Amlodipine as outpatient.    -Chronic RBBB  -H/o Syncope, 12 day monitor 1% PAC burden, 1% PVC burden.   -Active Tobacco abuse  -H/o EtOH abuse. Primary cardiologist is Dr. Darnell Shone:     Her cardiac status remains stable.   As noted previously, there is no significant CAD. Preliminary results on her echocardiogram shows overall normal LV function. No new cardiac recommendations at this time. Encourage smoking cessation. We will be available as needed. Thank you. Subjective:     No new complaints.      Objective:      Patient Vitals for the past 8 hrs:   Temp Pulse Resp BP SpO2   09/24/22 0950 -- -- -- 91/67 --   09/24/22 0810 97.3 °F (36.3 °C) -- -- -- --   09/24/22 0605 97.3 °F (36.3 °C) 77 19 91/67 96 %   09/24/22 0505 98 °F (36.7 °C) (!) 57 14 (!) 88/56 100 %         Patient Vitals for the past 96 hrs:   Weight   09/24/22 0950 47.6 kg (105 lb)   09/23/22 0944 47.6 kg (105 lb)         Intake/Output Summary (Last 24 hours) at 9/24/2022 1144  Last data filed at 9/24/2022 8217  Gross per 24 hour   Intake 1810 ml   Output 650 ml   Net 1160 ml       Physical Exam:  General:  alert, cooperative, no distress, appears stated age  Neck:  no JVD  Lungs:  clear to auscultation bilaterally  Heart:  regular rate and rhythm  Abdomen:  no guarding or rigidity  Extremities:  no edema    Data Review:     Labs: Results:       Chemistry Recent Labs     09/24/22  0742 09/23/22  1103 09/22/22  1643   GLU 81 88 112*    139 135*   K 3.5 3.4* 3.3*    106 98*   CO2 26 25 24   BUN 14 13 12   CREA 0.53* 0.73 0.75   CA 8.5 9.1 10.9*   MG  --  2.4 1.9   AGAP 5 8 13   BUCR 26* 18 16   AP  --  43* 56   TP  --  6.4 7.9   ALB  --  3.3* 4.4   GLOB  --  3.1 3.5   AGRAT  --  1.1 1.3      CBC w/Diff Recent Labs     09/24/22  0742 09/23/22  1103 09/23/22  0132   WBC 7.3 13.7* 16.7*   RBC 2.79* 3.86* 4.09*   HGB 8.6* 12.2 12.7   HCT 26.0* 36.5 37.4    285 331   GRANS 50 52 60   LYMPH 40 38 33   EOS 0 0 0      Cardiac Enzymes No results found for: CPK, CK, CKMMB, CKMB, RCK3, CKMBT, CKNDX, CKND1, MIKE, TROPT, TROIQ, STEPHAN, TROPT, TNIPOC, BNP, BNPP   Coagulation Recent Labs     09/23/22  0900 09/23/22  0132 09/22/22  1643   PTP  --   --  12.4   INR  --   -- 0. 9   APTT >180.0* 60.0* 31.2       Lipid Panel Lab Results   Component Value Date/Time    Cholesterol, total 195 11/25/2021 04:42 AM    HDL Cholesterol 48 11/25/2021 04:42 AM    LDL, calculated 126.4 (H) 11/25/2021 04:42 AM    VLDL, calculated 20.6 11/25/2021 04:42 AM    Triglyceride 103 11/25/2021 04:42 AM    CHOL/HDL Ratio 4.1 11/25/2021 04:42 AM      BNP No results found for: BNP, BNPP, XBNPT   Liver Enzymes Recent Labs     09/23/22  1103   TP 6.4   ALB 3.3*   AP 43*      Digoxin    Thyroid Studies Lab Results   Component Value Date/Time    T4, Total 6.5 05/02/2017 11:11 AM    TSH 3.11 09/23/2022 11:03 AM

## 2022-09-24 NOTE — PROGRESS NOTES
Problem: Falls - Risk of  Goal: *Absence of Falls  Description: Document Salena Swanson Fall Risk and appropriate interventions in the flowsheet.   9/23/2022 2249 by Juan Carlos Pathak RN  Outcome: Progressing Towards Goal  Note: Fall Risk Interventions:            Medication Interventions: Bed/chair exit alarm, Evaluate medications/consider consulting pharmacy, Patient to call before getting OOB    Elimination Interventions: Bed/chair exit alarm, Call light in reach, Patient to call for help with toileting needs, Toileting schedule/hourly rounds, Stay With Me (per policy)           0/98/9865 2247 by Juan Carlos Pathak RN  Outcome: Progressing Towards Goal  Note: Fall Risk Interventions:            Medication Interventions: Bed/chair exit alarm, Evaluate medications/consider consulting pharmacy, Patient to call before getting OOB    Elimination Interventions: Bed/chair exit alarm, Call light in reach, Patient to call for help with toileting needs, Toileting schedule/hourly rounds, Stay With Me (per policy)              Problem: Patient Education: Go to Patient Education Activity  Goal: Patient/Family Education  9/23/2022 2249 by Juan Carlos Pathak RN  Outcome: Progressing Towards Goal  9/23/2022 2247 by Juan Carlos Pathak RN  Outcome: Progressing Towards Goal     Problem: Pain  Goal: *Control of Pain  9/23/2022 2249 by Juan Carlos Pathak RN  Outcome: Progressing Towards Goal  9/23/2022 2247 by Juan Carlos Pathak RN  Outcome: Progressing Towards Goal  Goal: *PALLIATIVE CARE:  Alleviation of Pain  Outcome: Progressing Towards Goal     Problem: Patient Education: Go to Patient Education Activity  Goal: Patient/Family Education  9/23/2022 2249 by Juan Carlos Pathak RN  Outcome: Progressing Towards Goal  9/23/2022 2247 by Juan Carlos Pathak RN  Outcome: Progressing Towards Goal     Problem: Unstable Angina/NSTEMI: Discharge Outcomes  Goal: *Hemodynamically stable  Outcome: Progressing Towards Goal  Goal: *Stable cardiac rhythm  Outcome: Progressing Towards Goal  Goal: *Lungs clear or at baseline  Outcome: Progressing Towards Goal  Goal: *Optimal pain control at patient's stated goal  Outcome: Progressing Towards Goal  Goal: *Identifies cardiac risk factors  Outcome: Progressing Towards Goal  Goal: *Verbalizes home exercise program, activity guidelines, cardiac precautions  Outcome: Progressing Towards Goal  Goal: *Verbalizes understanding and describes prescribed diet  Outcome: Progressing Towards Goal  Goal: *Verbalizes name, dosage, time, side effects, and number of days to continue medications  Outcome: Progressing Towards Goal  Goal: *Anxiety reduced or absent  Outcome: Progressing Towards Goal  Goal: *Understands and describes signs and symptoms to report to providers(Stroke Metric)  Outcome: Progressing Towards Goal  Goal: *Describes follow-up/return visits to physicians  Outcome: Progressing Towards Goal  Goal: *Describes available resources and support systems  Outcome: Progressing Towards Goal  Goal: *Describes smoking cessation resources  Outcome: Progressing Towards Goal

## 2022-09-24 NOTE — PROGRESS NOTES
D/C order noted for today. Orders reviewed. No needs identified at this time. CM remains available if needed.            Jimmie Skaggs, [phone]

## 2022-09-24 NOTE — ROUTINE PROCESS
8072 Received report from [facility]. Patient alert and oriented x 4. Denies any pain at this time.

## 2022-09-25 LAB — LACTATE BLD-SCNC: 1.62 MMOL/L (ref 0.4–2)

## 2022-10-09 NOTE — DISCHARGE SUMMARY
Discharge Summary    Patient: Jose Miller MRN: [id number]  CSN: [id number]    YOB: 1955  Age: 75 y.o. Sex: female    DOA: 9/22/2022 LOS:  LOS: 1 day   Discharge Date: 9/24/2022     Admission Diagnoses: Elevated troponin [R77.8]  NSTEMI (non-ST elevated myocardial infarction) [facility]) [I21.4]    Discharge Diagnoses:    Elevated troponin, no significant CAD noted on catheterization  Acute on chronic combined CHF  Hypokalemia on admission, now mildly hyperkalemic  Cardiomyopathy, nonischemic, EF 30-35% 11/2021 ----> 55-60% 9/2022     Discharge Condition: Stable    PHYSICAL EXAM  Visit Vitals  /68   Pulse 83   Temp 98.4 °F (36.9 °C)   Resp 23   Ht 5' 3\" (1.6 m)   Wt 47.6 kg (105 lb)   SpO2 98%   Breastfeeding No   BMI 18.60 kg/m²       General: In NAD. Nontoxic-appearing. HEENT: NCAT. Sclerae anicteric. Lungs:  Clear, no wheezes. No accessory muscle use. Heart:  RRR. Abdomen: Soft, NT/ND. Extremities: Warm, no edema or ischemia. Psych:   Mood normal.  Neurologic:  Awake and alert, moves extremities spontaneously. Motor grossly nonfocal.    Hospital Course:   See admission H&P for full details of HPI. Patient was admitted to the hospital after presenting to the emergency department for evaluation of dizziness and vomiting. Troponin levels were noted to be elevated and cardiology evaluation was obtained. Patient underwent cardiac catheterization with results as documented below. No further cardiac work-up has been recommended and patient is felt to be medically stable for discharge home with outpatient follow-up as advised. Consults:   Cardiology    Significant Diagnostic Studies/Procedures:   2D echo: Interpretation Summary    Result status: Final result       Left Ventricle: Normal left ventricular systolic function with a visually estimated EF of 55 - 60%. Left ventricle size is normal. Mildly increased wall thickness. See diagram for wall motion findings. Aortic Valve:  Moderate regurgitation. Pulmonic Valve: Moderate regurgitation. Tricuspid Valve: Moderate regurgitation. Mitral Valve: Mild regurgitation. Left Atrium: Left atrium is moderately dilated. LA Vol Index A/L is 43 mL/m2. Pulmonary Arteries: Moderate pulmonary hypertension present. The estimated PASP is 51 mmHg. Technical qualifiers: Echo study was technically difficult due to patient's heart rhythm. Comparison Study Information    Prior Study    There is a prior study available for comparison. As compared to the previous study, there are significant changes. LV function has increased. Pulmonary hypertension has increased. CXR:  FINDINGS:     The cardiac silhouette appears within normal limits. Moderate aortic arch  atherosclerosis. Pulmonary vasculature appears within normal limits. Mild residual right basilar streaky interstitial opacities, mildly improved from  prior, likely some residual atelectasis/infiltrate. No definite evidence of pleural effusion or pneumothorax. No acute osseous abnormality appreciated. IMPRESSION  Mild right basilar atelectasis versus mild residual infiltrate, somewhat  improved from prior. Dictated by Debbie Khan MD, PGY-2     As the attending radiologist, I have assessed the study images and dictated or  reviewed/edited the final report as needed. CT abdomen/pelvis:  COMPARISON: CT abdomen and pelvis without contrast 5/1/2022. FINDINGS:   Lung Base: Unremarkable. Liver: Subcentimeter hypodensity in the liver are size limited but likely cysts. Hyperdense foci at the dome of the right lobe measuring up to 13 mm is stable  since at least 2021, possibly flash filling hemangiomas, not fully  characterized. Biliary: Unremarkable. Spleen: Unremarkable. Pancreas: Several calcifications along the posterior pancreatic head suggests a  sequela of old chronic pancreatitis.  Small hypodensity measuring 5 mm at  inferior posterior pancreatic head on image 31, stable to prior. No ductal  dilation. No peripancreatic stranding. Adrenal Glands: Unremarkable. Kidneys: Small cysts. 3 mm nonobstructing left intrarenal calculus. Bowel: Unremarkable. The appendix is not enlarged. Bladder/ Pelvic Organs: Bladder is unremarkable. Hysterectomy. Peritoneum/ Retroperitoneum: Trace free fluid, which may contribute to hazy  appearance of the mesenteric and paracolic gutter fat. Similar appearance prior. Lymph Nodes: Unremarkable. Vessels: Arterial wall calcifications without aortic aneurysm. Bones/Soft tissues: Small fat-containing bilateral inguinal hernias. Mild lumbar  vertebral osteophytes. IMPRESSION     1. No definite acute findings. Trace nonspecific free fluid of uncertain  significance. 2. Mild sequela of chronic pancreatitis. A small hypodensity in the pancreatic  head is stable, possible small cyst. Nonemergent MR abdomen assessment could be  utilized. 3. Nonobstructing left nephrolithiasis. 4. Chronic liver lesions, likely representing small cysts. Right hepatic dome  stable hyperenhancing/filling hemangiomas or vascular shunts. 5. Other stable chronic findings as above. Cardiac catheterization:  Conclusion    Right dominant system. Dominant RCA is widely patent. Left main is widely patent. Circumflex is widely patent. LAD is widely patent. There is a mid segment bridging noted. LVEDP is 5 mmHg. Overall left ventricular systolic function is low normal with EF around 50%. There is mild hypokinesis involving the apex and inferior apex. No further coronary evaluation needed at this time. Discharge Medications:     Discharge Medication List as of 9/24/2022  6:36 PM        CONTINUE these medications which have NOT CHANGED    Details   mirtazapine (REMERON) 45 mg tablet Take 45 mg by mouth nightly., Historical Med      ferrous sulfate (FeroSuL) 325 mg (65 mg iron) tablet Take 325 mg by mouth Daily (before breakfast). , Historical Med      divalproex DR (Depakote) 250 mg tablet Take 250 mg by mouth two (2) times a day., Historical Med      potassium chloride SR (KLOR-CON 10) 10 mEq tablet Take 10 mEq by mouth daily. , Historical Med      metoprolol succinate (TOPROL-XL) 50 mg XL tablet Take 1 Tablet by mouth daily. , Normal, Disp-90 Tablet, R-2      aspirin delayed-release 81 mg tablet Take 1 Tablet by mouth daily. , Normal, Disp-100 Tablet, R-2           STOP taking these medications       lisinopriL (PRINIVIL, ZESTRIL) 20 mg tablet Comments:   Reason for Stopping:         amLODIPine (NORVASC) 5 mg tablet Comments:   Reason for Stopping:                 Activity: As tolerated. Diet: Cardiac    Disposition:.  Home. Follow-up: with PCP in 1 week and cardiology as directed. Maryam Bonilla MD  [address] [place], [place] [address]ists    Disclaimer: Sections of this note are dictated using utilizing voice recognition software. Minor typographical errors may be present. If questions arise, please do not hesitate to contact me or call our department.

## 2022-10-14 ENCOUNTER — OFFICE VISIT (OUTPATIENT)
Dept: CARDIOLOGY CLINIC | Age: 67
End: 2022-10-14
Payer: MEDICARE

## 2022-10-14 VITALS
HEIGHT: 63 IN | SYSTOLIC BLOOD PRESSURE: 115 MMHG | DIASTOLIC BLOOD PRESSURE: 56 MMHG | BODY MASS INDEX: 16.48 KG/M2 | HEART RATE: 63 BPM | WEIGHT: 93 LBS

## 2022-10-14 DIAGNOSIS — I27.20 PULMONARY HYPERTENSION (HCC): ICD-10-CM

## 2022-10-14 DIAGNOSIS — I45.10 RBBB: ICD-10-CM

## 2022-10-14 DIAGNOSIS — I08.0 MITRAL AND AORTIC REGURGITATION: ICD-10-CM

## 2022-10-14 DIAGNOSIS — I42.8 NONISCHEMIC CARDIOMYOPATHY (HCC): ICD-10-CM

## 2022-10-14 DIAGNOSIS — I50.22 SYSTOLIC CHF, CHRONIC (HCC): Primary | ICD-10-CM

## 2022-10-14 PROCEDURE — 1111F DSCHRG MED/CURRENT MED MERGE: CPT | Performed by: INTERNAL MEDICINE

## 2022-10-14 PROCEDURE — G8419 CALC BMI OUT NRM PARAM NOF/U: HCPCS | Performed by: INTERNAL MEDICINE

## 2022-10-14 PROCEDURE — 1123F ACP DISCUSS/DSCN MKR DOCD: CPT | Performed by: INTERNAL MEDICINE

## 2022-10-14 PROCEDURE — G8400 PT W/DXA NO RESULTS DOC: HCPCS | Performed by: INTERNAL MEDICINE

## 2022-10-14 PROCEDURE — G8427 DOCREV CUR MEDS BY ELIG CLIN: HCPCS | Performed by: INTERNAL MEDICINE

## 2022-10-14 PROCEDURE — G8536 NO DOC ELDER MAL SCRN: HCPCS | Performed by: INTERNAL MEDICINE

## 2022-10-14 PROCEDURE — G9717 DOC PT DX DEP/BP F/U NT REQ: HCPCS | Performed by: INTERNAL MEDICINE

## 2022-10-14 PROCEDURE — 99214 OFFICE O/P EST MOD 30 MIN: CPT | Performed by: INTERNAL MEDICINE

## 2022-10-14 PROCEDURE — 3017F COLORECTAL CA SCREEN DOC REV: CPT | Performed by: INTERNAL MEDICINE

## 2022-10-14 PROCEDURE — 1090F PRES/ABSN URINE INCON ASSESS: CPT | Performed by: INTERNAL MEDICINE

## 2022-10-14 PROCEDURE — 1101F PT FALLS ASSESS-DOCD LE1/YR: CPT | Performed by: INTERNAL MEDICINE

## 2022-10-14 RX ORDER — DOCUSATE SODIUM 100 MG/1
100 CAPSULE, LIQUID FILLED ORAL 2 TIMES DAILY
COMMUNITY

## 2022-10-14 RX ORDER — FLUOXETINE HYDROCHLORIDE 20 MG/1
CAPSULE ORAL DAILY
COMMUNITY

## 2022-10-14 NOTE — PROGRESS NOTES
HISTORY OF PRESENT ILLNESS  Shane Calvert is a 75 y.o. female. 1/7/2022  Patient presents to f/u for admission to [facility] 11/24-11/30/2021 for syncope and found to have non-ischemic cardiomyopathy EF 30-35%, cardiac cath revealed Non obstructive epicardial coronary arteries with severe LV dysfunction. Since discharge she reports intermittent bilateral lower extremity edema. She denies chest pain, shortness of breath or palpitations  2/23/2022  Patient presents to f/u for [facility]. She denies chest pain, shortness of breath, palpitations or edema. 10/2022  Patient is here for follow-up she was recently admitted with non-STEMI. Cardiac cath showed no significant CAD. Ejection fraction was reported 50% by cath. Follow-up  The history is provided by the Patient and medical records. Pertinent negatives include no chest pain and no shortness of breath. Review of Systems   Constitutional:  Negative for malaise/fatigue. HENT:  Negative for congestion. Eyes:  Negative for double vision and redness. Respiratory:  Negative for cough, shortness of breath and wheezing. Cardiovascular:  Negative for chest pain, palpitations, orthopnea, claudication, leg swelling and PND. Gastrointestinal:  Negative for nausea and vomiting. Musculoskeletal:  Negative for falls. Neurological:  Negative for dizziness. Endo/Heme/Allergies:  Does not bruise/bleed easily. Physical Exam  Vitals and nursing note reviewed. Constitutional:       Appearance: She is well-developed. Neck:      Vascular: No JVD. Cardiovascular:      Rate and Rhythm: Normal rate and regular rhythm. Pulses: Intact distal pulses. Heart sounds: Normal heart sounds. No murmur heard. No friction rub. No gallop. Pulmonary:      Effort: Pulmonary effort is normal. No respiratory distress. Breath sounds: Normal breath sounds. No wheezing or rales. Chest:      Chest wall: No tenderness.    Abdominal:      General: There is no distension. Palpations: Abdomen is soft. There is no mass. Tenderness: There is no abdominal tenderness. Musculoskeletal:         General: Normal range of motion. Right lower leg: No edema. Left lower leg: No edema. Skin:     General: Skin is warm and dry. Neurological:      Mental Status: She is alert and oriented to person, place, and time. Echo 11/2021  Interpretation Summary    LV: Estimated LVEF is 30 - 35%. Visually measured ejection fraction. Normal cavity size. Moderate concentric hypertrophy. Moderately reduced systolic function. Moderate (grade 2) left ventricular diastolic dysfunction. LA: Moderately dilated left atrium. Left Atrium volume index is 45 mL/m2. AV: Aortic valve focal thickening present. With no significant stenosis. Mild to moderate aortic valve regurgitation is present. TV: Mild tricuspid valve regurgitation is present. Right Ventricular Arterial Pressure (RVSP) is 28 mmHg. Pulmonary hypertension not suggested by Doppler findings. PV: Mild to moderate pulmonic valve regurgitation is present. Comparison Study Information      Prior Study    There is no prior study available for comparison     11/2021  NST  Procedure Conclusion  Nuclear Stress Test  Nuclear Cardiac Spect Rest then Gated Stress with tomographic imaging/tomography utilized for the tomographic myocardical perfusion imaging performed. Ledbetter Boer was used as the stressing method and agent. (Ledbetter Boer given via a 10 - 20 sec injection.) Left ventricular perfusion is abnormal. Myocardial perfusion imaging supports an intermediate risk stress test.   There is no prior study available for comparison. . This Single Photon Emission Computer Tomograph (SPECT) study utilized tomographic imaging/tomography for the tomographic myocardial perfusion imaging performed during this study. 11/30/2021  Holter monitor  Interpretation Summary   12 events were transmitted.  0 patient triggered; 12 auto triggered   Patient monitored for 6d 23h 45m   865 PACs with PAC burden of <1%   515 PVCs with PVC burden of <1%    Indications 9/2022    Coronary artery disease involving native heart, unspecified vessel or lesion type, unspecified whether angina present [I25.10 (ICD-10-CM)]     Conclusion    Right dominant system. Dominant RCA is widely patent. Left main is widely patent. Circumflex is widely patent. LAD is widely patent. There is a mid segment bridging noted. LVEDP is 5 mmHg. Overall left ventricular systolic function is low normal with EF around 50%. There is mild hypokinesis involving the apex and inferior apex. No further coronary evaluation needed at this time. Interpretation Summary 9/2022         Left Ventricle: Normal left ventricular systolic function with a visually estimated EF of 55 - 60%. Left ventricle size is normal. Mildly increased wall thickness. See diagram for wall motion findings. Aortic Valve: Moderate regurgitation. Pulmonic Valve: Moderate regurgitation. Tricuspid Valve: Moderate regurgitation. Mitral Valve: Mild regurgitation. Left Atrium: Left atrium is moderately dilated. LA Vol Index A/L is 43 mL/m2. Pulmonary Arteries: Moderate pulmonary hypertension present. The estimated PASP is 51 mmHg. Technical qualifiers: Echo study was technically difficult due to patient's heart rhythm. ASSESSMENT and PLAN  Ms. Neetu Mott has a reminder for a \"due or due soon\" health maintenance. I have asked that she contact her primary care provider for follow-up on this health maintenance. No flowsheet data found. Assessment         ICD-10-CM ICD-9-CM    1. Systolic CHF, chronic (HCC)  I50.22 428.22      428.0     Stable compensated continue treatment      2. Nonischemic cardiomyopathy (HCC)  I42.8 425.4     Improving ejection fraction continue treatment      3.  Mitral and aortic regurgitation  I08.0 396.3     Moderate aortic and mild mitral regurgitation recent echo continue monitoring      4. Pulmonary hypertension (HCC)  I27.20 416.8     Moderate. Multifactorial monitor. LVEDP reported 5 last cardiac cath      5. RBBB  I45.10 426.4     Old stable        1/2022  Patient recently admitted for syncope and found to have NSTEMI with nonischemic cardiomyopathy ejection fraction 30-35%. Cardiac cath reviewed and discussed with patient no epicardial stenosis noted. We will continue to optimize medications continue beta-blocker daily, will increase lisinopril to 20 mg/day, encourage low-sodium diet less than 2000 mg/day. Will Rx Lasix 20 mg as needed for edema. Check BMP in 1 week. 2/2022  Patient seen in follow-up for nonischemic cardiomyopathy. Currently asymptomatic. Recent BMP reviewed and discussed with patient normal renal function with high normal potassium. Will discontinue potassium. She has not needed Lasix since last office visit. Blood pressure remains mildly elevated will increase lisinopril to 40 mg/day repeat BMP in 1 week. We will continue goal directed medical therapy and uptitrate treatment medications as blood pressure will allow. Repeat limited echo after follow-up visit to reevaluate LV function. If EF remains less than 35% consider ICD for primary prevention. 10/2022  CHF compensated recent admission with non-STEMI no significant CAD LVEF has been improving. Moderate pulmonary hypertension likely group 3. We will continue to monitor. Low LVEDP during that time. Advised smoking cessation. Lisinopril and amlodipine were discontinued due to low blood pressure in the hospital blood pressure low normal will continue with Toprol as LVEF has improved  There are no discontinued medications. No orders of the defined types were placed in this encounter. Follow-up and Dispositions    Return in about 6 months (around 4/14/2023).

## 2022-10-14 NOTE — PROGRESS NOTES
1. Have you been to the ER, urgent care clinic since your last visit? Hospitalized since your last visit? Yes When: 90/2022 Where: [facility] Reason for visit:     2. Have you seen or consulted any other health care providers outside of the [address] [place] [address] since your last visit? Include any pap smears or colon screening.       Yes Where: Dr. Dominga Bernstein

## 2022-10-23 PROBLEM — R79.89 ELEVATED TROPONIN: Status: RESOLVED | Noted: 2022-09-23 | Resolved: 2022-10-23

## 2022-10-23 PROBLEM — R77.8 ELEVATED TROPONIN: Status: RESOLVED | Noted: 2022-09-23 | Resolved: 2022-10-23

## 2022-11-04 RX ORDER — METOPROLOL SUCCINATE 50 MG/1
TABLET, EXTENDED RELEASE ORAL
Qty: 90 TABLET | Refills: 2 | Status: SHIPPED | OUTPATIENT
Start: 2022-11-04

## 2023-03-13 RX ORDER — FUROSEMIDE 20 MG/1
TABLET ORAL
Qty: 60 TABLET | Refills: 3 | Status: SHIPPED | OUTPATIENT
Start: 2023-03-13

## 2023-03-13 RX ORDER — LISINOPRIL 20 MG/1
TABLET ORAL
Qty: 90 TABLET | Refills: 3 | Status: SHIPPED | OUTPATIENT
Start: 2023-03-13

## 2023-03-13 RX ORDER — ASPIRIN 81 MG/1
TABLET, COATED ORAL
Qty: 90 TABLET | Refills: 2 | Status: SHIPPED | OUTPATIENT
Start: 2023-03-13

## 2023-03-24 ENCOUNTER — HOSPITAL ENCOUNTER (EMERGENCY)
Facility: HOSPITAL | Age: 68
Discharge: HOME HEALTH | End: 2023-03-24

## 2023-03-24 VITALS
SYSTOLIC BLOOD PRESSURE: 109 MMHG | OXYGEN SATURATION: 100 % | WEIGHT: 96 LBS | TEMPERATURE: 98.7 F | HEIGHT: 62 IN | BODY MASS INDEX: 17.66 KG/M2 | HEART RATE: 79 BPM | DIASTOLIC BLOOD PRESSURE: 85 MMHG | RESPIRATION RATE: 18 BRPM

## 2023-03-24 LAB
ALBUMIN SERPL-MCNC: 3.8 G/DL (ref 3.4–5)
ALBUMIN/GLOB SERPL: 1.1 (ref 0.8–1.7)
ALP SERPL-CCNC: 38 U/L (ref 45–117)
ALT SERPL-CCNC: 23 U/L (ref 13–56)
ANION GAP SERPL CALC-SCNC: 8 MMOL/L (ref 3–18)
AST SERPL-CCNC: 18 U/L (ref 10–38)
BASOPHILS # BLD: 0.1 K/UL (ref 0–0.1)
BASOPHILS NFR BLD: 1 % (ref 0–2)
BILIRUB SERPL-MCNC: 0.6 MG/DL (ref 0.2–1)
BUN SERPL-MCNC: 34 MG/DL (ref 7–18)
BUN/CREAT SERPL: 32 (ref 12–20)
CALCIUM SERPL-MCNC: 10.1 MG/DL (ref 8.5–10.1)
CHLORIDE SERPL-SCNC: 100 MMOL/L (ref 100–111)
CO2 SERPL-SCNC: 27 MMOL/L (ref 21–32)
CREAT SERPL-MCNC: 1.07 MG/DL (ref 0.6–1.3)
DIFFERENTIAL METHOD BLD: ABNORMAL
EOSINOPHIL # BLD: 0 K/UL (ref 0–0.4)
EOSINOPHIL NFR BLD: 0 % (ref 0–5)
ERYTHROCYTE [DISTWIDTH] IN BLOOD BY AUTOMATED COUNT: 12.5 % (ref 11.6–14.5)
GLOBULIN SER CALC-MCNC: 3.5 G/DL (ref 2–4)
GLUCOSE SERPL-MCNC: 104 MG/DL (ref 74–99)
HCT VFR BLD AUTO: 38 % (ref 35–45)
HGB BLD-MCNC: 13 G/DL (ref 12–16)
IMM GRANULOCYTES # BLD AUTO: 0 K/UL (ref 0–0.04)
IMM GRANULOCYTES NFR BLD AUTO: 0 % (ref 0–0.5)
LYMPHOCYTES # BLD: 3.5 K/UL (ref 0.9–3.6)
LYMPHOCYTES NFR BLD: 33 % (ref 21–52)
MCH RBC QN AUTO: 30.7 PG (ref 24–34)
MCHC RBC AUTO-ENTMCNC: 34.2 G/DL (ref 31–37)
MCV RBC AUTO: 89.8 FL (ref 78–100)
MONOCYTES # BLD: 0.9 K/UL (ref 0.05–1.2)
MONOCYTES NFR BLD: 9 % (ref 3–10)
NEUTS SEG # BLD: 6.2 K/UL (ref 1.8–8)
NEUTS SEG NFR BLD: 58 % (ref 40–73)
NRBC # BLD: 0 K/UL (ref 0–0.01)
NRBC BLD-RTO: 0 PER 100 WBC
PLATELET # BLD AUTO: 189 K/UL (ref 135–420)
PMV BLD AUTO: 11.9 FL (ref 9.2–11.8)
POTASSIUM SERPL-SCNC: 3.6 MMOL/L (ref 3.5–5.5)
PROT SERPL-MCNC: 7.3 G/DL (ref 6.4–8.2)
RBC # BLD AUTO: 4.23 M/UL (ref 4.2–5.3)
SODIUM SERPL-SCNC: 135 MMOL/L (ref 136–145)
WBC # BLD AUTO: 10.7 K/UL (ref 4.6–13.2)

## 2023-03-24 PROCEDURE — 80053 COMPREHEN METABOLIC PANEL: CPT

## 2023-03-24 PROCEDURE — 85025 COMPLETE CBC W/AUTO DIFF WBC: CPT

## 2023-03-24 PROCEDURE — 4500000002 HC ER NO CHARGE

## 2023-03-24 ASSESSMENT — PAIN - FUNCTIONAL ASSESSMENT: PAIN_FUNCTIONAL_ASSESSMENT: NONE - DENIES PAIN

## 2023-03-25 NOTE — ED NOTES
Pt approached the desk stating that she would like to leave- states she has to go home. Ambulatory without difficulty. IV removed.        Apollo White RN  03/24/23 3520       Apollo White RN  03/24/23 [id number]

## 2023-03-30 ENCOUNTER — HOSPITAL ENCOUNTER (INPATIENT)
Facility: HOSPITAL | Age: 68
LOS: 1 days | Discharge: HOME | End: 2023-03-31
Attending: EMERGENCY MEDICINE | Admitting: HOSPITALIST
Payer: MEDICARE

## 2023-03-30 ENCOUNTER — APPOINTMENT (OUTPATIENT)
Facility: HOSPITAL | Age: 68
End: 2023-03-30
Payer: MEDICARE

## 2023-03-30 DIAGNOSIS — E87.1 HYPONATREMIA SYNDROME: Primary | ICD-10-CM

## 2023-03-30 DIAGNOSIS — R55 SYNCOPE, UNSPECIFIED SYNCOPE TYPE: ICD-10-CM

## 2023-03-30 LAB
ALBUMIN SERPL-MCNC: 3.3 G/DL (ref 3.4–5)
ALBUMIN/GLOB SERPL: 1.2 (ref 0.8–1.7)
ALP SERPL-CCNC: 42 U/L (ref 45–117)
ALT SERPL-CCNC: 16 U/L (ref 13–56)
AMMONIA PLAS-SCNC: <10 UMOL/L (ref 11–32)
AMPHET UR QL SCN: NEGATIVE
ANION GAP SERPL CALC-SCNC: 5 MMOL/L (ref 3–18)
APPEARANCE UR: CLEAR
AST SERPL-CCNC: 19 U/L (ref 10–38)
BACTERIA URNS QL MICRO: NEGATIVE /HPF
BARBITURATES UR QL SCN: NEGATIVE
BASOPHILS # BLD: 0 K/UL (ref 0–0.1)
BASOPHILS NFR BLD: 0 % (ref 0–2)
BENZODIAZ UR QL: NEGATIVE
BILIRUB SERPL-MCNC: 0.2 MG/DL (ref 0.2–1)
BILIRUB UR QL: NEGATIVE
BUN SERPL-MCNC: 37 MG/DL (ref 7–18)
BUN/CREAT SERPL: 29 (ref 12–20)
CALCIUM SERPL-MCNC: 9 MG/DL (ref 8.5–10.1)
CANNABINOIDS UR QL SCN: POSITIVE
CHLORIDE SERPL-SCNC: 95 MMOL/L (ref 100–111)
CO2 SERPL-SCNC: 29 MMOL/L (ref 21–32)
COCAINE UR QL SCN: NEGATIVE
COLOR UR: YELLOW
CREAT SERPL-MCNC: 1.29 MG/DL (ref 0.6–1.3)
DIFFERENTIAL METHOD BLD: ABNORMAL
EKG ATRIAL RATE: 53 BPM
EKG DIAGNOSIS: NORMAL
EKG P AXIS: 59 DEGREES
EKG P-R INTERVAL: 178 MS
EKG Q-T INTERVAL: 472 MS
EKG QRS DURATION: 132 MS
EKG QTC CALCULATION (BAZETT): 442 MS
EKG R AXIS: 32 DEGREES
EKG T AXIS: 52 DEGREES
EKG VENTRICULAR RATE: 53 BPM
EOSINOPHIL # BLD: 0 K/UL (ref 0–0.4)
EOSINOPHIL NFR BLD: 0 % (ref 0–5)
EPITH CASTS URNS QL MICRO: NORMAL /LPF (ref 0–5)
ERYTHROCYTE [DISTWIDTH] IN BLOOD BY AUTOMATED COUNT: 12.4 % (ref 11.6–14.5)
ETHANOL SERPL-MCNC: <3 MG/DL (ref 0–3)
GLOBULIN SER CALC-MCNC: 2.7 G/DL (ref 2–4)
GLUCOSE SERPL-MCNC: 93 MG/DL (ref 74–99)
GLUCOSE UR STRIP.AUTO-MCNC: NEGATIVE MG/DL
HCT VFR BLD AUTO: 30.6 % (ref 35–45)
HGB BLD-MCNC: 10.4 G/DL (ref 12–16)
HGB UR QL STRIP: NEGATIVE
IMM GRANULOCYTES # BLD AUTO: 0.1 K/UL (ref 0–0.04)
IMM GRANULOCYTES NFR BLD AUTO: 1 % (ref 0–0.5)
KETONES UR QL STRIP.AUTO: NEGATIVE MG/DL
LACTATE BLD-SCNC: 1.48 MMOL/L (ref 0.4–2)
LEUKOCYTE ESTERASE UR QL STRIP.AUTO: ABNORMAL
LYMPHOCYTES # BLD: 4.5 K/UL (ref 0.9–3.6)
LYMPHOCYTES NFR BLD: 49 % (ref 21–52)
Lab: ABNORMAL
MCH RBC QN AUTO: 31 PG (ref 24–34)
MCHC RBC AUTO-ENTMCNC: 34 G/DL (ref 31–37)
MCV RBC AUTO: 91.1 FL (ref 78–100)
METHADONE UR QL: NEGATIVE
MONOCYTES # BLD: 0.8 K/UL (ref 0.05–1.2)
MONOCYTES NFR BLD: 9 % (ref 3–10)
NEUTS SEG # BLD: 3.9 K/UL (ref 1.8–8)
NEUTS SEG NFR BLD: 42 % (ref 40–73)
NITRITE UR QL STRIP.AUTO: NEGATIVE
NRBC # BLD: 0 K/UL (ref 0–0.01)
NRBC BLD-RTO: 0 PER 100 WBC
OPIATES UR QL: NEGATIVE
PCP UR QL: NEGATIVE
PH UR STRIP: 5.5 (ref 5–8)
PLATELET # BLD AUTO: 217 K/UL (ref 135–420)
PMV BLD AUTO: 12 FL (ref 9.2–11.8)
POTASSIUM SERPL-SCNC: 3.7 MMOL/L (ref 3.5–5.5)
PROCALCITONIN SERPL-MCNC: <0.05 NG/ML
PROT SERPL-MCNC: 6 G/DL (ref 6.4–8.2)
PROT UR STRIP-MCNC: NEGATIVE MG/DL
RBC # BLD AUTO: 3.36 M/UL (ref 4.2–5.3)
RBC #/AREA URNS HPF: NEGATIVE /HPF (ref 0–5)
SODIUM SERPL-SCNC: 129 MMOL/L (ref 136–145)
SP GR UR REFRACTOMETRY: 1 (ref 1–1.03)
T4 FREE SERPL-MCNC: 1.1 NG/DL (ref 0.7–1.5)
TROPONIN I SERPL HS-MCNC: 7 NG/L (ref 0–54)
TROPONIN I SERPL HS-MCNC: 8 NG/L (ref 0–54)
TROPONIN I SERPL HS-MCNC: 8 NG/L (ref 0–54)
TSH SERPL DL<=0.05 MIU/L-ACNC: 1.26 UIU/ML (ref 0.36–3.74)
UROBILINOGEN UR QL STRIP.AUTO: 0.2 EU/DL (ref 0.2–1)
WBC # BLD AUTO: 9.3 K/UL (ref 4.6–13.2)
WBC URNS QL MICRO: NORMAL /HPF (ref 0–4)

## 2023-03-30 PROCEDURE — 84443 ASSAY THYROID STIM HORMONE: CPT

## 2023-03-30 PROCEDURE — 6360000002 HC RX W HCPCS: Performed by: INTERNAL MEDICINE

## 2023-03-30 PROCEDURE — 83605 ASSAY OF LACTIC ACID: CPT

## 2023-03-30 PROCEDURE — 84484 ASSAY OF TROPONIN QUANT: CPT

## 2023-03-30 PROCEDURE — 2580000003 HC RX 258: Performed by: EMERGENCY MEDICINE

## 2023-03-30 PROCEDURE — 6370000000 HC RX 637 (ALT 250 FOR IP): Performed by: INTERNAL MEDICINE

## 2023-03-30 PROCEDURE — 96361 HYDRATE IV INFUSION ADD-ON: CPT

## 2023-03-30 PROCEDURE — 2580000003 HC RX 258: Performed by: INTERNAL MEDICINE

## 2023-03-30 PROCEDURE — 82140 ASSAY OF AMMONIA: CPT

## 2023-03-30 PROCEDURE — 81001 URINALYSIS AUTO W/SCOPE: CPT

## 2023-03-30 PROCEDURE — 70450 CT HEAD/BRAIN W/O DYE: CPT

## 2023-03-30 PROCEDURE — 80053 COMPREHEN METABOLIC PANEL: CPT

## 2023-03-30 PROCEDURE — 93005 ELECTROCARDIOGRAM TRACING: CPT | Performed by: EMERGENCY MEDICINE

## 2023-03-30 PROCEDURE — 80307 DRUG TEST PRSMV CHEM ANLYZR: CPT

## 2023-03-30 PROCEDURE — 99285 EMERGENCY DEPT VISIT HI MDM: CPT

## 2023-03-30 PROCEDURE — 2140000001 HC CVICU INTERMEDIATE R&B

## 2023-03-30 PROCEDURE — 84145 PROCALCITONIN (PCT): CPT

## 2023-03-30 PROCEDURE — 93010 ELECTROCARDIOGRAM REPORT: CPT | Performed by: INTERNAL MEDICINE

## 2023-03-30 PROCEDURE — 85025 COMPLETE CBC W/AUTO DIFF WBC: CPT

## 2023-03-30 PROCEDURE — 83930 ASSAY OF BLOOD OSMOLALITY: CPT

## 2023-03-30 PROCEDURE — 84439 ASSAY OF FREE THYROXINE: CPT

## 2023-03-30 PROCEDURE — 36415 COLL VENOUS BLD VENIPUNCTURE: CPT

## 2023-03-30 PROCEDURE — 96360 HYDRATION IV INFUSION INIT: CPT

## 2023-03-30 PROCEDURE — 71045 X-RAY EXAM CHEST 1 VIEW: CPT

## 2023-03-30 PROCEDURE — 82077 ASSAY SPEC XCP UR&BREATH IA: CPT

## 2023-03-30 PROCEDURE — 99222 1ST HOSP IP/OBS MODERATE 55: CPT | Performed by: INTERNAL MEDICINE

## 2023-03-30 RX ORDER — ONDANSETRON 4 MG/1
4 TABLET, ORALLY DISINTEGRATING ORAL EVERY 8 HOURS PRN
Status: DISCONTINUED | OUTPATIENT
Start: 2023-03-30 | End: 2023-03-31 | Stop reason: HOSPADM

## 2023-03-30 RX ORDER — ASPIRIN 81 MG/1
81 TABLET ORAL DAILY
Status: DISCONTINUED | OUTPATIENT
Start: 2023-03-31 | End: 2023-03-31 | Stop reason: HOSPADM

## 2023-03-30 RX ORDER — IPRATROPIUM BROMIDE AND ALBUTEROL SULFATE 2.5; .5 MG/3ML; MG/3ML
1 SOLUTION RESPIRATORY (INHALATION) EVERY 4 HOURS PRN
Status: DISCONTINUED | OUTPATIENT
Start: 2023-03-30 | End: 2023-03-31 | Stop reason: HOSPADM

## 2023-03-30 RX ORDER — POTASSIUM CHLORIDE 750 MG/1
10 TABLET, EXTENDED RELEASE ORAL DAILY
Status: DISCONTINUED | OUTPATIENT
Start: 2023-03-31 | End: 2023-03-31 | Stop reason: HOSPADM

## 2023-03-30 RX ORDER — 0.9 % SODIUM CHLORIDE 0.9 %
500 INTRAVENOUS SOLUTION INTRAVENOUS ONCE
Status: COMPLETED | OUTPATIENT
Start: 2023-03-30 | End: 2023-03-30

## 2023-03-30 RX ORDER — FERROUS SULFATE 325(65) MG
325 TABLET ORAL
Status: DISCONTINUED | OUTPATIENT
Start: 2023-03-31 | End: 2023-03-31 | Stop reason: HOSPADM

## 2023-03-30 RX ORDER — POLYETHYLENE GLYCOL 3350 17 G/17G
17 POWDER, FOR SOLUTION ORAL DAILY PRN
Status: DISCONTINUED | OUTPATIENT
Start: 2023-03-30 | End: 2023-03-31 | Stop reason: HOSPADM

## 2023-03-30 RX ORDER — 0.9 % SODIUM CHLORIDE 0.9 %
1000 INTRAVENOUS SOLUTION INTRAVENOUS ONCE
Status: COMPLETED | OUTPATIENT
Start: 2023-03-30 | End: 2023-03-30

## 2023-03-30 RX ORDER — MECOBALAMIN 5000 MCG
5 TABLET,DISINTEGRATING ORAL NIGHTLY
Status: DISCONTINUED | OUTPATIENT
Start: 2023-03-30 | End: 2023-03-31 | Stop reason: HOSPADM

## 2023-03-30 RX ORDER — SODIUM CHLORIDE 0.9 % (FLUSH) 0.9 %
5-40 SYRINGE (ML) INJECTION PRN
Status: DISCONTINUED | OUTPATIENT
Start: 2023-03-30 | End: 2023-03-31 | Stop reason: HOSPADM

## 2023-03-30 RX ORDER — SODIUM CHLORIDE 9 MG/ML
INJECTION, SOLUTION INTRAVENOUS PRN
Status: DISCONTINUED | OUTPATIENT
Start: 2023-03-30 | End: 2023-03-31 | Stop reason: HOSPADM

## 2023-03-30 RX ORDER — SODIUM CHLORIDE 9 MG/ML
INJECTION, SOLUTION INTRAVENOUS CONTINUOUS
Status: DISCONTINUED | OUTPATIENT
Start: 2023-03-30 | End: 2023-03-31 | Stop reason: HOSPADM

## 2023-03-30 RX ORDER — MIRTAZAPINE 15 MG/1
45 TABLET, FILM COATED ORAL NIGHTLY
Status: DISCONTINUED | OUTPATIENT
Start: 2023-03-30 | End: 2023-03-31 | Stop reason: HOSPADM

## 2023-03-30 RX ORDER — SODIUM CHLORIDE 9 MG/ML
INJECTION, SOLUTION INTRAVENOUS CONTINUOUS
Status: DISCONTINUED | OUTPATIENT
Start: 2023-03-30 | End: 2023-03-30

## 2023-03-30 RX ORDER — HEPARIN SODIUM 5000 [USP'U]/ML
5000 INJECTION, SOLUTION INTRAVENOUS; SUBCUTANEOUS 2 TIMES DAILY
Status: DISCONTINUED | OUTPATIENT
Start: 2023-03-30 | End: 2023-03-31 | Stop reason: HOSPADM

## 2023-03-30 RX ORDER — SODIUM CHLORIDE 0.9 % (FLUSH) 0.9 %
5-40 SYRINGE (ML) INJECTION EVERY 12 HOURS SCHEDULED
Status: DISCONTINUED | OUTPATIENT
Start: 2023-03-30 | End: 2023-03-31 | Stop reason: HOSPADM

## 2023-03-30 RX ORDER — METOPROLOL SUCCINATE 50 MG/1
50 TABLET, EXTENDED RELEASE ORAL DAILY
Status: DISCONTINUED | OUTPATIENT
Start: 2023-03-31 | End: 2023-03-31 | Stop reason: HOSPADM

## 2023-03-30 RX ORDER — DIVALPROEX SODIUM 250 MG/1
250 TABLET, DELAYED RELEASE ORAL 2 TIMES DAILY
Status: DISCONTINUED | OUTPATIENT
Start: 2023-03-30 | End: 2023-03-31 | Stop reason: HOSPADM

## 2023-03-30 RX ORDER — ACETAMINOPHEN 650 MG/1
650 SUPPOSITORY RECTAL EVERY 6 HOURS PRN
Status: DISCONTINUED | OUTPATIENT
Start: 2023-03-30 | End: 2023-03-31 | Stop reason: HOSPADM

## 2023-03-30 RX ORDER — ONDANSETRON 2 MG/ML
4 INJECTION INTRAMUSCULAR; INTRAVENOUS EVERY 6 HOURS PRN
Status: DISCONTINUED | OUTPATIENT
Start: 2023-03-30 | End: 2023-03-31 | Stop reason: HOSPADM

## 2023-03-30 RX ORDER — ACETAMINOPHEN 325 MG/1
650 TABLET ORAL EVERY 6 HOURS PRN
Status: DISCONTINUED | OUTPATIENT
Start: 2023-03-30 | End: 2023-03-31 | Stop reason: HOSPADM

## 2023-03-30 RX ADMIN — SODIUM CHLORIDE, PRESERVATIVE FREE 10 ML: 5 INJECTION INTRAVENOUS at 20:21

## 2023-03-30 RX ADMIN — SODIUM CHLORIDE 500 ML: 900 INJECTION, SOLUTION INTRAVENOUS at 08:17

## 2023-03-30 RX ADMIN — Medication 5 MG: at 20:18

## 2023-03-30 RX ADMIN — HEPARIN SODIUM 5000 UNITS: 5000 INJECTION INTRAVENOUS; SUBCUTANEOUS at 20:19

## 2023-03-30 RX ADMIN — SODIUM CHLORIDE 1000 ML: 900 INJECTION, SOLUTION INTRAVENOUS at 04:06

## 2023-03-30 RX ADMIN — SODIUM CHLORIDE: 900 INJECTION, SOLUTION INTRAVENOUS at 02:07

## 2023-03-30 RX ADMIN — SODIUM CHLORIDE: 900 INJECTION, SOLUTION INTRAVENOUS at 16:49

## 2023-03-30 RX ADMIN — SODIUM CHLORIDE: 900 INJECTION, SOLUTION INTRAVENOUS at 04:06

## 2023-03-30 ASSESSMENT — PAIN - FUNCTIONAL ASSESSMENT: PAIN_FUNCTIONAL_ASSESSMENT: NONE - DENIES PAIN

## 2023-03-30 ASSESSMENT — PAIN SCALES - GENERAL
PAINLEVEL_OUTOF10: 0

## 2023-03-30 ASSESSMENT — ENCOUNTER SYMPTOMS
SHORTNESS OF BREATH: 0
SORE THROAT: 0
VOMITING: 0
CHOKING: 0

## 2023-03-30 NOTE — H&P
History and Physical    Patient: Alicia Hunt MRN: [id number]  SSN: [id number]    YOB: 1955  Age: 70 y.o. Sex: female      Subjective:      Alicia Hunt is a 70 y.o. female who presents to [facility] ER with complaint of Dizziness, Nausea, and Loss of Consciousness. Patient initial reports that she becomes nausea, faint, and occasionally passed out upon standing from a seated or lying position. Patient reports that this has occurred before, but has been happening rather frequently over the last week (~4 times). However, Patient then reports that she has been having \"hot flashes\" accompanied by urinary incontinence and these possible syncopal episodes---some of which occurred while she had already walking some distanced. Second-Hand accounts report that Patient slowly revived from these episodes over the course of 10+ minutes, which could be a post-ictal state. Patient endorses that her tongue has felt sore recently, but cannot attribute this to her episodes of passing out. Patient endorses cold intolerance, night sweats, poor appetite, and unintentional weight loss. Patient's Sister reports that Patient had previously been on Megestrol, but this agent was discontinued. Patient states that she has had constipation for 14 days. Patient reports that she has been having dysuria with increased urinary frequency and urgency, but also polyuria. Patient reports a significant thirst and agrees with polydipsia. Patient also acknowledges that she has been somewhat confused recently, as well as having weakness and fatigue. Patient reports that she \"furniture climbs\" at home. Patient denies ataxia. Patient's Sister also reports that Patient has been having an increased frequency of what appears to be monoclonal jerking on Patient's Left side. Patient denies fevers, chills, vomiting, diarrhea, cough, chest pain, pain with inspiration, and abdominal pain.     In [facility] ER, Patient is noted to have Temperature Hypotension versus possible Seizure Activity)  - CT Head (-)  Telemetry, Orthostatic Vitals qShift, HOLD home Antihypertensives, IV Fluids (NS 75 mL/hr), repeat Troponin, and PT/OT. Ordered EEG in AM.  Consider consulting Neurological services in AM.    History of CVA  Continue home Aspirin. Anxiety & Depression  Continue home Mirtazapine and Divalproex. Check Serum Ammonia Level.     Code Status  DNR/DNI    DVT Prophylaxis  Chemoprophylaxis is achieved with Subcutaneous Heparin 5000 units q8hrs    Signed By: Antione Brennan DO     March 30, 2023

## 2023-03-30 NOTE — ED NOTES
Pt ambulatory to the bathroom with minimal assistance.  Tolerated well     Pablito Mcneil RN  03/30/23 0993

## 2023-03-30 NOTE — ED NOTES
TRANSFER - OUT REPORT:    Verbal report given to Gurpreet Bishop RN on Alma Ren  being transferred to [facility] for routine progression of patient care       Report consisted of patient's Situation, Background, Assessment and   Recommendations(SBAR). Information from the following report(s) ED SBAR was reviewed with the receiving nurse. Gleason Assessment: No data recorded  Lines:   Peripheral IV 03/30/23 Right Antecubital (Active)        Opportunity for questions and clarification was provided.       Patient transported with:  Monitor          Josemanuel Helm RN  03/30/23 6433

## 2023-03-30 NOTE — ED NOTES
[address] transport spoke to Angelika regarding transfer to [facility] room 201. ETA 15mins.       Caroline Garcia RN  03/30/23 1959

## 2023-03-30 NOTE — ED PROVIDER NOTES
`[facility] EMERGENCY DEPT  eMERGENCY dEPARTMENT eNCOUnter      Pt Name: Bernie Benedict  MRN: [id number]  Birthdate 1955 of evaluation: 3/30/2023  Provider:Andrei Benedict MD    CHIEF COMPLAINT         HPI  Bernie Benedict is a 68 y.o. female  whose family member states she has been passing out intermittent for the last week. She states she feels dizzy with /nausea and falls to the floor. Pt was seen at [facility] ED on 3/24 and left because it was taking too long for the wait. Pt states she has been told she is on too many psych medications. A/O x 4. Denies chest pain    ROS  Review of Systems   Constitutional:  Positive for fatigue. Negative for activity change and fever.   HENT:  Negative for sore throat.    Respiratory:  Negative for choking and shortness of breath.    Cardiovascular:  Negative for palpitations.   Gastrointestinal:  Negative for vomiting.   Musculoskeletal:  Negative for arthralgias.     Except as noted above the remainder of the review of systems was reviewed and negative.       PAST MEDICAL HISTORY     Past Medical History:   Diagnosis Date    Depression     Gastrointestinal disorder Pancreatitis    Pancreatitis     Psychiatric disorder          SURGICAL HISTORY       Past Surgical History:   Procedure Laterality Date    GYN      HYSTERECTOMY (CERVIX STATUS UNKNOWN)           CURRENTMEDICATIONS       Previous Medications    DIVALPROEX (DEPAKOTE) 250 MG DR TABLET    Take 250 mg by mouth 2 times daily    DOCUSATE (COLACE, DULCOLAX) 100 MG CAPS    Take 100 mg by mouth 2 times daily    FERROUS SULFATE (IRON 325) 325 (65 FE) MG TABLET    Take 325 mg by mouth every morning (before breakfast)    FLUOXETINE (PROZAC) 20 MG CAPSULE    Take by mouth daily    FUROSEMIDE (LASIX) 20 MG TABLET    TAKE 1 TABLET BY MOUTH ONCE DAILY AS NEEDED FOR EDEMA    GNP ASPIRIN 81 MG EC TABLET    TAKE 1 TABLET BY MOUTH ONCE DAILY    LISINOPRIL (PRINIVIL;ZESTRIL) 20 MG TABLET    TAKE 1 TABLET BY MOUTH DAILY    METOPROLOL SUCCINATE  (TOPROL XL) 50 MG EXTENDED RELEASE TABLET    TAKE 1 TABLET BY MOUTH ONCE DAILY    MIRTAZAPINE (REMERON) 45 MG TABLET    Take 45 mg by mouth    POTASSIUM CHLORIDE (KLOR-CON) 10 MEQ EXTENDED RELEASE TABLET    Take 10 mEq by mouth daily       ALLERGIES     Patient has no known allergies. FAMILY HISTORY       Family History   Problem Relation Age of Onset    Diabetes Paternal Aunt     Cancer Paternal Aunt     Alzheimer's Disease Maternal Aunt     Diabetes Maternal Aunt     Cancer Maternal Aunt           SOCIAL HISTORY       Social History     Socioeconomic History    Marital status:    Tobacco Use    Smoking status: Former     Packs/day: 0.50     Types: Cigarettes     Quit date: 2022     Years since quittin.0    Smokeless tobacco: Former   Substance and Sexual Activity    Alcohol use: No    Drug use: No         PHYSICAL EXAM       ED Triage Vitals [23]   BP Temp Temp Source Heart Rate Resp SpO2 Height Weight   (!) 107/55 97.9 °F (36.6 °C) Oral 53 16 100 % 5' 2\" (1.575 m) 96 lb (43.5 kg)       Physical Exam  HENT:      Head: Normocephalic. Right Ear: Tympanic membrane normal.      Left Ear: Tympanic membrane and external ear normal.      Nose: Nose normal.      Mouth/Throat:      Mouth: Mucous membranes are moist.      Pharynx: No oropharyngeal exudate. Neurological:      Mental Status: She is alert. No results found for this or any previous visit (from the past 24 hour(s)). No results found. PROCEDURES:  Unless otherwise noted below, none     EMERGENCY DEPARTMENT COURSE and DIFFERENTIALDIAGNOSIS/ MDM:   Vitals:    Vitals:    239   BP: (!) 107/55   Pulse: 53   Resp: 16   Temp: 97.9 °F (36.6 °C)   TempSrc: Oral   SpO2: 100%   Weight: 96 lb (43.5 kg)   Height: 5' 2\" (1.575 m)     MDM    Lab tests: interpreted by me    EKG: interpreted by me    s. All questions and concerns were addressed.        Procedures    FINAL IMPRESSION         Acute

## 2023-03-30 NOTE — ED TRIAGE NOTES
Pt states she has been passing out for the last week. States she feels dizzy/nauseas and falls to the floor. Pt was seen at [facility] ED on 3/24 and left because it was taking too long for the wait. Pt states she has been told she is on too many psych medications. A/O x 4.  Denies chest pain

## 2023-03-30 NOTE — PROGRESS NOTES
1500: Dual skin assessment performed with Orin Saldivar RN. Skin C/D/I with no moisture associated damage or pressure injuries.     Review of Systems    Physical Exam  Skin:

## 2023-03-30 NOTE — PROGRESS NOTES
1358: TRANSFER - IN REPORT:    Verbal report received from [place], [place] on Yu Frees  being received from [facility] for routine progression of patient care      Report consisted of patient's Situation, Background, Assessment and   Recommendations(SBAR). Information from the following report(s) Nurse Handoff Report, Index, Intake/Output, MAR, and Recent Results was reviewed with the receiving nurse. Opportunity for questions and clarification was provided. Assessment completed upon patient's arrival to unit and care assumed. 1500: Pt arrived to unit via EMS. Pt transferred IND from stretcher to bed. Pt A/Ox4 in NAD. Pt with no complaints of pain or discomfort at this time. Telemetry monitor applied. Admisson assessment completed. Admission database completed. Dual skin assessment completed with Belgica Johnson RN. Mepliex applied to sacrum. Pt educated on use of call light and to call with all needs. Bed left in lowest position, call light in reach, bed alarm set. 1530:  20 gauge PIV in L FA.    1600: Admission MD paged regarding pt's arrival.     1730: MD at bedside    1745: Family at bedside. 1900: Bedside and Verbal shift change report given to Zabrina Hawley RN (oncoming nurse) by Bhumika Medina RN (offgoing nurse). Report included the following information Nurse Handoff Report, Index, Intake/Output, MAR, Recent Results, and Cardiac Rhythm Sinus Terrie Eason .

## 2023-03-31 ENCOUNTER — APPOINTMENT (OUTPATIENT)
Facility: HOSPITAL | Age: 68
End: 2023-03-31
Payer: MEDICARE

## 2023-03-31 VITALS
TEMPERATURE: 98.6 F | OXYGEN SATURATION: 95 % | BODY MASS INDEX: 17.85 KG/M2 | DIASTOLIC BLOOD PRESSURE: 84 MMHG | HEIGHT: 62 IN | RESPIRATION RATE: 16 BRPM | HEART RATE: 78 BPM | SYSTOLIC BLOOD PRESSURE: 151 MMHG | WEIGHT: 97 LBS

## 2023-03-31 LAB
ALBUMIN SERPL-MCNC: 2.6 G/DL (ref 3.4–5)
ALBUMIN/GLOB SERPL: 1 (ref 0.8–1.7)
ALP SERPL-CCNC: 28 U/L (ref 45–117)
ALT SERPL-CCNC: 16 U/L (ref 13–56)
ANION GAP SERPL CALC-SCNC: 1 MMOL/L (ref 3–18)
AST SERPL-CCNC: 15 U/L (ref 10–38)
BASOPHILS # BLD: 0.1 K/UL (ref 0–0.1)
BASOPHILS NFR BLD: 1 % (ref 0–2)
BILIRUB SERPL-MCNC: 0.4 MG/DL (ref 0.2–1)
BUN SERPL-MCNC: 12 MG/DL (ref 7–18)
BUN/CREAT SERPL: 22 (ref 12–20)
CALCIUM SERPL-MCNC: 8.5 MG/DL (ref 8.5–10.1)
CHLORIDE SERPL-SCNC: 113 MMOL/L (ref 100–111)
CO2 SERPL-SCNC: 25 MMOL/L (ref 21–32)
CREAT SERPL-MCNC: 0.55 MG/DL (ref 0.6–1.3)
DIFFERENTIAL METHOD BLD: ABNORMAL
ECHO AO ROOT DIAM: 2.7 CM
ECHO AO ROOT INDEX: 1.91 CM/M2
ECHO AR MAX VEL PISA: 4.4 M/S
ECHO AV AREA PEAK VELOCITY: 2.6 CM2
ECHO AV AREA/BSA PEAK VELOCITY: 1.8 CM2/M2
ECHO AV PEAK GRADIENT: 8 MMHG
ECHO AV PEAK VELOCITY: 1.2 M/S
ECHO AV REGURGITANT PHT: 679.3 MILLISECOND
ECHO AV VELOCITY RATIO: 1.08
ECHO BSA: 1.39 M2
ECHO EST RA PRESSURE: 3 MMHG
ECHO LA AREA 2C: 18.8 CM2
ECHO LA AREA 4C: 17.9 CM2
ECHO LA VOL 2C: 53 ML (ref 22–52)
ECHO LA VOL 4C: 47 ML (ref 22–52)
ECHO LA VOLUME AREA LENGTH: 55 ML
ECHO LA VOLUME INDEX A2C: 38 ML/M2 (ref 16–34)
ECHO LA VOLUME INDEX A4C: 33 ML/M2 (ref 16–34)
ECHO LA VOLUME INDEX AREA LENGTH: 39 ML/M2 (ref 16–34)
ECHO LV E' LATERAL VELOCITY: 10 CM/S
ECHO LV E' SEPTAL VELOCITY: 9 CM/S
ECHO LV FRACTIONAL SHORTENING: 49 % (ref 28–44)
ECHO LV INTERNAL DIMENSION DIASTOLE INDEX: 3.19 CM/M2
ECHO LV INTERNAL DIMENSION DIASTOLIC: 4.5 CM (ref 3.9–5.3)
ECHO LV INTERNAL DIMENSION SYSTOLIC INDEX: 1.63 CM/M2
ECHO LV INTERNAL DIMENSION SYSTOLIC: 2.3 CM
ECHO LV IVSD: 1 CM (ref 0.6–0.9)
ECHO LV MASS 2D: 175 G (ref 67–162)
ECHO LV MASS INDEX 2D: 124.1 G/M2 (ref 43–95)
ECHO LV POSTERIOR WALL DIASTOLIC: 1.2 CM (ref 0.6–0.9)
ECHO LV RELATIVE WALL THICKNESS RATIO: 0.53
ECHO LVOT AREA: 2.5 CM2
ECHO LVOT DIAM: 1.8 CM
ECHO LVOT MEAN GRADIENT: 4 MMHG
ECHO LVOT PEAK GRADIENT: 6 MMHG
ECHO LVOT PEAK VELOCITY: 1.3 M/S
ECHO LVOT STROKE VOLUME INDEX: 48.9 ML/M2
ECHO LVOT SV: 68.9 ML
ECHO LVOT VTI: 27.1 CM
ECHO MV A VELOCITY: 0.57 M/S
ECHO MV E DECELERATION TIME (DT): 234.2 MS
ECHO MV E VELOCITY: 0.65 M/S
ECHO MV E/A RATIO: 1.14
ECHO MV E/E' LATERAL: 6.5
ECHO MV E/E' RATIO (AVERAGED): 6.86
ECHO MV E/E' SEPTAL: 7.22
ECHO PV MAX VELOCITY: 0.9 M/S
ECHO PV PEAK GRADIENT: 3 MMHG
ECHO RIGHT VENTRICULAR SYSTOLIC PRESSURE (RVSP): 38 MMHG
ECHO RV FREE WALL PEAK S': 19 CM/S
ECHO RV LONGITUDINAL DIMENSION: 5 CM
ECHO RV TAPSE: 2.3 CM (ref 1.7–?)
ECHO TV REGURGITANT MAX VELOCITY: 2.95 M/S
ECHO TV REGURGITANT PEAK GRADIENT: 35 MMHG
EOSINOPHIL # BLD: 0.1 K/UL (ref 0–0.4)
EOSINOPHIL NFR BLD: 1 % (ref 0–5)
ERYTHROCYTE [DISTWIDTH] IN BLOOD BY AUTOMATED COUNT: 12.5 % (ref 11.6–14.5)
GLOBULIN SER CALC-MCNC: 2.5 G/DL (ref 2–4)
GLUCOSE SERPL-MCNC: 87 MG/DL (ref 74–99)
HCT VFR BLD AUTO: 31.1 % (ref 35–45)
HGB BLD-MCNC: 10.1 G/DL (ref 12–16)
IMM GRANULOCYTES # BLD AUTO: 0 K/UL (ref 0–0.04)
IMM GRANULOCYTES NFR BLD AUTO: 0 % (ref 0–0.5)
LYMPHOCYTES # BLD: 3.1 K/UL (ref 0.9–3.6)
LYMPHOCYTES NFR BLD: 40 % (ref 21–52)
MCH RBC QN AUTO: 30.6 PG (ref 24–34)
MCHC RBC AUTO-ENTMCNC: 32.5 G/DL (ref 31–37)
MCV RBC AUTO: 94.2 FL (ref 78–100)
MONOCYTES # BLD: 0.7 K/UL (ref 0.05–1.2)
MONOCYTES NFR BLD: 9 % (ref 3–10)
NEUTS SEG # BLD: 3.9 K/UL (ref 1.8–8)
NEUTS SEG NFR BLD: 50 % (ref 40–73)
NRBC # BLD: 0 K/UL (ref 0–0.01)
NRBC BLD-RTO: 0 PER 100 WBC
PLATELET # BLD AUTO: 215 K/UL (ref 135–420)
PMV BLD AUTO: 12.1 FL (ref 9.2–11.8)
POTASSIUM SERPL-SCNC: 4.3 MMOL/L (ref 3.5–5.5)
PROT SERPL-MCNC: 5.1 G/DL (ref 6.4–8.2)
RBC # BLD AUTO: 3.3 M/UL (ref 4.2–5.3)
SODIUM SERPL-SCNC: 139 MMOL/L (ref 136–145)
WBC # BLD AUTO: 7.7 K/UL (ref 4.6–13.2)

## 2023-03-31 PROCEDURE — 94761 N-INVAS EAR/PLS OXIMETRY MLT: CPT

## 2023-03-31 PROCEDURE — 6370000000 HC RX 637 (ALT 250 FOR IP): Performed by: INTERNAL MEDICINE

## 2023-03-31 PROCEDURE — 36415 COLL VENOUS BLD VENIPUNCTURE: CPT

## 2023-03-31 PROCEDURE — 80053 COMPREHEN METABOLIC PANEL: CPT

## 2023-03-31 PROCEDURE — 85025 COMPLETE CBC W/AUTO DIFF WBC: CPT

## 2023-03-31 PROCEDURE — 2580000003 HC RX 258: Performed by: INTERNAL MEDICINE

## 2023-03-31 PROCEDURE — 93306 TTE W/DOPPLER COMPLETE: CPT

## 2023-03-31 PROCEDURE — 6360000002 HC RX W HCPCS: Performed by: INTERNAL MEDICINE

## 2023-03-31 PROCEDURE — 93306 TTE W/DOPPLER COMPLETE: CPT | Performed by: INTERNAL MEDICINE

## 2023-03-31 PROCEDURE — 83935 ASSAY OF URINE OSMOLALITY: CPT

## 2023-03-31 RX ORDER — GAUZE BANDAGE 2" X 2"
100 BANDAGE TOPICAL DAILY
Status: DISCONTINUED | OUTPATIENT
Start: 2023-03-31 | End: 2023-03-31 | Stop reason: HOSPADM

## 2023-03-31 RX ORDER — MULTIVITAMIN WITH IRON
1 TABLET ORAL DAILY
Status: DISCONTINUED | OUTPATIENT
Start: 2023-03-31 | End: 2023-03-31 | Stop reason: HOSPADM

## 2023-03-31 RX ADMIN — DIVALPROEX SODIUM 250 MG: 250 TABLET, DELAYED RELEASE ORAL at 10:10

## 2023-03-31 RX ADMIN — FERROUS SULFATE TAB 325 MG (65 MG ELEMENTAL FE) 325 MG: 325 (65 FE) TAB at 10:10

## 2023-03-31 RX ADMIN — POTASSIUM CHLORIDE 10 MEQ: 750 TABLET, EXTENDED RELEASE ORAL at 10:10

## 2023-03-31 RX ADMIN — HEPARIN SODIUM 5000 UNITS: 5000 INJECTION INTRAVENOUS; SUBCUTANEOUS at 10:10

## 2023-03-31 RX ADMIN — DIVALPROEX SODIUM 250 MG: 250 TABLET, DELAYED RELEASE ORAL at 00:23

## 2023-03-31 RX ADMIN — Medication 100 MG: at 16:55

## 2023-03-31 RX ADMIN — SODIUM CHLORIDE, PRESERVATIVE FREE 10 ML: 5 INJECTION INTRAVENOUS at 10:19

## 2023-03-31 RX ADMIN — ASPIRIN 81 MG: 81 TABLET, COATED ORAL at 10:10

## 2023-03-31 RX ADMIN — MIRTAZAPINE 45 MG: 15 TABLET, FILM COATED ORAL at 00:23

## 2023-03-31 RX ADMIN — THERA TABS 1 TABLET: TAB at 16:55

## 2023-03-31 ASSESSMENT — PAIN SCALES - GENERAL
PAINLEVEL_OUTOF10: 0

## 2023-03-31 NOTE — PROGRESS NOTES
0700: Bedside and Verbal shift change report given to ABIGAIL Thompson (oncoming nurse) by Jordyn Suresh RN (offgoing nurse). Report included the following information Nurse Handoff Report, Index, Adult Overview, Intake/Output, MAR, Recent Results, and Cardiac Rhythm Sinus Bill Birch . Morning medications and assessment completed. Pt with no complaints of pain or discomfort. Personal care completed. 1345: Pt off unit for Echo via bed.    1400: Pt returned to unit. 1415: Discharge order noted. 1655: AVS printed. 1700: Reviewed discharge instructions with pt. Pt request pain medication for sacrum discomfort and follow up appointment be arranged. 1710: MD paged. MD to prescribe pain medications to be picked up tomorrow at pt's pharmacy. Follow up appointment arranged. 1715: Reviewed discharge instructions with updated information with patient and niece. Allowed time for questions, all questions answered. PIV removed. Tele removed. 1720: Pt dressed independently. Waiting for transport. 1722: Transport arrived. Pt to [facility] Main Entrance via wheelchair.

## 2023-03-31 NOTE — PROGRESS NOTES
Advance Care Planning     Advance Care Planning Inpatient Note  Spiritual Care Department    Today's Date: 3/31/2023  Unit: [facility] [address]    Received request from admission screening. Upon review of chart and communication with care team, patient's decision making abilities are not in question. . Patient was/were present in the room during visit. Goals of ACP Conversation:  Discuss advance care planning documents    Health Care Decision Makers:       Primary Decision Maker: Josh Lam - Other - [phone]  Summary:  Verified [facility]    Advance Care Planning Documents (Patient Wishes):  Healthcare Power of /Advance Directive Appointment of Health Care Agent  Living Will/Advance Directive     Assessment:     03/31/23 1353   Encounter Summary   Encounter Overview/Reason  Initial Encounter; Advance Care Planning   Service Provided For: Patient   Referral/Consult From: Other    Support System Family members   Last Encounter  03/31/23  ([id number])   Complexity of Encounter Moderate   Begin Time 1343   End Time  1354   Total Time Calculated 11 min   Encounter    Type Initial Screen/Assessment   Spiritual/Emotional needs   Type Spiritual Support   Grief, Loss, and Adjustments   Type Adjustment to illness   Advance Care Planning   Type Completed AD/ACP document(s);ACP conversation;Care Preferences Addressed   Assessment/Intervention/Outcome   Assessment Coping;Calm   Intervention Active listening   Outcome Comfort;Encouraged   Plan and Referrals   Plan/Referrals Continue to visit, (comment)       Interventions:  Provided education on documents for clarity and greater understanding    Care Preferences Communicated:   No    Outcomes/Plan:  Existing advance directive reviewed with patient; no changes to patient's previously recorded wishes.     Electronically signed by Alcon Garcia, [employer] on 3/31/2023 at 1:55 PM

## 2023-03-31 NOTE — PROGRESS NOTES
Bedside shift change report given to Jay  (oncoming nurse) by Antwon Tavares  (offgoing nurse). Report included the following information Nurse Handoff Report, MAR, and Cardiac Rhythm Sinus shawn .

## 2023-03-31 NOTE — PROGRESS NOTES
PT orders received and chart reviewed. Per chart review, denies skilled mobility needs. Documented amb with staff during admission. Formal PT evaluation not indicated. Discontinuing PT orders.

## 2023-03-31 NOTE — CARE COORDINATION
03/31/23 0919   Service Assessment   Patient Orientation Alert and Oriented   Cognition Alert   History Provided By Patient   Primary Caregiver Self   Support Systems Family Members   Patient's Healthcare Decision Maker is: Named in [facility]   PCP Verified by CM Yes   Last Visit to PCP Within last year   Prior Functional Level Independent in ADLs/IADLs   Current Functional Level Independent in ADLs/IADLs   Can patient return to prior living arrangement Yes   Ability to make needs known: Good   Family able to assist with home care needs: Yes   Financial Resources Medicare   Social/Functional History   Lives With Family  (nephew Marla Auguste)   Type of Home Mobile home   Home Layout One level   [address] None   Receives Help From Family   ADL Assistance Independent   Homemaking Assistance Independent   Homemaking Responsibilities Yes   Ambulation Assistance Independent   Transfer Assistance Independent   Occupation Retired   Discharge Planning   Living Arrangements Family Members   Current Services Prior To Admission None   Patient expects to be discharged to: Trailer/mobile home   [address] Discharge   Mode of Transport at Discharge Self  (pt states her nephew Marla Auguste will transport her home at time of  discharge.)     Case Management Assessment  Initial Evaluation    Date/Time of Evaluation: 3/31/2023 9:22 AM  Assessment Completed by: Amelia Mohan RN    If patient is discharged prior to next notation, then this note serves as note for discharge by case management. Patient Name: Natasha Husain                   YOB: 1955  Diagnosis: Hyponatremia [E87.1]  Hyponatremia syndrome [E87.1]                   Date / Time: 3/30/2023  1:02 AM    Patient Admission Status: Inpatient   Readmission Risk (Low < 19, Mod (19-27), High > 27): Readmission Risk Score: 11.4    Current PCP: Billy Eduardo MD  PCP verified by CM?  (P) Yes    Chart Reviewed: Yes      History Pleasant    Barriers to discharge: Medical complications    Additional Case Management Notes: The Plan for Transition of Care is related to the following treatment goals of Hyponatremia [E87.1]  Hyponatremia syndrome [T13.6]    IF APPLICABLE: The Patient and/or patient representative Miquel Abdi and her family were provided with a choice of provider and agrees with the discharge plan. Freedom of choice list with basic dialogue that supports the patient's individualized plan of care/goals and shares the quality data associated with the providers was provided to:     Patient Representative Name:       The Patient and/or Patient Representative Agree with the Discharge Plan?       Rae Gates RN  Case Management Department  Ph: [phone]

## 2023-03-31 NOTE — PLAN OF CARE
Problem: Discharge Planning  Goal: Discharge to home or other facility with appropriate resources  Outcome: Completed     Problem: ABCDS Injury Assessment  Goal: Absence of physical injury  Outcome: Completed     Problem: Safety - Adult  Goal: Free from fall injury  Outcome: Completed     Problem: Nutrition Deficit:  Goal: Optimize nutritional status  Outcome: Completed  Flowsheets (Taken 3/31/2023 1042 by Maurice Thomson RD)  Nutrient intake appropriate for improving, restoring, or maintaining nutritional needs:   Assess nutritional status and recommend course of action   Recommend appropriate diets, oral nutritional supplements, and vitamin/mineral supplements   Monitor oral intake, labs, and treatment plans

## 2023-03-31 NOTE — PROGRESS NOTES
Comprehensive Nutrition Assessment    Type and Reason for Visit:  Initial, Positive Nutrition Screen    Nutrition Recommendations/Plan:   Modify oral nutrition supplement to nutrition intake opportunity: Magic Cup (each provides 290 kcal, 9g protein) TID  (meets 36% of fluid restriction)  Plan to add MVI and thiamine supplements 2/2 expected poor PO PTA. Continue current diet and monitor PO intake, compliance with oral supplements, weight, labs, and POC while admitted. Malnutrition Assessment:  Malnutrition Status: At risk for malnutrition (Comment) (r/t suspected poor intake and underweight BMI.) (03/31/23 1333)      Nutrition History and Allergies:   PMHx of pyelonephritis, MDD, NSTEMI, HTN. Wt hx review: c wt- 97 lb (bed scale); 10/14/22- 93 lb (+4.3%); 5/02/22- 105.13 lb (-7.7%); 2/23/22- 104 lb (-6.7%). No significant wt change documented. NKFA. Nutrition Assessment:    Pt admitted for management of hyponatremia with loss of consciousness. Also reported poor appetite and unintentional wt loss, MST noted. Pt currently on 1L fuid restriction and NS @ 75mL/hr r/t hyponatremia. Ensure High Protein ordered TID (meets 72% of fluid restriction). Pt unavailable to speak to x2- YENI. Unable to assess supplement intake, though will change to magic cup supplements r/t fluid restriction. Unable to perform NFPE. Nutrition Related Findings:    Last BM 3/23. Labs: Na (139) WNL up from 129 at admission, Cr (0.55) L. Pertinent meds: ferrous sulfate, Remeron, IVF. Wound Type: None       Current Nutrition Intake & Therapies:    Average Meal Intake: Unable to assess  Average Supplements Intake: Unable to assess  ADULT ORAL NUTRITION SUPPLEMENT; Breakfast, Lunch, Dinner; Standard High Calorie/High Protein Oral Supplement  ADULT DIET; Regular; 1000 ml    Anthropometric Measures:  Height: 5' 2\" (157.5 cm)  Ideal Body Weight (IBW): 110 lbs (50 kg)       Current Body Weight: 97 lb (44 kg) (3/31), 88.2 % IBW.  Weight Source: Bed Scale  Current BMI (kg/m2): 17.7  Usual Body Weight: 93 lb (42.2 kg) (10/14/22)  % Weight Change (Calculated): 4.3  Weight Adjustment For: No Adjustment  BMI Categories: Underweight (BMI less than 22) age over 72    Estimated Daily Nutrient Needs:  Energy Requirements Based On: Formula (MSJ x 1.2-1.5)  Weight Used for Energy Requirements: Current  Energy (kcal/day): 1102 - 1377  Weight Used for Protein Requirements: Current (1.2-1.5)  Protein (g/day): 53 - 66  Method Used for Fluid Requirements: 1 ml/kcal  Fluid (ml/day): 1102 - 2804    Nutrition Diagnosis:   Underweight related to inadequate protein-energy intake as evidenced by BMI    Nutrition Interventions:   Food and/or Nutrient Delivery: Continue Current Diet, Modify Oral Nutrition Supplement, Vitamin Supplement  Nutrition Education/Counseling: No recommendation at this time, Education not indicated  Coordination of Nutrition Care: Continue to monitor while inpatient  Plan of Care discussed with: . Goals:     Goals: Meet at least 75% of estimated needs, by next RD assessment       Nutrition Monitoring and Evaluation:   Behavioral-Environmental Outcomes: None Identified  Food/Nutrient Intake Outcomes: Food and Nutrient Intake, Supplement Intake, Vitamin/Mineral Intake  Physical Signs/Symptoms Outcomes: Biochemical Data, GI Status, Meal Time Behavior, Weight, Nutrition Focused Physical Findings    Discharge Planning:     Too soon to determine, Continue current diet     Nay Mello, [address]  Contact: [phone]

## 2023-03-31 NOTE — DISCHARGE INSTRUCTIONS
DISCHARGE SUMMARY from Nurse    PATIENT INSTRUCTIONS:    After general anesthesia or intravenous sedation, for 24 hours or while taking prescription Narcotics:  Limit your activities  Do not drive and operate hazardous machinery  Do not make important personal or business decisions  Do  not drink alcoholic beverages  If you have not urinated within 8 hours after discharge, please contact your surgeon on call. Report the following to your surgeon:  Excessive pain, swelling, redness or odor of or around the surgical area  Temperature over 100.5  Nausea and vomiting lasting longer than 4 hours or if unable to take medications  Any signs of decreased circulation or nerve impairment to extremity: change in color, persistent  numbness, tingling, coldness or increase pain  Any questions    What to do at Home:  Recommended activity: activity as tolerated. If you experience any of the following symptoms Shortness of breath, chest pain, or fever, please follow up with Emergency Department. *  Please give a list of your current medications to your Primary Care Provider. *  Please update this list whenever your medications are discontinued, doses are      changed, or new medications (including over-the-counter products) are added. *  Please carry medication information at all times in case of emergency situations. These are general instructions for a healthy lifestyle:    No smoking/ No tobacco products/ Avoid exposure to second hand smoke  Surgeon General's Warning:  Quitting smoking now greatly reduces serious risk to your health.     Obesity, smoking, and sedentary lifestyle greatly increases your risk for illness    A healthy diet, regular physical exercise & weight monitoring are important for maintaining a healthy lifestyle    You may be retaining fluid if you have a history of heart failure or if you experience any of the following symptoms:  Weight gain of 3 pounds or more overnight or 5 pounds in a

## 2023-03-31 NOTE — PROGRESS NOTES
Discharge order noted for today. Orders received. No needs identified at this time. Case management remains available as needed.       [facility] RN CDCES  Case Management

## 2023-04-01 PROBLEM — R55 SYNCOPE: Status: ACTIVE | Noted: 2023-04-01

## 2023-04-01 LAB — OSMOLALITY SERPL: 288 MOSMOL/KG (ref 280–301)

## 2023-04-01 RX ORDER — TRAMADOL HYDROCHLORIDE 50 MG/1
50 TABLET ORAL EVERY 4 HOURS PRN
Qty: 12 TABLET | Refills: 0 | Status: SHIPPED | OUTPATIENT
Start: 2023-04-01 | End: 2023-04-04

## 2023-04-01 RX ORDER — NICOTINE 21 MG/24HR
1 PATCH, TRANSDERMAL 24 HOURS TRANSDERMAL DAILY
Qty: 14 PATCH | Refills: 5 | Status: SHIPPED | OUTPATIENT
Start: 2023-04-01 | End: 2023-04-15

## 2023-04-01 NOTE — DISCHARGE SUMMARY
[facility]  [facility]charge Summary       Patient: Bety Rose Age: 70 y.o. : 1955 MR#: [id number] SSN: [id number]  PCP on record: Abbe Wen MD  Admit date: 3/30/2023  Discharge date: 2023    Consults:    -   Procedures:  -     Significant Diagnostic Studies:   -    Discharge Diagnoses:                                           Patient Active Problem List   Diagnosis    QT prolongation    Severe protein-calorie malnutrition ([address]    Pyelonephritis    SIRS (systemic inflammatory response syndrome) ([address]    UTI (urinary tract infection)    Severe sepsis ([address]    Sepsis ([address]    Anxiety state    Weight loss, unintentional    Major depressive disorder, recurrent episode, severe, with psychosis ([address]    NSTEMI (non-ST elevated myocardial infarction) ([address]    Itch of skin    Stage 3 acute kidney injury ([address]    Hypotension    Psychiatric disorder    Bradycardia    Hallucination    Hyponatremia    Leukocytosis    Tachycardia    Cystic disease of liver    Hypertension    Syncope       Hospital Course by Problem   1. Today's examination of the patient revealed:     Subjective:     Objective:   VS: BP (!) 151/84   Pulse 78   Temp 98.6 °F (37 °C) (Oral)   Resp 16   Ht 5' 2\" (1.575 m)   Wt 97 lb (44 kg)   SpO2 95%   BMI 17.74 kg/m²    Tmax/24hrs: Temp (24hrs), Av.6 °F (37 °C), Min:98.6 °F (37 °C), Max:98.6 °F (37 °C)     Input/Output: No intake or output data in the 24 hours ending 23 1338    General:    Cardiovascular:    Pulmonary:    GI:    Extremities: Additional:      Labs:    No results found for this or any previous visit (from the past 24 hour(s)).   Additional Data Reviewed:     Condition:   Disposition:    []Home   []Home with Home Health   []SNF/NH   []Rehab   []Home with family   []Alternate Facility:____________________      Discharge Medications:     Discharge Medication List as of 3/31/2023  5:08 PM        CONTINUE these

## 2023-04-02 LAB — OSMOLALITY UR: 395 MOSMOL/KG

## 2023-04-03 NOTE — PROGRESS NOTES
Physician Progress Note      PATIENT:               José Manuel Stock  [facility] #:                  [id number]  :                       1955  ADMIT DATE:       3/30/2023 1:02 AM  DISCH DATE:        3/31/2023 5:27 PM  RESPONDING  PROVIDER #:        Margarita Bonilla MD          QUERY TEXT:    Patient admitted with hyponatremia. Patient noted to have loss of   consciousness. When all testing has been completed, if possible, please   document in progress notes and discharge summary after study the etiology of   the syncope: The medical record reflects the following:  Risk Factors: 69 yo female admitted with hyponatremia and LOC  Clinical Indicators: Per H&P Patient is admitted to [facility] Telemetry Unit  for   management of Hyponatremia with Loss of Consciousness (concern for Orthostatic   Hypotension versus possible Seizure Activity). ?CT head negative  BP low in ED 88/45 MAP 54  Treatment: IV fluid boluses, CT head, serial labs      Thank you for your time,    Gabrielle SUERO, RN, [address]  C: [phone]    [email or web address]  Options provided:  -- Syncope due to orthostatic hypotension secondary to *** (diabetic,   Parkinson's or MS) autonomic neuropathy  -- Syncope due to other hypotension  -- Carotid sinus syncope  -- Other - I will add my own diagnosis  -- Disagree - Not applicable / Not valid  -- Disagree - Clinically unable to determine / Unknown  -- Refer to Clinical Documentation Reviewer    PROVIDER RESPONSE TEXT:    The syncope is due to other hypotension.     Query created by: Da Shane on 3/31/2023 12:34 PM      Electronically signed by:  Margarita Bonilla MD 4/3/2023 12:45 PM

## 2023-07-04 ENCOUNTER — APPOINTMENT (OUTPATIENT)
Facility: HOSPITAL | Age: 68
End: 2023-07-04
Payer: MEDICARE

## 2023-07-04 ENCOUNTER — HOSPITAL ENCOUNTER (EMERGENCY)
Facility: HOSPITAL | Age: 68
Discharge: HOME | End: 2023-07-05
Attending: STUDENT IN AN ORGANIZED HEALTH CARE EDUCATION/TRAINING PROGRAM
Payer: MEDICARE

## 2023-07-04 DIAGNOSIS — S51.012A LACERATION OF LEFT ELBOW, INITIAL ENCOUNTER: ICD-10-CM

## 2023-07-04 DIAGNOSIS — K59.00 CONSTIPATION, UNSPECIFIED CONSTIPATION TYPE: ICD-10-CM

## 2023-07-04 DIAGNOSIS — R55 SYNCOPE AND COLLAPSE: Primary | ICD-10-CM

## 2023-07-04 LAB
ALBUMIN SERPL-MCNC: 3.2 G/DL (ref 3.4–5)
ALBUMIN/GLOB SERPL: 1.1 (ref 0.8–1.7)
ALP SERPL-CCNC: 50 U/L (ref 45–117)
ALT SERPL-CCNC: 44 U/L (ref 13–56)
ANION GAP SERPL CALC-SCNC: 7 MMOL/L (ref 3–18)
AST SERPL-CCNC: 59 U/L (ref 10–38)
BASOPHILS # BLD: 0 K/UL (ref 0–0.1)
BASOPHILS NFR BLD: 0 % (ref 0–2)
BILIRUB SERPL-MCNC: 0.8 MG/DL (ref 0.2–1)
BUN SERPL-MCNC: 6 MG/DL (ref 7–18)
BUN/CREAT SERPL: 8 (ref 12–20)
CALCIUM SERPL-MCNC: 8.9 MG/DL (ref 8.5–10.1)
CHLORIDE SERPL-SCNC: 106 MMOL/L (ref 100–111)
CO2 SERPL-SCNC: 26 MMOL/L (ref 21–32)
CREAT SERPL-MCNC: 0.78 MG/DL (ref 0.6–1.3)
DIFFERENTIAL METHOD BLD: ABNORMAL
EOSINOPHIL # BLD: 0 K/UL (ref 0–0.4)
EOSINOPHIL NFR BLD: 0 % (ref 0–5)
ERYTHROCYTE [DISTWIDTH] IN BLOOD BY AUTOMATED COUNT: 13.2 % (ref 11.6–14.5)
GLOBULIN SER CALC-MCNC: 2.8 G/DL (ref 2–4)
GLUCOSE SERPL-MCNC: 108 MG/DL (ref 74–99)
HCT VFR BLD AUTO: 27.8 % (ref 35–45)
HGB BLD-MCNC: 9.1 G/DL (ref 12–16)
IMM GRANULOCYTES # BLD AUTO: 0 K/UL (ref 0–0.04)
IMM GRANULOCYTES NFR BLD AUTO: 0 % (ref 0–0.5)
LYMPHOCYTES # BLD: 6.5 K/UL (ref 0.9–3.6)
LYMPHOCYTES NFR BLD: 58 % (ref 21–52)
MAGNESIUM SERPL-MCNC: 1.9 MG/DL (ref 1.6–2.6)
MCH RBC QN AUTO: 30.8 PG (ref 24–34)
MCHC RBC AUTO-ENTMCNC: 32.7 G/DL (ref 31–37)
MCV RBC AUTO: 94.2 FL (ref 78–100)
MONOCYTES # BLD: 0.3 K/UL (ref 0.05–1.2)
MONOCYTES NFR BLD: 3 % (ref 3–10)
NEUTS SEG # BLD: 4.4 K/UL (ref 1.8–8)
NEUTS SEG NFR BLD: 39 % (ref 40–73)
NRBC # BLD: 0 K/UL (ref 0–0.01)
NRBC BLD-RTO: 0 PER 100 WBC
PLATELET # BLD AUTO: 197 K/UL (ref 135–420)
PLATELET COMMENT: ABNORMAL
PMV BLD AUTO: 11.7 FL (ref 9.2–11.8)
POTASSIUM SERPL-SCNC: 3.4 MMOL/L (ref 3.5–5.5)
PROT SERPL-MCNC: 6 G/DL (ref 6.4–8.2)
RBC # BLD AUTO: 2.95 M/UL (ref 4.2–5.3)
RBC MORPH BLD: ABNORMAL
SODIUM SERPL-SCNC: 139 MMOL/L (ref 136–145)
TROPONIN I SERPL HS-MCNC: 6 NG/L (ref 0–54)
WBC # BLD AUTO: 11.2 K/UL (ref 4.6–13.2)

## 2023-07-04 PROCEDURE — 73070 X-RAY EXAM OF ELBOW: CPT

## 2023-07-04 PROCEDURE — 85025 COMPLETE CBC W/AUTO DIFF WBC: CPT

## 2023-07-04 PROCEDURE — 93005 ELECTROCARDIOGRAM TRACING: CPT | Performed by: PHYSICIAN ASSISTANT

## 2023-07-04 PROCEDURE — 90714 TD VACC NO PRESV 7 YRS+ IM: CPT | Performed by: PHYSICIAN ASSISTANT

## 2023-07-04 PROCEDURE — 6360000002 HC RX W HCPCS: Performed by: PHYSICIAN ASSISTANT

## 2023-07-04 PROCEDURE — 90471 IMMUNIZATION ADMIN: CPT | Performed by: PHYSICIAN ASSISTANT

## 2023-07-04 PROCEDURE — 99285 EMERGENCY DEPT VISIT HI MDM: CPT

## 2023-07-04 PROCEDURE — 71045 X-RAY EXAM CHEST 1 VIEW: CPT

## 2023-07-04 PROCEDURE — 84484 ASSAY OF TROPONIN QUANT: CPT

## 2023-07-04 PROCEDURE — 83735 ASSAY OF MAGNESIUM: CPT

## 2023-07-04 PROCEDURE — 2580000003 HC RX 258: Performed by: PHYSICIAN ASSISTANT

## 2023-07-04 PROCEDURE — 96374 THER/PROPH/DIAG INJ IV PUSH: CPT

## 2023-07-04 PROCEDURE — 80053 COMPREHEN METABOLIC PANEL: CPT

## 2023-07-04 RX ORDER — 0.9 % SODIUM CHLORIDE 0.9 %
1000 INTRAVENOUS SOLUTION INTRAVENOUS ONCE
Status: COMPLETED | OUTPATIENT
Start: 2023-07-04 | End: 2023-07-04

## 2023-07-04 RX ADMIN — SODIUM CHLORIDE 1000 ML: 9 INJECTION, SOLUTION INTRAVENOUS at 21:31

## 2023-07-04 RX ADMIN — CLOSTRIDIUM TETANI TOXOID ANTIGEN (FORMALDEHYDE INACTIVATED) AND CORYNEBACTERIUM DIPHTHERIAE TOXOID ANTIGEN (FORMALDEHYDE INACTIVATED) 0.5 ML: 5; 2 INJECTION, SUSPENSION INTRAMUSCULAR at 21:37

## 2023-07-04 ASSESSMENT — ENCOUNTER SYMPTOMS
RHINORRHEA: 0
BACK PAIN: 0
SHORTNESS OF BREATH: 0
VOMITING: 0
EYE DISCHARGE: 0
ABDOMINAL PAIN: 0
NAUSEA: 0
WHEEZING: 0
SORE THROAT: 0
COUGH: 0
EYE REDNESS: 0
STRIDOR: 0

## 2023-07-04 ASSESSMENT — PAIN - FUNCTIONAL ASSESSMENT: PAIN_FUNCTIONAL_ASSESSMENT: NONE - DENIES PAIN

## 2023-07-05 ENCOUNTER — APPOINTMENT (OUTPATIENT)
Facility: HOSPITAL | Age: 68
End: 2023-07-05
Payer: MEDICARE

## 2023-07-05 VITALS
OXYGEN SATURATION: 99 % | TEMPERATURE: 98.4 F | WEIGHT: 90 LBS | DIASTOLIC BLOOD PRESSURE: 68 MMHG | HEART RATE: 58 BPM | SYSTOLIC BLOOD PRESSURE: 143 MMHG | RESPIRATION RATE: 22 BRPM | HEIGHT: 62 IN | BODY MASS INDEX: 16.56 KG/M2

## 2023-07-05 LAB
EKG ATRIAL RATE: 47 BPM
EKG DIAGNOSIS: NORMAL
EKG P AXIS: 8 DEGREES
EKG P-R INTERVAL: 162 MS
EKG Q-T INTERVAL: 520 MS
EKG QRS DURATION: 136 MS
EKG QTC CALCULATION (BAZETT): 460 MS
EKG R AXIS: 13 DEGREES
EKG T AXIS: -10 DEGREES
EKG VENTRICULAR RATE: 47 BPM
TROPONIN I SERPL HS-MCNC: 12 NG/L (ref 0–54)

## 2023-07-05 PROCEDURE — 70450 CT HEAD/BRAIN W/O DYE: CPT

## 2023-07-05 PROCEDURE — 6360000002 HC RX W HCPCS: Performed by: PHYSICIAN ASSISTANT

## 2023-07-05 PROCEDURE — 6370000000 HC RX 637 (ALT 250 FOR IP): Performed by: PHYSICIAN ASSISTANT

## 2023-07-05 PROCEDURE — 84484 ASSAY OF TROPONIN QUANT: CPT

## 2023-07-05 RX ORDER — POTASSIUM CHLORIDE 20 MEQ/1
40 TABLET, EXTENDED RELEASE ORAL ONCE
Status: COMPLETED | OUTPATIENT
Start: 2023-07-05 | End: 2023-07-05

## 2023-07-05 RX ORDER — MAGNESIUM CITRATE
150 SOLUTION, ORAL ORAL ONCE
Qty: 150 ML | Refills: 0 | Status: SHIPPED | OUTPATIENT
Start: 2023-07-05 | End: 2023-07-05

## 2023-07-05 RX ORDER — ONDANSETRON 2 MG/ML
4 INJECTION INTRAMUSCULAR; INTRAVENOUS
Status: COMPLETED | OUTPATIENT
Start: 2023-07-05 | End: 2023-07-05

## 2023-07-05 RX ADMIN — POTASSIUM CHLORIDE 40 MEQ: 1500 TABLET, EXTENDED RELEASE ORAL at 04:47

## 2023-07-05 RX ADMIN — ONDANSETRON 4 MG: 2 INJECTION INTRAMUSCULAR; INTRAVENOUS at 01:14

## 2023-07-05 NOTE — ED TRIAGE NOTES
Pt ambulatory into the ED with c/o laceration to left elbow after tripping and hitting a mirror. Bleeding is in under control. Pt has arm wrapped. Pt reports no Tetanus shot \"in a long time\". Denies any pain or loss of mobility.

## 2023-07-05 NOTE — ED NOTES
While in triage, pt lost consciousness for approx 30 seconds- observed staring off and not responding to sternal rub. Pt soiled herself and voided on the floor. States she passes out sometimes; her \"meds are messed up\" and she gets confused. Admits to smoking marijuana today. Unable to perform EKG in triage due to patient slumping down. Pt transferred to room 1 to complete assessment.       Herlinda Cordova RN  07/04/23 2037

## 2023-07-05 NOTE — ED PROVIDER NOTES
Patient states she is aware that she was only supposed to be taking Norvasc for blood pressure. She states she was taking lisinopril and metoprolol as well without realizing this. This is likely the source of the patient's hypotension and subsequently her syncopal episode. I do not suspect PE given that the pt denies chest pain/SOB and is not tachycardiac hypoxic or tachypneic. Patient is recommended to only take Norvasc for her hypertension. Very close PCP follow-up recommended. Return precautions advised. Patient agrees. Leny Montemayor PA-C         MED RECONCILIATION:  No current facility-administered medications for this encounter.      Current Outpatient Medications   Medication Sig    magnesium citrate (CITROMA) SOLN Take 150 mLs by mouth once for 1 dose    nicotine (NICODERM CQ) 14 MG/24HR Place 1 patch onto the skin daily for 14 days    GNP ASPIRIN 81 MG EC tablet TAKE 1 TABLET BY MOUTH ONCE DAILY    furosemide (LASIX) 20 MG tablet TAKE 1 TABLET BY MOUTH ONCE DAILY AS NEEDED FOR EDEMA    divalproex (DEPAKOTE) 250 MG DR tablet Take 250 mg by mouth 2 times daily    docusate (COLACE, DULCOLAX) 100 MG CAPS Take 100 mg by mouth 2 times daily    ferrous sulfate (IRON 325) 325 (65 Fe) MG tablet Take 325 mg by mouth every morning (before breakfast)    FLUoxetine (PROZAC) 20 MG capsule Take by mouth daily    mirtazapine (REMERON) 45 MG tablet Take 45 mg by mouth       Disposition:  D/c       Medication List        START taking these medications      magnesium citrate Soln  Commonly known as: CITROMA  Take 150 mLs by mouth once for 1 dose            ASK your doctor about these medications      divalproex 250 MG DR tablet  Commonly known as: DEPAKOTE     docusate 100 MG Caps  Commonly known as: COLACE, DULCOLAX     ferrous sulfate 325 (65 Fe) MG tablet  Commonly known as: IRON 325     FLUoxetine 20 MG capsule  Commonly known as: PROZAC     furosemide 20 MG tablet  Commonly known as: LASIX  TAKE 1 TABLET BY MOUTH

## 2023-07-23 ENCOUNTER — APPOINTMENT (OUTPATIENT)
Facility: HOSPITAL | Age: 68
End: 2023-07-23
Payer: MEDICARE

## 2023-07-23 ENCOUNTER — HOSPITAL ENCOUNTER (EMERGENCY)
Facility: HOSPITAL | Age: 68
Discharge: HOME | End: 2023-07-23
Attending: STUDENT IN AN ORGANIZED HEALTH CARE EDUCATION/TRAINING PROGRAM
Payer: MEDICARE

## 2023-07-23 VITALS
OXYGEN SATURATION: 100 % | HEIGHT: 62 IN | DIASTOLIC BLOOD PRESSURE: 66 MMHG | WEIGHT: 87 LBS | HEART RATE: 55 BPM | RESPIRATION RATE: 17 BRPM | BODY MASS INDEX: 16.01 KG/M2 | TEMPERATURE: 98.7 F | SYSTOLIC BLOOD PRESSURE: 137 MMHG

## 2023-07-23 DIAGNOSIS — R29.6 FREQUENT FALLS: ICD-10-CM

## 2023-07-23 DIAGNOSIS — D64.9 NORMOCYTIC ANEMIA: ICD-10-CM

## 2023-07-23 DIAGNOSIS — E87.6 HYPOKALEMIA: ICD-10-CM

## 2023-07-23 DIAGNOSIS — Z48.02 ENCOUNTER FOR REMOVAL OF SUTURES: ICD-10-CM

## 2023-07-23 DIAGNOSIS — R79.89 ELEVATED D-DIMER: ICD-10-CM

## 2023-07-23 DIAGNOSIS — F12.10 TETRAHYDROCANNABINOL (THC) USE DISORDER, MILD, ABUSE: ICD-10-CM

## 2023-07-23 DIAGNOSIS — R77.0 LOW SERUM ALBUMIN: ICD-10-CM

## 2023-07-23 DIAGNOSIS — L02.91 ABSCESS: Primary | ICD-10-CM

## 2023-07-23 LAB
ALBUMIN SERPL-MCNC: 3.2 G/DL (ref 3.4–5)
ALBUMIN/GLOB SERPL: 1 (ref 0.8–1.7)
ALP SERPL-CCNC: 56 U/L (ref 45–117)
ALT SERPL-CCNC: 17 U/L (ref 13–56)
AMPHET UR QL SCN: NEGATIVE
ANION GAP SERPL CALC-SCNC: 7 MMOL/L (ref 3–18)
APPEARANCE UR: ABNORMAL
AST SERPL-CCNC: 13 U/L (ref 10–38)
BACTERIA URNS QL MICRO: ABNORMAL /HPF
BARBITURATES UR QL SCN: NEGATIVE
BASOPHILS # BLD: 0.1 K/UL (ref 0–0.1)
BASOPHILS NFR BLD: 1 % (ref 0–2)
BENZODIAZ UR QL: NEGATIVE
BILIRUB DIRECT SERPL-MCNC: 0.1 MG/DL (ref 0–0.2)
BILIRUB SERPL-MCNC: 0.4 MG/DL (ref 0.2–1)
BILIRUB UR QL: NEGATIVE
BUN SERPL-MCNC: 8 MG/DL (ref 7–18)
BUN/CREAT SERPL: 12 (ref 12–20)
CALCIUM SERPL-MCNC: 9 MG/DL (ref 8.5–10.1)
CANNABINOIDS UR QL SCN: POSITIVE
CHLORIDE SERPL-SCNC: 107 MMOL/L (ref 100–111)
CO2 SERPL-SCNC: 29 MMOL/L (ref 21–32)
COCAINE UR QL SCN: NEGATIVE
COLOR UR: YELLOW
CREAT SERPL-MCNC: 0.65 MG/DL (ref 0.6–1.3)
CRP SERPL-MCNC: 0.5 MG/DL (ref 0–0.3)
D DIMER PPP FEU-MCNC: 0.5 UG/ML(FEU)
DIFFERENTIAL METHOD BLD: ABNORMAL
EKG ATRIAL RATE: 61 BPM
EKG DIAGNOSIS: NORMAL
EKG P AXIS: 52 DEGREES
EKG P-R INTERVAL: 136 MS
EKG Q-T INTERVAL: 468 MS
EKG QRS DURATION: 126 MS
EKG QTC CALCULATION (BAZETT): 471 MS
EKG R AXIS: 64 DEGREES
EKG T AXIS: 21 DEGREES
EKG VENTRICULAR RATE: 61 BPM
EOSINOPHIL # BLD: 0 K/UL (ref 0–0.4)
EOSINOPHIL NFR BLD: 0 % (ref 0–5)
EPITH CASTS URNS QL MICRO: ABNORMAL /LPF (ref 0–5)
ERYTHROCYTE [DISTWIDTH] IN BLOOD BY AUTOMATED COUNT: 12.8 % (ref 11.6–14.5)
ERYTHROCYTE [SEDIMENTATION RATE] IN BLOOD: 28 MM/HR (ref 0–30)
ETHANOL SERPL-MCNC: <3 MG/DL (ref 0–3)
GLOBULIN SER CALC-MCNC: 3.2 G/DL (ref 2–4)
GLUCOSE SERPL-MCNC: 114 MG/DL (ref 74–99)
GLUCOSE UR STRIP.AUTO-MCNC: NEGATIVE MG/DL
HCT VFR BLD AUTO: 33.9 % (ref 35–45)
HGB BLD-MCNC: 11.2 G/DL (ref 12–16)
HGB UR QL STRIP: ABNORMAL
IMM GRANULOCYTES # BLD AUTO: 0.1 K/UL (ref 0–0.04)
IMM GRANULOCYTES NFR BLD AUTO: 1 % (ref 0–0.5)
KETONES UR QL STRIP.AUTO: NEGATIVE MG/DL
LEUKOCYTE ESTERASE UR QL STRIP.AUTO: ABNORMAL
LIPASE SERPL-CCNC: 75 U/L (ref 73–393)
LYMPHOCYTES # BLD: 2.4 K/UL (ref 0.9–3.6)
LYMPHOCYTES NFR BLD: 20 % (ref 21–52)
Lab: ABNORMAL
MAGNESIUM SERPL-MCNC: 2.1 MG/DL (ref 1.6–2.6)
MCH RBC QN AUTO: 31.3 PG (ref 24–34)
MCHC RBC AUTO-ENTMCNC: 33 G/DL (ref 31–37)
MCV RBC AUTO: 94.7 FL (ref 78–100)
METHADONE UR QL: NEGATIVE
MONOCYTES # BLD: 0.8 K/UL (ref 0.05–1.2)
MONOCYTES NFR BLD: 7 % (ref 3–10)
NEUTS SEG # BLD: 8.4 K/UL (ref 1.8–8)
NEUTS SEG NFR BLD: 72 % (ref 40–73)
NITRITE UR QL STRIP.AUTO: NEGATIVE
NRBC # BLD: 0 K/UL (ref 0–0.01)
NRBC BLD-RTO: 0 PER 100 WBC
OPIATES UR QL: NEGATIVE
PCP UR QL: NEGATIVE
PH UR STRIP: 6.5 (ref 5–8)
PLATELET # BLD AUTO: 355 K/UL (ref 135–420)
PMV BLD AUTO: 10.5 FL (ref 9.2–11.8)
POTASSIUM SERPL-SCNC: 3.1 MMOL/L (ref 3.5–5.5)
PROT SERPL-MCNC: 6.4 G/DL (ref 6.4–8.2)
PROT UR STRIP-MCNC: ABNORMAL MG/DL
RBC # BLD AUTO: 3.58 M/UL (ref 4.2–5.3)
RBC #/AREA URNS HPF: ABNORMAL /HPF (ref 0–5)
SODIUM SERPL-SCNC: 143 MMOL/L (ref 136–145)
SP GR UR REFRACTOMETRY: 1.01 (ref 1–1.03)
TROPONIN I SERPL HS-MCNC: 6 NG/L (ref 0–54)
TROPONIN I SERPL HS-MCNC: 6 NG/L (ref 0–54)
UROBILINOGEN UR QL STRIP.AUTO: 1 EU/DL (ref 0.2–1)
WBC # BLD AUTO: 11.7 K/UL (ref 4.6–13.2)
WBC URNS QL MICRO: ABNORMAL /HPF (ref 0–4)

## 2023-07-23 PROCEDURE — 85652 RBC SED RATE AUTOMATED: CPT

## 2023-07-23 PROCEDURE — 82077 ASSAY SPEC XCP UR&BREATH IA: CPT

## 2023-07-23 PROCEDURE — 70450 CT HEAD/BRAIN W/O DYE: CPT

## 2023-07-23 PROCEDURE — 83735 ASSAY OF MAGNESIUM: CPT

## 2023-07-23 PROCEDURE — 80076 HEPATIC FUNCTION PANEL: CPT

## 2023-07-23 PROCEDURE — 85025 COMPLETE CBC W/AUTO DIFF WBC: CPT

## 2023-07-23 PROCEDURE — 83690 ASSAY OF LIPASE: CPT

## 2023-07-23 PROCEDURE — 84484 ASSAY OF TROPONIN QUANT: CPT

## 2023-07-23 PROCEDURE — 6370000000 HC RX 637 (ALT 250 FOR IP): Performed by: STUDENT IN AN ORGANIZED HEALTH CARE EDUCATION/TRAINING PROGRAM

## 2023-07-23 PROCEDURE — 85379 FIBRIN DEGRADATION QUANT: CPT

## 2023-07-23 PROCEDURE — 71275 CT ANGIOGRAPHY CHEST: CPT

## 2023-07-23 PROCEDURE — 6360000002 HC RX W HCPCS: Performed by: STUDENT IN AN ORGANIZED HEALTH CARE EDUCATION/TRAINING PROGRAM

## 2023-07-23 PROCEDURE — 73070 X-RAY EXAM OF ELBOW: CPT

## 2023-07-23 PROCEDURE — 96374 THER/PROPH/DIAG INJ IV PUSH: CPT

## 2023-07-23 PROCEDURE — 6360000004 HC RX CONTRAST MEDICATION: Performed by: STUDENT IN AN ORGANIZED HEALTH CARE EDUCATION/TRAINING PROGRAM

## 2023-07-23 PROCEDURE — 93005 ELECTROCARDIOGRAM TRACING: CPT | Performed by: STUDENT IN AN ORGANIZED HEALTH CARE EDUCATION/TRAINING PROGRAM

## 2023-07-23 PROCEDURE — 93010 ELECTROCARDIOGRAM REPORT: CPT | Performed by: INTERNAL MEDICINE

## 2023-07-23 PROCEDURE — 71045 X-RAY EXAM CHEST 1 VIEW: CPT

## 2023-07-23 PROCEDURE — 86140 C-REACTIVE PROTEIN: CPT

## 2023-07-23 PROCEDURE — 80307 DRUG TEST PRSMV CHEM ANLYZR: CPT

## 2023-07-23 PROCEDURE — 99285 EMERGENCY DEPT VISIT HI MDM: CPT

## 2023-07-23 PROCEDURE — 80048 BASIC METABOLIC PNL TOTAL CA: CPT

## 2023-07-23 PROCEDURE — 81001 URINALYSIS AUTO W/SCOPE: CPT

## 2023-07-23 RX ORDER — ACETAMINOPHEN 325 MG/1
975 TABLET ORAL
Status: COMPLETED | OUTPATIENT
Start: 2023-07-23 | End: 2023-07-23

## 2023-07-23 RX ORDER — DOXYCYCLINE HYCLATE 100 MG/1
100 CAPSULE ORAL
Status: COMPLETED | OUTPATIENT
Start: 2023-07-23 | End: 2023-07-23

## 2023-07-23 RX ORDER — KETOROLAC TROMETHAMINE 15 MG/ML
15 INJECTION, SOLUTION INTRAMUSCULAR; INTRAVENOUS ONCE
Status: COMPLETED | OUTPATIENT
Start: 2023-07-23 | End: 2023-07-23

## 2023-07-23 RX ORDER — DOXYCYCLINE HYCLATE 100 MG
100 TABLET ORAL 2 TIMES DAILY
Qty: 14 TABLET | Refills: 0 | Status: SHIPPED | OUTPATIENT
Start: 2023-07-23 | End: 2023-07-30

## 2023-07-23 RX ADMIN — KETOROLAC TROMETHAMINE 15 MG: 15 INJECTION, SOLUTION INTRAMUSCULAR; INTRAVENOUS at 13:19

## 2023-07-23 RX ADMIN — ACETAMINOPHEN 325MG 975 MG: 325 TABLET ORAL at 13:20

## 2023-07-23 RX ADMIN — IOPAMIDOL 100 ML: 755 INJECTION, SOLUTION INTRAVENOUS at 13:09

## 2023-07-23 RX ADMIN — POTASSIUM BICARBONATE 40 MEQ: 782 TABLET, EFFERVESCENT ORAL at 13:13

## 2023-07-23 RX ADMIN — DOXYCYCLINE HYCLATE 100 MG: 100 CAPSULE ORAL at 13:13

## 2023-07-23 ASSESSMENT — PAIN - FUNCTIONAL ASSESSMENT
PAIN_FUNCTIONAL_ASSESSMENT: 0-10
PAIN_FUNCTIONAL_ASSESSMENT: NONE - DENIES PAIN

## 2023-07-23 ASSESSMENT — PAIN SCALES - GENERAL: PAINLEVEL_OUTOF10: 10

## 2023-07-23 NOTE — ED TRIAGE NOTES
States frequent falls due to dizziness over past few weeks. Denies CP. Also has infected sore on left elbow x 2 weeks and golf ball sized abscess to left occipital scalp x 2 weeks.

## 2023-07-23 NOTE — ED NOTES
Patient medicated as documented in the [facility]. Patient has taken herself off the monitor. Writer explained to patient that MD ordered the cardiac monitor. No evidence of learning.       Vamsi Holland RN  07/23/23 9231

## 2023-07-23 NOTE — ED PROVIDER NOTES
Musculoskeletal:      Left elbow: Laceration present. No effusion. Normal range of motion. No tenderness. Cervical back: Normal range of motion. Skin:     General: Skin is warm. Capillary Refill: Capillary refill takes less than 2 seconds. Findings: Abscess and wound present. Neurological:      General: No focal deficit present. Mental Status: She is alert and oriented to person, place, and time. Cranial Nerves: No cranial nerve deficit. Sensory: No sensory deficit. Motor: No weakness.       Coordination: Coordination normal.      Gait: Gait normal.      Deep Tendon Reflexes: Reflexes normal.          DIAGNOSTIC RESULTS   LABS:     Recent Results (from the past 24 hour(s))   EKG 12 Lead    Collection Time: 07/23/23 10:10 AM   Result Value Ref Range    Ventricular Rate 61 BPM    Atrial Rate 61 BPM    P-R Interval 136 ms    QRS Duration 126 ms    Q-T Interval 468 ms    QTc Calculation (Bazett) 471 ms    P Axis 52 degrees    R Axis 64 degrees    T Axis 21 degrees    Diagnosis       Normal sinus rhythm  Right bundle branch block  Abnormal ECG  When compared with ECG of 04-JUL-2023 20:29,  No significant change was found  Confirmed by Ebenezer Lundberg MD, [place] ([id number]) on 7/23/2023 11:46:31 AM     BMP    Collection Time: 07/23/23 10:30 AM   Result Value Ref Range    Sodium 143 136 - 145 mmol/L    Potassium 3.1 (L) 3.5 - 5.5 mmol/L    Chloride 107 100 - 111 mmol/L    CO2 29 21 - 32 mmol/L    Anion Gap 7 3.0 - 18 mmol/L    Glucose 114 (H) 74 - 99 mg/dL    BUN 8 7.0 - 18 MG/DL    Creatinine 0.65 0.6 - 1.3 MG/DL    Bun/Cre Ratio 12 12 - 20      Est, Glom Filt Rate >60 >60 ml/min/1.73m2    Calcium 9.0 8.5 - 10.1 MG/DL   CBC with Auto Differential    Collection Time: 07/23/23 10:30 AM   Result Value Ref Range    WBC 11.7 4.6 - 13.2 K/uL    RBC 3.58 (L) 4.20 - 5.30 M/uL    Hemoglobin 11.2 (L) 12.0 - 16.0 g/dL    Hematocrit 33.9 (L) 35.0 - 45.0 %    MCV 94.7 78.0 - 100.0 FL    MCH 31.3 24.0 - 34.0 PG

## 2023-07-23 NOTE — DISCHARGE INSTRUCTIONS
Your CT scan showed a nodule in your thyroid gland.  Please follow up with your PCM to get our an outpatient ultrasound of your thyroid

## 2023-10-09 ENCOUNTER — APPOINTMENT (OUTPATIENT)
Facility: HOSPITAL | Age: 68
DRG: 885 | End: 2023-10-09
Payer: MEDICARE

## 2023-10-09 ENCOUNTER — HOSPITAL ENCOUNTER (INPATIENT)
Facility: HOSPITAL | Age: 68
LOS: 9 days | Discharge: HOME | DRG: 885 | End: 2023-10-18
Attending: PSYCHIATRY & NEUROLOGY | Admitting: PSYCHIATRY & NEUROLOGY
Payer: MEDICARE

## 2023-10-09 DIAGNOSIS — R45.851 SUICIDAL IDEATION: ICD-10-CM

## 2023-10-09 DIAGNOSIS — R06.02 SHORTNESS OF BREATH: Primary | ICD-10-CM

## 2023-10-09 PROBLEM — F31.9 BIPOLAR 1 DISORDER, DEPRESSED (HCC): Status: ACTIVE | Noted: 2023-10-09

## 2023-10-09 LAB
ALBUMIN SERPL-MCNC: 3.8 G/DL (ref 3.4–5)
ALBUMIN/GLOB SERPL: 1.2 (ref 0.8–1.7)
ALP SERPL-CCNC: 56 U/L (ref 45–117)
ALT SERPL-CCNC: 25 U/L (ref 13–56)
AMPHET UR QL SCN: NEGATIVE
ANION GAP SERPL CALC-SCNC: 4 MMOL/L (ref 3–18)
APPEARANCE UR: CLEAR
AST SERPL-CCNC: 18 U/L (ref 10–38)
BARBITURATES UR QL SCN: NEGATIVE
BASOPHILS # BLD: 0.1 K/UL (ref 0–0.1)
BASOPHILS NFR BLD: 1 % (ref 0–2)
BENZODIAZ UR QL: NEGATIVE
BILIRUB SERPL-MCNC: 0.4 MG/DL (ref 0.2–1)
BILIRUB UR QL: NEGATIVE
BUN SERPL-MCNC: 5 MG/DL (ref 7–18)
BUN/CREAT SERPL: 8 (ref 12–20)
CALCIUM SERPL-MCNC: 9.3 MG/DL (ref 8.5–10.1)
CANNABINOIDS UR QL SCN: POSITIVE
CHLORIDE SERPL-SCNC: 112 MMOL/L (ref 100–111)
CO2 SERPL-SCNC: 27 MMOL/L (ref 21–32)
COCAINE UR QL SCN: NEGATIVE
COLOR UR: YELLOW
CREAT SERPL-MCNC: 0.61 MG/DL (ref 0.6–1.3)
DIFFERENTIAL METHOD BLD: ABNORMAL
EKG ATRIAL RATE: 85 BPM
EKG DIAGNOSIS: NORMAL
EKG P AXIS: 62 DEGREES
EKG P-R INTERVAL: 144 MS
EKG Q-T INTERVAL: 414 MS
EKG QRS DURATION: 130 MS
EKG QTC CALCULATION (BAZETT): 492 MS
EKG R AXIS: 55 DEGREES
EKG T AXIS: -36 DEGREES
EKG VENTRICULAR RATE: 85 BPM
EOSINOPHIL # BLD: 0 K/UL (ref 0–0.4)
EOSINOPHIL NFR BLD: 0 % (ref 0–5)
ERYTHROCYTE [DISTWIDTH] IN BLOOD BY AUTOMATED COUNT: 13.2 % (ref 11.6–14.5)
ETHANOL SERPL-MCNC: <3 MG/DL (ref 0–3)
FLUAV RNA SPEC QL NAA+PROBE: NOT DETECTED
FLUBV RNA SPEC QL NAA+PROBE: NOT DETECTED
GLOBULIN SER CALC-MCNC: 3.2 G/DL (ref 2–4)
GLUCOSE SERPL-MCNC: 91 MG/DL (ref 74–99)
GLUCOSE UR STRIP.AUTO-MCNC: NEGATIVE MG/DL
HCT VFR BLD AUTO: 34.2 % (ref 35–45)
HGB BLD-MCNC: 11 G/DL (ref 12–16)
HGB UR QL STRIP: NEGATIVE
IMM GRANULOCYTES # BLD AUTO: 0 K/UL (ref 0–0.04)
IMM GRANULOCYTES NFR BLD AUTO: 0 % (ref 0–0.5)
KETONES UR QL STRIP.AUTO: NEGATIVE MG/DL
LEUKOCYTE ESTERASE UR QL STRIP.AUTO: NEGATIVE
LYMPHOCYTES # BLD: 2.8 K/UL (ref 0.9–3.6)
LYMPHOCYTES NFR BLD: 26 % (ref 21–52)
Lab: ABNORMAL
MAGNESIUM SERPL-MCNC: 2 MG/DL (ref 1.6–2.6)
MCH RBC QN AUTO: 30.6 PG (ref 24–34)
MCHC RBC AUTO-ENTMCNC: 32.2 G/DL (ref 31–37)
MCV RBC AUTO: 95.3 FL (ref 78–100)
MONOCYTES # BLD: 0.7 K/UL (ref 0.05–1.2)
MONOCYTES NFR BLD: 7 % (ref 3–10)
NEUTS SEG # BLD: 7.3 K/UL (ref 1.8–8)
NEUTS SEG NFR BLD: 67 % (ref 40–73)
NITRITE UR QL STRIP.AUTO: NEGATIVE
NRBC # BLD: 0 K/UL (ref 0–0.01)
NRBC BLD-RTO: 0 PER 100 WBC
NT PRO BNP: 509 PG/ML (ref 0–900)
OPIATES UR QL: NEGATIVE
PCP UR QL: NEGATIVE
PH UR STRIP: 6.5 (ref 5–8)
PLATELET # BLD AUTO: 282 K/UL (ref 135–420)
PMV BLD AUTO: 11.2 FL (ref 9.2–11.8)
POTASSIUM SERPL-SCNC: 3.2 MMOL/L (ref 3.5–5.5)
PROT SERPL-MCNC: 7 G/DL (ref 6.4–8.2)
PROT UR STRIP-MCNC: NEGATIVE MG/DL
RBC # BLD AUTO: 3.59 M/UL (ref 4.2–5.3)
SARS-COV-2 RNA RESP QL NAA+PROBE: NOT DETECTED
SODIUM SERPL-SCNC: 143 MMOL/L (ref 136–145)
SP GR UR REFRACTOMETRY: <1.005 (ref 1–1.03)
TROPONIN I SERPL HS-MCNC: 6 NG/L (ref 0–54)
UROBILINOGEN UR QL STRIP.AUTO: 0.2 EU/DL (ref 0.2–1)
WBC # BLD AUTO: 11 K/UL (ref 4.6–13.2)

## 2023-10-09 PROCEDURE — 70450 CT HEAD/BRAIN W/O DYE: CPT

## 2023-10-09 PROCEDURE — 71046 X-RAY EXAM CHEST 2 VIEWS: CPT

## 2023-10-09 PROCEDURE — 99285 EMERGENCY DEPT VISIT HI MDM: CPT

## 2023-10-09 PROCEDURE — 93010 ELECTROCARDIOGRAM REPORT: CPT | Performed by: INTERNAL MEDICINE

## 2023-10-09 PROCEDURE — 80307 DRUG TEST PRSMV CHEM ANLYZR: CPT

## 2023-10-09 PROCEDURE — 6370000000 HC RX 637 (ALT 250 FOR IP)

## 2023-10-09 PROCEDURE — 1240000000 HC EMOTIONAL WELLNESS R&B

## 2023-10-09 PROCEDURE — 83735 ASSAY OF MAGNESIUM: CPT

## 2023-10-09 PROCEDURE — 82077 ASSAY SPEC XCP UR&BREATH IA: CPT

## 2023-10-09 PROCEDURE — 81003 URINALYSIS AUTO W/O SCOPE: CPT

## 2023-10-09 PROCEDURE — 84484 ASSAY OF TROPONIN QUANT: CPT

## 2023-10-09 PROCEDURE — 87636 SARSCOV2 & INF A&B AMP PRB: CPT

## 2023-10-09 PROCEDURE — 6370000000 HC RX 637 (ALT 250 FOR IP): Performed by: PSYCHIATRY & NEUROLOGY

## 2023-10-09 PROCEDURE — 83880 ASSAY OF NATRIURETIC PEPTIDE: CPT

## 2023-10-09 PROCEDURE — 93005 ELECTROCARDIOGRAM TRACING: CPT

## 2023-10-09 PROCEDURE — 71275 CT ANGIOGRAPHY CHEST: CPT

## 2023-10-09 PROCEDURE — 6360000004 HC RX CONTRAST MEDICATION

## 2023-10-09 PROCEDURE — 80053 COMPREHEN METABOLIC PANEL: CPT

## 2023-10-09 PROCEDURE — 85025 COMPLETE CBC W/AUTO DIFF WBC: CPT

## 2023-10-09 PROCEDURE — 87086 URINE CULTURE/COLONY COUNT: CPT

## 2023-10-09 RX ORDER — DOCUSATE SODIUM 100 MG/1
100 CAPSULE, LIQUID FILLED ORAL 2 TIMES DAILY
Status: DISCONTINUED | OUTPATIENT
Start: 2023-10-09 | End: 2023-10-18 | Stop reason: HOSPADM

## 2023-10-09 RX ORDER — ASPIRIN 81 MG/1
81 TABLET ORAL DAILY
Status: DISCONTINUED | OUTPATIENT
Start: 2023-10-10 | End: 2023-10-18 | Stop reason: HOSPADM

## 2023-10-09 RX ORDER — FLUOXETINE HYDROCHLORIDE 20 MG/1
20 CAPSULE ORAL DAILY
Status: DISCONTINUED | OUTPATIENT
Start: 2023-10-10 | End: 2023-10-18 | Stop reason: HOSPADM

## 2023-10-09 RX ORDER — DIVALPROEX SODIUM 250 MG/1
250 TABLET, DELAYED RELEASE ORAL 2 TIMES DAILY
Status: DISCONTINUED | OUTPATIENT
Start: 2023-10-09 | End: 2023-10-13

## 2023-10-09 RX ORDER — FERROUS SULFATE 325(65) MG
325 TABLET ORAL
Status: DISCONTINUED | OUTPATIENT
Start: 2023-10-10 | End: 2023-10-18 | Stop reason: HOSPADM

## 2023-10-09 RX ORDER — MIRTAZAPINE 15 MG/1
45 TABLET, FILM COATED ORAL NIGHTLY
Status: DISCONTINUED | OUTPATIENT
Start: 2023-10-09 | End: 2023-10-11

## 2023-10-09 RX ORDER — FUROSEMIDE 40 MG/1
20 TABLET ORAL DAILY
Status: DISCONTINUED | OUTPATIENT
Start: 2023-10-10 | End: 2023-10-18 | Stop reason: HOSPADM

## 2023-10-09 RX ADMIN — DIVALPROEX SODIUM 250 MG: 250 TABLET, DELAYED RELEASE ORAL at 23:30

## 2023-10-09 RX ADMIN — POTASSIUM BICARBONATE 40 MEQ: 782 TABLET, EFFERVESCENT ORAL at 16:10

## 2023-10-09 RX ADMIN — IOPAMIDOL 80 ML: 755 INJECTION, SOLUTION INTRAVENOUS at 15:23

## 2023-10-09 ASSESSMENT — ENCOUNTER SYMPTOMS
SHORTNESS OF BREATH: 1
ABDOMINAL PAIN: 0
NAUSEA: 0
ABDOMINAL DISTENTION: 0
VOMITING: 0
DIARRHEA: 0
RHINORRHEA: 0

## 2023-10-09 ASSESSMENT — SLEEP AND FATIGUE QUESTIONNAIRES
DO YOU HAVE DIFFICULTY SLEEPING: YES
DO YOU USE A SLEEP AID: NO
SLEEP PATTERN: DIFFICULTY FALLING ASLEEP;DISTURBED/INTERRUPTED SLEEP
AVERAGE NUMBER OF SLEEP HOURS: 4

## 2023-10-09 ASSESSMENT — LIFESTYLE VARIABLES
HOW MANY STANDARD DRINKS CONTAINING ALCOHOL DO YOU HAVE ON A TYPICAL DAY: PATIENT DOES NOT DRINK
HOW OFTEN DO YOU HAVE A DRINK CONTAINING ALCOHOL: NEVER

## 2023-10-09 NOTE — ED PROVIDER NOTES
errors are inadvertently transcribed by the computer software. Please disregard these errors. Please excuse any errors that have escaped final proofreading.        Gabriel Thibodeaux PA-C  10/09/23 2028

## 2023-10-09 NOTE — ED NOTES
Pt changed into blue paper scrubs.  Belongings placed in locker 7 top shelf 2 bags [place], [place]  10/09/23 2215

## 2023-10-09 NOTE — ED NOTES
Pt expressed having thoughts of suicide. She has been feeling hopeless and has thought to take all her prescription meds. She denies actually attempting. She is very tearful. She reports that recently she had her power shut off and doesn't have the money to have them turned back on. She states that her nephew is also living with her.  Provider Yvan ZAVALA made aware     Leopold Slick, RN  10/09/23 9685

## 2023-10-09 NOTE — BSMART NOTE
Behavioral Health Crisis Assessment    Chief Complaint: \"I kept looking at my pills and thought about taking all of them\"       Voluntary or Involuntary Status: voluntarily      C-SSRS current suicide Risk (High, Moderate, Low): moderate      Past Suicide Attempts (specify):  denied any past attempts. States last time she had suicidal thoughts was years ago when her daughter was murdered in 35 years ago when she was 11. Self Injurious/Self Mutilation behaviors (specify): denied    Protective Factors (specify): states her next door neighbor \"Izzy\", \"scared of God will do. \" Active in the [employer] as an Us[employer]. Risk Factors (specify): Mental Health Disorder with history of depression, financial issues, limited support system. Substance use (current or past): (See Below)    Alcohol: beer  Date started: first alcoholic drink at age 12  Route: PO  Frequency: was drinking 2-24 packs a day  Amount:was drinking 2-24 packs a day  Last use: 10 years ago  Withdrawals: denied  Rehab/Inpatient Services: none  Hx of seizures: denied  Hx of blackouts: denied     Heroin: denied  Date started:  Route:  Frequency:  Amount:  Last use: Withdrawals:   Rehab/Inpatient Services:      Cocaine: denied  Date started:   Route:  Frequency:  Amount:  Last use: Withdrawals:   Rehab/Inpatient Services:     Marijuana: every once in a blue moon. Last was 2 weeks ago. Prescription/OTC Medications: denied     Hallucinogenics: denied     Cigarettes/Cigars/Vapors: 2 packs cigarettes a day for the past month         MH & Substance use Treatment  (current and/or past): Inpatient: [facility] 11/20/21-11/24/21  Outpatient: past at [facility] with Dr. Ashley Radford.  States most recent with [facility] with Dr. Juanjo Allen towards others (current and/or past:(specify): Denied      Legal issues (current or past):

## 2023-10-10 PROBLEM — E43 SEVERE PROTEIN-CALORIE MALNUTRITION (HCC): Status: ACTIVE | Noted: 2021-11-23

## 2023-10-10 LAB
BACTERIA SPEC CULT: NORMAL
SERVICE CMNT-IMP: NORMAL

## 2023-10-10 PROCEDURE — 99222 1ST HOSP IP/OBS MODERATE 55: CPT | Performed by: PSYCHIATRY & NEUROLOGY

## 2023-10-10 PROCEDURE — 1240000000 HC EMOTIONAL WELLNESS R&B

## 2023-10-10 PROCEDURE — 6370000000 HC RX 637 (ALT 250 FOR IP): Performed by: PSYCHIATRY & NEUROLOGY

## 2023-10-10 RX ORDER — GUAIFENESIN 200 MG/10ML
200 LIQUID ORAL EVERY 4 HOURS PRN
Status: DISCONTINUED | OUTPATIENT
Start: 2023-10-10 | End: 2023-10-12

## 2023-10-10 RX ORDER — MULTIVITAMIN WITH IRON
1 TABLET ORAL DAILY
Status: DISCONTINUED | OUTPATIENT
Start: 2023-10-10 | End: 2023-10-18 | Stop reason: HOSPADM

## 2023-10-10 RX ORDER — GAUZE BANDAGE 2" X 2"
100 BANDAGE TOPICAL DAILY
Status: DISCONTINUED | OUTPATIENT
Start: 2023-10-10 | End: 2023-10-18 | Stop reason: HOSPADM

## 2023-10-10 RX ADMIN — DOCUSATE SODIUM 100 MG: 100 CAPSULE, LIQUID FILLED ORAL at 08:31

## 2023-10-10 RX ADMIN — THERA TABS 1 TABLET: TAB at 15:59

## 2023-10-10 RX ADMIN — THIAMINE HCL TAB 100 MG 100 MG: 100 TAB at 15:59

## 2023-10-10 RX ADMIN — FLUOXETINE 20 MG: 20 CAPSULE ORAL at 08:31

## 2023-10-10 RX ADMIN — ASPIRIN 81 MG: 81 TABLET, COATED ORAL at 08:31

## 2023-10-10 RX ADMIN — DOCUSATE SODIUM 100 MG: 100 CAPSULE, LIQUID FILLED ORAL at 20:27

## 2023-10-10 RX ADMIN — MIRTAZAPINE 45 MG: 15 TABLET, FILM COATED ORAL at 00:29

## 2023-10-10 RX ADMIN — FUROSEMIDE 20 MG: 40 TABLET ORAL at 08:30

## 2023-10-10 RX ADMIN — FERROUS SULFATE TAB 325 MG (65 MG ELEMENTAL FE) 325 MG: 325 (65 FE) TAB at 08:31

## 2023-10-10 RX ADMIN — MIRTAZAPINE 45 MG: 15 TABLET, FILM COATED ORAL at 20:24

## 2023-10-10 RX ADMIN — DIVALPROEX SODIUM 250 MG: 250 TABLET, DELAYED RELEASE ORAL at 20:24

## 2023-10-10 RX ADMIN — DOCUSATE SODIUM 100 MG: 100 CAPSULE, LIQUID FILLED ORAL at 00:29

## 2023-10-10 RX ADMIN — DIVALPROEX SODIUM 250 MG: 250 TABLET, DELAYED RELEASE ORAL at 08:30

## 2023-10-10 NOTE — BSMART NOTE
SOCIAL WORK GROUP THERAPY  NOTE           Date: October 10, 2023     Group Time:  12:00 PM      Group Ended 12:45      [address]     Group Topic: Stress Test        Group members Participation: 3      Patient Refused to Participate in Group session today but was encouraged to Contribute to the class session. Group session: There are diverse types of stress. A stressor may be a onetime occurrence, or it can be an occurrence that keeps happening over a  long period of time. Some people cope with stress more successfully and can recover from stressful event more fast than other. Everyone must deal with stress in their life, one way or another stressful events. Federica Lewis, , MSW.  Supervisee

## 2023-10-10 NOTE — GROUP NOTE
Group Therapy Note    Date: 10/10/2023    Group Start Time: 1500  Group End Time: 0442  Group Topic: Recreational        StoneyAvi      Group Therapy Note    Attendees Declined: 5/9    Group type: Exercise     Recreational group engaged patients in a psychosocial intervention such as physical activity. Recreational therapist lead group members in guided exercises. Patients discussed different workouts and how to incorporate exercise into their daily routines. The group may help reduce anxiety, depression, and stress and improve self-esteem and mood.      Patient Declined    Recreational Therapist  Lanna Hodgkins

## 2023-10-10 NOTE — PLAN OF CARE
Problem: Safety - Adult  Goal: Free from fall injury  Outcome: Progressing     Problem: Anxiety  Goal: Will report anxiety at manageable levels  Description: INTERVENTIONS:  1. Administer medication as ordered  2. Teach and rehearse alternative coping skills  3. Provide emotional support with 1:1 interaction with staff  Outcome: Progressing     Problem: Self Harm/Suicidality  Goal: Will have no self-injury during hospital stay  Description: INTERVENTIONS:  1. Ensure constant observer at bedside with Q15M safety checks  2. Maintain a safe environment  3. Secure patient belongings  4. Ensure family/visitors adhere to safety recommendations  5. Ensure safety tray has been added to patient's diet order  6. Every shift and PRN: Re-assess suicidal risk via Frequent Screener    Outcome: Progressing     Problem: Depression  Goal: Will be euthymic at discharge  Description: INTERVENTIONS:  1. Administer medication as ordered  2. Provide emotional support via 1:1 interaction with staff  3. Encourage involvement in milieu/groups/activities  4. Monitor for social isolation  Outcome: Progressing     Problem: Sleep Disturbance  Goal: Will exhibit normal sleeping pattern  Description: INTERVENTIONS:  1. Administer medication as ordered  2. Decrease environmental stimuli, including noise, as appropriate  3. Discourage social isolation and naps during the day  Outcome: Not Progressing     Problem: Sleep Disturbance  Goal: Will exhibit normal sleeping pattern  Description: INTERVENTIONS:  1. Administer medication as ordered  2. Decrease environmental stimuli, including noise, as appropriate  3. Discourage social isolation and naps during the day  Outcome: Not Progressing    Received Pt to unit, alert and oriented x4 . Pt presents with a depressed mood and sad affect. Very tearful. Reports SI with plan to overdose on all her prescribed medications.  Pt states that she has a long history of depression, however its been worse due

## 2023-10-10 NOTE — H&P
[facility] HISTORY AND PHYSICAL    Name:  Lindsay Rubio  MR#:   [id number]  :  1955  ACCOUNT #:  [de-identified]  ADMIT DATE:  10/09/2023    IDENTIFYING INFORMATION:  The patient is a 68-year-old female, admitted to this facility voluntarily on the above-mentioned date. BASIS FOR ADMISSION:  The patient presented herself to our emergency department with a history of having problems with increased anxiety and depression and multiple medical problems in the past including pancreatitis, cardiomyopathy, prior history of hyponatremia requiring to be treated; however, this time, describing, rather than a worsening of her medical problems, the presence of depression and anxiety with her describing the presence of suicidal thoughts. The patient had mentioned having a syncopal episode on the day of her admission to the ER; however, studies came back negative and after the case was medically cleared, she was presented to the physician on-call who kindly admitted the patient to my service. PSYCHIATRIC HISTORY:  Chronic history of depression, at times with psychotic symptoms, described that one of her main stressors have been social and economical.  She described that her   in 45 after years of being ; however, she was able to withdraw some type of help from the \"widows fund\" which the patient thought it was Social Security. However, this fund stopped helping the patient 3 months or so ago with increased difficulties economically being described. The patient, who had mentioned multiple losses including the death of her daughter at the age of 11 when she was killed by her boyfriend in [place], described two of her adult sisters being still alive, however, not being supportive of her. So, describing the presence of suicidal thoughts, the patient was admitted to the facility for further inpatient care.     MEDICAL HISTORY:  This is complicated by a history of anxiety and

## 2023-10-10 NOTE — ED NOTES
8:30 PM Assumed care of the patient at this time. Discussed with SALLY Michelle concerning patient Kym Ho, standard discussion of reason for visit, HPI, ROS, PE, and current results available. Recommendation for continuing to monitor the pt while in the ED awaiting inpatient psychiatric bed. LUIS Fontenot PA-C      11:00 PM pt admitted to the [facility]. Leny Montemayor PA-C     Disposition: admit to psych    Dictation disclaimer:  Please note that this dictation was completed with [employer], the computer voice recognition software. Quite often unanticipated grammatical, syntax, homophones, and other interpretive errors are inadvertently transcribed by the computer software. Please disregard these errors. Please excuse any errors that have escaped final proofreading.        Leny [place]  10/10/23 0157

## 2023-10-10 NOTE — GROUP NOTE
Art Therapy Group Progress Note  PATIENT SCHEDULED FOR GROUP AT:  1:00 PM      GROUP STOP TIME:  1:45 PM      ATTENDANCE:  Moderate (4/9 participants)     TOPIC / FOCUS:  Kinesthetic Art Making     GOALS:  decreasing stress and anxiety, promote positive coping skills, increase focus, increase mind body connection and regulation, encourage creativity and joyful experience, practice bilateral stimulation, increase engagement     PARTICIPATION LEVEL:   interactive, participated in art making. PARTICIPATION QUALITY:  appropriate     ATTENTION LEVEL: focused, invested     LEVEL OF CONSCIOUSNESS: alert, oriented     SPEECH:  normal     THOUGHT PROCESS/ CONTENT: logical     AFFECTIVE FUNCTIONING: congruent     MOOD: euthymic     SYMBOLIC & THEMATIC CONTENT AS NOTED IN IMAGERY: PT expressed an interested in art and appeared to be excited to participate in group. Pt was able to follow directions, eventually creating a portrait on her page. Pt interacted with all of the different materials provided. PT reported that she uses art and writing poetry as coping skills. PT was removed from group to meet with RT before discussion occurred.      AT provided PT with a journal.     STATUS AFTER INTERVENTION: unchanged     RESPONSE TO LEARNING: engaged and willing to experiment with materials     ENDINGS: none reported    MODES OF INTERVENTION: exploration, art media, socialization, psychoeducation     DISCIPLINE RESPONSIBLE: Art Therapist     Kota Spence  Art Therapist, MA

## 2023-10-10 NOTE — CONSULTS
[facility] NOTE FOR BEHAVIORAL HEALTH UNIT    Patient:    Georgette Leonard , 70 y.o. female   Room/Bed:  110/02  Admission Date:   10/9/2023  Code status:  Prior      Reason for Consult: Medical   Psychiatry Attending Requesting Consult: Dr. Christian Fitzpatrick    ASSESSMENT:  Georgette Leonard is a 70 y.o. female w/ a PMH of anxiety and depression, hx chronic pancreatitis, a history of cardiomyopathy with a left ventricular fraction of 01%-67% and diastolic function grade II, history of cerebrovascular accident with a left lacunar infarct on the left thalamus, chronic history of cannabis use, history of nephrolithiasis, chronic liver lesion nonspecific as to what it was, and a history of syncope and collapse with a prior admission to the facility also in 11/2021 described who is presenting for SI who is now admitted to the [facility] Unit.     Nurse Chaperone during History and Physical: Yes    PLAN:    Anxiety and Depression with SI   -Pt with suicidal thoughts  - no SI on exam    Plan  -Management per inpatient psychiatry    Hx chronic pancreatitis  - stable    Hx cardiomyopathy with a left ventricular fraction of 86%-24% and diastolic function grade II  - lasix 20mg qd  - was on lisinopril and metoprolol but were discontinued in April; unclear why but maybe related to syncopal event?; pt doesn't know which meds she takes at home     Hx cerebrovascular accident with a left lacunar infarct on the left thalamus  - ASA  - could benefit from a statin but would defer to PCP/cards    Non-productive cough/fever to 101.3  - sxs started today  - covid neg with normal CXR and CTA yesterday  - likely this is a viral URI day 1 of illness; no need for RVP as clinical dx sufficient with no evidence of PNA and exam reassuring    Plan  - prn robitussin    Patchy hair loss  - some patchy hair loss of the left side of the scalp; very dry but no obvious underlying rash  - unclear whether component

## 2023-10-10 NOTE — GROUP NOTE
Group Therapy Note    Date: 10/10/2023    Group Start Time: 1400  Group End Time: 6102  Group Topic: Psychoeducation        Darin Grande        Group Therapy Note    Attendees: 0  Group Focus- Anger Management and finding ways to deal through frustration and anger in a positive way. Patient declined to participate in group.    Discipline Responsible: /Counselor      Signature:  Darin Grande

## 2023-10-11 LAB

## 2023-10-11 PROCEDURE — 6370000000 HC RX 637 (ALT 250 FOR IP): Performed by: PSYCHIATRY & NEUROLOGY

## 2023-10-11 PROCEDURE — 99232 SBSQ HOSP IP/OBS MODERATE 35: CPT | Performed by: PSYCHIATRY & NEUROLOGY

## 2023-10-11 PROCEDURE — 87536 HIV-1 QUANT&REVRSE TRNSCRPJ: CPT

## 2023-10-11 PROCEDURE — 6370000000 HC RX 637 (ALT 250 FOR IP)

## 2023-10-11 PROCEDURE — 0202U NFCT DS 22 TRGT SARS-COV-2: CPT

## 2023-10-11 PROCEDURE — 1240000000 HC EMOTIONAL WELLNESS R&B

## 2023-10-11 PROCEDURE — 86592 SYPHILIS TEST NON-TREP QUAL: CPT

## 2023-10-11 PROCEDURE — 36415 COLL VENOUS BLD VENIPUNCTURE: CPT

## 2023-10-11 PROCEDURE — 2500000003 HC RX 250 WO HCPCS: Performed by: STUDENT IN AN ORGANIZED HEALTH CARE EDUCATION/TRAINING PROGRAM

## 2023-10-11 RX ORDER — ARIPIPRAZOLE 5 MG/1
5 TABLET ORAL DAILY
Status: DISCONTINUED | OUTPATIENT
Start: 2023-10-11 | End: 2023-10-18 | Stop reason: HOSPADM

## 2023-10-11 RX ORDER — MIRTAZAPINE 15 MG/1
30 TABLET, FILM COATED ORAL NIGHTLY
Status: DISCONTINUED | OUTPATIENT
Start: 2023-10-11 | End: 2023-10-18 | Stop reason: HOSPADM

## 2023-10-11 RX ORDER — CEPHALEXIN 250 MG/1
250 CAPSULE ORAL EVERY 6 HOURS SCHEDULED
Status: DISCONTINUED | OUTPATIENT
Start: 2023-10-11 | End: 2023-10-18 | Stop reason: HOSPADM

## 2023-10-11 RX ADMIN — GUAIFENESIN 200 MG: 200 SOLUTION ORAL at 01:37

## 2023-10-11 RX ADMIN — FERROUS SULFATE TAB 325 MG (65 MG ELEMENTAL FE) 325 MG: 325 (65 FE) TAB at 08:18

## 2023-10-11 RX ADMIN — DOCUSATE SODIUM 100 MG: 100 CAPSULE, LIQUID FILLED ORAL at 20:19

## 2023-10-11 RX ADMIN — DOCUSATE SODIUM 100 MG: 100 CAPSULE, LIQUID FILLED ORAL at 08:17

## 2023-10-11 RX ADMIN — DIVALPROEX SODIUM 250 MG: 250 TABLET, DELAYED RELEASE ORAL at 08:17

## 2023-10-11 RX ADMIN — CEPHALEXIN 250 MG: 250 CAPSULE ORAL at 17:55

## 2023-10-11 RX ADMIN — FLUOXETINE 20 MG: 20 CAPSULE ORAL at 08:17

## 2023-10-11 RX ADMIN — THIAMINE HCL TAB 100 MG 100 MG: 100 TAB at 08:17

## 2023-10-11 RX ADMIN — FUROSEMIDE 20 MG: 40 TABLET ORAL at 08:17

## 2023-10-11 RX ADMIN — THERA TABS 1 TABLET: TAB at 08:17

## 2023-10-11 RX ADMIN — MIRTAZAPINE 30 MG: 15 TABLET, FILM COATED ORAL at 20:19

## 2023-10-11 RX ADMIN — ASPIRIN 81 MG: 81 TABLET, COATED ORAL at 08:17

## 2023-10-11 RX ADMIN — DIVALPROEX SODIUM 250 MG: 250 TABLET, DELAYED RELEASE ORAL at 20:19

## 2023-10-11 NOTE — BH NOTE
[facility] care of pt alert and oriented x 4. Energy much improved compared to yesterday evening. Pt reported that she felt much better. She reported that the feelings of sickness has been on and off going for about 5 years. \"I think it's from a bug bit. (While pointing to healing scar on forehead between eyebrows). .. My whole complexion has changed. It feels more leathery. \"      2020- pt remains compliant with meds. Opportunity for questions provided. 0000- pt reported feeling nauseated. ABX held and nourishments provided. Will continue to monitor and support as needed.

## 2023-10-11 NOTE — PLAN OF CARE
Problem: Safety - Adult  Goal: Free from fall injury  Outcome: Progressing     Problem: Anxiety  Goal: Will report anxiety at manageable levels  Description: INTERVENTIONS:  1. Administer medication as ordered  2. Teach and rehearse alternative coping skills  3. Provide emotional support with 1:1 interaction with staff  Outcome: Progressing    Pt pleasant and cooperative with staff. Pt interacts well with peers. Pt denies SI. Pt denied AVH. Pt compliant with meals and meds. Pt attends select groups with participation. Rounds maintained Q 15 mins.  Staff will continue to offer a safe and supportive environment

## 2023-10-11 NOTE — GROUP NOTE
Group Therapy Note    Date: 10/11/2023    Group Start Time: 1400  Group End Time: 1934  Group Topic: Music Therapy      Cceilia Dorantes        Group Therapy Note    Attendees: 4/11    Group Focus: Music therapy group explored what group members can do today to make the future better. Group consisted of collaborative music making, singing, and verbal processing of music experiences. The group may promote self-expression, present-centered focus, and use of music as a coping skill. Notes:  Patient did not attend music therapy group.       Discipline Responsible: /Counselor      Signature:  DAO Narayanan, [facility] Music Therapist  Licensed Professional Counselor

## 2023-10-11 NOTE — BH NOTE
2157- Assumed care of pt resting in bed alert. Pt with sick symptoms: runny nose, congestion, nonproductive cough. Pt reported, \"I just started feeling run down today. \"  VS elevated. PFM was notified and symptoms and did come to evaluate and new orders were obtained. Pt was provided with water for hydration. Remains compliant with meds and unit policies. Denied si/hi/avh during time of assessment. All needs assessed and met. Will continue to monitor and support as needed. 1193- pt awke in bed coughing. Prn med provided for cough suppressant. 0230- currently resting. Prn med appears effective. 0639-pt slept about 7 hrs.

## 2023-10-12 PROCEDURE — 87389 HIV-1 AG W/HIV-1&-2 AB AG IA: CPT

## 2023-10-12 PROCEDURE — 1240000000 HC EMOTIONAL WELLNESS R&B

## 2023-10-12 PROCEDURE — 36415 COLL VENOUS BLD VENIPUNCTURE: CPT

## 2023-10-12 PROCEDURE — 6370000000 HC RX 637 (ALT 250 FOR IP): Performed by: PSYCHIATRY & NEUROLOGY

## 2023-10-12 PROCEDURE — 6370000000 HC RX 637 (ALT 250 FOR IP)

## 2023-10-12 PROCEDURE — 99231 SBSQ HOSP IP/OBS SF/LOW 25: CPT | Performed by: PSYCHIATRY & NEUROLOGY

## 2023-10-12 RX ORDER — DEXTROMETHORPHAN POLISTIREX 30 MG/5ML
30 SUSPENSION ORAL EVERY 12 HOURS SCHEDULED
Status: DISCONTINUED | OUTPATIENT
Start: 2023-10-12 | End: 2023-10-18 | Stop reason: HOSPADM

## 2023-10-12 RX ORDER — GUAIFENESIN 600 MG/1
600 TABLET, EXTENDED RELEASE ORAL 2 TIMES DAILY
Status: DISCONTINUED | OUTPATIENT
Start: 2023-10-12 | End: 2023-10-18 | Stop reason: HOSPADM

## 2023-10-12 RX ADMIN — MIRTAZAPINE 30 MG: 15 TABLET, FILM COATED ORAL at 20:20

## 2023-10-12 RX ADMIN — CEPHALEXIN 250 MG: 250 CAPSULE ORAL at 06:35

## 2023-10-12 RX ADMIN — ASPIRIN 81 MG: 81 TABLET, COATED ORAL at 07:31

## 2023-10-12 RX ADMIN — FLUOXETINE 20 MG: 20 CAPSULE ORAL at 07:32

## 2023-10-12 RX ADMIN — CEPHALEXIN 250 MG: 250 CAPSULE ORAL at 11:31

## 2023-10-12 RX ADMIN — FUROSEMIDE 20 MG: 40 TABLET ORAL at 07:31

## 2023-10-12 RX ADMIN — DIVALPROEX SODIUM 250 MG: 250 TABLET, DELAYED RELEASE ORAL at 07:31

## 2023-10-12 RX ADMIN — DEXTROMETHORPHAN POLISTIREX 30 MG: 30 SUSPENSION ORAL at 22:23

## 2023-10-12 RX ADMIN — FERROUS SULFATE TAB 325 MG (65 MG ELEMENTAL FE) 325 MG: 325 (65 FE) TAB at 07:31

## 2023-10-12 RX ADMIN — DIVALPROEX SODIUM 250 MG: 250 TABLET, DELAYED RELEASE ORAL at 21:00

## 2023-10-12 RX ADMIN — THERA TABS 1 TABLET: TAB at 07:31

## 2023-10-12 RX ADMIN — GUAIFENESIN 600 MG: 600 TABLET, EXTENDED RELEASE ORAL at 20:20

## 2023-10-12 RX ADMIN — CEPHALEXIN 250 MG: 250 CAPSULE ORAL at 18:26

## 2023-10-12 RX ADMIN — THIAMINE HCL TAB 100 MG 100 MG: 100 TAB at 07:31

## 2023-10-12 RX ADMIN — DOCUSATE SODIUM 100 MG: 100 CAPSULE, LIQUID FILLED ORAL at 07:31

## 2023-10-12 RX ADMIN — DOCUSATE SODIUM 100 MG: 100 CAPSULE, LIQUID FILLED ORAL at 20:20

## 2023-10-12 RX ADMIN — ARIPIPRAZOLE 5 MG: 5 TABLET ORAL at 07:31

## 2023-10-12 NOTE — GROUP NOTE
Group Therapy Note    Date: 10/12/2023    Group Start Time: 0930  Group End Time: 2452  Group Topic: Music Therapy      Alecia Severin        Group Therapy Note    Attendees: 6/10    Group Focus: Music therapy group explored the theme of change utilizing selvin analysis followed by collaborative music making. There was also discussion related to helping others and taking care of one's physical health. The group may promote self-awareness, socialization, mood enhancement, and use of music as a coping skill. Notes:  Patient did not attend group.       Discipline Responsible: /Counselor      Signature:  Alecia Severin, MT-BC, [address] [facility] [place] Music Therapist  Licensed Professional Counselor

## 2023-10-12 NOTE — GROUP NOTE
Group Therapy Note    Date: 10/12/2023    Group Start Time: 1430  Group End Time: 1500  Group Topic: Psychotherapy      Alecia Severin        Group Therapy Note    Attendees: 8/11    Group Focus: The group consisted of psychoeducation on the GLAD Technique as a way of finding ana lilia and balance in life. Group members each discussed and shared something they are grateful (G) for today, one new thing they have learned (L) recently, one small accomplishment (A), and one delight (D) for today. Notes:  Patient did not attend group.       Discipline Responsible: /Counselor      Signature:  Alecia Severin, MT-BC, [facility] Music Therapist  Licensed Professional Counselor

## 2023-10-12 NOTE — GROUP NOTE
Group Therapy Note    Date: 10/11/2023    Group Start Time: 1900  Group End Time: 1945  Group Topic: Relaxation    WILD Hodges Intern      Group Therapy Note    Attendees: 1/10    Group Focus: The goal of this group was for patients to practice various grounding techniques to implement skills of being present in the current moment. Patient refused to attend group.      Discipline Responsible: /Counselor Intern      Signature:  WILD Hodges Intern

## 2023-10-12 NOTE — BSMART NOTE
SOCIAL WORK GROUP THERAPY  NOTE           Date: October 12, 2023     Group Time:  12:00 PM      Group Ended 12:45      [facility] Special Unit 1     Group Topic: Depression        Group members Participation: 3      Patient Refused to Participate in Group session today but was encouraged to Contribute. Group session: Depression is a mood disorder that causes a persistent feeling of sadness and loss of interest in things and activities you once enjoyed. It can also cause difficulty with thinking, memory, eating and sleeping. Peg Dickinson, , MSW.  Supervisee

## 2023-10-12 NOTE — CONSULTS
[facility] Family Medicine  CONSULT NOTE    Patient:    Abhinav France , 70 y.o. female   Room/Bed:  109/01  Admission Date:   10/9/2023  Code status:  Prior      Reason for Consult: Cough    ASSESSMENT:  Abhinav France is a 70 y.o. female w/ a PMH of anxiety and depression, hx chronic pancreatitis, a history of cardiomyopathy with a left ventricular fraction of 93%-17% and diastolic function grade II, history of cerebrovascular accident with a left lacunar infarct on the left thalamus, chronic history of cannabis use, history of nephrolithiasis, chronic liver lesion nonspecific as to what it was, and a history of syncope and collapse with a prior admission to the facility also in 11/2021 described who is presenting for SI who is now admitted to the [facility] Unit who is now complaining of coughing likely secondary to her rhino/enterovirus. Unintentional weight loss also concerning. RECOMMENDATIONS:    Cough: Likely 2/2 rhino/enterovirus:  Changed Mucinex from as needed to scheduled  Started Delsym  Continue to wear mask  No need to repeat chest x-ray since lungs are clear to auscultations bilateral and chest x-ray from 10/9 was unremarkable. Continue to monitor for temperature  Will reach back out to us if symptoms worsen  Continue medications except as noted above    Unintentional weight Loss:  Continue current regular diet with oral nutrition supplement (Ensure Plus high-protein)  Will need a work-up for her weight loss outpatient  Continue medications except as noted above    This patient was seen in behavioral health unit by [address] [place] [address] as a consult. SUBJECTIVE:   Dr. Ruddy Sousa has consulted Family Medicine to evaluate Abhinav France  in the Emergency Department.  She  is a 70 y.o. female w/ PMHx of anxiety and depression, hx chronic pancreatitis, a history of cardiomyopathy with a left ventricular fraction of 67%-94% and diastolic function grade II, history of cerebrovascular accident with a left

## 2023-10-12 NOTE — PLAN OF CARE
Problem: Safety - Adult  Goal: Free from fall injury  Outcome: Progressing     Problem: Anxiety  Goal: Will report anxiety at manageable levels  Description: INTERVENTIONS:  1. Administer medication as ordered  2. Teach and rehearse alternative coping skills  3. Provide emotional support with 1:1 interaction with staff  Outcome: Progressing     Problem: Self Harm/Suicidality  Goal: Will have no self-injury during hospital stay  Description: INTERVENTIONS:  1. Ensure constant observer at bedside with Q15M safety checks  2. Maintain a safe environment  3. Secure patient belongings  4. Ensure family/visitors adhere to safety recommendations  5. Ensure safety tray has been added to patient's diet order  6. Every shift and PRN: Re-assess suicidal risk via Frequent Screener    Outcome: Progressing     Pt denies current SI. Pt denies AVH. Pt's VS wnl and afebrile. Pt compliant with meals and meds. Rounds maintained Q 15 mins.  Staff will continue to offer a safe and supportive environment

## 2023-10-12 NOTE — GROUP NOTE
Group Therapy Note    Date: 10/12/2023    Group Start Time: 1500  Group End Time: 9157  Group Topic: Recreational        Avi Lua        Group Therapy Note    Attendees Declined: 6/11    Group Type: [place]      Group Focus: Recreational therapy group engaged patients through a group game. RT placed patients into two teams to encourage team building. RT used topics that focused on mental health and emotions. The group can assist in increasing positive mood,emotional awareness,  social skills, and stress reduction.        Patient Declined    Recreational Therapist  Peyton Cuevas

## 2023-10-13 LAB
HIV 1+2 AB+HIV1 P24 AG SERPL QL IA: NONREACTIVE
HIV 1/2 RESULT COMMENT: NORMAL

## 2023-10-13 PROCEDURE — 99232 SBSQ HOSP IP/OBS MODERATE 35: CPT | Performed by: PSYCHIATRY & NEUROLOGY

## 2023-10-13 PROCEDURE — 6370000000 HC RX 637 (ALT 250 FOR IP)

## 2023-10-13 PROCEDURE — 6370000000 HC RX 637 (ALT 250 FOR IP): Performed by: PSYCHIATRY & NEUROLOGY

## 2023-10-13 PROCEDURE — 1240000000 HC EMOTIONAL WELLNESS R&B

## 2023-10-13 RX ADMIN — GUAIFENESIN 600 MG: 600 TABLET, EXTENDED RELEASE ORAL at 20:12

## 2023-10-13 RX ADMIN — CEPHALEXIN 250 MG: 250 CAPSULE ORAL at 12:15

## 2023-10-13 RX ADMIN — FLUOXETINE 20 MG: 20 CAPSULE ORAL at 09:01

## 2023-10-13 RX ADMIN — THERA TABS 1 TABLET: TAB at 09:03

## 2023-10-13 RX ADMIN — DEXTROMETHORPHAN POLISTIREX 30 MG: 30 SUSPENSION ORAL at 08:59

## 2023-10-13 RX ADMIN — DEXTROMETHORPHAN POLISTIREX 30 MG: 30 SUSPENSION ORAL at 20:12

## 2023-10-13 RX ADMIN — DIVALPROEX SODIUM 250 MG: 250 TABLET, DELAYED RELEASE ORAL at 09:03

## 2023-10-13 RX ADMIN — CEPHALEXIN 250 MG: 250 CAPSULE ORAL at 06:20

## 2023-10-13 RX ADMIN — GUAIFENESIN 600 MG: 600 TABLET, EXTENDED RELEASE ORAL at 09:02

## 2023-10-13 RX ADMIN — DOCUSATE SODIUM 100 MG: 100 CAPSULE, LIQUID FILLED ORAL at 09:03

## 2023-10-13 RX ADMIN — ASPIRIN 81 MG: 81 TABLET, COATED ORAL at 09:00

## 2023-10-13 RX ADMIN — ARIPIPRAZOLE 5 MG: 5 TABLET ORAL at 09:00

## 2023-10-13 RX ADMIN — MIRTAZAPINE 30 MG: 15 TABLET, FILM COATED ORAL at 20:12

## 2023-10-13 RX ADMIN — THIAMINE HCL TAB 100 MG 100 MG: 100 TAB at 09:02

## 2023-10-13 RX ADMIN — FUROSEMIDE 20 MG: 40 TABLET ORAL at 09:00

## 2023-10-13 RX ADMIN — FERROUS SULFATE TAB 325 MG (65 MG ELEMENTAL FE) 325 MG: 325 (65 FE) TAB at 09:03

## 2023-10-13 RX ADMIN — CEPHALEXIN 250 MG: 250 CAPSULE ORAL at 17:55

## 2023-10-13 RX ADMIN — CEPHALEXIN 250 MG: 250 CAPSULE ORAL at 00:00

## 2023-10-13 RX ADMIN — DOCUSATE SODIUM 100 MG: 100 CAPSULE, LIQUID FILLED ORAL at 20:12

## 2023-10-13 ASSESSMENT — PAIN SCALES - GENERAL: PAINLEVEL_OUTOF10: 0

## 2023-10-13 NOTE — PLAN OF CARE
Problem: Safety - Adult  Goal: Free from fall injury  Outcome: Progressing     Problem: Anxiety  Goal: Will report anxiety at manageable levels  Description: INTERVENTIONS:  1. Administer medication as ordered  2. Teach and rehearse alternative coping skills  3. Provide emotional support with 1:1 interaction with staff  Outcome: Progressing     Problem: Self Harm/Suicidality  Goal: Will have no self-injury during hospital stay  Description: INTERVENTIONS:  1. Ensure constant observer at bedside with Q15M safety checks  2. Maintain a safe environment  3. Secure patient belongings  4. Ensure family/visitors adhere to safety recommendations  5. Ensure safety tray has been added to patient's diet order  6. Every shift and PRN: Re-assess suicidal risk via Frequent Screener    Outcome: Progressing     Problem: Depression  Goal: Will be euthymic at discharge  Description: INTERVENTIONS:  1. Administer medication as ordered  2. Provide emotional support via 1:1 interaction with staff  3. Encourage involvement in milieu/groups/activities  4. Monitor for social isolation  Outcome: Progressing     Problem: Sleep Disturbance  Goal: Will exhibit normal sleeping pattern  Description: INTERVENTIONS:  1. Administer medication as ordered  2. Decrease environmental stimuli, including noise, as appropriate  3. Discourage social isolation and naps during the day  Outcome: Progressing     Problem: Nutrition Deficit:  Goal: Optimize nutritional status  Outcome: Not Progressing    Pt received in bedroom alert and oriented x4, pt is cooperative and friendly. Rates depression and anxiety  2/10, reports she is feeling better after learning about available resources for after discharge. Pt denies any SI/HI and denies any hallucinations. Pt is compliant with medications. Stressed the importance of medication compliance and positive coping skills. No aggression noted at this time. Droplet precautions continue.  Q15 minute checks

## 2023-10-13 NOTE — GROUP NOTE
Group Therapy Note    Date: 10/13/2023    Group Start Time: 1300  Group End Time: 1196  Group Topic: Music Therapy      Alex Fallon        Group Therapy Note    Attendees: 7/13    Group Focus: Music therapy group consisted of collaborative music making with a combination of instrumental improvisation and group singing. The group may promote improved mood, energy, sense of self-efficacy, present-centered focus, and use of music as a coping skill. Notes:  Patient did not attend group.       Discipline Responsible: /Counselor      Signature:  DAO Prado, [address] [facility] Music Therapist  Licensed Professional Counselor

## 2023-10-13 NOTE — PLAN OF CARE
Problem: Self Harm/Suicidality  Goal: Will have no self-injury during hospital stay  Description: INTERVENTIONS:  1. Ensure constant observer at bedside with Q15M safety checks  2. Maintain a safe environment  3. Secure patient belongings  4. Ensure family/visitors adhere to safety recommendations  5. Ensure safety tray has been added to patient's diet order  6. Every shift and PRN: Re-assess suicidal risk via Frequent Screener    10/13/2023 1211 by Audrey Ervin RN  Outcome: Progressing  10/13/2023 0223 by Dereck Adams RN  Outcome: Progressing     Problem: Depression  Goal: Will be euthymic at discharge  Description: INTERVENTIONS:  1. Administer medication as ordered  2. Provide emotional support via 1:1 interaction with staff  3. Encourage involvement in milieu/groups/activities  4. Monitor for social isolation  10/13/2023 1211 by Audrey Ervin RN  Outcome: Progressing  10/13/2023 0223 by Dereck Adams RN  Outcome: Progressing     Problem: Nutrition Deficit:  Goal: Optimize nutritional status  10/13/2023 0223 by Dereck Adams RN  Outcome: Not Progressing      Patient received this am alert and verbal, no c/o pain or s/o distress, compliant with meds, nutritional status is still poor, patient is being encouraged to eat more, she continues to say she does not have much of an appetite, she has been sleeping well, she remains a no roommate, patient denies SI or HI, verbalized no delusions or hallucinations, will continue to monitor behavior and treatment.

## 2023-10-13 NOTE — BSMART NOTE
SOCIAL WORK GROUP THERAPY  NOTE           Date: October 13, 2023     Group Time:  12:00 PM      Group Ended 12:45      [facility]     Group Topic: Living longer          Group members Participation: 4      Patient Refused to Participate in Group session today but was encouraged to Contribute. Group session: Living a longer life has been linked to many behaviors like healthy eating and regular exercise. Rajat Pulido, , MSW.  Supervisee

## 2023-10-13 NOTE — GROUP NOTE
Group Therapy Note    Date: 10/13/2023    Group Start Time: 1500  Group End Time: 8447  Group Topic: Music Therapy      Dustin Rubio        Group Therapy Note    Attendees: 6/13    Group Focus: Music therapy group consisted of starting a game of Name That Tune. The group was split into two teams and just began to play when the group ended early due to an emergency on the unit. Notes:  Patient did not attend group.       Discipline Responsible: /Counselor      Signature:  DAO Olivera, [address] [facility] Music Therapist  Licensed Professional Counselor

## 2023-10-14 PROCEDURE — 1240000000 HC EMOTIONAL WELLNESS R&B

## 2023-10-14 PROCEDURE — 6370000000 HC RX 637 (ALT 250 FOR IP): Performed by: PSYCHIATRY & NEUROLOGY

## 2023-10-14 PROCEDURE — 99231 SBSQ HOSP IP/OBS SF/LOW 25: CPT | Performed by: PSYCHIATRY & NEUROLOGY

## 2023-10-14 PROCEDURE — 6370000000 HC RX 637 (ALT 250 FOR IP)

## 2023-10-14 RX ADMIN — FLUOXETINE 20 MG: 20 CAPSULE ORAL at 08:33

## 2023-10-14 RX ADMIN — MIRTAZAPINE 30 MG: 15 TABLET, FILM COATED ORAL at 20:13

## 2023-10-14 RX ADMIN — CEPHALEXIN 250 MG: 250 CAPSULE ORAL at 00:19

## 2023-10-14 RX ADMIN — THIAMINE HCL TAB 100 MG 100 MG: 100 TAB at 08:34

## 2023-10-14 RX ADMIN — ARIPIPRAZOLE 5 MG: 5 TABLET ORAL at 08:33

## 2023-10-14 RX ADMIN — FERROUS SULFATE TAB 325 MG (65 MG ELEMENTAL FE) 325 MG: 325 (65 FE) TAB at 08:33

## 2023-10-14 RX ADMIN — CEPHALEXIN 250 MG: 250 CAPSULE ORAL at 18:32

## 2023-10-14 RX ADMIN — FUROSEMIDE 20 MG: 40 TABLET ORAL at 08:32

## 2023-10-14 RX ADMIN — CEPHALEXIN 250 MG: 250 CAPSULE ORAL at 06:34

## 2023-10-14 RX ADMIN — GUAIFENESIN 600 MG: 600 TABLET, EXTENDED RELEASE ORAL at 20:13

## 2023-10-14 RX ADMIN — DOCUSATE SODIUM 100 MG: 100 CAPSULE, LIQUID FILLED ORAL at 08:33

## 2023-10-14 RX ADMIN — DEXTROMETHORPHAN POLISTIREX 30 MG: 30 SUSPENSION ORAL at 08:32

## 2023-10-14 RX ADMIN — GUAIFENESIN 600 MG: 600 TABLET, EXTENDED RELEASE ORAL at 08:33

## 2023-10-14 RX ADMIN — DEXTROMETHORPHAN POLISTIREX 30 MG: 30 SUSPENSION ORAL at 20:13

## 2023-10-14 RX ADMIN — THERA TABS 1 TABLET: TAB at 08:34

## 2023-10-14 RX ADMIN — ASPIRIN 81 MG: 81 TABLET, COATED ORAL at 08:33

## 2023-10-14 RX ADMIN — CEPHALEXIN 250 MG: 250 CAPSULE ORAL at 23:52

## 2023-10-14 RX ADMIN — DOCUSATE SODIUM 100 MG: 100 CAPSULE, LIQUID FILLED ORAL at 20:13

## 2023-10-14 RX ADMIN — CEPHALEXIN 250 MG: 250 CAPSULE ORAL at 14:43

## 2023-10-14 ASSESSMENT — PAIN SCALES - GENERAL: PAINLEVEL_OUTOF10: 0

## 2023-10-14 NOTE — BH NOTE
Received patient at change of shift, resting in bed. Appeared asleep. Compliant with HS meds. Affect was flat. Cooperative. Denied SI. Offered no c/o. Remains isolative to room. Staff to continue to monitor for location, safety, and comfort.

## 2023-10-14 NOTE — BH NOTE
System Downtime Recovery  A system downtime occurred on October 14, 2023 at 0100, for a duration of 1.5 hours. During this downtime, paper charting was completed and will be scanned into Epic.

## 2023-10-14 NOTE — PLAN OF CARE
Problem: Safety - Adult  Goal: Free from fall injury  Outcome: Progressing     Problem: Anxiety  Goal: Will report anxiety at manageable levels  Description: INTERVENTIONS:  1. Administer medication as ordered  2. Teach and rehearse alternative coping skills  3. Provide emotional support with 1:1 interaction with staff  Outcome: Progressing     Problem: Self Harm/Suicidality  Goal: Will have no self-injury during hospital stay  Description: INTERVENTIONS:  1. Ensure constant observer at bedside with Q15M safety checks  2. Maintain a safe environment  3. Secure patient belongings  4. Ensure family/visitors adhere to safety recommendations  5. Ensure safety tray has been added to patient's diet order  6. Every shift and PRN: Re-assess suicidal risk via Frequent Screener    Outcome: Progressing   Patient presents euthymic and bright this morning; she denies SI/HI and AVH. Patient observed standing in her doorway conversing with her peers. Patient understands that she must remain in isolation until ID discontinues the precautions. Patient states she feels a lot better. No complaints of pain  expressed. Patient remains med/meal compliant. No behavioral issues noted thus far. Will continue to provide a safe and therapeutic environment.

## 2023-10-15 PROCEDURE — 6370000000 HC RX 637 (ALT 250 FOR IP): Performed by: PSYCHIATRY & NEUROLOGY

## 2023-10-15 PROCEDURE — 99231 SBSQ HOSP IP/OBS SF/LOW 25: CPT | Performed by: PSYCHIATRY & NEUROLOGY

## 2023-10-15 PROCEDURE — 6370000000 HC RX 637 (ALT 250 FOR IP)

## 2023-10-15 PROCEDURE — 1240000000 HC EMOTIONAL WELLNESS R&B

## 2023-10-15 RX ADMIN — THERA TABS 1 TABLET: TAB at 08:42

## 2023-10-15 RX ADMIN — FERROUS SULFATE TAB 325 MG (65 MG ELEMENTAL FE) 325 MG: 325 (65 FE) TAB at 08:40

## 2023-10-15 RX ADMIN — GUAIFENESIN 600 MG: 600 TABLET, EXTENDED RELEASE ORAL at 08:41

## 2023-10-15 RX ADMIN — ARIPIPRAZOLE 5 MG: 5 TABLET ORAL at 08:41

## 2023-10-15 RX ADMIN — FUROSEMIDE 20 MG: 40 TABLET ORAL at 08:41

## 2023-10-15 RX ADMIN — DOCUSATE SODIUM 100 MG: 100 CAPSULE, LIQUID FILLED ORAL at 20:11

## 2023-10-15 RX ADMIN — ASPIRIN 81 MG: 81 TABLET, COATED ORAL at 08:42

## 2023-10-15 RX ADMIN — CEPHALEXIN 250 MG: 250 CAPSULE ORAL at 11:55

## 2023-10-15 RX ADMIN — THIAMINE HCL TAB 100 MG 100 MG: 100 TAB at 08:41

## 2023-10-15 RX ADMIN — DEXTROMETHORPHAN POLISTIREX 30 MG: 30 SUSPENSION ORAL at 20:11

## 2023-10-15 RX ADMIN — DOCUSATE SODIUM 100 MG: 100 CAPSULE, LIQUID FILLED ORAL at 08:40

## 2023-10-15 RX ADMIN — CEPHALEXIN 250 MG: 250 CAPSULE ORAL at 17:48

## 2023-10-15 RX ADMIN — CEPHALEXIN 250 MG: 250 CAPSULE ORAL at 06:14

## 2023-10-15 RX ADMIN — DEXTROMETHORPHAN POLISTIREX 30 MG: 30 SUSPENSION ORAL at 08:40

## 2023-10-15 RX ADMIN — FLUOXETINE 20 MG: 20 CAPSULE ORAL at 08:42

## 2023-10-15 RX ADMIN — GUAIFENESIN 600 MG: 600 TABLET, EXTENDED RELEASE ORAL at 20:10

## 2023-10-15 RX ADMIN — MIRTAZAPINE 30 MG: 15 TABLET, FILM COATED ORAL at 20:11

## 2023-10-15 NOTE — BH NOTE
Received patient at change of shift in her room, sitting up in bed. Affect brightened with approach. Stated she has been feeling much better. Compliant with HS meds. Denied SI. No distress noted. Offered no c/o. Will continue to monitor for location, safety, and comfort.

## 2023-10-15 NOTE — PLAN OF CARE
Problem: Safety - Adult  Goal: Free from fall injury  Outcome: Progressing     Problem: Anxiety  Goal: Will report anxiety at manageable levels  Description: INTERVENTIONS:  1. Administer medication as ordered  2. Teach and rehearse alternative coping skills  3. Provide emotional support with 1:1 interaction with staff  Outcome: Progressing     Problem: Self Harm/Suicidality  Goal: Will have no self-injury during hospital stay  Description: INTERVENTIONS:  1. Ensure constant observer at bedside with Q15M safety checks  2. Maintain a safe environment  3. Secure patient belongings  4. Ensure family/visitors adhere to safety recommendations  5. Ensure safety tray has been added to patient's diet order  6. Every shift and PRN: Re-assess suicidal risk via Frequent Screener    Outcome: Progressing       Pt is calm and cooperative, friendly. Pt is on droplet precautions at this time. Pt is eating meals in her room, not participating in groups. Pt denies SI/HI/SH thoughts at this time and has contracted for safety. Pt denies ALL hallucinations at this time. Pt is medication compliant, no prn medications given at this time on shift. No other concerns will monitor for safety and comfort.

## 2023-10-16 PROCEDURE — 6370000000 HC RX 637 (ALT 250 FOR IP): Performed by: PSYCHIATRY & NEUROLOGY

## 2023-10-16 PROCEDURE — 6370000000 HC RX 637 (ALT 250 FOR IP)

## 2023-10-16 PROCEDURE — 1240000000 HC EMOTIONAL WELLNESS R&B

## 2023-10-16 PROCEDURE — 99231 SBSQ HOSP IP/OBS SF/LOW 25: CPT | Performed by: PSYCHIATRY & NEUROLOGY

## 2023-10-16 RX ADMIN — CEPHALEXIN 250 MG: 250 CAPSULE ORAL at 12:29

## 2023-10-16 RX ADMIN — ASPIRIN 81 MG: 81 TABLET, COATED ORAL at 08:37

## 2023-10-16 RX ADMIN — DOCUSATE SODIUM 100 MG: 100 CAPSULE, LIQUID FILLED ORAL at 20:07

## 2023-10-16 RX ADMIN — THIAMINE HCL TAB 100 MG 100 MG: 100 TAB at 08:37

## 2023-10-16 RX ADMIN — GUAIFENESIN 600 MG: 600 TABLET, EXTENDED RELEASE ORAL at 20:07

## 2023-10-16 RX ADMIN — CEPHALEXIN 250 MG: 250 CAPSULE ORAL at 18:39

## 2023-10-16 RX ADMIN — ARIPIPRAZOLE 5 MG: 5 TABLET ORAL at 08:37

## 2023-10-16 RX ADMIN — THERA TABS 1 TABLET: TAB at 08:40

## 2023-10-16 RX ADMIN — MIRTAZAPINE 30 MG: 15 TABLET, FILM COATED ORAL at 20:07

## 2023-10-16 RX ADMIN — FLUOXETINE 20 MG: 20 CAPSULE ORAL at 08:37

## 2023-10-16 RX ADMIN — FUROSEMIDE 20 MG: 40 TABLET ORAL at 08:36

## 2023-10-16 RX ADMIN — CEPHALEXIN 250 MG: 250 CAPSULE ORAL at 00:36

## 2023-10-16 RX ADMIN — CEPHALEXIN 250 MG: 250 CAPSULE ORAL at 23:54

## 2023-10-16 RX ADMIN — FERROUS SULFATE TAB 325 MG (65 MG ELEMENTAL FE) 325 MG: 325 (65 FE) TAB at 08:37

## 2023-10-16 RX ADMIN — DEXTROMETHORPHAN POLISTIREX 30 MG: 30 SUSPENSION ORAL at 08:38

## 2023-10-16 RX ADMIN — CEPHALEXIN 250 MG: 250 CAPSULE ORAL at 06:20

## 2023-10-16 RX ADMIN — DEXTROMETHORPHAN POLISTIREX 30 MG: 30 SUSPENSION ORAL at 20:07

## 2023-10-16 RX ADMIN — DOCUSATE SODIUM 100 MG: 100 CAPSULE, LIQUID FILLED ORAL at 08:37

## 2023-10-16 NOTE — GROUP NOTE
Group Therapy Note    Date: 10/16/2023    Group Start Time: 1500  Group End Time: 2685  Group Topic: Music Therapy      Lurene Lanes        Group Therapy Note    Attendees: 2/10    Group Focus: Music therapy group started with deep breathing and a music-assisted ACT Leaves on a Stream technique to practice letting of thoughts, feelings, and sensations to assist with anxiety and stress reduction. Group continued with group members identifying and sharing personally meaningful songs in various categories such as songs that inspire group members, songs that calm them down, and songs that remind them of important people or events. Notes:  Pt was in the group area briefly at the start of group and reported that she felt \"great. \" Patient stated she needed to get her mask and then did not return to group.       Discipline Responsible: /Counselor      Signature:  Lurene Lanes, MT-BC, [address] [facility] Music Therapist  Licensed Professional Counselor

## 2023-10-16 NOTE — GROUP NOTE
Group Therapy Note    Date: 10/16/2023    Group Start Time: 1400  Group End Time: 3643  Group Topic: Psychoeducation        Mart Dowling        Group Therapy Note    Attendees: 5  Group focus- To identify potential triggers that disrupt patients wellbeing and to identify positive coping strategies. Patient declined to participate. Patient stated that she was staying on her side of the unit.       Discipline Responsible: /Counselor      Signature:  Mart Dowling

## 2023-10-16 NOTE — GROUP NOTE
Group Therapy Note    Date: 10/16/2023    Group Start Time: 0930  Group End Time: 5653  Group Topic: Recreational      Avi Lua        Group Therapy Note    Attendees Declined: 4/12      Group Type: Exercise Bingo    Group Focus: Recreational therapy group engaged patients through a game that focused on exercises to move the body (psychomotor skills). Patiens was able to act out the exercise they have it on their bingo card. The group may help reduce anxiety, depression, and stress and improve self-esteem and mood.      Patient Declined    Recreational Therapist  Peyton Cuevas

## 2023-10-16 NOTE — PLAN OF CARE
Problem: Safety - Adult  Goal: Free from fall injury  Outcome: Progressing     Problem: Anxiety  Goal: Will report anxiety at manageable levels  Description: INTERVENTIONS:  1. Administer medication as ordered  2. Teach and rehearse alternative coping skills  3. Provide emotional support with 1:1 interaction with staff  Outcome: Progressing    Pt in day area at times interacting well with peers. Pt denies SI. Pt denies AVH. Pt compliant with meals and meds. Rounds maintained Q 15 mins.  Staff will continue to offer a safe and supportive environment

## 2023-10-16 NOTE — BSMART NOTE
SOCIAL WORK GROUP THERAPY  NOTE           Date: October 16, 2023     Group Time:  12:00 PM      Group Ended 12:45      [facility] Special Unit 1     Group Topic: Gratitude. Group members Participation: 4      Patient Refused to Participate in Group session today but was encouraged to Contribute. Gratitude is a positive emotion that involves being thankful and appreciative and is associated with several mental and physical health benefits. When you experience gratitude, you feel grateful for something or someone in your life. Luca Kennedy, , MSW.  Supervisee

## 2023-10-16 NOTE — GROUP NOTE
Art Therapy Group Progress Note    PATIENT SCHEDULED FOR GROUP AT:  1:00 PM      GROUP STOP TIME:  1:45 PM      ATTENDANCE:  Moderate (6/11 participants)     TOPIC / FOCUS: Finish the Image     GOALS:  to practice observation and grounding techniques, practice mindfulness, decrease stress and anxiety, increase reality orientation, encourage focus      PARTICIPATION LEVEL:   [place]  Art Therapist, MA

## 2023-10-16 NOTE — BH NOTE
Received patient at change if shift in the milieu. Affect brightened with approach. Cooperative. Denied SI. Conversant with staff and peers. Noted to be encouraging to a peer while the peer was doing an art activity. No concerns were offered. Patient spoke about her love of knitting and her discharge plans of returning to [place]. Compliant with  meds.

## 2023-10-17 PROCEDURE — 1240000000 HC EMOTIONAL WELLNESS R&B

## 2023-10-17 PROCEDURE — 6370000000 HC RX 637 (ALT 250 FOR IP)

## 2023-10-17 PROCEDURE — 6370000000 HC RX 637 (ALT 250 FOR IP): Performed by: PSYCHIATRY & NEUROLOGY

## 2023-10-17 PROCEDURE — 99231 SBSQ HOSP IP/OBS SF/LOW 25: CPT | Performed by: PSYCHIATRY & NEUROLOGY

## 2023-10-17 RX ADMIN — FERROUS SULFATE TAB 325 MG (65 MG ELEMENTAL FE) 325 MG: 325 (65 FE) TAB at 08:23

## 2023-10-17 RX ADMIN — THIAMINE HCL TAB 100 MG 100 MG: 100 TAB at 08:22

## 2023-10-17 RX ADMIN — CEPHALEXIN 250 MG: 250 CAPSULE ORAL at 12:05

## 2023-10-17 RX ADMIN — MIRTAZAPINE 30 MG: 15 TABLET, FILM COATED ORAL at 20:18

## 2023-10-17 RX ADMIN — DEXTROMETHORPHAN POLISTIREX 30 MG: 30 SUSPENSION ORAL at 08:21

## 2023-10-17 RX ADMIN — DOCUSATE SODIUM 100 MG: 100 CAPSULE, LIQUID FILLED ORAL at 20:18

## 2023-10-17 RX ADMIN — GUAIFENESIN 600 MG: 600 TABLET, EXTENDED RELEASE ORAL at 08:21

## 2023-10-17 RX ADMIN — CEPHALEXIN 250 MG: 250 CAPSULE ORAL at 19:02

## 2023-10-17 RX ADMIN — ASPIRIN 81 MG: 81 TABLET, COATED ORAL at 08:22

## 2023-10-17 RX ADMIN — THERA TABS 1 TABLET: TAB at 08:23

## 2023-10-17 RX ADMIN — FUROSEMIDE 20 MG: 40 TABLET ORAL at 08:22

## 2023-10-17 RX ADMIN — ARIPIPRAZOLE 5 MG: 5 TABLET ORAL at 08:22

## 2023-10-17 RX ADMIN — CEPHALEXIN 250 MG: 250 CAPSULE ORAL at 05:49

## 2023-10-17 RX ADMIN — DEXTROMETHORPHAN POLISTIREX 30 MG: 30 SUSPENSION ORAL at 20:18

## 2023-10-17 RX ADMIN — GUAIFENESIN 600 MG: 600 TABLET, EXTENDED RELEASE ORAL at 20:18

## 2023-10-17 RX ADMIN — FLUOXETINE 20 MG: 20 CAPSULE ORAL at 08:23

## 2023-10-17 RX ADMIN — DOCUSATE SODIUM 100 MG: 100 CAPSULE, LIQUID FILLED ORAL at 08:22

## 2023-10-17 NOTE — GROUP NOTE
Group Therapy Note    Date: 10/17/2023    Group Start Time: 1400  Group End Time: 2129  Group Topic: Psychoeducation        Michelle Crystal        Group Therapy Note    Attendees: 4  Group Topic- To discuss emotions and feeling and identify positive coping methods. Notes:  Patient was offered to participate in group. Patient stated that she had an infection and did not want to participate.      Discipline Responsible: /Counselor      Signature:  Michelle Damian

## 2023-10-17 NOTE — GROUP NOTE
Group Therapy Note    Date: 10/17/2023    Group Start Time: 0930  Group End Time: 1091  Group Topic: Music Therapy      Jordana Cobos        Group Therapy Note    Attendees: 4/11    Group Focus: The group utilized a music and imagery-based approach. The group was guided through three relaxation inductions followed by playing three pieces of instrumental music to illicit imagery. Group members created art while listening to the music. Group members were provided space for verbal processing following music experiences. The group may promote self-expression, self-awareness, and use of music and art for stress reduction and accessing inner resources. Notes:  Patient did not attend group, reporting that she was waiting to see the doctor and due to precautions related to infection control.       Discipline Responsible: /Counselor      Signature:  DAO Dumont, [address] [facility] Music Therapist  Licensed Professional Counselor

## 2023-10-17 NOTE — BH NOTE
Patient was out in milieu socializing with staff and peers while watching football. Patient denies SI/HI and AVH. Patient's affect is congruent with her mood. Patient remains med/meal compliant. She is very pleasant and helpful to her peers. No behavioral issues noted.

## 2023-10-17 NOTE — GROUP NOTE
Group Therapy Note    Date: 10/17/2023    Group Start Time: 1500  Group End Time: 4723  Group Topic: Recreational      Avi Lua        Group Therapy Note    Attendees Declined: 6/10      Group Type: Connect More      Focus: Recreational therapist engaged patients in a meaningful conversations that encourage players to reflect on their opinions, feelings, thoughts and goals. This group may help improve social skills, self-reflection, emotional skills, self-control and self-resilience.      Patient Declined    Recreational Therapist  Ever Cooks

## 2023-10-17 NOTE — BH NOTE
PT appeared to have slept 7.5 hrs. PT states she would like to talk to  about DC. PT states she is comfortable and ready to go that she has a housing plan set and ready. Will continue to monitor for safety and changes in behavior.

## 2023-10-17 NOTE — PLAN OF CARE
Problem: Safety - Adult  Goal: Free from fall injury  Outcome: Progressing     Problem: Anxiety  Goal: Will report anxiety at manageable levels  Description: INTERVENTIONS:  1. Administer medication as ordered  2. Teach and rehearse alternative coping skills  3. Provide emotional support with 1:1 interaction with staff  Outcome: Progressing     Problem: Self Harm/Suicidality  Goal: Will have no self-injury during hospital stay  Description: INTERVENTIONS:  1. Ensure constant observer at bedside with Q15M safety checks  2. Maintain a safe environment  3. Secure patient belongings  4. Ensure family/visitors adhere to safety recommendations  5. Ensure safety tray has been added to patient's diet order  6. Every shift and PRN: Re-assess suicidal risk via Frequent Screener    Outcome: Progressing     Pt is calm and cooperative, friendly with staff and peers. Pt has appropriate interactions. Pt is eating meals in the day room but has not attended groups. Pt denies SI/HI/SH thoughts and has contracted for safety. Pt denies ALL hallucinations at this time. Pt is medication compliant, no prn medications given at this time on shift. No other concerns at this time, will monitor for safety and comfort.

## 2023-10-17 NOTE — BSMART NOTE
SOCIAL WORK GROUP THERAPY  NOTE           Date: October 17, 2023     Group Time:  12:00 PM      Group Ended 12:45      [facility] Special Unit 1     Group Topic: Gratitude/Part 2          Group members Participation:       Patient did not  Participate in Group session today. Gratitude is a positive emotion that involves being thankful and appreciative and is associated with several mental and physical health benefits. When you experience gratitude, you feel grateful for something or someone in your life. Elizabeth Rodas, , MSW.  Supervisee

## 2023-10-17 NOTE — GROUP NOTE
Group Therapy Note    Date: 10/16/2023    Group Start Time: 1900  Group End Time: 1945  Group Topic: Relaxation      WILD Murphy Intern        Group Therapy Note    Attendees: 6/11    Group Focus: The goal of this group was for patients to participate in a mindfulness exercise to help reduce stress. Patient refused to attend group.      Discipline Responsible: /Counselor Intern      Signature:  WILD Murphy

## 2023-10-18 VITALS
TEMPERATURE: 97.7 F | HEIGHT: 60 IN | HEART RATE: 67 BPM | DIASTOLIC BLOOD PRESSURE: 71 MMHG | OXYGEN SATURATION: 99 % | SYSTOLIC BLOOD PRESSURE: 142 MMHG | BODY MASS INDEX: 18.46 KG/M2 | RESPIRATION RATE: 18 BRPM | WEIGHT: 94 LBS

## 2023-10-18 PROCEDURE — 6370000000 HC RX 637 (ALT 250 FOR IP): Performed by: PSYCHIATRY & NEUROLOGY

## 2023-10-18 PROCEDURE — 6370000000 HC RX 637 (ALT 250 FOR IP)

## 2023-10-18 RX ORDER — MIRTAZAPINE 30 MG/1
30 TABLET, FILM COATED ORAL NIGHTLY
Qty: 30 TABLET | Refills: 0 | Status: SHIPPED | OUTPATIENT
Start: 2023-10-18

## 2023-10-18 RX ORDER — PSEUDOEPHEDRINE HCL 30 MG
100 TABLET ORAL 2 TIMES DAILY
Qty: 60 CAPSULE | Refills: 0 | Status: SHIPPED | OUTPATIENT
Start: 2023-10-18

## 2023-10-18 RX ORDER — FERROUS SULFATE 325(65) MG
325 TABLET ORAL
Qty: 30 TABLET | Refills: 0 | Status: SHIPPED | OUTPATIENT
Start: 2023-10-19

## 2023-10-18 RX ORDER — THIAMINE MONONITRATE (VIT B1) 100 MG
100 TABLET ORAL DAILY
Qty: 30 TABLET | Refills: 0 | Status: SHIPPED | OUTPATIENT
Start: 2023-10-19

## 2023-10-18 RX ORDER — ASPIRIN 81 MG/1
81 TABLET ORAL DAILY
Qty: 30 TABLET | Refills: 0 | Status: SHIPPED | OUTPATIENT
Start: 2023-10-19

## 2023-10-18 RX ORDER — FLUOXETINE HYDROCHLORIDE 20 MG/1
20 CAPSULE ORAL DAILY
Qty: 30 CAPSULE | Refills: 0 | Status: SHIPPED | OUTPATIENT
Start: 2023-10-19

## 2023-10-18 RX ORDER — MULTIVITAMIN WITH IRON
1 TABLET ORAL DAILY
Qty: 30 TABLET | Refills: 0 | Status: SHIPPED | OUTPATIENT
Start: 2023-10-19

## 2023-10-18 RX ORDER — ARIPIPRAZOLE 5 MG/1
5 TABLET ORAL DAILY
Qty: 30 TABLET | Refills: 0 | Status: SHIPPED | OUTPATIENT
Start: 2023-10-19

## 2023-10-18 RX ORDER — FUROSEMIDE 20 MG/1
20 TABLET ORAL DAILY
Qty: 30 TABLET | Refills: 0 | Status: SHIPPED | OUTPATIENT
Start: 2023-10-19

## 2023-10-18 RX ADMIN — FERROUS SULFATE TAB 325 MG (65 MG ELEMENTAL FE) 325 MG: 325 (65 FE) TAB at 07:59

## 2023-10-18 RX ADMIN — FUROSEMIDE 20 MG: 40 TABLET ORAL at 07:59

## 2023-10-18 RX ADMIN — THIAMINE HCL TAB 100 MG 100 MG: 100 TAB at 07:59

## 2023-10-18 RX ADMIN — CEPHALEXIN 250 MG: 250 CAPSULE ORAL at 06:39

## 2023-10-18 RX ADMIN — ARIPIPRAZOLE 5 MG: 5 TABLET ORAL at 07:59

## 2023-10-18 RX ADMIN — ASPIRIN 81 MG: 81 TABLET, COATED ORAL at 07:59

## 2023-10-18 RX ADMIN — DEXTROMETHORPHAN POLISTIREX 30 MG: 30 SUSPENSION ORAL at 07:59

## 2023-10-18 RX ADMIN — CEPHALEXIN 250 MG: 250 CAPSULE ORAL at 02:06

## 2023-10-18 RX ADMIN — DOCUSATE SODIUM 100 MG: 100 CAPSULE, LIQUID FILLED ORAL at 07:59

## 2023-10-18 RX ADMIN — FLUOXETINE 20 MG: 20 CAPSULE ORAL at 07:59

## 2023-10-18 RX ADMIN — GUAIFENESIN 600 MG: 600 TABLET, EXTENDED RELEASE ORAL at 08:03

## 2023-10-18 RX ADMIN — THERA TABS 1 TABLET: TAB at 08:03

## 2023-10-18 NOTE — DISCHARGE INSTRUCTIONS
BEHAVIORAL HEALTH NURSING DISCHARGE NOTE      The following personal items collected during your admission are returned to you:   Dental Appliance:    Vision:    Hearing Aid:    Jewelry:    Clothing:    Other Valuables:    Valuables sent to safe:        PATIENT INSTRUCTIONS:    The discharge information has been reviewed with the patient. The patient verbalized understanding.

## 2023-10-18 NOTE — PLAN OF CARE
Problem: Self Harm/Suicidality  Goal: Will have no self-injury during hospital stay  Description: INTERVENTIONS:  1. Ensure constant observer at bedside with Q15M safety checks  2. Maintain a safe environment  3. Secure patient belongings  4. Ensure family/visitors adhere to safety recommendations  5. Ensure safety tray has been added to patient's diet order  6. Every shift and PRN: Re-assess suicidal risk via Frequent Screener    10/18/2023 0010 by Donya Olivares RN  Outcome: Progressing  10/17/2023 1032 by Tiffanie Kaye RN  Outcome: Progressing     Problem: Anxiety  Goal: Will report anxiety at manageable levels  Description: INTERVENTIONS:  1. Administer medication as ordered  2. Teach and rehearse alternative coping skills  3. Provide emotional support with 1:1 interaction with staff  10/18/2023 0010 by Donya Olivares RN  Outcome: Progressing  10/17/2023 1032 by Tiffanie Kaye RN  Outcome: Progressing     Problem: Safety - Adult  Goal: Free from fall injury  10/18/2023 0010 by Donya Olivares RN  Outcome: Progressing  10/17/2023 1032 by Tiffanie Kaye RN  Outcome: Progressing     Pleasant and cooperative. Presents w/ euthymic mood and congruent affect. Denies si/hi avh. Was compliant w/ hs scheduled medications and ate hs snack. Looks forward to discharge on 10/18/2023.   Will continue to monitor/support

## 2023-12-13 RX ORDER — METOPROLOL SUCCINATE 50 MG/1
50 TABLET, EXTENDED RELEASE ORAL DAILY
Qty: 90 TABLET | Refills: 0 | Status: SHIPPED | OUTPATIENT
Start: 2023-12-13

## 2023-12-18 NOTE — GROUP NOTE
"Progress Note  General Surgery    Patient Name: Ellen Rowe  YOB: 1980    Date: 12/18/2023                   SUBJECTIVE:     Chief Complaint/Reason for Admission:   Chief Complaint   Patient presents with    Follow-up     VSG 12.18.187 annual bariatric        History of Present Illness:  Ms. Ellen Rowe is a 43 y.o. female 5 year s/p LVSG.  Pt without any complaints. Exercising 3d/wk.  Good satiety.  Taking vitamins as directed. She reports some life changes over the last year (Mother passed away, new job), she is traveling a lot and spends a lot of time in the car for work.     Review of Systems:  12 point ROS negative except as stated in HPI    The patient's problem list, medication list, history and allergies were reviewed and updated as appropriate.    OBJECTIVE:   Visit Vitals  BP (!) 136/91 (BP Location: Right arm, Patient Position: Sitting)   Pulse 76   Ht 5' 3" (1.6 m)   Wt 85.6 kg (188 lb 12.8 oz)   BMI 33.44 kg/m²          BMI Readings from Last 3 Encounters:   12/18/23 33.44 kg/m²   07/17/23 32.77 kg/m²        Physical Exam:  General:  Well developed, well nourished, no acute distress  HEENT:  Normocephalic, atraumatic, PERRL, EOMI, clear sclera, ears normal, neck supple, throat clear without erythema or exudates  CVS:  RRR, S1 and S2 normal, no murmurs, rubs, gallops  Resp:  Lungs clear to auscultation, no wheezes, rales, rhonchi, cough  GI:  Abdomen soft, non-tender, non-distended, normoactive bowel sounds, no masses  :  Deferred  MSK:  No muscle atrophy, cyanosis, peripheral edema, full range of motion  Skin:  No rashes, ulcers, erythema  Neuro:  CNII-XII grossly intact  Psych:  Alert and oriented to person, place, and time      Review / Management:     LABS:  Reviewed CBC, CMP, Fe Panel, B Vitamin Panel & discussed with pt.    VISIT DIAGNOSES:       ICD-10-CM ICD-9-CM   1. Encounter for vitamin deficiency screening  Z13.21 V77.99        ASSESSMENT/PLAN:     Starting " Art Therapy Group Progress Note    PATIENT SCHEDULED FOR GROUP AT:  1:00 PM      GROUP STOP TIME:  1:45 PM      ATTENDANCE:  Moderate (4/11 participants)     TOPIC / FOCUS: Patterned Mandala     GOALS: define mindfulness, practice deep breathing, promote concentration, disrupt negative thinking, increase autonomy    PARTICIPATION LEVEL:   [address] [facility]  Art Therapist, MA    Pt reported that she was going to attend group, but did not enter the day area during group session to participate. Weight 247   2 Year Weight 169   4 Year Weight 180   5 Year Weight 188     -  Pt doing well  -  Discussed exercise goals at this time, increase cardio  -  Mindful snacking habits      RTC  3 mo

## 2024-01-23 ENCOUNTER — HOSPITAL ENCOUNTER (EMERGENCY)
Facility: HOSPITAL | Age: 69
Discharge: HOME | End: 2024-01-23
Payer: MEDICARE

## 2024-01-23 ENCOUNTER — APPOINTMENT (OUTPATIENT)
Facility: HOSPITAL | Age: 69
End: 2024-01-23
Payer: MEDICARE

## 2024-01-23 VITALS
WEIGHT: 98 LBS | OXYGEN SATURATION: 98 % | HEIGHT: 62 IN | BODY MASS INDEX: 18.03 KG/M2 | DIASTOLIC BLOOD PRESSURE: 84 MMHG | TEMPERATURE: 98.1 F | SYSTOLIC BLOOD PRESSURE: 146 MMHG | HEART RATE: 74 BPM | RESPIRATION RATE: 20 BRPM

## 2024-01-23 DIAGNOSIS — R53.82 CHRONIC FATIGUE: Primary | ICD-10-CM

## 2024-01-23 DIAGNOSIS — R05.9 COUGH, UNSPECIFIED TYPE: ICD-10-CM

## 2024-01-23 DIAGNOSIS — R51.9 ACUTE NONINTRACTABLE HEADACHE, UNSPECIFIED HEADACHE TYPE: ICD-10-CM

## 2024-01-23 LAB
ALBUMIN SERPL-MCNC: 3.8 G/DL (ref 3.4–5)
ALBUMIN/GLOB SERPL: 1.2 (ref 0.8–1.7)
ALP SERPL-CCNC: 60 U/L (ref 45–117)
ALT SERPL-CCNC: 13 U/L (ref 13–56)
ANION GAP SERPL CALC-SCNC: 5 MMOL/L (ref 3–18)
APPEARANCE UR: CLEAR
AST SERPL-CCNC: 10 U/L (ref 10–38)
B PERT DNA SPEC QL NAA+PROBE: NOT DETECTED
BACTERIA URNS QL MICRO: ABNORMAL /HPF
BASOPHILS # BLD: 0.1 K/UL (ref 0–0.1)
BASOPHILS NFR BLD: 1 % (ref 0–2)
BILIRUB SERPL-MCNC: 0.4 MG/DL (ref 0.2–1)
BILIRUB UR QL: NEGATIVE
BORDETELLA PARAPERTUSSIS BY PCR: NOT DETECTED
BUN SERPL-MCNC: 6 MG/DL (ref 7–18)
BUN/CREAT SERPL: 7 (ref 12–20)
C PNEUM DNA SPEC QL NAA+PROBE: NOT DETECTED
CALCIUM SERPL-MCNC: 9.8 MG/DL (ref 8.5–10.1)
CHLORIDE SERPL-SCNC: 108 MMOL/L (ref 100–111)
CO2 SERPL-SCNC: 26 MMOL/L (ref 21–32)
COLOR UR: YELLOW
CREAT SERPL-MCNC: 0.85 MG/DL (ref 0.6–1.3)
DIFFERENTIAL METHOD BLD: ABNORMAL
EOSINOPHIL # BLD: 0 K/UL (ref 0–0.4)
EOSINOPHIL NFR BLD: 0 % (ref 0–5)
EPITH CASTS URNS QL MICRO: ABNORMAL /LPF (ref 0–5)
ERYTHROCYTE [DISTWIDTH] IN BLOOD BY AUTOMATED COUNT: 13.5 % (ref 11.6–14.5)
FLUAV SUBTYP SPEC NAA+PROBE: NOT DETECTED
FLUBV RNA SPEC QL NAA+PROBE: NOT DETECTED
GLOBULIN SER CALC-MCNC: 3.2 G/DL (ref 2–4)
GLUCOSE SERPL-MCNC: 123 MG/DL (ref 74–99)
GLUCOSE UR STRIP.AUTO-MCNC: NEGATIVE MG/DL
HADV DNA SPEC QL NAA+PROBE: NOT DETECTED
HCOV 229E RNA SPEC QL NAA+PROBE: NOT DETECTED
HCOV HKU1 RNA SPEC QL NAA+PROBE: NOT DETECTED
HCOV NL63 RNA SPEC QL NAA+PROBE: NOT DETECTED
HCOV OC43 RNA SPEC QL NAA+PROBE: NOT DETECTED
HCT VFR BLD AUTO: 37.5 % (ref 35–45)
HGB BLD-MCNC: 12.2 G/DL (ref 12–16)
HGB UR QL STRIP: ABNORMAL
HMPV RNA SPEC QL NAA+PROBE: NOT DETECTED
HPIV1 RNA SPEC QL NAA+PROBE: NOT DETECTED
HPIV2 RNA SPEC QL NAA+PROBE: NOT DETECTED
HPIV3 RNA SPEC QL NAA+PROBE: NOT DETECTED
HPIV4 RNA SPEC QL NAA+PROBE: NOT DETECTED
IMM GRANULOCYTES # BLD AUTO: 0 K/UL (ref 0–0.04)
IMM GRANULOCYTES NFR BLD AUTO: 0 % (ref 0–0.5)
KETONES UR QL STRIP.AUTO: NEGATIVE MG/DL
LEUKOCYTE ESTERASE UR QL STRIP.AUTO: NEGATIVE
LYMPHOCYTES # BLD: 3.7 K/UL (ref 0.9–3.6)
LYMPHOCYTES NFR BLD: 33 % (ref 21–52)
M PNEUMO DNA SPEC QL NAA+PROBE: NOT DETECTED
MAGNESIUM SERPL-MCNC: 2.1 MG/DL (ref 1.6–2.6)
MCH RBC QN AUTO: 31.5 PG (ref 24–34)
MCHC RBC AUTO-ENTMCNC: 32.5 G/DL (ref 31–37)
MCV RBC AUTO: 96.9 FL (ref 78–100)
MONOCYTES # BLD: 0.7 K/UL (ref 0.05–1.2)
MONOCYTES NFR BLD: 6 % (ref 3–10)
NEUTS SEG # BLD: 6.5 K/UL (ref 1.8–8)
NEUTS SEG NFR BLD: 59 % (ref 40–73)
NITRITE UR QL STRIP.AUTO: NEGATIVE
NRBC # BLD: 0 K/UL (ref 0–0.01)
NRBC BLD-RTO: 0 PER 100 WBC
PH UR STRIP: 6 (ref 5–8)
PLATELET # BLD AUTO: 297 K/UL (ref 135–420)
PMV BLD AUTO: 11.2 FL (ref 9.2–11.8)
POTASSIUM SERPL-SCNC: 3.3 MMOL/L (ref 3.5–5.5)
PROT SERPL-MCNC: 7 G/DL (ref 6.4–8.2)
PROT UR STRIP-MCNC: NEGATIVE MG/DL
RBC # BLD AUTO: 3.87 M/UL (ref 4.2–5.3)
RBC #/AREA URNS HPF: ABNORMAL /HPF (ref 0–5)
RSV RNA SPEC QL NAA+PROBE: NOT DETECTED
RV+EV RNA SPEC QL NAA+PROBE: NOT DETECTED
SARS-COV-2 RNA RESP QL NAA+PROBE: NOT DETECTED
SODIUM SERPL-SCNC: 139 MMOL/L (ref 136–145)
SP GR UR REFRACTOMETRY: <1.005 (ref 1–1.03)
T3FREE SERPL-MCNC: 2.6 PG/ML (ref 2.18–3.98)
TROPONIN I SERPL HS-MCNC: 5 NG/L (ref 0–54)
TSH SERPL DL<=0.05 MIU/L-ACNC: 1.55 UIU/ML (ref 0.36–3.74)
UROBILINOGEN UR QL STRIP.AUTO: 0.2 EU/DL (ref 0.2–1)
WBC # BLD AUTO: 11 K/UL (ref 4.6–13.2)
WBC URNS QL MICRO: ABNORMAL /HPF (ref 0–4)

## 2024-01-23 PROCEDURE — 80053 COMPREHEN METABOLIC PANEL: CPT

## 2024-01-23 PROCEDURE — 84443 ASSAY THYROID STIM HORMONE: CPT

## 2024-01-23 PROCEDURE — 93005 ELECTROCARDIOGRAM TRACING: CPT | Performed by: PHYSICIAN ASSISTANT

## 2024-01-23 PROCEDURE — 81001 URINALYSIS AUTO W/SCOPE: CPT

## 2024-01-23 PROCEDURE — 84481 FREE ASSAY (FT-3): CPT

## 2024-01-23 PROCEDURE — 85025 COMPLETE CBC W/AUTO DIFF WBC: CPT

## 2024-01-23 PROCEDURE — 0202U NFCT DS 22 TRGT SARS-COV-2: CPT

## 2024-01-23 PROCEDURE — 71045 X-RAY EXAM CHEST 1 VIEW: CPT

## 2024-01-23 PROCEDURE — 6370000000 HC RX 637 (ALT 250 FOR IP): Performed by: PHYSICIAN ASSISTANT

## 2024-01-23 PROCEDURE — 84484 ASSAY OF TROPONIN QUANT: CPT

## 2024-01-23 PROCEDURE — 99285 EMERGENCY DEPT VISIT HI MDM: CPT

## 2024-01-23 PROCEDURE — 83735 ASSAY OF MAGNESIUM: CPT

## 2024-01-23 RX ORDER — POTASSIUM CHLORIDE 20 MEQ/1
40 TABLET, EXTENDED RELEASE ORAL
Status: COMPLETED | OUTPATIENT
Start: 2024-01-23 | End: 2024-01-23

## 2024-01-23 RX ORDER — ACETAMINOPHEN 325 MG/1
325 TABLET ORAL
Status: COMPLETED | OUTPATIENT
Start: 2024-01-23 | End: 2024-01-23

## 2024-01-23 RX ORDER — HYDROCODONE BITARTRATE AND ACETAMINOPHEN 5; 325 MG/1; MG/1
1 TABLET ORAL
Status: COMPLETED | OUTPATIENT
Start: 2024-01-23 | End: 2024-01-23

## 2024-01-23 RX ORDER — NAPROXEN 500 MG/1
500 TABLET ORAL 2 TIMES DAILY
Qty: 30 TABLET | Refills: 0 | Status: SHIPPED | OUTPATIENT
Start: 2024-01-23

## 2024-01-23 RX ADMIN — ACETAMINOPHEN 325MG 325 MG: 325 TABLET ORAL at 20:39

## 2024-01-23 RX ADMIN — POTASSIUM CHLORIDE 40 MEQ: 1500 TABLET, EXTENDED RELEASE ORAL at 18:53

## 2024-01-23 RX ADMIN — HYDROCODONE BITARTRATE AND ACETAMINOPHEN 1 TABLET: 5; 325 TABLET ORAL at 20:39

## 2024-01-23 ASSESSMENT — ENCOUNTER SYMPTOMS
VOMITING: 0
ABDOMINAL PAIN: 0
WHEEZING: 0
COUGH: 1
STRIDOR: 0
EYE DISCHARGE: 0
BACK PAIN: 0
SORE THROAT: 0
SHORTNESS OF BREATH: 1
EYE REDNESS: 0
NAUSEA: 0
RHINORRHEA: 0

## 2024-01-23 ASSESSMENT — PAIN DESCRIPTION - LOCATION: LOCATION: HEAD

## 2024-01-23 ASSESSMENT — PAIN SCALES - GENERAL: PAINLEVEL_OUTOF10: 10

## 2024-01-23 NOTE — ED PROVIDER NOTES
known as: REMERON  Take 1 tablet by mouth nightly     multivitamin Tabs tablet  Take 1 tablet by mouth daily     selenium sulfide 1 % shampoo  Commonly known as: SELSUN BLUE  Apply topically daily as needed for Itching     vitamin B-1 100 MG tablet  Commonly known as: THIAMINE  Take 1 tablet by mouth daily               Where to Get Your Medications        These medications were sent to [place], [place] - [address] [place] [address] - P [phone] - F [phone]  [facility] [address]      Phone: [phone]   naproxen 500 MG tablet             Diagnosis     Clinical Impression:   1. Chronic fatigue    2. Acute nonintractable headache, unspecified headache type    3. Cough, unspecified type                Leny Montemayor PA-C  01/23/24 [id number]

## 2024-01-25 LAB
EKG ATRIAL RATE: 65 BPM
EKG DIAGNOSIS: NORMAL
EKG P AXIS: 52 DEGREES
EKG P-R INTERVAL: 146 MS
EKG Q-T INTERVAL: 450 MS
EKG QRS DURATION: 128 MS
EKG QTC CALCULATION (BAZETT): 468 MS
EKG R AXIS: 24 DEGREES
EKG T AXIS: 22 DEGREES
EKG VENTRICULAR RATE: 65 BPM

## 2024-01-25 PROCEDURE — 93010 ELECTROCARDIOGRAM REPORT: CPT | Performed by: INTERNAL MEDICINE

## 2024-02-19 RX ORDER — FUROSEMIDE 20 MG/1
TABLET ORAL
Qty: 60 TABLET | Refills: 3 | OUTPATIENT
Start: 2024-02-19

## 2024-03-11 RX ORDER — METOPROLOL SUCCINATE 50 MG/1
50 TABLET, EXTENDED RELEASE ORAL DAILY
Qty: 90 TABLET | Refills: 0 | OUTPATIENT
Start: 2024-03-11

## 2024-04-30 RX ORDER — METOPROLOL SUCCINATE 50 MG/1
50 TABLET, EXTENDED RELEASE ORAL DAILY
Qty: 90 TABLET | Refills: 0 | OUTPATIENT
Start: 2024-04-30

## 2024-08-09 NOTE — DISCHARGE INSTRUCTIONS
Discontinue your lisinopril and metoprolol. You should only be taking amlodipine for your blood pressure. Drink plenty of water and follow-up with your primary care doctor within the week. dentures

## 2024-10-30 RX ORDER — METOPROLOL SUCCINATE 50 MG/1
50 TABLET, EXTENDED RELEASE ORAL DAILY
Qty: 90 TABLET | Refills: 0 | OUTPATIENT
Start: 2024-10-30

## 2024-11-14 ENCOUNTER — OFFICE VISIT (OUTPATIENT)
Facility: CLINIC | Age: 69
End: 2024-11-14

## 2024-11-14 ENCOUNTER — HOSPITAL ENCOUNTER (OUTPATIENT)
Facility: HOSPITAL | Age: 69
Setting detail: SPECIMEN
Discharge: HOME | End: 2024-11-17
Payer: MEDICARE

## 2024-11-14 VITALS
BODY MASS INDEX: 18.58 KG/M2 | HEART RATE: 92 BPM | DIASTOLIC BLOOD PRESSURE: 79 MMHG | HEIGHT: 62 IN | OXYGEN SATURATION: 95 % | SYSTOLIC BLOOD PRESSURE: 134 MMHG | WEIGHT: 101 LBS | TEMPERATURE: 97.4 F

## 2024-11-14 DIAGNOSIS — N17.9 STAGE 3 ACUTE KIDNEY INJURY (HCC): ICD-10-CM

## 2024-11-14 DIAGNOSIS — Z12.2 ENCOUNTER FOR SCREENING FOR MALIGNANT NEOPLASM OF LUNG: ICD-10-CM

## 2024-11-14 DIAGNOSIS — I35.1 MODERATE AORTIC REGURGITATION: ICD-10-CM

## 2024-11-14 DIAGNOSIS — I10 HYPERTENSION, UNSPECIFIED TYPE: Primary | ICD-10-CM

## 2024-11-14 DIAGNOSIS — R63.6 LOW WEIGHT: ICD-10-CM

## 2024-11-14 DIAGNOSIS — E78.5 HYPERLIPIDEMIA, UNSPECIFIED HYPERLIPIDEMIA TYPE: ICD-10-CM

## 2024-11-14 DIAGNOSIS — Z86.79 HISTORY OF RIGHT BUNDLE BRANCH BLOCK (RBBB): ICD-10-CM

## 2024-11-14 DIAGNOSIS — R94.31 QT PROLONGATION: ICD-10-CM

## 2024-11-14 DIAGNOSIS — Z86.39 HISTORY OF THYROID NODULE: ICD-10-CM

## 2024-11-14 DIAGNOSIS — Z23 FLU VACCINE NEED: ICD-10-CM

## 2024-11-14 DIAGNOSIS — I50.20 HFREF (HEART FAILURE WITH REDUCED EJECTION FRACTION) (HCC): ICD-10-CM

## 2024-11-14 DIAGNOSIS — F33.3 MAJOR DEPRESSIVE DISORDER, RECURRENT EPISODE, SEVERE, WITH PSYCHOSIS (HCC): ICD-10-CM

## 2024-11-14 DIAGNOSIS — I25.10 CORONARY ARTERY DISEASE, UNSPECIFIED VESSEL OR LESION TYPE, UNSPECIFIED WHETHER ANGINA PRESENT, UNSPECIFIED WHETHER NATIVE OR TRANSPLANTED HEART: ICD-10-CM

## 2024-11-14 DIAGNOSIS — F17.210 CIGARETTE NICOTINE DEPENDENCE WITHOUT COMPLICATION: ICD-10-CM

## 2024-11-14 DIAGNOSIS — D50.9 IRON DEFICIENCY ANEMIA, UNSPECIFIED IRON DEFICIENCY ANEMIA TYPE: ICD-10-CM

## 2024-11-14 DIAGNOSIS — I10 HYPERTENSION, UNSPECIFIED TYPE: ICD-10-CM

## 2024-11-14 DIAGNOSIS — F10.11 HISTORY OF ETOH ABUSE: ICD-10-CM

## 2024-11-14 LAB
ALBUMIN SERPL-MCNC: 3.9 G/DL (ref 3.4–5)
ALBUMIN/GLOB SERPL: 1.3 (ref 0.8–1.7)
ALP SERPL-CCNC: 61 U/L (ref 45–117)
ALT SERPL-CCNC: 15 U/L (ref 13–56)
ANION GAP SERPL CALC-SCNC: 7 MMOL/L (ref 3–18)
AST SERPL-CCNC: 16 U/L (ref 10–38)
BASOPHILS # BLD: 0 K/UL (ref 0–0.1)
BASOPHILS NFR BLD: 0 % (ref 0–2)
BILIRUB SERPL-MCNC: 0.6 MG/DL (ref 0.2–1)
BUN SERPL-MCNC: 5 MG/DL (ref 7–18)
BUN/CREAT SERPL: 7 (ref 12–20)
CALCIUM SERPL-MCNC: 10.1 MG/DL (ref 8.5–10.1)
CHLORIDE SERPL-SCNC: 107 MMOL/L (ref 100–111)
CHOLEST SERPL-MCNC: 202 MG/DL
CO2 SERPL-SCNC: 27 MMOL/L (ref 21–32)
CREAT SERPL-MCNC: 0.71 MG/DL (ref 0.6–1.3)
CREAT UR-MCNC: 111 MG/DL (ref 30–125)
DIFFERENTIAL METHOD BLD: ABNORMAL
EOSINOPHIL # BLD: 0 K/UL (ref 0–0.4)
EOSINOPHIL NFR BLD: 0 % (ref 0–5)
ERYTHROCYTE [DISTWIDTH] IN BLOOD BY AUTOMATED COUNT: 13.8 % (ref 11.6–14.5)
GLOBULIN SER CALC-MCNC: 3 G/DL (ref 2–4)
GLUCOSE SERPL-MCNC: 97 MG/DL (ref 74–99)
HCT VFR BLD AUTO: 37.5 % (ref 35–45)
HDLC SERPL-MCNC: 90 MG/DL (ref 40–60)
HDLC SERPL: 2.2 (ref 0–5)
HGB BLD-MCNC: 12.1 G/DL (ref 12–16)
IMM GRANULOCYTES # BLD AUTO: 0 K/UL (ref 0–0.04)
IMM GRANULOCYTES NFR BLD AUTO: 0 % (ref 0–0.5)
LDLC SERPL CALC-MCNC: 92 MG/DL (ref 0–100)
LIPID PANEL: ABNORMAL
LYMPHOCYTES # BLD: 3.1 K/UL (ref 0.9–3.6)
LYMPHOCYTES NFR BLD: 32 % (ref 21–52)
MCH RBC QN AUTO: 30.9 PG (ref 24–34)
MCHC RBC AUTO-ENTMCNC: 32.3 G/DL (ref 31–37)
MCV RBC AUTO: 95.9 FL (ref 78–100)
MICROALBUMIN UR-MCNC: 1.13 MG/DL (ref 0–3)
MICROALBUMIN/CREAT UR-RTO: 10 MG/G (ref 0–30)
MONOCYTES # BLD: 0.9 K/UL (ref 0.05–1.2)
MONOCYTES NFR BLD: 9 % (ref 3–10)
NEUTS SEG # BLD: 5.6 K/UL (ref 1.8–8)
NEUTS SEG NFR BLD: 58 % (ref 40–73)
NRBC # BLD: 0 K/UL (ref 0–0.01)
NRBC BLD-RTO: 0 PER 100 WBC
PLATELET # BLD AUTO: 320 K/UL (ref 135–420)
PMV BLD AUTO: 11.4 FL (ref 9.2–11.8)
POTASSIUM SERPL-SCNC: 3.1 MMOL/L (ref 3.5–5.5)
PROT SERPL-MCNC: 6.9 G/DL (ref 6.4–8.2)
RBC # BLD AUTO: 3.91 M/UL (ref 4.2–5.3)
SODIUM SERPL-SCNC: 141 MMOL/L (ref 136–145)
TRIGL SERPL-MCNC: 100 MG/DL
VLDLC SERPL CALC-MCNC: 20 MG/DL
WBC # BLD AUTO: 9.7 K/UL (ref 4.6–13.2)

## 2024-11-14 PROCEDURE — 85025 COMPLETE CBC W/AUTO DIFF WBC: CPT

## 2024-11-14 PROCEDURE — 80061 LIPID PANEL: CPT

## 2024-11-14 PROCEDURE — 82570 ASSAY OF URINE CREATININE: CPT

## 2024-11-14 PROCEDURE — 82043 UR ALBUMIN QUANTITATIVE: CPT

## 2024-11-14 PROCEDURE — 36415 COLL VENOUS BLD VENIPUNCTURE: CPT

## 2024-11-14 PROCEDURE — 80053 COMPREHEN METABOLIC PANEL: CPT

## 2024-11-14 RX ORDER — FERROUS SULFATE 325(65) MG
325 TABLET ORAL
Qty: 30 TABLET | Refills: 0 | Status: SHIPPED | OUTPATIENT
Start: 2024-11-14 | End: 2024-11-14

## 2024-11-14 RX ORDER — AMLODIPINE BESYLATE 5 MG/1
5 TABLET ORAL DAILY
Qty: 30 TABLET | Refills: 0
Start: 2024-11-14

## 2024-11-14 RX ORDER — ATORVASTATIN CALCIUM 20 MG/1
20 TABLET, FILM COATED ORAL DAILY
Qty: 30 TABLET | Refills: 1
Start: 2024-11-14

## 2024-11-14 RX ORDER — FUROSEMIDE 20 MG/1
20 TABLET ORAL DAILY
Qty: 30 TABLET | Refills: 1 | Status: SHIPPED | OUTPATIENT
Start: 2024-11-14

## 2024-11-14 RX ORDER — AMLODIPINE BESYLATE 5 MG/1
5 TABLET ORAL DAILY
COMMUNITY
End: 2024-11-14 | Stop reason: SDUPTHER

## 2024-11-14 RX ORDER — ATORVASTATIN CALCIUM 20 MG/1
20 TABLET, FILM COATED ORAL DAILY
COMMUNITY
End: 2024-11-14 | Stop reason: SDUPTHER

## 2024-11-14 RX ORDER — ASPIRIN 81 MG/1
81 TABLET ORAL DAILY
Qty: 30 TABLET | Refills: 0 | Status: SHIPPED | OUTPATIENT
Start: 2024-11-14 | End: 2024-11-14

## 2024-11-14 RX ORDER — OSELTAMIVIR PHOSPHATE 75 MG/1
CAPSULE ORAL
COMMUNITY
Start: 2024-11-11 | End: 2024-11-14 | Stop reason: ALTCHOICE

## 2024-11-14 RX ORDER — ASPIRIN 81 MG/1
81 TABLET ORAL DAILY
Qty: 30 TABLET | Refills: 0
Start: 2024-11-14

## 2024-11-14 RX ORDER — FERROUS SULFATE 325(65) MG
325 TABLET ORAL
Qty: 30 TABLET | Refills: 0
Start: 2024-11-14

## 2024-11-14 RX ORDER — MIRTAZAPINE 30 MG/1
30 TABLET, FILM COATED ORAL NIGHTLY
Qty: 30 TABLET | Refills: 0
Start: 2024-11-14

## 2024-11-14 SDOH — ECONOMIC STABILITY: INCOME INSECURITY: HOW HARD IS IT FOR YOU TO PAY FOR THE VERY BASICS LIKE FOOD, HOUSING, MEDICAL CARE, AND HEATING?: HARD

## 2024-11-14 SDOH — ECONOMIC STABILITY: FOOD INSECURITY: WITHIN THE PAST 12 MONTHS, YOU WORRIED THAT YOUR FOOD WOULD RUN OUT BEFORE YOU GOT MONEY TO BUY MORE.: SOMETIMES TRUE

## 2024-11-14 SDOH — ECONOMIC STABILITY: FOOD INSECURITY: WITHIN THE PAST 12 MONTHS, THE FOOD YOU BOUGHT JUST DIDN'T LAST AND YOU DIDN'T HAVE MONEY TO GET MORE.: NEVER TRUE

## 2024-11-14 ASSESSMENT — PATIENT HEALTH QUESTIONNAIRE - PHQ9
SUM OF ALL RESPONSES TO PHQ QUESTIONS 1-9: 18
2. FEELING DOWN, DEPRESSED OR HOPELESS: NEARLY EVERY DAY
9. THOUGHTS THAT YOU WOULD BE BETTER OFF DEAD, OR OF HURTING YOURSELF: NOT AT ALL
SUM OF ALL RESPONSES TO PHQ QUESTIONS 1-9: 18
5. POOR APPETITE OR OVEREATING: NEARLY EVERY DAY
SUM OF ALL RESPONSES TO PHQ9 QUESTIONS 1 & 2: 6
SUM OF ALL RESPONSES TO PHQ QUESTIONS 1-9: 18
SUM OF ALL RESPONSES TO PHQ QUESTIONS 1-9: 18
2. FEELING DOWN, DEPRESSED OR HOPELESS: NEARLY EVERY DAY
8. MOVING OR SPEAKING SO SLOWLY THAT OTHER PEOPLE COULD HAVE NOTICED. OR THE OPPOSITE, BEING SO FIGETY OR RESTLESS THAT YOU HAVE BEEN MOVING AROUND A LOT MORE THAN USUAL: NOT AT ALL
SUM OF ALL RESPONSES TO PHQ QUESTIONS 1-9: 18
5. POOR APPETITE OR OVEREATING: NEARLY EVERY DAY
SUM OF ALL RESPONSES TO PHQ QUESTIONS 1-9: 18
3. TROUBLE FALLING OR STAYING ASLEEP: NEARLY EVERY DAY
8. MOVING OR SPEAKING SO SLOWLY THAT OTHER PEOPLE COULD HAVE NOTICED. OR THE OPPOSITE, BEING SO FIGETY OR RESTLESS THAT YOU HAVE BEEN MOVING AROUND A LOT MORE THAN USUAL: NOT AT ALL
10. IF YOU CHECKED OFF ANY PROBLEMS, HOW DIFFICULT HAVE THESE PROBLEMS MADE IT FOR YOU TO DO YOUR WORK, TAKE CARE OF THINGS AT HOME, OR GET ALONG WITH OTHER PEOPLE: EXTREMELY DIFFICULT
4. FEELING TIRED OR HAVING LITTLE ENERGY: NEARLY EVERY DAY
SUM OF ALL RESPONSES TO PHQ QUESTIONS 1-9: 18
1. LITTLE INTEREST OR PLEASURE IN DOING THINGS: NEARLY EVERY DAY
9. THOUGHTS THAT YOU WOULD BE BETTER OFF DEAD, OR OF HURTING YOURSELF: NOT AT ALL
1. LITTLE INTEREST OR PLEASURE IN DOING THINGS: NEARLY EVERY DAY
6. FEELING BAD ABOUT YOURSELF - OR THAT YOU ARE A FAILURE OR HAVE LET YOURSELF OR YOUR FAMILY DOWN: NEARLY EVERY DAY
6. FEELING BAD ABOUT YOURSELF - OR THAT YOU ARE A FAILURE OR HAVE LET YOURSELF OR YOUR FAMILY DOWN: NEARLY EVERY DAY
4. FEELING TIRED OR HAVING LITTLE ENERGY: NEARLY EVERY DAY
3. TROUBLE FALLING OR STAYING ASLEEP: NEARLY EVERY DAY
7. TROUBLE CONCENTRATING ON THINGS, SUCH AS READING THE NEWSPAPER OR WATCHING TELEVISION: NOT AT ALL
SUM OF ALL RESPONSES TO PHQ QUESTIONS 1-9: 18
SUM OF ALL RESPONSES TO PHQ9 QUESTIONS 1 & 2: 6
10. IF YOU CHECKED OFF ANY PROBLEMS, HOW DIFFICULT HAVE THESE PROBLEMS MADE IT FOR YOU TO DO YOUR WORK, TAKE CARE OF THINGS AT HOME, OR GET ALONG WITH OTHER PEOPLE: EXTREMELY DIFFICULT

## 2024-11-14 NOTE — PROGRESS NOTES
\"Have you been to the ER, urgent care clinic since your last visit?  Hospitalized since your last visit?\"    Yes - [facility] over summer    “Have you seen or consulted any other health care providers outside our system since your last visit?”    NO    Have you had a mammogram?”   NO    No breast cancer screening on file       “Have you had a colorectal cancer screening such as a colonoscopy/FIT/Cologuard?    NO    No colonoscopy on file  No cologuard on file  Date of last FIT: 11/20/2021   No flexible sigmoidoscopy on file          
Medications   Medication Sig Dispense Refill    furosemide (LASIX) 20 MG tablet Take 1 tablet by mouth daily 30 tablet 1    amLODIPine (NORVASC) 5 MG tablet Take 1 tablet by mouth daily 30 tablet 0    mirtazapine (REMERON) 30 MG tablet Take 1 tablet by mouth nightly 30 tablet 0    atorvastatin (LIPITOR) 20 MG tablet Take 1 tablet by mouth daily 30 tablet 1    FLUoxetine (PROZAC) 20 MG capsule Take 1 capsule by mouth daily 30 capsule 1    ferrous sulfate (IRON 325) 325 (65 Fe) MG tablet Take 1 tablet by mouth daily (with breakfast) 30 tablet 0    aspirin 81 MG EC tablet Take 1 tablet by mouth daily 30 tablet 0    docusate (COLACE, DULCOLAX) 100 MG CAPS Take 100 mg by mouth 2 times daily 60 capsule 0    Multiple Vitamin (MULTIVITAMIN) TABS tablet Take 1 tablet by mouth daily 30 tablet 0    thiamine mononitrate (THIAMINE) 100 MG tablet Take 1 tablet by mouth daily 30 tablet 0    selenium sulfide (SELSUN BLUE) 1 % shampoo Apply topically daily as needed for Itching (Patient not taking: Reported on 11/14/2024) 207 mL 0     No current facility-administered medications for this visit.      Allergies   Allergen Reactions    Pork-Derived Products Other (See Comments)      Family History   Problem Relation Age of Onset    Diabetes Paternal Aunt     Cancer Paternal Aunt     Alzheimer's Disease Maternal Aunt     Diabetes Maternal Aunt     Cancer Maternal Aunt             OBJECTIVE  Vitals:    11/14/24 1329 11/14/24 1354   BP: (!) 152/81 134/79   Pulse: 92 92   Temp: 97.4 °F (36.3 °C)    SpO2: 95% 95%   Weight: 45.8 kg (101 lb)    Height: 1.575 m (5' 2\")      Body mass index is 18.47 kg/m².   Wt Readings from Last 3 Encounters:   11/14/24 45.8 kg (101 lb)   01/23/24 44.5 kg (98 lb)   07/23/23 39.5 kg (87 lb)     General:  alert, cooperative, well appearing, in no apparent distress.  Eyes:   The conjunctiva is clear and noninjected.  There is no sclera icterus or conjunctival pallor.  CV:  The heart sounds are regular in rate and

## 2024-11-15 ENCOUNTER — TELEPHONE (OUTPATIENT)
Facility: CLINIC | Age: 69
End: 2024-11-15

## 2024-11-15 DIAGNOSIS — E87.6 HYPOKALEMIA: Primary | ICD-10-CM

## 2024-11-15 RX ORDER — POTASSIUM CHLORIDE 750 MG/1
10 TABLET, EXTENDED RELEASE ORAL DAILY
Qty: 90 TABLET | Refills: 0 | Status: SHIPPED | OUTPATIENT
Start: 2024-11-15

## 2024-11-15 NOTE — TELEPHONE ENCOUNTER
Spoke to patient about low potassium  Pt states not taking potassium supplements  Will start patient on daily potassium supplements , likely due to water pill  Patient understand, will follow up in clinic for blood work     Dr Cross

## 2024-11-15 NOTE — PATIENT INSTRUCTIONS
[facility] Housing Resources*      For emergency housing call Housing Hotlines:   [facility] Housing Crisis Hotline 649-185-3130 (ForKids)      [facility] ([facility]  [address], [place] [address] [phone]  Helpful Info:  [facility] coordinates with other organizations to utilize supportive services to move homeless individuals from dependency to self-sufficiency.   No same day service. Must call to complete pre-intake screening with staff member. There is a current waitlist for program. Shower and laundry available Monday-Friday.     [facility] [phone]  [address] [address]  Helpful info: Shelters provide food, shelters, resources, prayers for men in the [facility]    [facility]  Supportive housing program for homeless women and children.   Must contact Regional Crisis Hotline at 125-898-1266 for information on program.   WEBSITE: [email or web address]  What they offer: Transitional housing for single mothers and pregnant women 18-35.   Website: https://[email or web address] [facility].org/need-help/  To apply: click on link above to answer a few questions or call [phone]  [address] [address] [phone]   Helpful Info: Open 24/7, 365 days a year. Emergency shelter for Men, Single Women, and Women with children.  Residents receive 3 meals a day, clothing, toiletries, shower and laundry services, case management, and counseling. Call for bed availability.     [facility]  [address] [place] [address] [phone]  Website: https://[employer].org/  Helpful Info: Referrals are ONLY accepted through the Housing Crisis Line ([phone]). Limited space/beds available.  Their program provides short-term subsidized shelter,

## 2024-11-21 ENCOUNTER — TELEPHONE (OUTPATIENT)
Facility: CLINIC | Age: 69
End: 2024-11-21

## 2024-11-21 NOTE — TELEPHONE ENCOUNTER
Practice Manager from [facility], Dr. Katz office called regarding the Release of Information that was filled out by patient. They stated that they need additional information as to why the records are being requested due to HIPAA. I advised that the form stated that the entire record is being requested because the patient has established care with one of our providers and the form has been filled out properly. She stated that until we update the request they are not sending any records and disconnected the call. I spoke with practice manager Mariusz and she adv to call the patient to advise them to send the records because we cannot add any additional information on the form

## 2025-01-02 DIAGNOSIS — I50.20 HFREF (HEART FAILURE WITH REDUCED EJECTION FRACTION) (HCC): ICD-10-CM

## 2025-01-02 NOTE — TELEPHONE ENCOUNTER
Last appointment: 11/14/2024    Next appointment: none    Pharmacy requesting 90 day refill for     furosemide (LASIX) 20 MG tablet [[id number]]    Pharmacy     [id number] [place] [id number] - P [phone] - F [phone] (Pharmacy)

## 2025-01-07 RX ORDER — FUROSEMIDE 20 MG/1
20 TABLET ORAL DAILY
Qty: 90 TABLET | Refills: 0 | Status: SHIPPED | OUTPATIENT
Start: 2025-01-07

## 2025-01-28 NOTE — ED NOTES
Patient complains she is not able to find her phone. Nurse looked under the bed, on the counters, pulled all of the blankets off of patient and on the sides of bed. There was no phone present. I never saw a phone in her possession. CHIEF COMPLAINT: sob    HISTORY OF PRESENT ILLNESS:  76F w/ hx of ETOH in past, PAD w/ b/l carotid artery stenosis, HFpEF EF 64% w/ severe AS, COPD, HTN, CAD, RLE DVT 8/2023 w/ hx of R hip infection p/w worsening R hip pain. Patient is poor historian, not answering all questions she is being asked, continued requests for pain medication. Patient endorses being admitted to [facility] for multiple days for hip pain. States she did not receive antibiotics. Cannot specify nature of her admission. States she fell in front of [employer] before [place] admission but not since. States she was able to bear weight after leaving hospital around 3 days ago but pain has progressively worsened and does not think she can ambulate now. Denies fevers, chills or additional trauma. States she does not remember her medications and is not compliant with many of them outside of hypertension.     In ER: Given Tylenol 1gm IVPB, Duoneb x2, Morphine 4mg IV x1    Allergies    No Known Allergies    Intolerances    	    MEDICATIONS:  cloNIDine 0.1 milliGRAM(s) Oral every 12 hours  enoxaparin Injectable 40 milliGRAM(s) SubCutaneous every 24 hours  furosemide    Tablet 40 milliGRAM(s) Oral daily      albuterol/ipratropium for Nebulization 3 milliLiter(s) Nebulizer every 6 hours  fluticasone propionate/ salmeterol 250-50 MICROgram(s) Diskus 1 Dose(s) Inhalation two times a day  tiotropium 2.5 MICROgram(s) Inhaler 2 Puff(s) Inhalation daily    acetaminophen     Tablet .. 650 milliGRAM(s) Oral every 6 hours PRN  gabapentin 400 milliGRAM(s) Oral two times a day  HYDROmorphone  Injectable 0.5 milliGRAM(s) IV Push every 6 hours PRN  melatonin 3 milliGRAM(s) Oral at bedtime PRN  ondansetron Injectable 4 milliGRAM(s) IV Push every 8 hours PRN    polyethylene glycol 3350 17 Gram(s) Oral daily  senna 2 Tablet(s) Oral at bedtime    atorvastatin 40 milliGRAM(s) Oral at bedtime    thiamine 100 milliGRAM(s) Oral daily      PAST MEDICAL & SURGICAL HISTORY:  Hypertension      Hyperlipidemia      CAD (coronary artery disease)      Migraine      Anxiety      Emphysema, unspecified      HTN (hypertension)      Anxiety      Coronary artery disease      Stented coronary artery      Seizure      Alcohol abuse      Migraine      Pain of right hip joint      MRSA bacteremia      Essential hypertension      CAD (coronary artery disease)      Elevated brain natriuretic peptide (BNP) level      S/P bladder repair      Status post total hip replacement, right  5/21/18      History of arthroplasty of right hip          FAMILY HISTORY:  Family history of coronary artery disease in daughter (Child)    Family history of essential hypertension        SOCIAL HISTORY:    per hpi    REVIEW OF SYSTEMS:  See HPI, otherwise complete 10 point review of systems negative    [ ] All others negative	      PHYSICAL EXAM:  T(C): 36.6 (01-28-25 @ 05:35), Max: 36.9 (01-27-25 @ 14:40)  HR: 76 (01-28-25 @ 05:35) (76 - 97)  BP: 162/66 (01-28-25 @ 05:35) (107/60 - 194/74)  RR: 18 (01-28-25 @ 05:35) (16 - 18)  SpO2: 96% (01-28-25 @ 05:35) (93% - 100%)  Wt(kg): --  I&O's Summary      Appearance: No Acute Distress	  HEENT:  Normal oral mucosa, PERRL, EOMI	  Cardiovascular: Normal S1 S2, No JVD, 2/6 gardenia  Respiratory: Lungs clear to auscultation bilaterally  Gastrointestinal:  Soft, Non-tender, + BS	  Skin: No rashes, No ecchymoses, No cyanosis	  Neurologic: Non-focal  Extremities: No clubbing, cyanosis or edema  Vascular: Peripheral pulses palpable 2+ bilaterally  Psychiatry: A & O x 3, Mood & affect appropriate    Laboratory Data:	 	    CBC Full  -  ( 27 Jan 2025 22:26 )  WBC Count : 5.81 K/uL  Hemoglobin : 11.1 g/dL  Hematocrit : 36.0 %  Platelet Count - Automated : 225 K/uL  Mean Cell Volume : 100.8 fl  Mean Cell Hemoglobin : 31.1 pg  Mean Cell Hemoglobin Concentration : 30.8 g/dL  Auto Neutrophil # : 4.32 K/uL  Auto Lymphocyte # : 0.57 K/uL  Auto Monocyte # : 0.80 K/uL  Auto Eosinophil # : 0.07 K/uL  Auto Basophil # : 0.02 K/uL  Auto Neutrophil % : 74.4 %  Auto Lymphocyte % : 9.8 %  Auto Monocyte % : 13.8 %  Auto Eosinophil % : 1.2 %  Auto Basophil % : 0.3 %    01-27    141  |  103  |  29[H]  ----------------------------<  189[H]  4.7   |  29  |  1.08  01-27    141  |  103  |  30[H]  ----------------------------<  81  4.8   |  26  |  0.93    Ca    8.4      27 Jan 2025 22:26  Ca    8.3[L]      27 Jan 2025 16:35  Mg     2.1     01-27    TPro  6.7  /  Alb  2.9[L]  /  TBili  0.3  /  DBili  x   /  AST  15  /  ALT  11  /  AlkPhos  109  01-27  TPro  6.9  /  Alb  2.9[L]  /  TBili  0.3  /  DBili  x   /  AST  24  /  ALT  15  /  AlkPhos  121[H]  01-27      proBNP:   Lipid Profile:   HgA1c:   TSH:       CARDIAC MARKERS:            Interpretation of Telemetry: 	    ECG:  	  RADIOLOGY:  OTHER: 	    PREVIOUS DIAGNOSTIC TESTING:    [ ] Echocardiogram:  [ ] Catheterization:  [ ] Stress Test:  	    Assessment:  76F w/ hx of ETOH in past, PAD w/ b/l carotid artery stenosis, HFpEF EF 64% w/ severe AS, COPD, HTN, CAD, RLE DVT 8/2023 w/ hx of R hip infection p/w worsening R hip pain and hypoxic resp failure    Recs:  cardiac stable  no e/o acs  cw antiplatelet, statin and antianginals for cad/stent. denies chest pain. hold off on ischemic eval for now   appears vol up - change lasix to 20mg iv bid. gentle diuresis as patient is preload dependent in setting of severe AS  obtain echo. doubt would be a TAVR candidate in setting of chronic hip infection and risk of endocarditis  suggest pulmonary consult to assist with tx of copd  consider CTA chest to r/o PE  would start xarelto 2.5mg bid for PAD dosing and "ppx for hx of VTE"  f/u ortho recs  will follow              Greater than 60 minutes spent on total encounter; more than 50% of the visit was spent counseling and/or coordinating care by the attending physician.   	  Chaka Lawrence MD   Cardiovascular Diseases  [phone]

## 2025-08-06 ENCOUNTER — TELEPHONE (OUTPATIENT)
Facility: CLINIC | Age: 70
End: 2025-08-06

## (undated) DEVICE — PRESSURE MONITORING SET: Brand: TRUWAVE

## (undated) DEVICE — SET FLD ADMIN 3 W STPCOCK FIX FEM L BOR 1IN

## (undated) DEVICE — CATHETER DIAG AD 4FR L100CM STD NYL JUDKINS R 4 TRULUMEN

## (undated) DEVICE — RADIFOCUS OPTITORQUE ANGIOGRAPHIC CATHETER: Brand: OPTITORQUE

## (undated) DEVICE — PROCEDURE KIT FLUID MGMT 10 FR CUST MAINFOLD

## (undated) DEVICE — BAND HEMOSTAT DRY D-STAT RAD --

## (undated) DEVICE — PACK PROCEDURE SURG VASC CATH 161 MMC LF

## (undated) DEVICE — INTRODUCER SHTH 4FR CANN L11CM DIL TIP 25MM RED TUNGSTEN

## (undated) DEVICE — GLIDESHEATH SLENDER STAINLESS STEEL KIT: Brand: GLIDESHEATH SLENDER

## (undated) DEVICE — COVER US PRB W15XL120CM W/ GEL RUBBERBAND TAPE STRP FLD GEN

## (undated) DEVICE — CATHETER ANGIO 4FR L100CM S STL NYL JL4 3 SEG BRAID SFT

## (undated) DEVICE — ANGIOGRAPHY KIT CUST VASC